# Patient Record
Sex: MALE | NOT HISPANIC OR LATINO | Employment: FULL TIME | URBAN - METROPOLITAN AREA
[De-identification: names, ages, dates, MRNs, and addresses within clinical notes are randomized per-mention and may not be internally consistent; named-entity substitution may affect disease eponyms.]

---

## 2018-06-06 ENCOUNTER — OFFICE VISIT (OUTPATIENT)
Dept: INTERNAL MEDICINE | Facility: CLINIC | Age: 33
End: 2018-06-06
Payer: COMMERCIAL

## 2018-06-06 VITALS
OXYGEN SATURATION: 99 % | HEIGHT: 66 IN | WEIGHT: 151.6 LBS | RESPIRATION RATE: 16 BRPM | TEMPERATURE: 98.2 F | SYSTOLIC BLOOD PRESSURE: 120 MMHG | DIASTOLIC BLOOD PRESSURE: 70 MMHG | HEART RATE: 71 BPM | BODY MASS INDEX: 24.36 KG/M2

## 2018-06-06 DIAGNOSIS — Z11.4 SCREENING FOR HIV (HUMAN IMMUNODEFICIENCY VIRUS): ICD-10-CM

## 2018-06-06 DIAGNOSIS — Z00.00 ROUTINE GENERAL MEDICAL EXAMINATION AT A HEALTH CARE FACILITY: Primary | ICD-10-CM

## 2018-06-06 DIAGNOSIS — Z13.1 SCREENING FOR DIABETES MELLITUS: ICD-10-CM

## 2018-06-06 DIAGNOSIS — R39.9 SYMPTOMS INVOLVING URINARY SYSTEM: ICD-10-CM

## 2018-06-06 DIAGNOSIS — M79.672 PAIN IN BOTH FEET: ICD-10-CM

## 2018-06-06 DIAGNOSIS — M79.671 PAIN IN BOTH FEET: ICD-10-CM

## 2018-06-06 DIAGNOSIS — Z13.6 CARDIOVASCULAR SCREENING; LDL GOAL LESS THAN 160: ICD-10-CM

## 2018-06-06 LAB
ALBUMIN UR-MCNC: NEGATIVE MG/DL
ANION GAP SERPL CALCULATED.3IONS-SCNC: 6 MMOL/L (ref 3–14)
APPEARANCE UR: CLEAR
BILIRUB UR QL STRIP: NEGATIVE
BUN SERPL-MCNC: 8 MG/DL (ref 7–30)
CALCIUM SERPL-MCNC: 9.4 MG/DL (ref 8.5–10.1)
CHLORIDE SERPL-SCNC: 102 MMOL/L (ref 94–109)
CHOLEST SERPL-MCNC: 182 MG/DL
CO2 SERPL-SCNC: 31 MMOL/L (ref 20–32)
COLOR UR AUTO: YELLOW
CREAT SERPL-MCNC: 0.8 MG/DL (ref 0.66–1.25)
FOLATE SERPL-MCNC: 23.4 NG/ML
GFR SERPL CREATININE-BSD FRML MDRD: >90 ML/MIN/1.7M2
GLUCOSE SERPL-MCNC: 91 MG/DL (ref 70–99)
GLUCOSE UR STRIP-MCNC: NEGATIVE MG/DL
HDLC SERPL-MCNC: 39 MG/DL
HGB UR QL STRIP: ABNORMAL
KETONES UR STRIP-MCNC: NEGATIVE MG/DL
LDLC SERPL CALC-MCNC: 113 MG/DL
LEUKOCYTE ESTERASE UR QL STRIP: NEGATIVE
NITRATE UR QL: NEGATIVE
NONHDLC SERPL-MCNC: 143 MG/DL
PH UR STRIP: 6.5 PH (ref 5–7)
POTASSIUM SERPL-SCNC: 3.8 MMOL/L (ref 3.4–5.3)
RBC #/AREA URNS AUTO: ABNORMAL /HPF
SODIUM SERPL-SCNC: 139 MMOL/L (ref 133–144)
SOURCE: ABNORMAL
SP GR UR STRIP: <=1.005 (ref 1–1.03)
TRIGL SERPL-MCNC: 152 MG/DL
UROBILINOGEN UR STRIP-ACNC: 0.2 EU/DL (ref 0.2–1)
VIT B12 SERPL-MCNC: 168 PG/ML (ref 193–986)
WBC #/AREA URNS AUTO: ABNORMAL /HPF

## 2018-06-06 PROCEDURE — 82746 ASSAY OF FOLIC ACID SERUM: CPT | Performed by: INTERNAL MEDICINE

## 2018-06-06 PROCEDURE — 80048 BASIC METABOLIC PNL TOTAL CA: CPT | Performed by: INTERNAL MEDICINE

## 2018-06-06 PROCEDURE — 36415 COLL VENOUS BLD VENIPUNCTURE: CPT | Performed by: INTERNAL MEDICINE

## 2018-06-06 PROCEDURE — 99385 PREV VISIT NEW AGE 18-39: CPT | Performed by: INTERNAL MEDICINE

## 2018-06-06 PROCEDURE — 82607 VITAMIN B-12: CPT | Performed by: INTERNAL MEDICINE

## 2018-06-06 PROCEDURE — 81001 URINALYSIS AUTO W/SCOPE: CPT | Performed by: INTERNAL MEDICINE

## 2018-06-06 PROCEDURE — 87389 HIV-1 AG W/HIV-1&-2 AB AG IA: CPT | Performed by: INTERNAL MEDICINE

## 2018-06-06 PROCEDURE — 80061 LIPID PANEL: CPT | Performed by: INTERNAL MEDICINE

## 2018-06-06 NOTE — LETTER
6/20/2018         Brandon Ordoñez  [address] [facility] [address]            Dear Mr. Ordoñez,    I am writing to inform you of the lab tests you had performed recently.      Your cholesterol results are as follows:    Lab Results   Component Value Date    CHOL 182 06/06/2018    HDL 39 06/06/2018     06/06/2018    TRIG 152 06/06/2018       Additional lab results are as follows:    Kidney function: NORMAL  Urine: NORMAL  Electrolytes: NORMAL  Glucose: NORMAL  Screening test for HIV: NEGATIVE  B12 level: ABNORMALLY LOW    You appear to have some B12 deficiency, which could be contributing to some of your symptoms.  Please follow-up with me in the next few weeks to discuss this further.    Thank you for allowing me to participate in your care.  If you have further questions, please contact us at [phone].      Sincerely,        DEVIN Mirza MD  Dept. of Internal Medicine  [facility]

## 2018-06-06 NOTE — PROGRESS NOTES
SUBJECTIVE:   CC: Brandon Ordoñez is an 33 year old male who presents for preventative health visit.     Healthy Habits:    Do you get at least three servings of calcium containing foods daily (dairy, green leafy vegetables, etc.)? yes    Amount of exercise or daily activities, outside of work: on the job which is like excercise    Problems taking medications regularly No    Medication side effects: No    Have you had an eye exam in the past two years? yes    Do you see a dentist twice per year? no    Do you have sleep apnea, excessive snoring or daytime drowsiness?some drowsiness in the daytime           Today's PHQ-2 Score:   PHQ-2 ( 1999 Pfizer) 6/6/2018   Q1: Little interest or pleasure in doing things 0   Q2: Feeling down, depressed or hopeless 0   PHQ-2 Score 0       Abuse: Current or Past(Physical, Sexual or Emotional)- No  Do you feel safe in your environment - Yes    Social History   Substance Use Topics     Smoking status: Never Smoker     Smokeless tobacco: Never Used     Alcohol use Yes      Comment: Occasional if there is a party       If you drink alcohol do you typically have >3 drinks per day or >7 drinks per week? Not Applicable                      Last PSA: No results found for: PSA    Reviewed orders with patient. Reviewed health maintenance and updated orders accordingly - Yes      Reviewed and updated as needed this visit by clinical staff  Tobacco  Allergies  Meds  Problems  Soc Hx        Reviewed and updated as needed this visit by Provider  Allergies  Meds  Problems          Today's exam accomplished with assistance from phone .    Patient is establishing care today, immigrated to this [place] approximately 8 months ago, did go through routine TB screening process and is up-to-date on tetanus immunization.    Complaining of periods where his urine is darker than usual.  Denies obvious pain or burning with urination, no blood in urine.    Also complains of  "intermittent bilateral foot \"burning.\"  Seems to be worse after prolonged standing and walking.  He is currently employed as a , is on his feet most of the day.    ROS:  CONSTITUTIONAL: NEGATIVE for fever, chills, change in weight  INTEGUMENTARY/SKIN: NEGATIVE for worrisome rashes, moles or lesions  EYES: NEGATIVE for vision changes or irritation  ENT: NEGATIVE for ear, mouth and throat problems  RESP: NEGATIVE for significant cough or SOB  CV: NEGATIVE for chest pain, palpitations or peripheral edema  GI: NEGATIVE for nausea, abdominal pain, heartburn, or change in bowel habits   male: negative for dysuria, hematuria, decreased urinary stream, erectile dysfunction, urethral discharge  MUSCULOSKELETAL: NEGATIVE for significant arthralgias or myalgia  NEURO: NEGATIVE for weakness, dizziness or paresthesias  PSYCHIATRIC: NEGATIVE for changes in mood or affect    OBJECTIVE:   /70  Pulse 71  Temp 98.2  F (36.8  C) (Oral)  Resp 16  Ht 5' 6\" (1.676 m)  Wt 151 lb 9.6 oz (68.8 kg)  SpO2 99%  BMI 24.47 kg/m2  EXAM:  GENERAL: healthy, alert and no distress  NECK: no adenopathy, no asymmetry, masses, or scars and thyroid normal to palpation  RESP: lungs clear to auscultation - no rales, rhonchi or wheezes  CV: regular rate and rhythm, normal S1 S2, no S3 or S4, no murmur, click or rub, no peripheral edema and peripheral pulses strong  ABDOMEN: soft, nontender, no hepatosplenomegaly, no masses and bowel sounds normal  MS: no gross musculoskeletal defects noted, no edema    ASSESSMENT/PLAN:   1. Routine general medical examination at a health care facility  Discussed cardiac disease risk factor modification including screening for and treating HTN, lipids, DM, and avoidance of tobacco.  Also discussed age appropriate cancer screening recommendations including testicular, prostate, colon and lung cancer as dictated by age group.  Recommended low fat, low salt diet and moderation in any alcohol intake. " " Recommended always using seatbelts when in a car.  Recommended never driving after drinking or riding with someone who has been drinking as well.     2. Screening for HIV (human immunodeficiency virus)  Pt agrees to screening  - HIV Antigen Antibody Combo    3. CARDIOVASCULAR SCREENING; LDL GOAL LESS THAN 160    - Lipid panel reflex to direct LDL Fasting    4. Screening for diabetes mellitus    - Basic metabolic panel  (Ca, Cl, CO2, Creat, Gluc, K, Na, BUN)    5. Symptoms involving urinary system    - UA with Microscopic reflex to Culture    6. Pain in both feet    - Folate  - Vitamin B12    COUNSELING:  Reviewed preventive health counseling, as reflected in patient instructions       reports that he has never smoked. He has never used smokeless tobacco.    Estimated body mass index is 24.47 kg/(m^2) as calculated from the following:    Height as of this encounter: 5' 6\" (1.676 m).    Weight as of this encounter: 151 lb 9.6 oz (68.8 kg).       Counseling Resources:  ATP IV Guidelines  Pooled Cohorts Equation Calculator  FRAX Risk Assessment  ICSI Preventive Guidelines  Dietary Guidelines for Americans, 2010  USDA's MyPlate  ASA Prophylaxis  Lung CA Screening    Angelo Mirza MD  [facility]  "

## 2018-06-07 LAB — HIV 1+2 AB+HIV1 P24 AG SERPL QL IA: NONREACTIVE

## 2018-07-30 ENCOUNTER — OFFICE VISIT (OUTPATIENT)
Dept: INTERNAL MEDICINE | Facility: CLINIC | Age: 33
End: 2018-07-30
Payer: COMMERCIAL

## 2018-07-30 VITALS
TEMPERATURE: 98.5 F | HEART RATE: 67 BPM | OXYGEN SATURATION: 99 % | WEIGHT: 150.6 LBS | DIASTOLIC BLOOD PRESSURE: 80 MMHG | SYSTOLIC BLOOD PRESSURE: 115 MMHG | BODY MASS INDEX: 24.31 KG/M2

## 2018-07-30 DIAGNOSIS — E53.8 B12 DEFICIENCY: Primary | ICD-10-CM

## 2018-07-30 DIAGNOSIS — Z23 NEED FOR PROPHYLACTIC VACCINATION WITH TETANUS-DIPHTHERIA (TD): ICD-10-CM

## 2018-07-30 LAB
ERYTHROCYTE [DISTWIDTH] IN BLOOD BY AUTOMATED COUNT: 11.8 % (ref 10–15)
HCT VFR BLD AUTO: 45.2 % (ref 40–53)
HGB BLD-MCNC: 15.6 G/DL (ref 13.3–17.7)
MCH RBC QN AUTO: 29.6 PG (ref 26.5–33)
MCHC RBC AUTO-ENTMCNC: 34.5 G/DL (ref 31.5–36.5)
MCV RBC AUTO: 86 FL (ref 78–100)
PLATELET # BLD AUTO: 236 10E9/L (ref 150–450)
RBC # BLD AUTO: 5.27 10E12/L (ref 4.4–5.9)
WBC # BLD AUTO: 7.4 10E9/L (ref 4–11)

## 2018-07-30 PROCEDURE — 90715 TDAP VACCINE 7 YRS/> IM: CPT | Performed by: INTERNAL MEDICINE

## 2018-07-30 PROCEDURE — 85027 COMPLETE CBC AUTOMATED: CPT | Performed by: INTERNAL MEDICINE

## 2018-07-30 PROCEDURE — 99214 OFFICE O/P EST MOD 30 MIN: CPT | Mod: 25 | Performed by: INTERNAL MEDICINE

## 2018-07-30 PROCEDURE — 90471 IMMUNIZATION ADMIN: CPT | Performed by: INTERNAL MEDICINE

## 2018-07-30 PROCEDURE — 36415 COLL VENOUS BLD VENIPUNCTURE: CPT | Performed by: INTERNAL MEDICINE

## 2018-07-30 NOTE — PROGRESS NOTES
SUBJECTIVE:   Brandon Ordoñez is a 33 year old male who presents to clinic today for the following health issues:      Pt is here to follow up on a letter he received with lab results from 6/20 stating that he had a B-12 deficiency and to further discuss.         Problem list and histories reviewed & adjusted, as indicated.  Additional history: as documented    Labs at last visit had shown B12 deficiency.  Patient continues to complain of generalized fatigue.    Reviewed and updated as needed this visit by clinical staff  Tobacco  Allergies  Meds  Problems       Reviewed and updated as needed this visit by Provider  Allergies  Meds  Problems         ROS:  Constitutional, HEENT, cardiovascular, pulmonary, GI, , musculoskeletal, neuro, skin, endocrine and psych systems are negative, except as otherwise noted.    OBJECTIVE:     /80  Pulse 67  Temp 98.5  F (36.9  C) (Oral)  Wt 150 lb 9.6 oz (68.3 kg)  SpO2 99%  BMI 24.31 kg/m2  Body mass index is 24.31 kg/(m^2).  GENERAL: healthy, alert and no distress  NECK: no adenopathy, no asymmetry, masses, or scars and thyroid normal to palpation  RESP: lungs clear to auscultation - no rales, rhonchi or wheezes  CV: regular rate and rhythm, normal S1 S2, no S3 or S4, no murmur, click or rub, no peripheral edema and peripheral pulses strong  ABDOMEN: soft, nontender, no hepatosplenomegaly, no masses and bowel sounds normal  MS: no gross musculoskeletal defects noted, no edema        ASSESSMENT/PLAN:     1. B12 deficiency  Start B12 supplementation once daily, check CBC today.  Recheck B12 levels in another 3 months.  - Cyanocobalamin (B-12) 1000 MCG TBCR; Take 1,000 mcg by mouth daily  Dispense: 100 tablet; Refill: 1  - CBC with platelets  - Vitamin B12; Future    2. Need for prophylactic vaccination with tetanus-diphtheria (TD)    - TDAP VACCINE (ADACEL)        Angelo Mirza MD  [facility]

## 2018-07-30 NOTE — PATIENT INSTRUCTIONS
- I will contact you with lab results from today.     - Start taking vitamin B12 supplement, once daily.  (Prescription has been sent to your pharmacy)    - Please come in for non-fasting labs in 3 months.  I will be in touch with you when I receive the results.

## 2018-07-30 NOTE — NURSING NOTE
Screening Questionnaire for Adult Immunization    Are you sick today?   No   Do you have allergies to medications, food, a vaccine component or latex?   No   Have you ever had a serious reaction after receiving a vaccination?   No   Do you have a long-term health problem with heart disease, lung disease, asthma, kidney disease, metabolic disease (e.g. diabetes), anemia, or other blood disorder?   No   Do you have cancer, leukemia, HIV/AIDS, or any other immune system problem?   No   In the past 3 months, have you taken medications that affect  your immune system, such as prednisone, other steroids, or anticancer drugs; drugs for the treatment of rheumatoid arthritis, Crohn s disease, or psoriasis; or have you had radiation treatments?   No   Have you had a seizure, or a brain or other nervous system problem?   No   During the past year, have you received a transfusion of blood or blood     products, or been given immune (gamma) globulin or antiviral drug?   No   For women: Are you pregnant or is there a chance you could become        pregnant during the next month?   No   Have you received any vaccinations in the past 4 weeks?   No     Immunization questionnaire answers were all negative.        Per orders of Dr. Mirza, injection of TDAP Adacel given by Jeaneth Melo. Patient instructed to remain in clinic for 15 minutes afterwards, and to report any adverse reaction to me immediately.       Screening performed by Jeaneth Melo on 7/30/2018 at 11:16 AM.    Due to injection administration, patient instructed to remain in clinic for 15 minutes  afterwards, and to report any adverse reaction to me immediately.

## 2018-07-30 NOTE — LETTER
7/30/2018         Brandon Silveirajose e  [address] [facility] [address]            Dear Mr. Ordoñez,    I am writing to inform you of the lab tests you had performed recently.      Your lab results are as follows:    Hemoglobin: NORMAL    Your lab testing today was perfectly normal.  Please continue the B12 supplement that we recommended, and follow-up for repeat lab testing in 3 months as we have discussed.    Thank you for allowing me to participate in your care.  If you have further questions, please contact us at [phone].      Sincerely,        DEVIN Mirza MD  Dept. of Internal Medicine  [facility]

## 2018-07-30 NOTE — MR AVS SNAPSHOT
"              After Visit Summary   7/30/2018    Brandon Ordoñez    MRN: [id number]           Patient Information     Date Of Birth          1985        Visit Information        Provider Department      7/30/2018 10:30 AM Open, Aime; Angelo Mirza MD [facility]        Today's Diagnoses     Need for prophylactic vaccination with tetanus-diphtheria (TD)    -  1    B12 deficiency          Care Instructions    - I will contact you with lab results from today.     - Start taking vitamin B12 supplement, once daily.  (Prescription has been sent to your pharmacy)    - Please come in for non-fasting labs in 3 months.  I will be in touch with you when I receive the results.             Follow-ups after your visit        Future tests that were ordered for you today     Open Future Orders        Priority Expected Expires Ordered    Vitamin B12 Routine 10/30/2018 12/30/2018 7/30/2018            Who to contact     If you have questions or need follow up information about today's clinic visit or your schedule please contact [facility] directly at [phone].  Normal or non-critical lab and imaging results will be communicated to you by MyChart, letter or phone within 4 business days after the clinic has received the results. If you do not hear from us within 7 days, please contact the clinic through [employer] or phone. If you have a critical or abnormal lab result, we will notify you by phone as soon as possible.  Submit refill requests through [employer] or call your pharmacy and they will forward the refill request to us. Please allow 3 business days for your refill to be completed.          Additional Information About Your Visit        [employer] Information     [employer] lets you send messages to your doctor, view your test results, renew your prescriptions, schedule appointments and more. To sign up, go to [email or web address] . Click on \"Log in\" on the " "left side of the screen, which will take you to the Welcome page. Then click on \"Sign up Now\" on the right side of the page.     You will be asked to enter the access code listed below, as well as some personal information. Please follow the directions to create your username and password.     Your access code is: [email or web address]  Expires: 8/15/2018  4:54 PM     Your access code will  in 90 days. If you need help or a new code, please call your [facility] or [phone].        Care EveryWhere ID     This is your Care EveryWhere ID. This could be used by other organizations to access your [place] medical records  [id number]        Your Vitals Were     Pulse Temperature Pulse Oximetry BMI (Body Mass Index)          67 98.5  F (36.9  C) (Oral) 99% 24.31 kg/m2         Blood Pressure from Last 3 Encounters:   18 115/80   18 120/70    Weight from Last 3 Encounters:   18 150 lb 9.6 oz (68.3 kg)   18 151 lb 9.6 oz (68.8 kg)              We Performed the Following     CBC with platelets     TDAP VACCINE (ADACEL)          Today's Medication Changes          These changes are accurate as of 18 11:08 AM.  If you have any questions, ask your nurse or doctor.               Start taking these medicines.        Dose/Directions    B-12 1000 MCG Tbcr   Used for:  B12 deficiency   Started by:  Angelo Mirza MD        Dose:  1000 mcg   Take 1,000 mcg by mouth daily   Quantity:  100 tablet   Refills:  1            Where to get your medicines      These medications were sent to [facility] [address] [facility] [address] AT [facility] [address], [facility] [address]     Phone:  [phone]     B-12 1000 MCG Tbcr                Primary Care Provider Fax #    Physician No Ref-Primary [phone]       No address on file        Equal Access to Services     EDWARD GODFREY AH: Yadira Hector, lino carpenter, marie ch, " malka noelarlet schmitt'aan ah. So [facility] [phone].    ATENCIÓN: Si habla bebe, tiene a [place] disposición servicios gratuitos de asistencia lingüística. Amy al [phone].    We comply with applicable federal civil rights laws and [place] laws. We do not discriminate on the basis of race, color, national origin, age, disability, sex, sexual orientation, or gender identity.            Thank you!     Thank you for choosing [facility]  for your care. Our goal is always to provide you with excellent care. Hearing back from our patients is one way we can continue to improve our services. Please take a few minutes to complete the written survey that you may receive in the mail after your visit with us. Thank you!             Your Updated Medication List - Protect others around you: Learn how to safely use, store and throw away your medicines at www.disposemymeds.org.          This list is accurate as of 7/30/18 11:08 AM.  Always use your most recent med list.                   Brand Name Dispense Instructions for use Diagnosis    B-12 1000 MCG Tbcr     100 tablet    Take 1,000 mcg by mouth daily    B12 deficiency

## 2018-11-21 DIAGNOSIS — E53.8 B12 DEFICIENCY: ICD-10-CM

## 2018-11-21 LAB — VIT B12 SERPL-MCNC: 370 PG/ML (ref 193–986)

## 2018-11-21 PROCEDURE — 36415 COLL VENOUS BLD VENIPUNCTURE: CPT | Performed by: INTERNAL MEDICINE

## 2018-11-21 PROCEDURE — 82607 VITAMIN B-12: CPT | Performed by: INTERNAL MEDICINE

## 2019-12-09 ENCOUNTER — OFFICE VISIT (OUTPATIENT)
Dept: INTERNAL MEDICINE | Facility: CLINIC | Age: 34
End: 2019-12-09
Payer: COMMERCIAL

## 2019-12-09 VITALS
DIASTOLIC BLOOD PRESSURE: 80 MMHG | OXYGEN SATURATION: 98 % | HEART RATE: 74 BPM | TEMPERATURE: 97.9 F | SYSTOLIC BLOOD PRESSURE: 120 MMHG | WEIGHT: 162 LBS | HEIGHT: 67 IN | BODY MASS INDEX: 25.43 KG/M2 | RESPIRATION RATE: 16 BRPM

## 2019-12-09 DIAGNOSIS — Z13.1 SCREENING FOR DIABETES MELLITUS: ICD-10-CM

## 2019-12-09 DIAGNOSIS — Z00.00 ROUTINE GENERAL MEDICAL EXAMINATION AT A HEALTH CARE FACILITY: Primary | ICD-10-CM

## 2019-12-09 DIAGNOSIS — E53.8 VITAMIN B12 DEFICIENCY (NON ANEMIC): ICD-10-CM

## 2019-12-09 DIAGNOSIS — Z13.6 CARDIOVASCULAR SCREENING; LDL GOAL LESS THAN 160: ICD-10-CM

## 2019-12-09 DIAGNOSIS — E53.8 B12 DEFICIENCY: ICD-10-CM

## 2019-12-09 DIAGNOSIS — Z01.84 IMMUNITY STATUS TESTING: ICD-10-CM

## 2019-12-09 LAB
ANION GAP SERPL CALCULATED.3IONS-SCNC: 4 MMOL/L (ref 3–14)
BUN SERPL-MCNC: 13 MG/DL (ref 7–30)
CALCIUM SERPL-MCNC: 9.2 MG/DL (ref 8.5–10.1)
CHLORIDE SERPL-SCNC: 103 MMOL/L (ref 94–109)
CHOLEST SERPL-MCNC: 201 MG/DL
CO2 SERPL-SCNC: 30 MMOL/L (ref 20–32)
CREAT SERPL-MCNC: 0.79 MG/DL (ref 0.66–1.25)
GFR SERPL CREATININE-BSD FRML MDRD: >90 ML/MIN/{1.73_M2}
GLUCOSE SERPL-MCNC: 99 MG/DL (ref 70–99)
HDLC SERPL-MCNC: 39 MG/DL
LDLC SERPL CALC-MCNC: 127 MG/DL
NONHDLC SERPL-MCNC: 162 MG/DL
POTASSIUM SERPL-SCNC: 4 MMOL/L (ref 3.4–5.3)
SODIUM SERPL-SCNC: 137 MMOL/L (ref 133–144)
TRIGL SERPL-MCNC: 173 MG/DL
VIT B12 SERPL-MCNC: 146 PG/ML (ref 193–986)

## 2019-12-09 PROCEDURE — 86765 RUBEOLA ANTIBODY: CPT | Performed by: INTERNAL MEDICINE

## 2019-12-09 PROCEDURE — 80048 BASIC METABOLIC PNL TOTAL CA: CPT | Performed by: INTERNAL MEDICINE

## 2019-12-09 PROCEDURE — 99395 PREV VISIT EST AGE 18-39: CPT | Performed by: INTERNAL MEDICINE

## 2019-12-09 PROCEDURE — 86762 RUBELLA ANTIBODY: CPT | Performed by: INTERNAL MEDICINE

## 2019-12-09 PROCEDURE — 86735 MUMPS ANTIBODY: CPT | Performed by: INTERNAL MEDICINE

## 2019-12-09 PROCEDURE — 80061 LIPID PANEL: CPT | Performed by: INTERNAL MEDICINE

## 2019-12-09 PROCEDURE — 36415 COLL VENOUS BLD VENIPUNCTURE: CPT | Performed by: INTERNAL MEDICINE

## 2019-12-09 PROCEDURE — 82607 VITAMIN B-12: CPT | Performed by: INTERNAL MEDICINE

## 2019-12-09 ASSESSMENT — MIFFLIN-ST. JEOR: SCORE: 1638.46

## 2019-12-09 NOTE — LETTER
12/10/2019         Brandon Ordoñez  [address] [place] [address]            Dear Mr. Ordoñez,    I am writing to inform you of the lab tests you had performed recently.      Your cholesterol results are as follows:    Lab Results   Component Value Date    HDL 39 12/09/2019     12/09/2019    CHOL 201 12/09/2019    TRIG 173 12/09/2019       Additional lab results are as follows:    Kidney function: NORMAL  Hemoglobin: NORMAL  Electrolytes: NORMAL  Glucose: NORMAL  B12 Level: LOW AGAIN, LIKE LAST YEAR  Measles/Mumps/Rubella (MMR) serology: IMMUNE    Your lab testing again shows a slight deficiency and vitamin B12.  I have sent a new prescription for the once daily vitamin B12 supplement to your pharmacy, please start taking this again.    The lab testing also shows that you do have immunity to measles/mumps/rubella (MMR).  You do not need to be vaccinated against these diseases.    Thank you for allowing me to participate in your care.  If you have further questions, please contact us at [phone].      Sincerely,        DEVIN Mirza MD  Dept. of Internal Medicine  [facility]

## 2019-12-09 NOTE — PROGRESS NOTES
3  SUBJECTIVE:   CC: Brandon Ordoñez is an 34 year old male who presents for preventive health visit.     Healthy Habits:    Do you get at least three servings of calcium containing foods daily (dairy, green leafy vegetables, etc.)? yes    Amount of exercise or daily activities, outside of work: 1 day(s) per week    Problems taking medications regularly not applicable    Medication side effects: No    Have you had an eye exam in the past two years? no    Do you see a dentist twice per year? yes    Do you have sleep apnea, excessive snoring or daytime drowsiness?no          Today's PHQ-2 Score:   PHQ-2 ( 1999 Pfizer) 12/9/2019 6/6/2018   Q1: Little interest or pleasure in doing things 0 0   Q2: Feeling down, depressed or hopeless 0 0   PHQ-2 Score 0 0       Abuse: Current or Past(Physical, Sexual or Emotional)- No  Do you feel safe in your environment? Yes        Social History     Tobacco Use     Smoking status: Never Smoker     Smokeless tobacco: Never Used   Substance Use Topics     Alcohol use: Yes     Comment: Occasional if there is a party      If you drink alcohol do you typically have >3 drinks per day or >7 drinks per week? No                      Last PSA: No results found for: PSA    Reviewed orders with patient. Reviewed health maintenance and updated orders accordingly - Yes      Reviewed and updated as needed this visit by clinical staff  Tobacco  Allergies  Meds  Problems  Med Hx  Surg Hx  Fam Hx         Reviewed and updated as needed this visit by Provider  Tobacco  Allergies  Meds  Problems  Med Hx  Surg Hx  Fam Hx            Patient is planning to start school at [employer] in the near future, needs proof of immunity to MMR, as well as record of receiving diphtheria and tetanus immunization.  He received a Tdap at this clinic last year.  He states he had paperwork that proved MMR immunity when he came to this country 2 years ago, but this paperwork has now  "apparently been misplaced.    No other acute issues to discuss today, he has been fasting.  At last years visit we demonstrated B12 deficiency, which corrected with subsequent supplementation, however he stopped this daily supplement several months ago.      ROS:  CONSTITUTIONAL: NEGATIVE for fever, chills, change in weight  INTEGUMENTARY/SKIN: NEGATIVE for worrisome rashes, moles or lesions  EYES: NEGATIVE for vision changes or irritation  ENT: NEGATIVE for ear, mouth and throat problems  RESP: NEGATIVE for significant cough or SOB  CV: NEGATIVE for chest pain, palpitations or peripheral edema  GI: NEGATIVE for nausea, abdominal pain, heartburn, or change in bowel habits   male: negative for dysuria, hematuria, decreased urinary stream, erectile dysfunction, urethral discharge  MUSCULOSKELETAL: NEGATIVE for significant arthralgias or myalgia  NEURO: NEGATIVE for weakness, dizziness or paresthesias  PSYCHIATRIC: NEGATIVE for changes in mood or affect    OBJECTIVE:   /80 (BP Location: Left arm, Patient Position: Chair, Cuff Size: Adult Large)   Pulse 74   Temp 97.9  F (36.6  C) (Oral)   Resp 16   Ht 1.702 m (5' 7\")   Wt 73.5 kg (162 lb)   SpO2 98%   BMI 25.37 kg/m    EXAM:  GENERAL: healthy, alert and no distress  NECK: no adenopathy, no asymmetry, masses, or scars and thyroid normal to palpation  RESP: lungs clear to auscultation - no rales, rhonchi or wheezes  CV: regular rate and rhythm, normal S1 S2, no S3 or S4, no murmur, click or rub, no peripheral edema and peripheral pulses strong  ABDOMEN: soft, nontender, no hepatosplenomegaly, no masses and bowel sounds normal  MS: no gross musculoskeletal defects noted, no edema        ASSESSMENT/PLAN:   1. Routine general medical examination at a health care facility  Discussed cardiac disease risk factor modification including screening for and treating HTN, lipids, DM, and smoking cessation.  Also discussed age appropriate cancer screening " "recommendations including testicular, prostate, colon and lung cancer as dictated by age group.  Recommended low fat, low salt diet and moderation in any alcohol intake.  Recommended always using seatbelts when in a car.  Recommended never driving after drinking or riding with someone who has been drinking as well.     2. CARDIOVASCULAR SCREENING; LDL GOAL LESS THAN 160    - Lipid panel reflex to direct LDL Fasting    3. Vitamin B12 deficiency (non anemic)    - Vitamin B12    4. Screening for diabetes mellitus    - Basic metabolic panel  (Ca, Cl, CO2, Creat, Gluc, K, Na, BUN)    5. Immunity status testing  Check MMR IgG serology today.  Gave patient record of his Tdap immunization received 1 year ago.  - Rubeola Antibody IgG  - Rubella Antibody IgG Quantitative  - Mumps Immune Status, IgG    COUNSELING:  Reviewed preventive health counseling, as reflected in patient instructions    Estimated body mass index is 25.37 kg/m  as calculated from the following:    Height as of this encounter: 1.702 m (5' 7\").    Weight as of this encounter: 73.5 kg (162 lb).    Weight management plan: Discussed healthy diet and exercise guidelines     reports that he has never smoked. He has never used smokeless tobacco.      Counseling Resources:  ATP IV Guidelines  Pooled Cohorts Equation Calculator  FRAX Risk Assessment  ICSI Preventive Guidelines  Dietary Guidelines for Americans, 2010  USDA's MyPlate  ASA Prophylaxis  Lung CA Screening    Angelo Mirza MD  [facility]  "

## 2019-12-10 LAB
MEV IGG SER QL IA: 2.6 AI (ref 0–0.8)
MUV IGG SER QL IA: 4.2 AI (ref 0–0.8)
RUBV IGG SERPL IA-ACNC: 86 IU/ML

## 2020-03-11 ENCOUNTER — HEALTH MAINTENANCE LETTER (OUTPATIENT)
Age: 35
End: 2020-03-11

## 2021-01-03 ENCOUNTER — HEALTH MAINTENANCE LETTER (OUTPATIENT)
Age: 36
End: 2021-01-03

## 2021-01-15 ENCOUNTER — HEALTH MAINTENANCE LETTER (OUTPATIENT)
Age: 36
End: 2021-01-15

## 2021-08-21 ENCOUNTER — VIRTUAL VISIT (OUTPATIENT)
Dept: URGENT CARE | Facility: CLINIC | Age: 36
End: 2021-08-21
Payer: COMMERCIAL

## 2021-08-21 DIAGNOSIS — Z20.822 SUSPECTED COVID-19 VIRUS INFECTION: ICD-10-CM

## 2021-08-21 DIAGNOSIS — R50.9 FEVER, UNSPECIFIED FEVER CAUSE: ICD-10-CM

## 2021-08-21 PROCEDURE — 99212 OFFICE O/P EST SF 10 MIN: CPT | Mod: TEL | Performed by: FAMILY MEDICINE

## 2021-08-21 NOTE — PROGRESS NOTES
The patient is being seen for a billable phone encounter.     Subjective   CC: Brandon Ordoñez  is a 36 year old male who presents via phone visit today for the following health issues:   Chief Complaint   Patient presents with     Cough     Fever     Covid Concern        Concern for COVID-19  About how many days ago did these symptoms start? Friday morning (2d)  Is this your first visit for this illness? Yes  In the 14 days before your symptoms started, have you had close contact with someone with COVID-19 (Coronavirus)? No but son was sick.   Do you have a fever or chills? Yes, the highest temperature was 101  Are you having new or worsening difficulty breathing? No  Do you have new or worsening cough? Yes, it's a dry cough.   Have you had any new or unexplained body aches? YES    Have you experienced any of the following NEW symptoms?    Headache: YES    Sore throat: YES    Loss of taste or smell: No    Chest pain: No    Diarrhea: No    Rash: No  What treatments have you tried? none  Who do you live with? Son and wife  Are you, or a household member, a healthcare worker or a ? No  Do you live in a nursing home, group home, or shelter? No  Do you have a way to get food/medications if quarantined? Yes, I have a friend or family member who can help me.                 Review of Systems   As above        Objective    Gen: Patient is alert, oriented  Resp: Speaking full sentence, no audible shortness of breath.  No cough of wheeze.          Assessment/Plan:  1. Fever, Suspected COVID-19 virus infection: Concern for covid vs other viral URI. Ordered covid test initially but he actually has an appointment already at [facility]. Will await results, but I wrote a work note for return depending on results.  Discussed concerning symptoms for which to be seen.  Also discussed need to continue quarantine while awaiting results and ongoing quarantine to be determined by test results.      Phone call  duration:  14 minutes  A professional Tamil  was used for this visit via phone.     Lydia Sandoval MD

## 2021-08-21 NOTE — LETTER
August 21, 2021  RE:  Brandon Ordoñez                                                                                                                  [address] [place] [address]      To whom it may concern:    I evaluated Brandon Ordoñez on August 21, 2021. Brandon Ordoñez should be excused from work/school due to needing testing for covid-19.      We can not give an exact date for return until he has completed his covid test as it depends on the information below.     Quarantine Guidelines:      If Brandon receives a positive COVID-19 test result, he can return to work on August 30th.        If he receives a negative COVID-19 test result, he may return to work when he has had no fever for at least 24 hours.       Sincerely,  Lydia Sandoval MD

## 2021-09-14 ENCOUNTER — OFFICE VISIT (OUTPATIENT)
Dept: URGENT CARE | Facility: URGENT CARE | Age: 36
End: 2021-09-14
Payer: COMMERCIAL

## 2021-09-14 VITALS
RESPIRATION RATE: 20 BRPM | TEMPERATURE: 98.4 F | HEART RATE: 78 BPM | OXYGEN SATURATION: 98 % | SYSTOLIC BLOOD PRESSURE: 138 MMHG | DIASTOLIC BLOOD PRESSURE: 78 MMHG

## 2021-09-14 DIAGNOSIS — H00.012 HORDEOLUM EXTERNUM OF RIGHT LOWER EYELID: Primary | ICD-10-CM

## 2021-09-14 DIAGNOSIS — H10.31 ACUTE CONJUNCTIVITIS OF RIGHT EYE, UNSPECIFIED ACUTE CONJUNCTIVITIS TYPE: ICD-10-CM

## 2021-09-14 PROCEDURE — 99214 OFFICE O/P EST MOD 30 MIN: CPT | Performed by: PHYSICIAN ASSISTANT

## 2021-09-14 RX ORDER — SULFAMETHOXAZOLE/TRIMETHOPRIM 800-160 MG
1 TABLET ORAL 2 TIMES DAILY
Qty: 20 TABLET | Refills: 0 | Status: SHIPPED | OUTPATIENT
Start: 2021-09-14 | End: 2021-09-24

## 2021-09-14 RX ORDER — POLYMYXIN B SULFATE AND TRIMETHOPRIM 1; 10000 MG/ML; [USP'U]/ML
1-2 SOLUTION OPHTHALMIC EVERY 4 HOURS
Qty: 5 ML | Refills: 0 | Status: SHIPPED | OUTPATIENT
Start: 2021-09-14 | End: 2021-09-21

## 2021-09-14 NOTE — PATIENT INSTRUCTIONS
Patient Education     What Is Conjunctivitis?  Conjunctivitis is an irritation or infection. It affects the membrane that covers the white of your eye and the inside of your eyelid (conjunctiva). It can happen to one or both eyes. The membrane swells and the blood vessels enlarge (dilate). This makes your eye red. That's why conjunctivitis is sometimes called red eye or pink eye.     What are the symptoms?  If you have one or more of these symptoms, see an eye healthcare provider:     Redness in and around your eye    Eyes that are puffy and sore    Itching, burning, or stinging eyes    Watery eyes or discharge from your eye    Eyelids that are crusty or stuck together when you wake up in the morning    Pink color in the whites of one or both eyes    Sensitivity to bright light  Getting treatment quickly can help prevent damage to your eyes.   How is it diagnosed?  Conjunctivitis is often a minor eye infection. But it can sometimes become a more serious problem. Some more serious eye diseases have symptoms that look like conjunctivitis. So it's important for an eye healthcare provider to diagnose you. Your eye healthcare provider will ask about your symptoms and any medicines you take. He or she will ask about any illnesses or health conditions you may have. The healthcare provider will also check your eyes with a hand-held light and a special microscope called a slit lamp.   [facility] last reviewed this educational content on 8/1/2020 2000-2021 [employer]. All rights reserved. This information is not intended as a substitute for professional medical care. Always follow your healthcare professional's instructions.           Patient Education     Sty (or Stye)  A sty is when the oil gland of the eyelid becomes inflamed. It may develop into an infection with a small pocket of pus (an abscess). This can cause pain, redness, and swelling. In early stages, a sty is treated with antibiotic cream, eye  drops, or a small towel soaked in warm water (a warm compress). More severe cases may need to be opened and drained by a healthcare provider.   Home care    Eye drops or ointment are often prescribed to treat the infection. Use these as directed.     Artificial tears may also be used to lubricate the eye and make it more comfortable. You can buy these over the counter without a prescription. Talk with your healthcare provider before using any over-the-counter treatment for a sty.    Apply a warm, damp towel to the affected area for at least 5 minutes, 3 to 4 times a day for a week. Warm compresses open the pores and speed the healing. Make sure the compresses are not too hot, as they may burn your eyelid.    Sometimes the sty will drain with this treatment alone. If this happens, keep using the antibiotic until all the redness and swelling are gone.    Wash your hands before and after touching the infected eyelid.    Don t squeeze or try to break open the sty.    Follow-up care  Follow up with your healthcare provider, or as advised.   When to seek medical advice  Call your healthcare provider or seek medical care right away if any of these occur:     Increase in swelling or redness around the eyelid after 48 to 72 hours    Increase in eye pain or the eyelid blisters    Increase in warmth--the eyelid feels hot    Drainage of blood or thick pus from the sty    Blister on the eyelid    Inability to open the eyelid due to swelling    Fever of 100.4 F (38 C) or above, or as directed by your provider    Vision changes    Headache or stiff neck    The sty comes back  [employer] last reviewed this educational content on 6/1/2020 2000-2021 [employer]. All rights reserved. This information is not intended as a substitute for professional medical care. Always follow your healthcare professional's instructions.

## 2021-09-16 NOTE — PROGRESS NOTES
SUBJECTIVE:  Brandon Ordoñez is a 36 year old male who presents to the clinic today for a rash.  Onset of rash was 2 week(s) ago.   Rash is sudden onset and still present.  Location of the rash: eyelid.  Quality/symptoms of rash: painful and red   Symptoms are mild and rash seems to be stable.  Previous history of a similar rash? No  Recent exposure history: none known    Associated symptoms include: nothing.    Past Medical History:   Diagnosis Date     NO ACTIVE PROBLEMS      Current Outpatient Medications   Medication Sig Dispense Refill     Cyanocobalamin (B-12) 1000 MCG TBCR Take 1,000 mcg by mouth daily 100 tablet 1     sulfamethoxazole-trimethoprim (BACTRIM DS) 800-160 MG tablet Take 1 tablet by mouth 2 times daily for 10 days 20 tablet 0     trimethoprim-polymyxin b (POLYTRIM) 78764-8.1 UNIT/ML-% ophthalmic solution Place 1-2 drops into both eyes every 4 hours for 7 days 5 mL 0     Social History     Tobacco Use     Smoking status: Never Smoker     Smokeless tobacco: Never Used   Substance Use Topics     Alcohol use: Yes     Comment: Occasional if there is a party        ROS:  10 point ROS negative except as listed above      EXAM:   /78   Pulse 78   Temp 98.4  F (36.9  C) (Tympanic)   Resp 20   SpO2 98%   GENERAL: alert, no acute distress.  SKIN: pea sized nodule of right lower lid, minimal erythema.  Tender.    GENERAL APPEARANCE: healthy, alert and no distress  EYES: EOMI,  PERRL, conjunctiva clear (with exception of bilateral congenital conjunctival darkness)  NECK: supple, non-tender to palpation, no adenopathy noted  RESP: lungs clear to auscultation - no rales, rhonchi or wheezes  CV: regular rates and rhythm, normal S1 S2, no murmur noted    ASSESSMENT:  (H00.012) Hordeolum externum of right lower eyelid  (primary encounter diagnosis)  Comment: covering for secondary bacteria  Plan: sulfamethoxazole-trimethoprim (BACTRIM DS)         800-160 MG tablet, Adult Eye Referral, Adult          Dermatology Referral, trimethoprim-polymyxin b         (POLYTRIM) 36155-2.1 UNIT/ML-% ophthalmic         solution      (H10.31) Acute conjunctivitis of right eye, unspecified acute conjunctivitis type  Comment: will cover for bacteria of conjunctiva due to trauma from nodule  Plan: trimethoprim-polymyxin b (POLYTRIM) 68177-3.1         UNIT/ML-% ophthalmic solution      Red flags and emergent follow up discussed, and understood by patient  Follow up with PCP if symptoms worsen or fail to improve      Patient Instructions     Patient Education     What Is Conjunctivitis?  Conjunctivitis is an irritation or infection. It affects the membrane that covers the white of your eye and the inside of your eyelid (conjunctiva). It can happen to one or both eyes. The membrane swells and the blood vessels enlarge (dilate). This makes your eye red. That's why conjunctivitis is sometimes called red eye or pink eye.     What are the symptoms?  If you have one or more of these symptoms, see an eye healthcare provider:     Redness in and around your eye    Eyes that are puffy and sore    Itching, burning, or stinging eyes    Watery eyes or discharge from your eye    Eyelids that are crusty or stuck together when you wake up in the morning    Pink color in the whites of one or both eyes    Sensitivity to bright light  Getting treatment quickly can help prevent damage to your eyes.   How is it diagnosed?  Conjunctivitis is often a minor eye infection. But it can sometimes become a more serious problem. Some more serious eye diseases have symptoms that look like conjunctivitis. So it's important for an eye healthcare provider to diagnose you. Your eye healthcare provider will ask about your symptoms and any medicines you take. He or she will ask about any illnesses or health conditions you may have. The healthcare provider will also check your eyes with a hand-held light and a special microscope called a slit lamp.   [employer] last  reviewed this educational content on 8/1/2020 2000-2021 [employer]. All rights reserved. This information is not intended as a substitute for professional medical care. Always follow your healthcare professional's instructions.           Patient Education     Sty (or Stye)  A sty is when the oil gland of the eyelid becomes inflamed. It may develop into an infection with a small pocket of pus (an abscess). This can cause pain, redness, and swelling. In early stages, a sty is treated with antibiotic cream, eye drops, or a small towel soaked in warm water (a warm compress). More severe cases may need to be opened and drained by a healthcare provider.   Home care    Eye drops or ointment are often prescribed to treat the infection. Use these as directed.     Artificial tears may also be used to lubricate the eye and make it more comfortable. You can buy these over the counter without a prescription. Talk with your healthcare provider before using any over-the-counter treatment for a sty.    Apply a warm, damp towel to the affected area for at least 5 minutes, 3 to 4 times a day for a week. Warm compresses open the pores and speed the healing. Make sure the compresses are not too hot, as they may burn your eyelid.    Sometimes the sty will drain with this treatment alone. If this happens, keep using the antibiotic until all the redness and swelling are gone.    Wash your hands before and after touching the infected eyelid.    Don t squeeze or try to break open the sty.    Follow-up care  Follow up with your healthcare provider, or as advised.   When to seek medical advice  Call your healthcare provider or seek medical care right away if any of these occur:     Increase in swelling or redness around the eyelid after 48 to 72 hours    Increase in eye pain or the eyelid blisters    Increase in warmth--the eyelid feels hot    Drainage of blood or thick pus from the sty    Blister on the eyelid    Inability  to open the eyelid due to swelling    Fever of 100.4 F (38 C) or above, or as directed by your provider    Vision changes    Headache or stiff neck    The sty comes back  [employer] last reviewed this educational content on 6/1/2020 2000-2021 [employer]. All rights reserved. This information is not intended as a substitute for professional medical care. Always follow your healthcare professional's instructions.

## 2021-10-10 ENCOUNTER — HEALTH MAINTENANCE LETTER (OUTPATIENT)
Age: 36
End: 2021-10-10

## 2022-01-29 ENCOUNTER — HEALTH MAINTENANCE LETTER (OUTPATIENT)
Age: 37
End: 2022-01-29

## 2022-05-03 ENCOUNTER — OFFICE VISIT (OUTPATIENT)
Dept: PEDIATRICS | Facility: CLINIC | Age: 37
End: 2022-05-03
Payer: COMMERCIAL

## 2022-05-03 VITALS
BODY MASS INDEX: 25.74 KG/M2 | DIASTOLIC BLOOD PRESSURE: 74 MMHG | OXYGEN SATURATION: 100 % | HEART RATE: 68 BPM | TEMPERATURE: 97.9 F | HEIGHT: 67 IN | WEIGHT: 164 LBS | RESPIRATION RATE: 14 BRPM | SYSTOLIC BLOOD PRESSURE: 130 MMHG

## 2022-05-03 DIAGNOSIS — R51.9 NONINTRACTABLE HEADACHE, UNSPECIFIED CHRONICITY PATTERN, UNSPECIFIED HEADACHE TYPE: Primary | ICD-10-CM

## 2022-05-03 DIAGNOSIS — Z11.59 NEED FOR HEPATITIS C SCREENING TEST: ICD-10-CM

## 2022-05-03 DIAGNOSIS — G47.26 SHIFT WORK SLEEP DISORDER: ICD-10-CM

## 2022-05-03 LAB
BASOPHILS # BLD AUTO: 0 10E3/UL (ref 0–0.2)
BASOPHILS NFR BLD AUTO: 0 %
EOSINOPHIL # BLD AUTO: 0.1 10E3/UL (ref 0–0.7)
EOSINOPHIL NFR BLD AUTO: 2 %
ERYTHROCYTE [DISTWIDTH] IN BLOOD BY AUTOMATED COUNT: 11.7 % (ref 10–15)
HCT VFR BLD AUTO: 41.2 % (ref 40–53)
HGB BLD-MCNC: 13.9 G/DL (ref 13.3–17.7)
LYMPHOCYTES # BLD AUTO: 2.2 10E3/UL (ref 0.8–5.3)
LYMPHOCYTES NFR BLD AUTO: 39 %
MCH RBC QN AUTO: 29.1 PG (ref 26.5–33)
MCHC RBC AUTO-ENTMCNC: 33.7 G/DL (ref 31.5–36.5)
MCV RBC AUTO: 86 FL (ref 78–100)
MONOCYTES # BLD AUTO: 0.5 10E3/UL (ref 0–1.3)
MONOCYTES NFR BLD AUTO: 9 %
NEUTROPHILS # BLD AUTO: 2.8 10E3/UL (ref 1.6–8.3)
NEUTROPHILS NFR BLD AUTO: 50 %
PLATELET # BLD AUTO: 237 10E3/UL (ref 150–450)
RBC # BLD AUTO: 4.78 10E6/UL (ref 4.4–5.9)
TSH SERPL DL<=0.005 MIU/L-ACNC: 0.74 MU/L (ref 0.4–4)
WBC # BLD AUTO: 5.7 10E3/UL (ref 4–11)

## 2022-05-03 PROCEDURE — 85025 COMPLETE CBC W/AUTO DIFF WBC: CPT | Performed by: NURSE PRACTITIONER

## 2022-05-03 PROCEDURE — 86803 HEPATITIS C AB TEST: CPT | Performed by: NURSE PRACTITIONER

## 2022-05-03 PROCEDURE — 84443 ASSAY THYROID STIM HORMONE: CPT | Performed by: NURSE PRACTITIONER

## 2022-05-03 PROCEDURE — 36415 COLL VENOUS BLD VENIPUNCTURE: CPT | Performed by: NURSE PRACTITIONER

## 2022-05-03 PROCEDURE — 99213 OFFICE O/P EST LOW 20 MIN: CPT | Performed by: NURSE PRACTITIONER

## 2022-05-03 NOTE — PROGRESS NOTES
"  Assessment & Plan     Nonintractable headache, unspecified chronicity pattern, unspecified headache type  Is more generalied, improved since ER visit. No red flag symptoms.     Need for hepatitis C screening test    - Hepatitis C Screen Reflex to HCV RNA Quant and Genotype; Future  - CBC with platelets and differential; Future  - TSH with free T4 reflex; Future  - Hepatitis C Screen Reflex to HCV RNA Quant and Genotype  - CBC with platelets and differential  - TSH with free T4 reflex    Shift work sleep disorder  He notes he sleeps 5-6 hours per night, works nights and gets up to help care for his toddler. He complains of fatigue and tiredness. Discussed these things are likely related. All other labs from ER are normal, will add tsh today.   - TSH with free T4 reflex; Future  - TSH with free T4 reflex       No follow-ups on file.    Jennifer Anderson, [facility] JENNIFER Taylor is a 36 year old who presents for the following health issues     HPI     ED/UC Followup:    Facility:  [facility]  Date of visit: 5/1/22  Reason for visit: exposure to chemicals  Current Status: headache at times and tiredness, maybe memory loss, digestion issues      Went to ER 2 days ago by EMS with concern for exposure of CO, level was 10 per 's investigation. He had confusion, strange breathing and headache. He had normal EKG, mild elevation of neut, otherwise normal labs including CO level.     Since the ER visit, he notes he has continued to have mild headache, mild R calf pain that extends to thigh and stomach and neck. He describes it as \"tightness,\" no redness or swelling of the calf. He denies vision changes, n/v/d/c, tolerating PO without problems. He feels the headache all over the head.     He works at [employer], as a technician.     Review of Systems   Constitutional, HEENT, cardiovascular, pulmonary, GI, , musculoskeletal, neuro, skin, endocrine and psych systems are " "negative, except as otherwise noted.      Objective    /74   Pulse 68   Temp 97.9  F (36.6  C)   Resp 14   Ht 1.702 m (5' 7\")   Wt 74.4 kg (164 lb)   SpO2 100%   BMI 25.69 kg/m    Body mass index is 25.69 kg/m .  Physical Exam   GENERAL: healthy, alert and no distress  EYES: Eyes grossly normal to inspection, PERRL and conjunctivae and sclerae normal  HENT: ear canals and TM's normal, nose and mouth without ulcers or lesions  NECK: no adenopathy, no asymmetry, masses, or scars and thyroid normal to palpation  RESP: lungs clear to auscultation - no rales, rhonchi or wheezes  CV: regular rate and rhythm, normal S1 S2, no S3 or S4, no murmur, click or rub, no peripheral edema and peripheral pulses strong  ABDOMEN: soft, nontender, no hepatosplenomegaly, no masses and bowel sounds normal  MS: no gross musculoskeletal defects noted, no edema  PSYCH: mentation appears normal, affect normal/bright              "

## 2022-05-03 NOTE — LETTER
May 3, 2022      Brandon Ordoñez  [address] [place] [address]        To Whom It May Concern:    Brandon Ordoñez  was seen on 5/3/2022, and was seen in ER 5/1/2022.  He has been absent from work, and plans on returning Thursday May 12, 2022 without restrictions.         Sincerely,        GWEN Sorensen CNP

## 2022-05-04 LAB — HCV AB SERPL QL IA: NONREACTIVE

## 2022-09-18 ENCOUNTER — HEALTH MAINTENANCE LETTER (OUTPATIENT)
Age: 37
End: 2022-09-18

## 2023-03-15 NOTE — MR AVS SNAPSHOT
After Visit Summary   6/6/2018    Brandon Ordoñez    MRN: [id number]           Patient Information     Date Of Birth          1985        Visit Information        Provider Department      6/6/2018 9:15 AM Angelo Mirza MD; PHONE,  [facility]        Today's Diagnoses     Routine general medical examination at a health care facility    -  1    Screening for HIV (human immunodeficiency virus)        CARDIOVASCULAR SCREENING; LDL GOAL LESS THAN 160        Screening for diabetes mellitus        Symptoms involving urinary system        Pain in both feet          Care Instructions      Preventive Health Recommendations  Male Ages 26 - 39    Yearly exam:             See your health care provider every year in order to  o   Review health changes.   o   Discuss preventive care.    o   Review your medicines if your doctor has prescribed any.    You should be tested each year for STDs (sexually transmitted diseases), if you re at risk.     After age 35, talk to your provider about cholesterol testing. If you are at risk for heart disease, have your cholesterol tested at least every 5 years.     If you are at risk for diabetes, you should have a diabetes test (fasting glucose).  Shots: Get a flu shot each year. Get a tetanus shot every 10 years.     Nutrition:    Eat at least 5 servings of fruits and vegetables daily.     Eat whole-grain bread, whole-wheat pasta and brown rice instead of white grains and rice.     Talk to your provider about Calcium and Vitamin D.     Lifestyle    Exercise for at least 150 minutes a week (30 minutes a day, 5 days a week). This will help you control your weight and prevent disease.     Limit alcohol to one drink per day.     No smoking.     Wear sunscreen to prevent skin cancer.     See your dentist every six months for an exam and cleaning.             Follow-ups after your visit        Who to contact     If you have questions or  Addended by: Rosa Diaz on: 4/1/2019 11:28 AM     Modules accepted: Orders "need follow up information about today's clinic visit or your schedule please contact [facility] directly at [phone].  Normal or non-critical lab and imaging results will be communicated to you by MyChart, letter or phone within 4 business days after the clinic has received the results. If you do not hear from us within 7 days, please contact the clinic through [employer] or phone. If you have a critical or abnormal lab result, we will notify you by phone as soon as possible.  Submit refill requests through [employer] or call your pharmacy and they will forward the refill request to us. Please allow 3 business days for your refill to be completed.          Additional Information About Your Visit        [employer] Information     [employer] lets you send messages to your doctor, view your test results, renew your prescriptions, schedule appointments and more. To sign up, go to [email or web address] . Click on \"Log in\" on the left side of the screen, which will take you to the Welcome page. Then click on \"Sign up Now\" on the right side of the page.     You will be asked to enter the access code listed below, as well as some personal information. Please follow the directions to create your username and password.     Your access code is: [email or web address]  Expires: 8/15/2018  4:54 PM     Your access code will  in 90 days. If you need help or a new code, please call your [place] clinic or [phone].        Care EveryWhere ID     This is your Care EveryWhere ID. This could be used by other organizations to access your [place] medical records  [id number]        Your Vitals Were     Pulse Temperature Respirations Height Pulse Oximetry BMI (Body Mass Index)    71 98.2  F (36.8  C) (Oral) 16 5' 6\" (1.676 m) 99% 24.47 kg/m2       Blood Pressure from Last 3 Encounters:   18 120/70    Weight from Last 3 Encounters:   18 151 lb 9.6 oz (68.8 kg)              We Performed the Following     " Basic metabolic panel  (Ca, Cl, CO2, Creat, Gluc, K, Na, BUN)     Folate     HIV Antigen Antibody Combo     Lipid panel reflex to direct LDL Fasting     UA with Microscopic reflex to Culture     Vitamin B12        Primary Care Provider Fax #    Physician No Ref-Primary [phone]       No address on file        Equal Access to Services     EDWARD GODFREY : Hadii aad ku hadjulianroberta Soomaali, waaxda luqadaha, qaybta kaalmada adeegyada, malka pandeytanmanuel barbour. So [facility] [phone].    ATENCIÓN: Si habla español, tiene a [place] disposición servicios gratuitos de asistencia lingüística. Llame al [phone].    We comply with applicable federal civil rights laws and [place] laws. We do not discriminate on the basis of race, color, national origin, age, disability, sex, sexual orientation, or gender identity.            Thank you!     Thank you for choosing [facility]  for your care. Our goal is always to provide you with excellent care. Hearing back from our patients is one way we can continue to improve our services. Please take a few minutes to complete the written survey that you may receive in the mail after your visit with us. Thank you!             Your Updated Medication List - Protect others around you: Learn how to safely use, store and throw away your medicines at www.disposemymeds.org.      Notice  As of 6/6/2018 10:09 AM    You have not been prescribed any medications.       Abdominal Pain, N/V/D

## 2023-04-06 ENCOUNTER — OFFICE VISIT (OUTPATIENT)
Dept: URGENT CARE | Facility: URGENT CARE | Age: 38
End: 2023-04-06
Payer: COMMERCIAL

## 2023-04-06 VITALS
RESPIRATION RATE: 16 BRPM | DIASTOLIC BLOOD PRESSURE: 72 MMHG | TEMPERATURE: 99.7 F | SYSTOLIC BLOOD PRESSURE: 100 MMHG | HEART RATE: 71 BPM | OXYGEN SATURATION: 97 %

## 2023-04-06 DIAGNOSIS — R07.0 THROAT PAIN: ICD-10-CM

## 2023-04-06 DIAGNOSIS — J06.9 VIRAL URI WITH COUGH: Primary | ICD-10-CM

## 2023-04-06 LAB
DEPRECATED S PYO AG THROAT QL EIA: NEGATIVE
FLUAV AG SPEC QL IA: NEGATIVE
FLUBV AG SPEC QL IA: NEGATIVE
GROUP A STREP BY PCR: NOT DETECTED
SARS-COV-2 RNA RESP QL NAA+PROBE: NEGATIVE

## 2023-04-06 PROCEDURE — 87651 STREP A DNA AMP PROBE: CPT | Performed by: NURSE PRACTITIONER

## 2023-04-06 PROCEDURE — U0005 INFEC AGEN DETEC AMPLI PROBE: HCPCS | Performed by: NURSE PRACTITIONER

## 2023-04-06 PROCEDURE — 87804 INFLUENZA ASSAY W/OPTIC: CPT | Performed by: NURSE PRACTITIONER

## 2023-04-06 PROCEDURE — 99213 OFFICE O/P EST LOW 20 MIN: CPT | Mod: CS | Performed by: NURSE PRACTITIONER

## 2023-04-06 PROCEDURE — U0003 INFECTIOUS AGENT DETECTION BY NUCLEIC ACID (DNA OR RNA); SEVERE ACUTE RESPIRATORY SYNDROME CORONAVIRUS 2 (SARS-COV-2) (CORONAVIRUS DISEASE [COVID-19]), AMPLIFIED PROBE TECHNIQUE, MAKING USE OF HIGH THROUGHPUT TECHNOLOGIES AS DESCRIBED BY CMS-2020-01-R: HCPCS | Performed by: NURSE PRACTITIONER

## 2023-04-06 NOTE — PATIENT INSTRUCTIONS
Discharge Instructions  Upper Respiratory Infection    The upper respiratory tract includes the sinuses, nasal passages, pharynx, and larynx. A URI, or upper respiratory infection, is an infection of any of the parts of the upper airway. Symptoms include runny nose, congestion, sore throat, cough, and fever. URIs are almost always caused by a virus. Antibiotics do not help with virus infections, so are not used for an ordinary URI. A URI is very contagious through coughing and nasal secretions; make sure you wash your hands often and clean surfaces after sneezing, coughing or touching them.  Viruses can live on surfaces for up to 3 days.      Return to the Emergency Department if:  Any of the symptoms you have get much worse.  You seem very sick, like being too weak to get up.  You have any new symptoms, especially serious things like chest pain.   You are short of breath.   You have a severe headache.  You are vomiting so much you can t keep fluids or medicines down.  You have confusion or seem unusually drowsy.  You have a seizure or convulsion.    Follow-up:    You should start to improve in 3 - 5 days.  A cough can linger for up to six weeks, but overall you should be feeling much better.  See your doctor if you have a fever for more than 3 days, or if you are not feeling better within 5 days.      What can I do to help myself?  Fill any prescriptions the doctor gave you and take them right away  If you have a fever, get plenty of rest and drink lots of fluids, especially water. Using a humidifier or saline nose spray will also help loosen secretions.   What clothes or blankets you have on won t change your fever. Do what is comfortable for you.  Bathing or sponging in lukewarm water may help you feel better.  Tylenol  (acetaminophen), Motrin  (ibuprofen), or Advil  (ibuprofen) help bring fever down and may help you feel more comfortable. Be sure to read and follow the package directions, and ask your doctor if  you have questions.  Do not drink alcohol.  Decongestants may help you feel better. You may use decongestant nose sprays Afrin  (oxymetazoline) or Burke-Synephrine  (phenylephrine hydrochloride) for up to 3 days, or may use a decongestant tablet like Sudafed  (pseudoephedrine).  If you were given a prescription for medicine here today, be sure to read all of the information (including the package insert) that comes with your prescription.  This will include important information about the medicine, its side effects, and any warnings that you need to know about.  The pharmacist who fills the prescription can provide more information and answer questions you may have about the medicine.  If you have questions or concerns that the pharmacist cannot address, please call or return to the Emergency Department.   Opioid Medication Information    Pain medications are among the most commonly prescribed medicines, so we are including this information for all our patients. If you did not receive pain medication or get a prescription for pain medicine, you can ignore it.     You may have been given a prescription for an opioid (narcotic) pain medicine and/or have received a pain medicine while here in the Emergency Department. These medicines can make you drowsy or impaired. You must not drive, operate dangerous equipment, or engage in any other dangerous activities while taking these medications. If you drive while taking these medications, you could be arrested for DUI, or driving under the influence. Do not drink any alcohol while you are taking these medications.     Opioid pain medications can cause addiction. If you have a history of chemical dependency of any type, you are at a higher risk of becoming addicted to pain medications.  Only take these prescribed medications to treat your pain when all other options have been tried. Take it for as short a time and as few doses as possible. Store your pain pills in a secure  place, as they are frequently stolen and provide a dangerous opportunity for children or visitors in your house to start abusing these powerful medications. We will not replace any lost or stolen medicine.  As soon as your pain is better, you should flush all your remaining medication.     Many prescription pain medications contain Tylenol  (acetaminophen), including Vicodin , Tylenol #3 , Norco , Lortab , and Percocet .  You should not take any extra pills of Tylenol  if you are using these prescription medications or you can get very sick.  Do not ever take more than 3000 mg of acetaminophen in any 24 hour period.    All opioids tend to cause constipation. Drink plenty of water and eat foods that have a lot of fiber, such as fruits, vegetables, prune juice, apple juice and high fiber cereal.  Take a laxative if you don t move your bowels at least every other day. Miralax , Milk of Magnesia, Colace , or Senna  can be used to keep you regular.      Remember that you can always come back to the Emergency Department if you are not able to see your regular doctor in the amount of time listed above, if you get any new symptoms, or if there is anything that worries you.

## 2023-04-06 NOTE — LETTER
April 6, 2023      Brandon Ordoñez  [id number] [place] [address]        To Whom It May Concern:    Brandon Ordoñez  was seen on 04/06/23  .  Please excuse him  until 4/10/2023 due to illness.        Sincerely,        Estephania Mandujano, CNP

## 2023-04-06 NOTE — PROGRESS NOTES
Chief Complaint   Patient presents with     Cough     Cough, congestion X 4 days          ICD-10-CM    1. Viral URI with cough  J06.9       2. Throat pain  R07.0 Streptococcus A Rapid Screen w/Reflex to PCR     Influenza A/B antigen     Symptomatic COVID-19 Virus (Coronavirus) by PCR Nose     Group A Streptococcus PCR Throat Swab        Tylenol, ibuprofen, fluids, rest, Delsym for cough suppression at night.  If he develops any significant shortness of breath he is advised to go to the emergency room.  If he is worsening in 7 to 10 days he should return for recheck.      Results for orders placed or performed in visit on 04/06/23 (from the past 24 hour(s))   Streptococcus A Rapid Screen w/Reflex to PCR    Specimen: Throat; Swab   Result Value Ref Range    Group A Strep antigen Negative Negative   Influenza A/B antigen    Specimen: Nose; Swab   Result Value Ref Range    Influenza A antigen Negative Negative    Influenza B antigen Negative Negative    Narrative    Test results must be correlated with clinical data. If necessary, results should be confirmed by a molecular assay or viral culture.       Subjective     Brandon Ordoñez is an 37 year old male who presents to clinic today for Sore throat, fever,runny nose, cough, body aches for 4 days. No appetite      ROS: 10 point ROS neg other than the symptoms noted above in the HPI.       Objective    /72   Pulse 71   Temp 99.7  F (37.6  C) (Tympanic)   Resp 16   SpO2 97%   Nurses notes and VS have been reviewed.    Physical Exam       GENERAL APPEARANCE: alert and mild distress     EYES: PERRL, EOMI, sclera non-icteric     HENT: ear canals and TM's normal, nose and mouth without ulcers or lesions, rhinorrhea clear, tonsillar hypertrophy and tonsillar erythema     NECK: cervical adenopathy bilaterally     RESP: lungs clear to auscultation - no rales, rhonchi or wheezes     CV: regular rates and rhythm, no murmurs, rubs, or gallop     MS: extremities  normal- no gross deformities noted; normal muscle tone.     SKIN: no suspicious lesions or rashes     PSYCH: normal thought process; no significant mood disturbance      GWEN López, CNP  [facility] Provider    The use of Dragon/Ordoro dictation services may have been used to construct the content in this note; any grammatical or spelling errors are non-intentional. Please contact the author of this note directly if you are in need of any clarification.

## 2023-04-13 ENCOUNTER — OFFICE VISIT (OUTPATIENT)
Dept: URGENT CARE | Facility: URGENT CARE | Age: 38
End: 2023-04-13
Payer: COMMERCIAL

## 2023-04-13 VITALS — RESPIRATION RATE: 18 BRPM | HEART RATE: 67 BPM | TEMPERATURE: 97.7 F | OXYGEN SATURATION: 100 %

## 2023-04-13 DIAGNOSIS — J20.9 ACUTE BRONCHITIS WITH SYMPTOMS > 10 DAYS: Primary | ICD-10-CM

## 2023-04-13 DIAGNOSIS — R07.0 THROAT PAIN: ICD-10-CM

## 2023-04-13 LAB
DEPRECATED S PYO AG THROAT QL EIA: NEGATIVE
GROUP A STREP BY PCR: NOT DETECTED

## 2023-04-13 PROCEDURE — 87651 STREP A DNA AMP PROBE: CPT | Performed by: PHYSICIAN ASSISTANT

## 2023-04-13 PROCEDURE — 99214 OFFICE O/P EST MOD 30 MIN: CPT | Performed by: PHYSICIAN ASSISTANT

## 2023-04-13 RX ORDER — BENZONATATE 200 MG/1
200 CAPSULE ORAL 3 TIMES DAILY PRN
Qty: 30 CAPSULE | Refills: 0 | Status: SHIPPED | OUTPATIENT
Start: 2023-04-13 | End: 2023-05-09

## 2023-04-13 RX ORDER — ALBUTEROL SULFATE 90 UG/1
2 AEROSOL, METERED RESPIRATORY (INHALATION) EVERY 6 HOURS PRN
Qty: 18 G | Refills: 0 | Status: SHIPPED | OUTPATIENT
Start: 2023-04-13 | End: 2023-05-09

## 2023-04-13 ASSESSMENT — ENCOUNTER SYMPTOMS
VOMITING: 0
CHEST TIGHTNESS: 1
WHEEZING: 1
SHORTNESS OF BREATH: 1
RHINORRHEA: 1
COUGH: 1
SORE THROAT: 1
DIARRHEA: 0
FEVER: 0

## 2023-04-13 NOTE — PROGRESS NOTES
Assessment & Plan:        ICD-10-CM    1. Acute bronchitis with symptoms > 10 days  J20.9 benzonatate (TESSALON) 200 MG capsule     albuterol (PROAIR HFA/PROVENTIL HFA/VENTOLIN HFA) 108 (90 Base) MCG/ACT inhaler      2. Throat pain  R07.0 Streptococcus A Rapid Screen w/Reflex to PCR - Clinic Collect     Group A Streptococcus PCR Throat Swab            Plan/Clinical Decision Making:    Patient with recent URI with continued cough with recent mild wheezing, chest tightness for the past day.   Nasal/sinus symptoms improving. Normal ear, throat, lung exam. O2sat 100%.   Start albuterol inhaler for wheezing as needed. Use Tessalon for cough.     Negative strep test today. Had previous negative strep, covid, flu testing.       Return if symptoms worsen or fail to improve, for in 3-5 days.     At the end of the encounter, I discussed results, diagnosis, medications. Discussed red flags for immediate return to clinic/ER, as well as indications for follow up if no improvement. Patient understood and agreed to plan. Patient was stable for discharge.        Shantal River PA-C on 4/13/2023 at 2:17 PM          Subjective:     HPI:    Brandon is a 37 year old male who presents to clinic today for the following health issues:  Chief Complaint   Patient presents with     Pharyngitis     ST, cough, difficulty swallowing, chest discomfort X 10 days.      HPI    Patient complains of ST, cough, chest discomfort for 10 days.  Possible wheezing today with chest tightness.   Traveled to [facility].   Was seen for URI symptoms on 4/6/23, had negative strep, covid and flu tests.     History obtained from the patient.    Review of Systems   Constitutional: Negative for fever.   HENT: Positive for congestion, rhinorrhea (mild) and sore throat.    Respiratory: Positive for cough, chest tightness, shortness of breath and wheezing.    Gastrointestinal: Negative for diarrhea and vomiting.         Patient Active Problem List   Diagnosis      CARDIOVASCULAR SCREENING; LDL GOAL LESS THAN 160        Past Medical History:   Diagnosis Date     NO ACTIVE PROBLEMS        Social History     Tobacco Use     Smoking status: Never     Smokeless tobacco: Never   Vaping Use     Vaping status: Not on file   Substance Use Topics     Alcohol use: Yes     Comment: Occasional if there is a party              Objective:     Vitals:    04/13/23 1409   Pulse: 67   Resp: 18   Temp: 97.7  F (36.5  C)   TempSrc: Tympanic   SpO2: 100%         Physical Exam   EXAM:   Pleasant, alert, appropriate appearance. NAD.  Head Exam: Normocephalic, atraumatic.  Eye Exam:   non icteric/injection.    Ear Exam: TMs grey without bulging. Normal canals.  Normal pinna.  Nose Exam: Normal external nose.    OroPharynx Exam:  Moist mucous membranes. negative erythema, pharynx without exudate or hypertrophy.  Neck/Thyroid Exam:  No LAD.    Chest/Respiratory Exam: CTAB.  Cardiovascular Exam: RRR. No murmur or rubs.      Results:  Results for orders placed or performed in visit on 04/13/23   Streptococcus A Rapid Screen w/Reflex to PCR - Clinic Collect     Status: Normal    Specimen: Throat; Swab   Result Value Ref Range    Group A Strep antigen Negative Negative

## 2023-04-13 NOTE — LETTER
April 13, 2023      Brandon Ordoñez  [id number] [place] [address]        To Whom It May Concern:    Brandon Ordoñez  was seen today for illness.  Please excuse him from work for 1 to 3 days as needed due to illness.         Sincerely,        Shantal River PA-C

## 2023-05-04 ENCOUNTER — APPOINTMENT (OUTPATIENT)
Dept: CT IMAGING | Facility: CLINIC | Age: 38
End: 2023-05-04
Attending: PHYSICIAN ASSISTANT
Payer: COMMERCIAL

## 2023-05-04 ENCOUNTER — APPOINTMENT (OUTPATIENT)
Dept: GENERAL RADIOLOGY | Facility: CLINIC | Age: 38
End: 2023-05-04
Attending: INTERNAL MEDICINE
Payer: COMMERCIAL

## 2023-05-04 ENCOUNTER — HOSPITAL ENCOUNTER (INPATIENT)
Facility: CLINIC | Age: 38
LOS: 4 days | Discharge: HOME | End: 2023-05-08
Attending: NEUROLOGICAL SURGERY | Admitting: NEUROLOGICAL SURGERY
Payer: COMMERCIAL

## 2023-05-04 ENCOUNTER — APPOINTMENT (OUTPATIENT)
Dept: CT IMAGING | Facility: CLINIC | Age: 38
End: 2023-05-04
Attending: EMERGENCY MEDICINE
Payer: COMMERCIAL

## 2023-05-04 ENCOUNTER — APPOINTMENT (OUTPATIENT)
Dept: GENERAL RADIOLOGY | Facility: CLINIC | Age: 38
End: 2023-05-04
Attending: EMERGENCY MEDICINE
Payer: COMMERCIAL

## 2023-05-04 ENCOUNTER — HOSPITAL ENCOUNTER (EMERGENCY)
Facility: CLINIC | Age: 38
Discharge: ANOTHER HOSPITAL | End: 2023-05-04
Attending: EMERGENCY MEDICINE | Admitting: EMERGENCY MEDICINE
Payer: COMMERCIAL

## 2023-05-04 ENCOUNTER — ANESTHESIA (OUTPATIENT)
Dept: SURGERY | Facility: CLINIC | Age: 38
End: 2023-05-04
Payer: COMMERCIAL

## 2023-05-04 ENCOUNTER — ANESTHESIA EVENT (OUTPATIENT)
Dept: SURGERY | Facility: CLINIC | Age: 38
End: 2023-05-04
Payer: COMMERCIAL

## 2023-05-04 VITALS
RESPIRATION RATE: 24 BRPM | HEART RATE: 80 BPM | SYSTOLIC BLOOD PRESSURE: 166 MMHG | WEIGHT: 154 LBS | BODY MASS INDEX: 24.12 KG/M2 | DIASTOLIC BLOOD PRESSURE: 100 MMHG | TEMPERATURE: 97.2 F | OXYGEN SATURATION: 100 %

## 2023-05-04 DIAGNOSIS — R90.0 INTRACRANIAL MASS: ICD-10-CM

## 2023-05-04 DIAGNOSIS — I62.9 INTRACRANIAL HEMORRHAGE (H): ICD-10-CM

## 2023-05-04 DIAGNOSIS — Z98.890 S/P CRANIOTOMY: Primary | ICD-10-CM

## 2023-05-04 DIAGNOSIS — R40.0 SOMNOLENCE: ICD-10-CM

## 2023-05-04 DIAGNOSIS — G93.89 BRAIN MASS: Primary | ICD-10-CM

## 2023-05-04 DIAGNOSIS — J30.2 SEASONAL ALLERGIC RHINITIS, UNSPECIFIED TRIGGER: ICD-10-CM

## 2023-05-04 LAB
ALBUMIN SERPL BCG-MCNC: 4.2 G/DL (ref 3.5–5.2)
ALLEN'S TEST: ABNORMAL
ANION GAP SERPL CALCULATED.3IONS-SCNC: 11 MMOL/L (ref 7–15)
ANION GAP SERPL CALCULATED.3IONS-SCNC: 16 MMOL/L (ref 7–15)
APTT PPP: 29 SECONDS (ref 22–38)
BASE EXCESS BLDA CALC-SCNC: -0.3 MMOL/L (ref -9–1.8)
BASOPHILS # BLD AUTO: 0 10E3/UL (ref 0–0.2)
BASOPHILS NFR BLD AUTO: 0 %
BUN SERPL-MCNC: 12.1 MG/DL (ref 6–20)
BUN SERPL-MCNC: 14.9 MG/DL (ref 6–20)
CA-I BLD-MCNC: 4.9 MG/DL (ref 4.4–5.2)
CALCIUM SERPL-MCNC: 10.3 MG/DL (ref 8.6–10)
CALCIUM SERPL-MCNC: 9.4 MG/DL (ref 8.6–10)
CHLORIDE SERPL-SCNC: 100 MMOL/L (ref 98–107)
CHLORIDE SERPL-SCNC: 97 MMOL/L (ref 98–107)
CREAT SERPL-MCNC: 0.68 MG/DL (ref 0.67–1.17)
CREAT SERPL-MCNC: 0.77 MG/DL (ref 0.67–1.17)
DEPRECATED HCO3 PLAS-SCNC: 25 MMOL/L (ref 22–29)
DEPRECATED HCO3 PLAS-SCNC: 26 MMOL/L (ref 22–29)
EOSINOPHIL # BLD AUTO: 0 10E3/UL (ref 0–0.7)
EOSINOPHIL NFR BLD AUTO: 0 %
ERYTHROCYTE [DISTWIDTH] IN BLOOD BY AUTOMATED COUNT: 12.1 % (ref 10–15)
ETHANOL SERPL-MCNC: <0.01 G/DL
GFR SERPL CREATININE-BSD FRML MDRD: >90 ML/MIN/1.73M2
GFR SERPL CREATININE-BSD FRML MDRD: >90 ML/MIN/1.73M2
GLUCOSE BLDC GLUCOMTR-MCNC: 119 MG/DL (ref 70–99)
GLUCOSE BLDC GLUCOMTR-MCNC: 132 MG/DL (ref 70–99)
GLUCOSE BLDC GLUCOMTR-MCNC: 134 MG/DL (ref 70–99)
GLUCOSE BLDC GLUCOMTR-MCNC: 150 MG/DL (ref 70–99)
GLUCOSE SERPL-MCNC: 137 MG/DL (ref 70–99)
GLUCOSE SERPL-MCNC: 159 MG/DL (ref 70–99)
GRAM STAIN RESULT: NORMAL
GRAM STAIN RESULT: NORMAL
HBA1C MFR BLD: 6 %
HCO3 BLD-SCNC: 25 MMOL/L (ref 21–28)
HCO3 BLDV-SCNC: 24 MMOL/L (ref 21–28)
HCO3 BLDV-SCNC: 24 MMOL/L (ref 21–28)
HCO3 BLDV-SCNC: 26 MMOL/L (ref 21–28)
HCT VFR BLD AUTO: 44 % (ref 40–53)
HGB BLD-MCNC: 14.8 G/DL (ref 13.3–17.7)
HOLD SPECIMEN: NORMAL
HOLD SPECIMEN: NORMAL
IMM GRANULOCYTES # BLD: 0.1 10E3/UL
IMM GRANULOCYTES NFR BLD: 0 %
INR PPP: 1.02 (ref 0.85–1.15)
LACTATE BLD-SCNC: 1.9 MMOL/L
LACTATE BLD-SCNC: 2.1 MMOL/L
LACTATE BLD-SCNC: 2.7 MMOL/L
LIPASE SERPL-CCNC: 20 U/L (ref 13–60)
LYMPHOCYTES # BLD AUTO: 0.9 10E3/UL (ref 0.8–5.3)
LYMPHOCYTES NFR BLD AUTO: 6 %
MCH RBC QN AUTO: 29.4 PG (ref 26.5–33)
MCHC RBC AUTO-ENTMCNC: 33.6 G/DL (ref 31.5–36.5)
MCV RBC AUTO: 87 FL (ref 78–100)
MONOCYTES # BLD AUTO: 1.1 10E3/UL (ref 0–1.3)
MONOCYTES NFR BLD AUTO: 7 %
NEUTROPHILS # BLD AUTO: 13.5 10E3/UL (ref 1.6–8.3)
NEUTROPHILS NFR BLD AUTO: 87 %
NRBC # BLD AUTO: 0 10E3/UL
NRBC BLD AUTO-RTO: 0 /100
O2/TOTAL GAS SETTING VFR VENT: 50 %
PCO2 BLD: 44 MM HG (ref 35–45)
PCO2 BLDV: 38 MM HG (ref 40–50)
PCO2 BLDV: 44 MM HG (ref 40–50)
PCO2 BLDV: 53 MM HG (ref 40–50)
PH BLD: 7.37 [PH] (ref 7.35–7.45)
PH BLDV: 7.31 [PH] (ref 7.32–7.43)
PH BLDV: 7.35 [PH] (ref 7.32–7.43)
PH BLDV: 7.41 [PH] (ref 7.32–7.43)
PHOSPHATE SERPL-MCNC: 3.4 MG/DL (ref 2.5–4.5)
PLATELET # BLD AUTO: 240 10E3/UL (ref 150–450)
PO2 BLD: 162 MM HG (ref 80–105)
PO2 BLDV: 36 MM HG (ref 25–47)
PO2 BLDV: 36 MM HG (ref 25–47)
PO2 BLDV: 74 MM HG (ref 25–47)
POTASSIUM SERPL-SCNC: 3.9 MMOL/L (ref 3.4–5.3)
POTASSIUM SERPL-SCNC: 4.4 MMOL/L (ref 3.4–5.3)
RBC # BLD AUTO: 5.04 10E6/UL (ref 4.4–5.9)
SAO2 % BLDV: 66 % (ref 94–100)
SAO2 % BLDV: 70 % (ref 94–100)
SAO2 % BLDV: 93 % (ref 94–100)
SODIUM SERPL-SCNC: 137 MMOL/L (ref 136–145)
SODIUM SERPL-SCNC: 138 MMOL/L (ref 136–145)
WBC # BLD AUTO: 15.5 10E3/UL (ref 4–11)

## 2023-05-04 PROCEDURE — 99221 1ST HOSP IP/OBS SF/LOW 40: CPT | Mod: FS | Performed by: PHYSICIAN ASSISTANT

## 2023-05-04 PROCEDURE — 258N000003 HC RX IP 258 OP 636: Performed by: ANESTHESIOLOGY

## 2023-05-04 PROCEDURE — 88271 CYTOGENETICS DNA PROBE: CPT | Performed by: NEUROLOGICAL SURGERY

## 2023-05-04 PROCEDURE — 85730 THROMBOPLASTIN TIME PARTIAL: CPT | Performed by: EMERGENCY MEDICINE

## 2023-05-04 PROCEDURE — 87205 SMEAR GRAM STAIN: CPT | Performed by: NEUROLOGICAL SURGERY

## 2023-05-04 PROCEDURE — 85004 AUTOMATED DIFF WBC COUNT: CPT | Performed by: EMERGENCY MEDICINE

## 2023-05-04 PROCEDURE — 61510 CRNEC TREPH EXC BRN TUM STTL: CPT | Performed by: NEUROLOGICAL SURGERY

## 2023-05-04 PROCEDURE — 250N000011 HC RX IP 250 OP 636: Performed by: EMERGENCY MEDICINE

## 2023-05-04 PROCEDURE — 94002 VENT MGMT INPAT INIT DAY: CPT

## 2023-05-04 PROCEDURE — 250N000011 HC RX IP 250 OP 636: Performed by: INTERNAL MEDICINE

## 2023-05-04 PROCEDURE — 88342 IMHCHEM/IMCYTCHM 1ST ANTB: CPT | Mod: 26 | Performed by: PATHOLOGY

## 2023-05-04 PROCEDURE — 999N000009 HC STATISTIC AIRWAY CARE

## 2023-05-04 PROCEDURE — 96376 TX/PRO/DX INJ SAME DRUG ADON: CPT

## 2023-05-04 PROCEDURE — 70450 CT HEAD/BRAIN W/O DYE: CPT | Mod: XE,77

## 2023-05-04 PROCEDURE — 370N000017 HC ANESTHESIA TECHNICAL FEE, PER MIN: Performed by: NEUROLOGICAL SURGERY

## 2023-05-04 PROCEDURE — 99291 CRITICAL CARE FIRST HOUR: CPT | Mod: 25

## 2023-05-04 PROCEDURE — 999N000065 XR CHEST PORT 1 VIEW

## 2023-05-04 PROCEDURE — 85610 PROTHROMBIN TIME: CPT | Performed by: EMERGENCY MEDICINE

## 2023-05-04 PROCEDURE — 82803 BLOOD GASES ANY COMBINATION: CPT | Performed by: INTERNAL MEDICINE

## 2023-05-04 PROCEDURE — 80069 RENAL FUNCTION PANEL: CPT | Performed by: EMERGENCY MEDICINE

## 2023-05-04 PROCEDURE — 87075 CULTR BACTERIA EXCEPT BLOOD: CPT | Performed by: NEUROLOGICAL SURGERY

## 2023-05-04 PROCEDURE — 96375 TX/PRO/DX INJ NEW DRUG ADDON: CPT

## 2023-05-04 PROCEDURE — 83605 ASSAY OF LACTIC ACID: CPT

## 2023-05-04 PROCEDURE — 258N000003 HC RX IP 258 OP 636: Performed by: PHYSICIAN ASSISTANT

## 2023-05-04 PROCEDURE — 200N000001 HC R&B ICU

## 2023-05-04 PROCEDURE — 82077 ASSAY SPEC XCP UR&BREATH IA: CPT | Performed by: EMERGENCY MEDICINE

## 2023-05-04 PROCEDURE — 82962 GLUCOSE BLOOD TEST: CPT

## 2023-05-04 PROCEDURE — 96361 HYDRATE IV INFUSION ADD-ON: CPT | Mod: 59

## 2023-05-04 PROCEDURE — 250N000013 HC RX MED GY IP 250 OP 250 PS 637: Performed by: PHYSICIAN ASSISTANT

## 2023-05-04 PROCEDURE — 00970ZZ DRAINAGE OF CEREBRAL HEMISPHERE, OPEN APPROACH: ICD-10-PCS | Performed by: NEUROLOGICAL SURGERY

## 2023-05-04 PROCEDURE — 99292 CRITICAL CARE ADDL 30 MIN: CPT

## 2023-05-04 PROCEDURE — 87040 BLOOD CULTURE FOR BACTERIA: CPT | Mod: XS | Performed by: EMERGENCY MEDICINE

## 2023-05-04 PROCEDURE — 258N000003 HC RX IP 258 OP 636: Performed by: EMERGENCY MEDICINE

## 2023-05-04 PROCEDURE — 250N000009 HC RX 250: Performed by: ANESTHESIOLOGY

## 2023-05-04 PROCEDURE — 250N000009 HC RX 250: Performed by: NEUROLOGICAL SURGERY

## 2023-05-04 PROCEDURE — 999N000157 HC STATISTIC RCP TIME EA 10 MIN

## 2023-05-04 PROCEDURE — 88331 PATH CONSLTJ SURG 1 BLK 1SPC: CPT | Mod: 26 | Performed by: PATHOLOGY

## 2023-05-04 PROCEDURE — 83036 HEMOGLOBIN GLYCOSYLATED A1C: CPT | Performed by: INTERNAL MEDICINE

## 2023-05-04 PROCEDURE — 272N000001 HC OR GENERAL SUPPLY STERILE: Performed by: NEUROLOGICAL SURGERY

## 2023-05-04 PROCEDURE — C1763 CONN TISS, NON-HUMAN: HCPCS | Performed by: NEUROLOGICAL SURGERY

## 2023-05-04 PROCEDURE — C1713 ANCHOR/SCREW BN/BN,TIS/BN: HCPCS | Performed by: NEUROLOGICAL SURGERY

## 2023-05-04 PROCEDURE — 96374 THER/PROPH/DIAG INJ IV PUSH: CPT | Mod: 59

## 2023-05-04 PROCEDURE — 31500 INSERT EMERGENCY AIRWAY: CPT

## 2023-05-04 PROCEDURE — 250N000011 HC RX IP 250 OP 636

## 2023-05-04 PROCEDURE — C9113 INJ PANTOPRAZOLE SODIUM, VIA: HCPCS | Performed by: PHYSICIAN ASSISTANT

## 2023-05-04 PROCEDURE — 87176 TISSUE HOMOGENIZATION CULTR: CPT | Performed by: NEUROLOGICAL SURGERY

## 2023-05-04 PROCEDURE — 88307 TISSUE EXAM BY PATHOLOGIST: CPT | Mod: TC | Performed by: NEUROLOGICAL SURGERY

## 2023-05-04 PROCEDURE — 00B70ZZ EXCISION OF CEREBRAL HEMISPHERE, OPEN APPROACH: ICD-10-PCS | Performed by: NEUROLOGICAL SURGERY

## 2023-05-04 PROCEDURE — 250N000013 HC RX MED GY IP 250 OP 250 PS 637: Performed by: ANESTHESIOLOGY

## 2023-05-04 PROCEDURE — 250N000011 HC RX IP 250 OP 636: Performed by: ANESTHESIOLOGY

## 2023-05-04 PROCEDURE — 88341 IMHCHEM/IMCYTCHM EA ADD ANTB: CPT | Mod: 26 | Performed by: PATHOLOGY

## 2023-05-04 PROCEDURE — 88364 INSITU HYBRIDIZATION (FISH): CPT | Mod: 26 | Performed by: MEDICAL GENETICS

## 2023-05-04 PROCEDURE — 5A1935Z RESPIRATORY VENTILATION, LESS THAN 24 CONSECUTIVE HOURS: ICD-10-PCS | Performed by: NEUROLOGICAL SURGERY

## 2023-05-04 PROCEDURE — 83690 ASSAY OF LIPASE: CPT | Performed by: EMERGENCY MEDICINE

## 2023-05-04 PROCEDURE — 250N000025 HC SEVOFLURANE, PER MIN: Performed by: NEUROLOGICAL SURGERY

## 2023-05-04 PROCEDURE — 70450 CT HEAD/BRAIN W/O DYE: CPT

## 2023-05-04 PROCEDURE — 61781 SCAN PROC CRANIAL INTRA: CPT | Performed by: NEUROLOGICAL SURGERY

## 2023-05-04 PROCEDURE — 8E09XBZ COMPUTER ASSISTED PROCEDURE OF HEAD AND NECK REGION: ICD-10-PCS | Performed by: NEUROLOGICAL SURGERY

## 2023-05-04 PROCEDURE — 360N000079 HC SURGERY LEVEL 6, PER MIN: Performed by: NEUROLOGICAL SURGERY

## 2023-05-04 PROCEDURE — 70496 CT ANGIOGRAPHY HEAD: CPT

## 2023-05-04 PROCEDURE — 88331 PATH CONSLTJ SURG 1 BLK 1SPC: CPT | Mod: TC | Performed by: NEUROLOGICAL SURGERY

## 2023-05-04 PROCEDURE — 250N000009 HC RX 250: Performed by: EMERGENCY MEDICINE

## 2023-05-04 PROCEDURE — 00U20KZ SUPPLEMENT DURA MATER WITH NONAUTOLOGOUS TISSUE SUBSTITUTE, OPEN APPROACH: ICD-10-PCS | Performed by: NEUROLOGICAL SURGERY

## 2023-05-04 PROCEDURE — 99291 CRITICAL CARE FIRST HOUR: CPT | Performed by: INTERNAL MEDICINE

## 2023-05-04 PROCEDURE — 82330 ASSAY OF CALCIUM: CPT | Performed by: INTERNAL MEDICINE

## 2023-05-04 PROCEDURE — 36415 COLL VENOUS BLD VENIPUNCTURE: CPT | Performed by: EMERGENCY MEDICINE

## 2023-05-04 PROCEDURE — 250N000011 HC RX IP 250 OP 636: Performed by: PHYSICIAN ASSISTANT

## 2023-05-04 PROCEDURE — 250N000013 HC RX MED GY IP 250 OP 250 PS 637: Performed by: INTERNAL MEDICINE

## 2023-05-04 PROCEDURE — 250N000011 HC RX IP 250 OP 636: Performed by: NEUROLOGICAL SURGERY

## 2023-05-04 PROCEDURE — 88365 INSITU HYBRIDIZATION (FISH): CPT | Mod: 26 | Performed by: MEDICAL GENETICS

## 2023-05-04 PROCEDURE — 88307 TISSUE EXAM BY PATHOLOGIST: CPT | Mod: 26 | Performed by: PATHOLOGY

## 2023-05-04 DEVICE — IMP SCR SYN MATRIX LOW PRO 1.5X04MM SELF DRILL 04.503.104.01: Type: IMPLANTABLE DEVICE | Site: CRANIAL | Status: FUNCTIONAL

## 2023-05-04 DEVICE — IMP PLATE SYN BURR HOLE COVER 17MM 04.503.023: Type: IMPLANTABLE DEVICE | Site: CRANIAL | Status: FUNCTIONAL

## 2023-05-04 DEVICE — DURAGEN® DURAL GRAFT MATRIX 5 PK 3X3 DOM
Type: IMPLANTABLE DEVICE | Site: CRANIAL | Status: FUNCTIONAL
Brand: DURAGEN®

## 2023-05-04 RX ORDER — SODIUM CHLORIDE, SODIUM LACTATE, POTASSIUM CHLORIDE, CALCIUM CHLORIDE 600; 310; 30; 20 MG/100ML; MG/100ML; MG/100ML; MG/100ML
INJECTION, SOLUTION INTRAVENOUS CONTINUOUS PRN
Status: DISCONTINUED | OUTPATIENT
Start: 2023-05-04 | End: 2023-05-04

## 2023-05-04 RX ORDER — AMOXICILLIN 250 MG
1 CAPSULE ORAL 2 TIMES DAILY
Status: DISCONTINUED | OUTPATIENT
Start: 2023-05-04 | End: 2023-05-08 | Stop reason: HOSPADM

## 2023-05-04 RX ORDER — IOPAMIDOL 755 MG/ML
500 INJECTION, SOLUTION INTRAVASCULAR ONCE
Status: COMPLETED | OUTPATIENT
Start: 2023-05-04 | End: 2023-05-04

## 2023-05-04 RX ORDER — LIDOCAINE 40 MG/G
CREAM TOPICAL
Status: DISCONTINUED | OUTPATIENT
Start: 2023-05-04 | End: 2023-05-08 | Stop reason: HOSPADM

## 2023-05-04 RX ORDER — NALOXONE HYDROCHLORIDE 0.4 MG/ML
0.4 INJECTION, SOLUTION INTRAMUSCULAR; INTRAVENOUS; SUBCUTANEOUS
Status: DISCONTINUED | OUTPATIENT
Start: 2023-05-04 | End: 2023-05-08 | Stop reason: HOSPADM

## 2023-05-04 RX ORDER — HYDRALAZINE HYDROCHLORIDE 20 MG/ML
10-20 INJECTION INTRAMUSCULAR; INTRAVENOUS EVERY 30 MIN PRN
Status: DISCONTINUED | OUTPATIENT
Start: 2023-05-04 | End: 2023-05-08 | Stop reason: HOSPADM

## 2023-05-04 RX ORDER — PROPOFOL 10 MG/ML
INJECTION, EMULSION INTRAVENOUS
Status: COMPLETED
Start: 2023-05-04 | End: 2023-05-04

## 2023-05-04 RX ORDER — DEXAMETHASONE SODIUM PHOSPHATE 10 MG/ML
10 INJECTION, SOLUTION INTRAMUSCULAR; INTRAVENOUS ONCE
Status: COMPLETED | OUTPATIENT
Start: 2023-05-04 | End: 2023-05-04

## 2023-05-04 RX ORDER — DEXAMETHASONE SODIUM PHOSPHATE 4 MG/ML
INJECTION, SOLUTION INTRA-ARTICULAR; INTRALESIONAL; INTRAMUSCULAR; INTRAVENOUS; SOFT TISSUE PRN
Status: DISCONTINUED | OUTPATIENT
Start: 2023-05-04 | End: 2023-05-04

## 2023-05-04 RX ORDER — MANNITOL 20 G/100ML
50 INJECTION, SOLUTION INTRAVENOUS ONCE
Status: COMPLETED | OUTPATIENT
Start: 2023-05-04 | End: 2023-05-04

## 2023-05-04 RX ORDER — ACETAMINOPHEN 325 MG/1
975 TABLET ORAL EVERY 8 HOURS
Status: COMPLETED | OUTPATIENT
Start: 2023-05-04 | End: 2023-05-07

## 2023-05-04 RX ORDER — ONDANSETRON 2 MG/ML
4 INJECTION INTRAMUSCULAR; INTRAVENOUS ONCE
Status: COMPLETED | OUTPATIENT
Start: 2023-05-04 | End: 2023-05-04

## 2023-05-04 RX ORDER — FENTANYL CITRATE 50 UG/ML
100 INJECTION, SOLUTION INTRAMUSCULAR; INTRAVENOUS ONCE
Status: COMPLETED | OUTPATIENT
Start: 2023-05-04 | End: 2023-05-04

## 2023-05-04 RX ORDER — SODIUM CHLORIDE 9 MG/ML
INJECTION, SOLUTION INTRAVENOUS CONTINUOUS
Status: DISCONTINUED | OUTPATIENT
Start: 2023-05-04 | End: 2023-05-05

## 2023-05-04 RX ORDER — OXYCODONE HYDROCHLORIDE 5 MG/1
5 TABLET ORAL EVERY 4 HOURS PRN
Status: DISCONTINUED | OUTPATIENT
Start: 2023-05-04 | End: 2023-05-08 | Stop reason: HOSPADM

## 2023-05-04 RX ORDER — LABETALOL HYDROCHLORIDE 5 MG/ML
10-40 INJECTION, SOLUTION INTRAVENOUS EVERY 10 MIN PRN
Status: DISCONTINUED | OUTPATIENT
Start: 2023-05-04 | End: 2023-05-08 | Stop reason: HOSPADM

## 2023-05-04 RX ORDER — ONDANSETRON 2 MG/ML
4 INJECTION INTRAMUSCULAR; INTRAVENOUS EVERY 6 HOURS PRN
Status: DISCONTINUED | OUTPATIENT
Start: 2023-05-04 | End: 2023-05-08 | Stop reason: HOSPADM

## 2023-05-04 RX ORDER — ONDANSETRON 4 MG/1
4 TABLET, ORALLY DISINTEGRATING ORAL EVERY 6 HOURS PRN
Status: DISCONTINUED | OUTPATIENT
Start: 2023-05-04 | End: 2023-05-08 | Stop reason: HOSPADM

## 2023-05-04 RX ORDER — BUPIVACAINE HYDROCHLORIDE AND EPINEPHRINE 2.5; 5 MG/ML; UG/ML
INJECTION, SOLUTION INFILTRATION; PERINEURAL PRN
Status: DISCONTINUED | OUTPATIENT
Start: 2023-05-04 | End: 2023-05-04

## 2023-05-04 RX ORDER — CHLORHEXIDINE GLUCONATE ORAL RINSE 1.2 MG/ML
15 SOLUTION DENTAL EVERY 12 HOURS
Status: DISCONTINUED | OUTPATIENT
Start: 2023-05-04 | End: 2023-05-05

## 2023-05-04 RX ORDER — PROPOFOL 10 MG/ML
INJECTION, EMULSION INTRAVENOUS PRN
Status: DISCONTINUED | OUTPATIENT
Start: 2023-05-04 | End: 2023-05-04

## 2023-05-04 RX ORDER — FENTANYL CITRATE 50 UG/ML
25-100 INJECTION, SOLUTION INTRAMUSCULAR; INTRAVENOUS
Status: DISCONTINUED | OUTPATIENT
Start: 2023-05-04 | End: 2023-05-06

## 2023-05-04 RX ORDER — PROCHLORPERAZINE MALEATE 10 MG
10 TABLET ORAL EVERY 6 HOURS PRN
Status: DISCONTINUED | OUTPATIENT
Start: 2023-05-04 | End: 2023-05-08 | Stop reason: HOSPADM

## 2023-05-04 RX ORDER — DEXAMETHASONE 4 MG/1
4 TABLET ORAL EVERY 6 HOURS SCHEDULED
Status: DISCONTINUED | OUTPATIENT
Start: 2023-05-04 | End: 2023-05-08 | Stop reason: HOSPADM

## 2023-05-04 RX ORDER — DEXTROSE MONOHYDRATE 25 G/50ML
25-50 INJECTION, SOLUTION INTRAVENOUS
Status: DISCONTINUED | OUTPATIENT
Start: 2023-05-04 | End: 2023-05-08 | Stop reason: HOSPADM

## 2023-05-04 RX ORDER — MAGNESIUM HYDROXIDE 1200 MG/15ML
LIQUID ORAL PRN
Status: DISCONTINUED | OUTPATIENT
Start: 2023-05-04 | End: 2023-05-06

## 2023-05-04 RX ORDER — NALOXONE HYDROCHLORIDE 0.4 MG/ML
0.2 INJECTION, SOLUTION INTRAMUSCULAR; INTRAVENOUS; SUBCUTANEOUS
Status: DISCONTINUED | OUTPATIENT
Start: 2023-05-04 | End: 2023-05-08 | Stop reason: HOSPADM

## 2023-05-04 RX ORDER — DEXAMETHASONE SODIUM PHOSPHATE 4 MG/ML
4 INJECTION, SOLUTION INTRA-ARTICULAR; INTRALESIONAL; INTRAMUSCULAR; INTRAVENOUS; SOFT TISSUE EVERY 6 HOURS SCHEDULED
Status: DISCONTINUED | OUTPATIENT
Start: 2023-05-04 | End: 2023-05-06

## 2023-05-04 RX ORDER — ETOMIDATE 2 MG/ML
20 INJECTION INTRAVENOUS ONCE
Status: COMPLETED | OUTPATIENT
Start: 2023-05-04 | End: 2023-05-04

## 2023-05-04 RX ORDER — FENTANYL CITRATE 50 UG/ML
INJECTION, SOLUTION INTRAMUSCULAR; INTRAVENOUS PRN
Status: DISCONTINUED | OUTPATIENT
Start: 2023-05-04 | End: 2023-05-04

## 2023-05-04 RX ORDER — PROPOFOL 10 MG/ML
5-75 INJECTION, EMULSION INTRAVENOUS CONTINUOUS
Status: DISCONTINUED | OUTPATIENT
Start: 2023-05-04 | End: 2023-05-05

## 2023-05-04 RX ORDER — LEVETIRACETAM 5 MG/ML
500 INJECTION INTRAVASCULAR EVERY 12 HOURS
Status: DISCONTINUED | OUTPATIENT
Start: 2023-05-05 | End: 2023-05-06

## 2023-05-04 RX ORDER — HYDROMORPHONE HCL IN WATER/PF 6 MG/30 ML
.2-.4 PATIENT CONTROLLED ANALGESIA SYRINGE INTRAVENOUS
Status: DISCONTINUED | OUTPATIENT
Start: 2023-05-04 | End: 2023-05-04 | Stop reason: ALTCHOICE

## 2023-05-04 RX ORDER — CEFAZOLIN SODIUM/WATER 2 G/20 ML
SYRINGE (ML) INTRAVENOUS PRN
Status: DISCONTINUED | OUTPATIENT
Start: 2023-05-04 | End: 2023-05-04

## 2023-05-04 RX ORDER — BUPIVACAINE HYDROCHLORIDE AND EPINEPHRINE 5; 5 MG/ML; UG/ML
INJECTION, SOLUTION PERINEURAL PRN
Status: DISCONTINUED | OUTPATIENT
Start: 2023-05-04 | End: 2023-05-04

## 2023-05-04 RX ORDER — LEVETIRACETAM 100 MG/ML
SOLUTION ORAL PRN
Status: DISCONTINUED | OUTPATIENT
Start: 2023-05-04 | End: 2023-05-04

## 2023-05-04 RX ORDER — CHLORHEXIDINE GLUCONATE ORAL RINSE 1.2 MG/ML
15 SOLUTION DENTAL EVERY 12 HOURS
Status: DISCONTINUED | OUTPATIENT
Start: 2023-05-04 | End: 2023-05-04

## 2023-05-04 RX ORDER — CALCIUM CARBONATE 500 MG/1
1000 TABLET, CHEWABLE ORAL 4 TIMES DAILY PRN
Status: DISCONTINUED | OUTPATIENT
Start: 2023-05-04 | End: 2023-05-08 | Stop reason: HOSPADM

## 2023-05-04 RX ORDER — BISACODYL 10 MG
10 SUPPOSITORY, RECTAL RECTAL DAILY PRN
Status: DISCONTINUED | OUTPATIENT
Start: 2023-05-04 | End: 2023-05-08 | Stop reason: HOSPADM

## 2023-05-04 RX ORDER — PROPOFOL 10 MG/ML
5-75 INJECTION, EMULSION INTRAVENOUS CONTINUOUS
Status: DISCONTINUED | OUTPATIENT
Start: 2023-05-04 | End: 2023-05-04 | Stop reason: HOSPADM

## 2023-05-04 RX ORDER — DEXMEDETOMIDINE HYDROCHLORIDE 4 UG/ML
INJECTION, SOLUTION INTRAVENOUS CONTINUOUS PRN
Status: DISCONTINUED | OUTPATIENT
Start: 2023-05-04 | End: 2023-05-04

## 2023-05-04 RX ORDER — NICOTINE POLACRILEX 4 MG
15-30 LOZENGE BUCCAL
Status: DISCONTINUED | OUTPATIENT
Start: 2023-05-04 | End: 2023-05-08 | Stop reason: HOSPADM

## 2023-05-04 RX ORDER — OXYCODONE HYDROCHLORIDE 5 MG/1
10 TABLET ORAL EVERY 4 HOURS PRN
Status: DISCONTINUED | OUTPATIENT
Start: 2023-05-04 | End: 2023-05-08 | Stop reason: HOSPADM

## 2023-05-04 RX ORDER — ACETAMINOPHEN 325 MG/1
650 TABLET ORAL EVERY 4 HOURS PRN
Status: DISCONTINUED | OUTPATIENT
Start: 2023-05-07 | End: 2023-05-08 | Stop reason: HOSPADM

## 2023-05-04 RX ORDER — PROPOFOL 10 MG/ML
INJECTION, EMULSION INTRAVENOUS CONTINUOUS PRN
Status: DISCONTINUED | OUTPATIENT
Start: 2023-05-04 | End: 2023-05-04

## 2023-05-04 RX ORDER — MANNITOL 20 G/100ML
INJECTION, SOLUTION INTRAVENOUS PRN
Status: DISCONTINUED | OUTPATIENT
Start: 2023-05-04 | End: 2023-05-04

## 2023-05-04 RX ORDER — POLYETHYLENE GLYCOL 3350 17 G/17G
17 POWDER, FOR SOLUTION ORAL DAILY
Status: DISCONTINUED | OUTPATIENT
Start: 2023-05-05 | End: 2023-05-08 | Stop reason: HOSPADM

## 2023-05-04 RX ADMIN — MANNITOL 50 G: 20 INJECTION, SOLUTION INTRAVENOUS at 14:39

## 2023-05-04 RX ADMIN — PHENYLEPHRINE HYDROCHLORIDE 100 MCG: 10 INJECTION INTRAVENOUS at 16:03

## 2023-05-04 RX ADMIN — LABETALOL HYDROCHLORIDE 20 MG: 5 INJECTION, SOLUTION INTRAVENOUS at 22:03

## 2023-05-04 RX ADMIN — DEXAMETHASONE SODIUM PHOSPHATE 4 MG: 4 INJECTION, SOLUTION INTRAMUSCULAR; INTRAVENOUS at 23:36

## 2023-05-04 RX ADMIN — FENTANYL CITRATE 100 MCG: 50 INJECTION, SOLUTION INTRAMUSCULAR; INTRAVENOUS at 11:47

## 2023-05-04 RX ADMIN — HYDROMORPHONE HYDROCHLORIDE 1 MG: 1 INJECTION, SOLUTION INTRAMUSCULAR; INTRAVENOUS; SUBCUTANEOUS at 12:11

## 2023-05-04 RX ADMIN — ROCURONIUM BROMIDE 20 MG: 50 INJECTION, SOLUTION INTRAVENOUS at 15:01

## 2023-05-04 RX ADMIN — LABETALOL HYDROCHLORIDE 10 MG: 5 INJECTION, SOLUTION INTRAVENOUS at 21:37

## 2023-05-04 RX ADMIN — ONDANSETRON 4 MG: 2 INJECTION INTRAMUSCULAR; INTRAVENOUS at 10:42

## 2023-05-04 RX ADMIN — DEXAMETHASONE SODIUM PHOSPHATE 4 MG: 4 INJECTION, SOLUTION INTRAMUSCULAR; INTRAVENOUS at 19:48

## 2023-05-04 RX ADMIN — DEXAMETHASONE SODIUM PHOSPHATE 10 MG: 4 INJECTION, SOLUTION INTRA-ARTICULAR; INTRALESIONAL; INTRAMUSCULAR; INTRAVENOUS; SOFT TISSUE at 14:46

## 2023-05-04 RX ADMIN — PHENYLEPHRINE HYDROCHLORIDE 50 MCG: 10 INJECTION INTRAVENOUS at 16:16

## 2023-05-04 RX ADMIN — ROCURONIUM BROMIDE 30 MG: 50 INJECTION, SOLUTION INTRAVENOUS at 14:30

## 2023-05-04 RX ADMIN — ROCURONIUM BROMIDE 10 MG: 50 INJECTION, SOLUTION INTRAVENOUS at 17:01

## 2023-05-04 RX ADMIN — PROPOFOL 25 MCG/KG/MIN: 10 INJECTION, EMULSION INTRAVENOUS at 14:32

## 2023-05-04 RX ADMIN — SODIUM CHLORIDE: 9 INJECTION, SOLUTION INTRAVENOUS at 19:41

## 2023-05-04 RX ADMIN — ACETAMINOPHEN 975 MG: 325 TABLET ORAL at 23:35

## 2023-05-04 RX ADMIN — Medication 2 G: at 14:38

## 2023-05-04 RX ADMIN — ROCURONIUM BROMIDE 100 MG: 10 INJECTION, SOLUTION INTRAVENOUS at 11:56

## 2023-05-04 RX ADMIN — PROPOFOL 10 MCG/KG/MIN: 10 INJECTION, EMULSION INTRAVENOUS at 11:59

## 2023-05-04 RX ADMIN — FENTANYL CITRATE 50 MCG: 50 INJECTION, SOLUTION INTRAMUSCULAR; INTRAVENOUS at 11:47

## 2023-05-04 RX ADMIN — PROPOFOL 35 MCG/KG/MIN: 10 INJECTION, EMULSION INTRAVENOUS at 20:42

## 2023-05-04 RX ADMIN — SODIUM CHLORIDE, SODIUM LACTATE, POTASSIUM CHLORIDE, CALCIUM CHLORIDE: 600; 310; 30; 20 INJECTION, SOLUTION INTRAVENOUS at 14:37

## 2023-05-04 RX ADMIN — FENTANYL CITRATE 100 MCG: 50 INJECTION, SOLUTION INTRAMUSCULAR; INTRAVENOUS at 12:01

## 2023-05-04 RX ADMIN — CHLORHEXIDINE GLUCONATE 0.12% ORAL RINSE 15 ML: 1.2 LIQUID ORAL at 19:48

## 2023-05-04 RX ADMIN — FENTANYL CITRATE 50 MCG: 50 INJECTION, SOLUTION INTRAMUSCULAR; INTRAVENOUS at 14:44

## 2023-05-04 RX ADMIN — IOPAMIDOL 75 ML: 755 INJECTION, SOLUTION INTRAVENOUS at 12:48

## 2023-05-04 RX ADMIN — FENTANYL CITRATE 50 MCG: 50 INJECTION, SOLUTION INTRAMUSCULAR; INTRAVENOUS at 15:02

## 2023-05-04 RX ADMIN — SENNOSIDES AND DOCUSATE SODIUM 1 TABLET: 50; 8.6 TABLET ORAL at 23:36

## 2023-05-04 RX ADMIN — ETOMIDATE INJECTION 20 MG: 2 SOLUTION INTRAVENOUS at 11:56

## 2023-05-04 RX ADMIN — PROPOFOL 50 MG: 10 INJECTION, EMULSION INTRAVENOUS at 14:43

## 2023-05-04 RX ADMIN — DEXMEDETOMIDINE HYDROCHLORIDE 0.2 MCG/KG/HR: 200 INJECTION INTRAVENOUS at 14:56

## 2023-05-04 RX ADMIN — DEXAMETHASONE SODIUM PHOSPHATE 10 MG: 10 INJECTION, SOLUTION INTRAMUSCULAR; INTRAVENOUS at 12:15

## 2023-05-04 RX ADMIN — SODIUM CHLORIDE 80 ML: 9 INJECTION, SOLUTION INTRAVENOUS at 12:48

## 2023-05-04 RX ADMIN — LEVETIRACETAM 500 MG: 100 SOLUTION ORAL at 15:02

## 2023-05-04 RX ADMIN — PHENYLEPHRINE HYDROCHLORIDE 0.2 MCG/KG/MIN: 10 INJECTION INTRAVENOUS at 15:22

## 2023-05-04 RX ADMIN — MANNITOL 50 G: 20 INJECTION, SOLUTION INTRAVENOUS at 11:52

## 2023-05-04 RX ADMIN — SODIUM CHLORIDE 1000 ML: 9 INJECTION, SOLUTION INTRAVENOUS at 10:42

## 2023-05-04 RX ADMIN — SODIUM CHLORIDE, POTASSIUM CHLORIDE, SODIUM LACTATE AND CALCIUM CHLORIDE: 600; 310; 30; 20 INJECTION, SOLUTION INTRAVENOUS at 14:22

## 2023-05-04 RX ADMIN — HYDROMORPHONE HYDROCHLORIDE 1 MG: 1 INJECTION, SOLUTION INTRAMUSCULAR; INTRAVENOUS; SUBCUTANEOUS at 13:22

## 2023-05-04 RX ADMIN — PANTOPRAZOLE SODIUM 40 MG: 40 INJECTION, POWDER, FOR SOLUTION INTRAVENOUS at 19:48

## 2023-05-04 ASSESSMENT — ENCOUNTER SYMPTOMS
NAUSEA: 1
VOMITING: 1
HEADACHES: 1

## 2023-05-04 ASSESSMENT — ACTIVITIES OF DAILY LIVING (ADL)
ADLS_ACUITY_SCORE: 35
ADLS_ACUITY_SCORE: 35
ADLS_ACUITY_SCORE: 47
ADLS_ACUITY_SCORE: 35
ADLS_ACUITY_SCORE: 49

## 2023-05-04 ASSESSMENT — LIFESTYLE VARIABLES: TOBACCO_USE: 0

## 2023-05-04 NOTE — ED PROVIDER NOTES
History     Chief Complaint:  Nausea, Vomiting, & Diarrhea       HPI   Brandon Ordoñez is a 37 year old male who presents with nausea and vomiting for the past few days.  Patient denies any diarrhea.  History is limited by the patient somnolence.  He denies any abdominal pain.  He notes a mild headache.  He denies any fevers.  He was seen initially at urgent care and sent to the ER due to his somnolence.      Independent Historian:    Cousin and Friend: Friend assists with the HPI as noted above.  Cousin arrived later and noted that the patient was seen at [facility] [place] ED with similar symptoms recently.    Review of External Notes:   Reviewed ED note from [facility] [place] from 5/1/2022 patient presented with confusion and headache and concerns for carbon monoxide exposure.  He had negative lab work-up at that time.    ROS:  Review of Systems   Unable to perform ROS: Mental status change   Gastrointestinal: Positive for nausea and vomiting.   Neurological: Positive for headaches.       Allergies:  Chicken-Derived Products (Egg)       Past Medical History:    Past Medical History:   Diagnosis Date     NO ACTIVE PROBLEMS        Past Surgical History:    Past Surgical History:   Procedure Laterality Date     NO HISTORY OF SURGERY          Family History:    family history is not on file.    Social History:   reports that he has never smoked. He has never used smokeless tobacco. He reports current alcohol use. He reports that he does not use drugs.  PCP: Jaimie - [facility]     Physical Exam     Patient Vitals for the past 24 hrs:   BP Temp Temp src Pulse Resp SpO2 Weight   05/04/23 1337 -- -- -- 80 24 100 % --   05/04/23 1336 (!) 166/100 -- -- 79 24 100 % --   05/04/23 1335 -- -- -- 89 25 100 % --   05/04/23 1334 -- -- -- 87 24 100 % --   05/04/23 1333 -- -- -- 96 24 100 % --   05/04/23 1332 (!) 163/119 -- -- 92 24 100 % --   05/04/23 1331 -- -- -- 89 24 100 % --   05/04/23 1330 -- -- -- 99 24 100 % --   05/04/23  1329 (!) 181/107 -- -- 81 29 100 % --   05/04/23 1327 -- -- -- 82 24 100 % --   05/04/23 1326 -- -- -- -- -- 100 % --   05/04/23 1325 -- -- -- -- 24 100 % --   05/04/23 1324 (!) 165/102 -- -- 88 24 100 % --   05/04/23 1320 (!) 167/110 -- -- 86 24 100 % --   05/04/23 1315 (!) 154/108 -- -- 84 19 100 % --   05/04/23 1310 (!) 157/105 -- -- 86 -- 100 % --   05/04/23 1236 (!) 159/106 -- -- 85 20 100 % --   05/04/23 1235 -- -- -- 86 19 100 % --   05/04/23 1230 (!) 156/109 -- -- 85 20 100 % --   05/04/23 1228 (!) 166/103 -- -- 90 20 100 % --   05/04/23 1225 (!) 157/108 -- -- 93 20 100 % --   05/04/23 1220 (!) 151/96 -- -- 85 19 100 % --   05/04/23 1215 (!) 157/106 -- -- 90 20 100 % --   05/04/23 1210 (!) 158/106 -- -- 96 20 100 % --   05/04/23 1205 (!) 161/109 -- -- 103 20 100 % --   05/04/23 1155 133/89 -- -- 72 19 99 % --   05/04/23 1150 -- -- -- 76 -- 98 % --   05/04/23 1145 (!) 148/95 -- -- 92 -- 100 % --   05/04/23 1137 -- -- -- 115 -- 100 % --   05/04/23 1134 -- -- -- 90 -- 100 % --   05/04/23 1130 134/89 -- -- 108 -- 100 % --   05/04/23 1124 -- -- -- 95 -- 100 % --   05/04/23 1123 -- -- -- 87 -- -- --   05/04/23 1122 -- -- -- 97 -- -- --   05/04/23 1121 (!) 104/93 -- -- 103 -- -- --   05/04/23 0958 -- -- -- -- -- -- 69.9 kg (154 lb)   05/04/23 0952 (!) 127/103 97.2  F (36.2  C) Temporal 98 16 99 % --        Physical Exam  Vitals and nursing note reviewed.   Constitutional:       General: He is in acute distress.      Appearance: He is ill-appearing and toxic-appearing.      Comments: Somnolent   HENT:      Head: Normocephalic and atraumatic.      Right Ear: External ear normal.      Left Ear: External ear normal.      Nose: Nose normal.   Eyes:      Extraocular Movements: Extraocular movements intact.      Conjunctiva/sclera: Conjunctivae normal.      Pupils: Pupils are equal, round, and reactive to light.   Cardiovascular:      Rate and Rhythm: Normal rate and regular rhythm.      Heart sounds: No murmur  heard.  Pulmonary:      Effort: Pulmonary effort is normal. No respiratory distress.      Breath sounds: No wheezing, rhonchi or rales.   Abdominal:      General: Abdomen is flat. There is no distension.      Palpations: Abdomen is soft.      Tenderness: There is no abdominal tenderness. There is no guarding or rebound.   Musculoskeletal:         General: No swelling or deformity.      Cervical back: Normal range of motion and neck supple.   Skin:     General: Skin is warm and dry.      Findings: No rash.   Neurological:      Mental Status: He is lethargic.      GCS: GCS eye subscore is 1. GCS verbal subscore is 4. GCS motor subscore is 6.      Cranial Nerves: No cranial nerve deficit.      Motor: Weakness (mild LUE weakness and pt will not move either LE) present.           Emergency Department Course     Imaging:  CTA Head with Contrast   Preliminary Result   IMPRESSION:   1. No high-grade stenosis or occlusion of the major proximal   intracranial arteries.   2. No intracranial aneurysm or high flow vascular malformation is   identified.   3. Please see separate report from previous head CT of same day for   details regarding the large hyperdense right frontal mass. MRI of the   brain without and with contrast is recommended for further evaluation.      XR Chest Port 1 View   Final Result   IMPRESSION: Endotracheal tube tip 1.7 cm from the aicha, consider   repositioning. Enteric tube tip below the margin of study. No acute   infiltrates.      WEI CHEUNG MD            SYSTEM ID:  [id number]      Head CT w/o contrast   Final Result   IMPRESSION:   1. Large mass with associated calcifications and probable associated   hemorrhage in the anterior right frontal region with surrounding   vasogenic edema and significant local mass effect resulting in   narrowing of the right more than left lateral ventricles and third   ventricle (with evidence for entrapment of the posterior body/temporal   horn of the left lateral  ventricle), and leftward subfalcine shift as   well as downward/central transtentorial herniation. Recommend urgent   neurosurgery consultation and brain MRI without and with contrast.   [   Critical Result: Findings concerning for acute intracranial   hemorrhage, brain herniation.]      Finding was identified on 5/4/2023 11:16 AM.       KARLA ARANDA was contacted by Dr. Bacon on 5/4/2023 11:25 AM and   verbalized understanding of the critical result.       WEI BACON MD            SYSTEM ID:  [id number]        Report per radiology    Laboratory:  Labs Ordered and Resulted from Time of ED Arrival to Time of ED Departure   GLUCOSE BY METER - Abnormal       Result Value    GLUCOSE BY METER POCT 150 (*)    BASIC METABOLIC PANEL - Abnormal    Sodium 138      Potassium 3.9      Chloride 97 (*)     Carbon Dioxide (CO2) 25      Anion Gap 16 (*)     Urea Nitrogen 14.9      Creatinine 0.68      Calcium 10.3 (*)     Glucose 159 (*)     GFR Estimate >90     CBC WITH PLATELETS AND DIFFERENTIAL - Abnormal    WBC Count 15.5 (*)     RBC Count 5.04      Hemoglobin 14.8      Hematocrit 44.0      MCV 87      MCH 29.4      MCHC 33.6      RDW 12.1      Platelet Count 240      % Neutrophils 87      % Lymphocytes 6      % Monocytes 7      % Eosinophils 0      % Basophils 0      % Immature Granulocytes 0      NRBCs per 100 WBC 0      Absolute Neutrophils 13.5 (*)     Absolute Lymphocytes 0.9      Absolute Monocytes 1.1      Absolute Eosinophils 0.0      Absolute Basophils 0.0      Absolute Immature Granulocytes 0.1      Absolute NRBCs 0.0     ISTAT GASES LACTATE VENOUS POCT - Abnormal    Lactic Acid POCT 2.7 (*)     Bicarbonate Venous POCT 26      O2 Sat, Venous POCT 93 (*)     pCO2V Venous POCT 53 (*)     pH Venous POCT 7.31 (*)     pO2 Venous POCT 74 (*)    ISTAT GASES LACTATE VENOUS POCT - Abnormal    Lactic Acid POCT 2.1 (*)     Bicarbonate Venous POCT 24      O2 Sat, Venous POCT 66 (*)     pCO2V Venous POCT 44      pH Venous  POCT 7.35      pO2 Venous POCT 36     ISTAT GASES LACTATE VENOUS POCT - Abnormal    Lactic Acid POCT 1.9      Bicarbonate Venous POCT 24      O2 Sat, Venous POCT 70 (*)     pCO2V Venous POCT 38 (*)     pH Venous POCT 7.41      pO2 Venous POCT 36     LIPASE - Normal    Lipase 20     ETHYL ALCOHOL LEVEL - Normal    Alcohol ethyl <0.01     INR - Normal    INR 1.02     PARTIAL THROMBOPLASTIN TIME - Normal    aPTT 29     RED BLOOD CELLS HAVE AVAILABLE (UNIT)   PREPARE RED BLOOD CELLS (UNIT)   BLOOD CULTURE   BLOOD CULTURE   ABO/RH TYPE AND SCREEN        Procedures      Rapid Sequence Intubation      Procedure: Rapid Sequence Intubation    Consent: Unable/Emergent     Risks Discussed: Unable due to AMS    Universal Protocol: Universal protocol was followed and time out conducted just prior to starting procedure, confirming patient identity, site/side, procedure, patient position, and availability of correct equipment and implants.     Indication: Impending respiratory failure/Altered mental status    Preparation: Preoxygenation with Mask. Premedication with Fentanyl.    Sedation: Etomidate     Paralytic: Rocuronium    Procedure Detail:   The patient was intubated with a 8 endotracheal tube using Video Laryngoscopy.  Following intubation, the patient's breath sounds were Equal.  ET Tube placement was confirmed with Auscultation, Chest X-ray and ETCO2.    Monitoring: Monitoring consisted of heart rate, cardiac monitor, continuous pulse oximetry, blood pressure checks, level of consciousness, IV access, constant attendance by RN, and MD attendance until patient stable or transfer of care.      Patient Status: The patient tolerated the procedure well: Yes. There were no complications.      Emergency Department Course & Assessments:             Interventions:  Medications   propofol (DIPRIVAN) infusion (70 mcg/kg/min × 69.9 kg Intravenous Rate/Dose Change 5/4/23 8995)   ondansetron (ZOFRAN) injection 4 mg (4 mg Intravenous  $Given 23 1042)   0.9% sodium chloride BOLUS (0 mLs Intravenous Stopped 23 1233)   dexamethasone PF (DECADRON) injection 10 mg (10 mg Intravenous $Given 23 1215)   mannitol 20 % infusion 50 g (0 g Intravenous Stopped 23 1233)   fentaNYL (PF) (SUBLIMAZE) injection 100 mcg (50 mcg Intravenous $Given 23 1147)   fentaNYL (PF) (SUBLIMAZE) injection 100 mcg (100 mcg Intravenous $Given 23 1147)   etomidate (AMIDATE) injection 20 mg (20 mg Intravenous $Given 23 1156)   rocuronium injection 100 mg (100 mg Intravenous $Given 23 1156)   fentaNYL (PF) (SUBLIMAZE) injection 100 mcg (100 mcg Intravenous $Given 23 1201)   HYDROmorphone (DILAUDID) injection 1 mg (1 mg Intravenous $Given 23 1211)   iopamidol (ISOVUE-370) solution 500 mL (75 mLs Intravenous $Given 23 1248)   CT scan flush use (80 mLs As instructed $Given 23 1248)   HYDROmorphone (DILAUDID) injection 1 mg (1 mg Intravenous $Given 23 1322)        Independent Interpretation (X-rays, CTs, rhythm strip):  Chest x-ray shows endotracheal tube just above the aicha and NG tube position in the stomach.  No obvious infiltrates or pneumothorax per my read.    Consultations/Discussion of Management or Tests:  1130 discussed CT findings with the radiologist.  Neurosurgery paged  1138 discussed with GABRIEL Stern for Dr. Dasilva and neurosurgery.  1211 discussed with Dr. Villanueva with [facility] ICU  1234 discussed with Dr. Dasilva with neurosurgery       Social Determinants of Health affecting care:  None       Disposition:  The patient was transferred to [facility] via EMS. Dr. Villanueva accepted the patient for transfer.     Impression & Plan    CMS Diagnoses: The Lactic acid level is elevated due to intracranial mass and hemorrhage, at this time there is no sign of severe sepsis or septic shock. and None      Medical Decision Makin-year-old male presenting with nausea and vomiting.  Patient is quite somnolent on arrival.  Is  unclear what is causing his somnolence.  He may be severely dehydrated or have some metabolic or electrolyte abnormality.  His blood sugar was normal in triage.  His vital signs were not significantly abnormal either.  Given the altered mental status with nausea and vomiting a CT of the head was ordered and showed a large intracranial mass with associated hemorrhage and midline shift and impending herniation as noted above.  I consulted neurosurgery immediately and they recommended giving a dose of Decadron and transferring a soon as possible.  Given the patient's somnolence and GCS of 11 with signs of pending herniation on his CT, I did give the patient a bolus of mannitol.  We also proceeded with intubation for airway protection and to hyperventilate if need be given the signs of herniation.  I discussed the case with Dr. Villanueva at the [facility] ICU and she accepted the patient for transfer.  I again called neurosurgery and discussed with Dr. Dasilva.  He will plan to take the patient urgently to the operating room upon patient's arrival.  He requested a CT angiogram be performed before transfer for operative planning.  He also requested that we aim for a PCO2 of 32 on the ventilator.  Initial blood gas showed PCO2 of much higher than this so we will increase respiratory rate and repeat VBG.  Patient remained hypertensive after intubation.  He was kept properly sedated with propofol and intermittent fentanyl and Dilaudid boluses.    Critical Care time:  was 90 minutes for this patient excluding procedures.    Diagnosis:    ICD-10-CM    1. Intracranial mass  R90.0       2. Intracranial hemorrhage (H)  I62.9       3. Somnolence  R40.0            Discharge Medications:  Discharge Medication List as of 5/4/2023  2:03 PM                      Antonio Oliva MD  05/04/23 1323

## 2023-05-04 NOTE — H&P
[facility] Intensive Care Consultation    Date of Admission:  5/4/2023     Assessment & Plan   Brandon Ordoñez is a 37 year old male who was admitted on 5/4/2023.  Emergently received surgery for large mass.    Neurology:  Status post glioblastoma resection: Currently heavily sedated.  Nurse notes no responsiveness.  Received paralytic for intubation but nothing else.  To this examiner does have pupil airway reflexes  -Defer to neurosurgery service repeat imaging for prognostication.  -Wean sedation to better assess mental status    Cardiovascular:  No acute issues:   -    Pulmonary:        Postoperative ventilatory requirement: Patient apneic when placed on pressure support.  -reduce minute ventilation and blood gas.       FiO2 (%): 100 %  Resp: 24      Renal  Acute issues: Labs relatively normal earlier in the day  -Continue half-normal saline as previously ordered    ID:         No acute issues:   -    GI  No acute issues: We will order GI prophylaxis if remains intubated  -    Nutrition  No acute issues: Nutrition consult and tube feeds if necessary  -    Endocrine:  Stress hyperglycemia: On dexamethasone  -ICU insulin protocol    Heme/Onc:  That is post resection of brain mass: Evidence of ongoing bleeding  -Monitoring J tube output    Dalila Portillo MD    Time Spent on this Encounter   Billing:  I spent 35 minutes bedside and on the inpatient unit today managing the critical care of Brandon Ordoñez in relation to the issues listed in this note.    Reason for Consult   Reason for consult: Critical care management of postoperative patient    History of Present Illness   Brandon Ordoñez is a 37 year old male who presents with altered mentation.  Head CT showed large mass with midline shift.  Get emergently to the operating room where he was discovered to have a glioblastoma.  Patient transferred; intubated sedated and is unable to provide  history.    Patient has no known medical history.  Unknown social and family history.  Unknown surgical history.    Physical Exam       Temp  Min: 97.2  F (36.2  C)  Max: 97.2  F (36.2  C)                There were no vitals filed for this visit.  No intake/output data recorded.    Constitutional: Heavily sedated  ENT: Very small but bilaterally reactive pupils, head in bandages with bloody drainage into RACHELLE tube, endotracheal tube in place otherwise atraumatic  Respiratory: Synchronous with ventilator.  Does have spontaneous respiratory effort  Cardiovascular: Regular rate rhythm  GI: Benign  Lymph/Hematologic: Normal  Skin: Grossly  Musculoskeletal: Grossly normal  Neurologic: Unresponsive    Medications         Data   Recent Labs   Lab 05/04/23  1039 05/04/23  1003   WBC 15.5*  --    HGB 14.8  --    MCV 87  --      --    INR 1.02  --      --    POTASSIUM 3.9  --    CHLORIDE 97*  --    CO2 25  --    BUN 14.9  --    CR 0.68  --    ANIONGAP 16*  --    ROYER 10.3*  --    * 150*     Recent Results (from the past 24 hour(s))   Head CT w/o contrast    Narrative    CT SCAN OF THE HEAD WITHOUT CONTRAST   5/4/2023 11:11 AM     HISTORY: Altered mental status.    TECHNIQUE: Axial images of the head and coronal reformations without  IV contrast material. Radiation dose for this scan was reduced using  automated exposure control, adjustment of the mA and/or kV according  to patient size, or iterative reconstruction technique.    COMPARISON: None.    FINDINGS: There is a large hyperdense masslike lesion that appears  intra-axial (although an extra-axial lesion is not entirely excluded)  involving the anterior right frontal lobe measuring approximate 6.3 x  4.7 x 5.2 cm. This lesion shows areas of ill-defined calcification  centrally and peripherally. Mild to moderate surrounding vasogenic  edema. Marked local mass effect contributing to significant  narrowing/effacement of the right ventricle and narrowing of  the  frontal horn of the left lateral ventricle, and complete effacement of  the third ventricle. Mild asymmetric dilation of the body and temporal  horn of the left lateral ventricle, concerning for ventricular  entrapment due to obstruction at the level of the foramen of  Chowdary/third ventricle. Complete effacement of the suprasellar cistern  due to posterior inferior displacement/shift of the cerebral  hemispheres. There is approximately 5 mm of leftward midline shift.  There are findings concerning for downward central transtentorial  herniation, with effacement of the interpeduncular and ambient  cisterns. There is diffuse cerebral sulcal effacement, suggestive of  increased intracranial pressure.    The orbits appear unremarkable. The paranasal sinuses are clear. The  mastoid air cells are clear. The calvarium appears intact.      Impression    IMPRESSION:  1. Large mass with associated calcifications and probable associated  hemorrhage in the anterior right frontal region with surrounding  vasogenic edema and significant local mass effect resulting in  narrowing of the right more than left lateral ventricles and third  ventricle (with evidence for entrapment of the posterior body/temporal  horn of the left lateral ventricle), and leftward subfalcine shift as  well as downward/central transtentorial herniation. Recommend urgent  neurosurgery consultation and brain MRI without and with contrast.  [  Critical Result: Findings concerning for acute intracranial  hemorrhage, brain herniation.]    Finding was identified on 5/4/2023 11:16 AM.     KARLA ARANDA was contacted by Dr. Bacon on 5/4/2023 11:25 AM and  verbalized understanding of the critical result.     WEI BACON MD         SYSTEM ID:  [id number]   XR Chest Port 1 View    Narrative    CHEST ONE VIEW  5/4/2023 12:19 PM     HISTORY: Post intubation.    COMPARISON: None.      Impression    IMPRESSION: Endotracheal tube tip 1.7 cm from the aicha,  consider  repositioning. Enteric tube tip below the margin of study. No acute  infiltrates.    WEI CHEUNG MD         SYSTEM ID:  [id number]   CTA Head with Contrast    Narrative    CT ANGIOGRAM OF THE HEAD AND NECK WITH CONTRAST May 4, 2023 1:05 PM     HISTORY: Right frontal mass/hemorrhage.    TECHNIQUE: CT angiography with an injection of 75mL Isovue-370 IV with  scans through the head. Images were transferred to a separate 3-D  workstation where multiplanar reformations and 3-D images were  created. Estimates of carotid stenoses are made relative to the distal  internal carotid artery diameters except as noted. Radiation dose for  this scan was reduced using automated exposure control, adjustment of  the mA and/or kV according to patient size, or iterative  reconstruction technique.      COMPARISON: CT of the head of same day.    FINDINGS: The visualized aspects of the distal cervical internal  carotid arteries appear patent without focal stenosis. The  intracranial internal carotid arteries are widely patent.  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 No high-grade stenosis of the anterior  cerebral arteries is identified. There is mass effect that results in  leftward displacement of the A2/A3 and more distal segments of the  anterior cerebral arteries. The major proximal visualized branches of  the anterior cerebral and middle cerebral arteries appear patent. No  high-grade stenosis or large vessel occlusion seen. The bilateral  vertebral arteries, basilar artery, and the proximal portions of the  posterior cerebral arteries appear patent without significant  stenosis. No large vessel occlusion is identified. No intracranial  aneurysm or high flow vascular malformation identified.    The previously demonstrated large hyperdense right frontal mass is  again noted with associated scattered calcifications and local mass  effect, as described on the previous  head CT report. No obvious  significant/brisk enhancement on arterial phase CT imaging of the head  in the region of the mass.      Impression    IMPRESSION:  1. No high-grade stenosis or occlusion of the major proximal  intracranial arteries.  2. No intracranial aneurysm or high flow vascular malformation is  identified.  3. Please see separate report from previous head CT of same day for  details regarding the large hyperdense right frontal mass. MRI of the  brain without and with contrast is recommended for further evaluation.

## 2023-05-04 NOTE — CONSULTS
[facility]    Neurosurgery Consultation     Date of Admission:  (Not on file)  Date of Consult (When I saw the patient): 05/04/23    Assessment & Plan   Brandon Ordoñez is a 37 year old male who was admitted on (Not on file). I was asked to see the patient for brain mass. Per ED MD, pt with 3 days n/v, and presented to  ED for evaluation. Head CT showed right frontal mass with significant edema and shift, with transtentorial herniation. He was emergently intubated for airway protection and increasing somnolence, and was transferred to  for surgical intervention with Dr. Dasilva. Right frontal craniotomy for intracranial mass resection was performed. See surgical dictation for further details.   Tissue sent to pathology for analysis.     Head CT tonight. MRI Brain w/wo contrast in am.   Keppra 500 mg BID IV.   Extubate as able.   Decadron 4mg q 6hr IV.       I have discussed the following assessment and plan with Dr. Dasilva, who is in agreement with the initial plan and will follow up with further consultation recommendations.    Pedro Luis Reich PA-C  [facility] Neurosurgery  [place] [address]    Tel [phone]        Code Status    No Order    Reason for Consult   Reason for consult: brain mass    Primary Care Physician   [facility]    Chief Complaint   n/v    History is obtained from the electronic health record and emergency department physician    History of Present Illness   Brandon Ordoñez is a 37 year old male who presents with brain mass. Per ED MD, pt with 3 days n/v, and presented to  ED for evaluation. Head CT showed right frontal mass with significant edema and shift, with transtentorial herniation. He was emergently intubated for airway protection and increasing somnolence, and was transferred to  for surgical intervention with Dr. Dasilva. Right frontal craniotomy for  intracranial mass resection was performed. See surgical dictation for further details.     Past Medical History   I have reviewed this patient's medical history and updated it with pertinent information if needed.   Past Medical History:   Diagnosis Date     NO ACTIVE PROBLEMS        Past Surgical History   I have reviewed this patient's surgical history and updated it with pertinent information if needed.  Past Surgical History:   Procedure Laterality Date     NO HISTORY OF SURGERY         Prior to Admission Medications   Cannot display prior to admission medications because the patient has not been admitted in this contact.     Allergies   Allergies   Allergen Reactions     Chicken-Derived Products (Egg) Rash       Social History   I have reviewed this patient's social history and updated it with pertinent information if needed. Brandon Ordoñez  reports that he has never smoked. He has never used smokeless tobacco. He reports current alcohol use. He reports that he does not use drugs.    Family History   I have reviewed this patient's family history and updated it with pertinent information if needed.   No family history on file.    Review of Systems   10 point review of systems is negative with the exception of HPI and PMH.       Physical Exam                      Vital Signs with Ranges  Temp:  [97.2  F (36.2  C)] 97.2  F (36.2  C)  Pulse:  [] 80  Resp:  [16-29] 24  BP: (104-181)/() 166/100  FiO2 (%):  [100 %] 100 %  SpO2:  [98 %-100 %] 100 %  0 lbs 0 oz     , There were no vitals taken for this visit.  0 lbs 0 oz  HEENT:  Normocephalic, atraumatic.  PERRLA.  EOM s intact.   Neck:  Supple, non-tender, without lymphadenopathy.  Heart:  No peripheral edema  Lungs:  No SOB  Abdomen:  Soft, non-tender, non-distended.  Skin:  Warm and dry, good capillary refill.  Extremities:  Good radial and dorsalis pedis pulses bilaterally, no edema, cyanosis or clubbing.    NEUROLOGICAL EXAMINATION:   Mental  status:  Intubated          Data   All new lab and imaging data was personally reviewed by me.    CT:CT SCAN OF THE HEAD WITHOUT CONTRAST   5/4/2023 11:11 AM      HISTORY: Altered mental status.     TECHNIQUE: Axial images of the head and coronal reformations without  IV contrast material. Radiation dose for this scan was reduced using  automated exposure control, adjustment of the mA and/or kV according  to patient size, or iterative reconstruction technique.     COMPARISON: None.     FINDINGS: There is a large hyperdense masslike lesion that appears  intra-axial (although an extra-axial lesion is not entirely excluded)  involving the anterior right frontal lobe measuring approximate 6.3 x  4.7 x 5.2 cm. This lesion shows areas of ill-defined calcification  centrally and peripherally. Mild to moderate surrounding vasogenic  edema. Marked local mass effect contributing to significant  narrowing/effacement of the right ventricle and narrowing of the  frontal horn of the left lateral ventricle, and complete effacement of  the third ventricle. Mild asymmetric dilation of the body and temporal  horn of the left lateral ventricle, concerning for ventricular  entrapment due to obstruction at the level of the foramen of  Chowdary/third ventricle. Complete effacement of the suprasellar cistern  due to posterior inferior displacement/shift of the cerebral  hemispheres. There is approximately 5 mm of leftward midline shift.  There are findings concerning for downward central transtentorial  herniation, with effacement of the interpeduncular and ambient  cisterns. There is diffuse cerebral sulcal effacement, suggestive of  increased intracranial pressure.     The orbits appear unremarkable. The paranasal sinuses are clear. The  mastoid air cells are clear. The calvarium appears intact.                                                                      IMPRESSION:  1. Large mass with associated calcifications and probable  associated  hemorrhage in the anterior right frontal region with surrounding  vasogenic edema and significant local mass effect resulting in  narrowing of the right more than left lateral ventricles and third  ventricle (with evidence for entrapment of the posterior body/temporal  horn of the left lateral ventricle), and leftward subfalcine shift as  well as downward/central transtentorial herniation. Recommend urgent  neurosurgery consultation and brain MRI without and with contrast.  [  Critical Result: Findings concerning for acute intracranial  hemorrhage, brain herniation.]     Finding was identified on 5/4/2023 11:16 AM.      KARLA ARANDA was contacted by Dr. Cerda on 5/4/2023 11:25 AM and  verbalized understanding of the critical result.     MRI:    CBC RESULTS:   Recent Labs   Lab Test 05/04/23  1039   WBC 15.5*   RBC 5.04   HGB 14.8   HCT 44.0   MCV 87   MCH 29.4   MCHC 33.6   RDW 12.1        Basic Metabolic Panel:  Lab Results   Component Value Date     05/04/2023     12/09/2019      Lab Results   Component Value Date    POTASSIUM 3.9 05/04/2023    POTASSIUM 4.0 12/09/2019     Lab Results   Component Value Date    CHLORIDE 97 05/04/2023    CHLORIDE 103 12/09/2019     Lab Results   Component Value Date    ROYER 10.3 05/04/2023    ROYER 9.2 12/09/2019     Lab Results   Component Value Date    CO2 25 05/04/2023    CO2 30 12/09/2019     Lab Results   Component Value Date    BUN 14.9 05/04/2023    BUN 13 12/09/2019     Lab Results   Component Value Date    CR 0.68 05/04/2023    CR 0.79 12/09/2019     Lab Results   Component Value Date     05/04/2023     05/04/2023    GLC 99 12/09/2019     INR:  Lab Results   Component Value Date    INR 1.02 05/04/2023

## 2023-05-04 NOTE — ANESTHESIA CARE TRANSFER NOTE
Patient: Brandon Ordoñez    Procedure: Procedure(s):  CRANIOTOMY, USING OPTICAL TRACKING SYSTEM, WITH NEOPLASM EXCISION RIGHT FRONTAL CRANIOTOMY WITH RESECTION OF RIGHT FRONTAL LESION USING STEALTH       Diagnosis: Brain mass [G93.89]  Diagnosis Additional Information: No value filed.    Anesthesia Type:   General     Note:    Oropharynx: endotracheal tube in place and ventilatory support  Level of Consciousness: iatrogenic sedation  Patient oxygen source: BMV.  Level of Supplemental Oxygen (L/min / FiO2): 15  Independent Airway: airway patency not satisfactory and stable  Dentition: dentition unchanged  Vital Signs Stable: post-procedure vital signs reviewed and stable  Report to RN Given: handoff report given  Patient transferred to: ICU  Comments: Patient connected to SpO2, EKG, and arterial blood pressure transport monitors and accompanied by CRNA, anesthesiologist, circulating RN to ICU room. Patient ventilated by anesthesiologist with ambu via ETT with O2 at 15 liters per minute during transport.     On arrival to ICU, endotracheal tube position unchanged, equal, bilateral breath sounds auscultated in ICU room, patient placed on ICU ventilator by respiratory therapist, teeth and oral mucosa intact and unchanged at handoff of care. At anesthesia handoff of care, clinical monitors and alarms on and functioning, report on patient's clinical status given to ICU RN, report on patient's clinical status given to intensivist, ICU staff questions answered.  ICU Handoff: Call for PAUSE to initiate/utilize ICU HANDOFF, Identified Patient, Identified Responsible Provider, Reviewed the Pertinent Medical History, Discussed Surgical Course, Reviewed Intra-OP Anesthesia Management and Issues during Anesthesia, Set Expectations for Post Procedure Period and Allowed Opportunity for Questions and Acknowledgement of Understanding      Vitals:  Vitals Value Taken Time   /41 05/04/23 1743   Temp     Pulse 67 05/04/23  1745   Resp 49 05/04/23 1745   SpO2 100 % 05/04/23 1745   Vitals shown include unvalidated device data.    Electronically Signed By: GWEN Barnett CRNA  May 4, 2023  5:46 PM

## 2023-05-04 NOTE — ED NOTES
Family present at bedside reports 4+ weeks of nausea, coughing multiple times of vomiting last night. Wife reports speech since 0700. C/o HA since yesterday morning.

## 2023-05-04 NOTE — ANESTHESIA PREPROCEDURE EVALUATION
Anesthesia Pre-Procedure Evaluation    Patient: Brandon Ordoñez   MRN: [id number] : 1985        Procedure : Procedure(s):  CRANIOTOMY, USING OPTICAL TRACKING SYSTEM, WITH NEOPLASM EXCISION RIGHT FRONTAL CRANIOTOMY WITH RESECTION OF RIGHT FRONTAL LESION USING STEALTH          Past Medical History:   Diagnosis Date     NO ACTIVE PROBLEMS       Past Surgical History:   Procedure Laterality Date     NO HISTORY OF SURGERY        Allergies   Allergen Reactions     Chicken-Derived Products (Egg) Rash      Social History     Tobacco Use     Smoking status: Never     Smokeless tobacco: Never   Vaping Use     Vaping status: Not on file   Substance Use Topics     Alcohol use: Yes     Comment: Occasional if there is a party       Wt Readings from Last 1 Encounters:   23 69.9 kg (154 lb)        Anesthesia Evaluation            ROS/MED HX  ENT/Pulmonary:     (+) Intermittent, asthma  (-) tobacco use   Neurologic: Comment: Large R frontal brain tumor with shift.       Cardiovascular:  - neg cardiovascular ROS     METS/Exercise Tolerance: >4 METS    Hematologic:  - neg hematologic  ROS     Musculoskeletal:  - neg musculoskeletal ROS     GI/Hepatic:  - neg GI/hepatic ROS     Renal/Genitourinary:  - neg Renal ROS     Endo:  - neg endo ROS     Psychiatric/Substance Use:  - neg psychiatric ROS     Infectious Disease:  - neg infectious disease ROS     Malignancy:  - neg malignancy ROS     Other:  - neg other ROS          Physical Exam    Airway   unable to assess          Respiratory Devices and Support    ETT:      Dental    unable to assess        Cardiovascular   cardiovascular exam normal          Pulmonary   pulmonary exam normal                OUTSIDE LABS:  CBC:   Lab Results   Component Value Date    WBC 15.5 (H) 2023    WBC 5.7 2022    HGB 14.8 2023    HGB 13.9 2022    HCT 44.0 2023    HCT 41.2 2022     2023     2022     BMP:   Lab Results    Component Value Date     05/04/2023     12/09/2019    POTASSIUM 3.9 05/04/2023    POTASSIUM 4.0 12/09/2019    CHLORIDE 97 (L) 05/04/2023    CHLORIDE 103 12/09/2019    CO2 25 05/04/2023    CO2 30 12/09/2019    BUN 14.9 05/04/2023    BUN 13 12/09/2019    CR 0.68 05/04/2023    CR 0.79 12/09/2019     (H) 05/04/2023     (H) 05/04/2023     COAGS:   Lab Results   Component Value Date    PTT 29 05/04/2023    INR 1.02 05/04/2023     POC: No results found for: BGM, HCG, HCGS  HEPATIC: No results found for: ALBUMIN, PROTTOTAL, ALT, AST, GGT, ALKPHOS, BILITOTAL, BILIDIRECT, [place]  OTHER:   Lab Results   Component Value Date    PH 7.35 05/04/2023    LACT 2.1 (H) 05/04/2023    ROYER 10.3 (H) 05/04/2023    LIPASE 20 05/04/2023    TSH 0.74 05/03/2022       Anesthesia Plan    ASA Status:  4      Anesthesia Type: General.     - Airway: ETT   Induction: Intravenous.   Maintenance: Balanced.   Techniques and Equipment:     - Lines/Monitors: 2nd IV, Arterial Line     Consents    Anesthesia Plan(s) and associated risks, benefits, and realistic alternatives discussed. Questions answered and patient/representative(s) expressed understanding.    - Discussed:     - Discussed with:  Other (See Comment) (Patient to OR directly after transfer from [facility]. )      - Extended Intubation/Ventilatory Support Discussed: No.      - Patient is DNR/DNI Status: No    Use of blood products discussed: No .     Postoperative Care    Pain management: IV analgesics.   PONV prophylaxis: Ondansetron (or other 5HT-3), Dexamethasone or Solumedrol     Comments:    Other Comments: MAP goal of >65mm Hg for this case. Use of IV fluids and colloids as well as IV vasoactive drips anticipated to achieve goal.             Miky Calderon MD

## 2023-05-04 NOTE — ED TRIAGE NOTES
"Pt here for n/v/d, poor PO intake, lethargy, and weakness. Symptoms began 3 days ago. Accompanied by friend. Pt is minimally responsive/sleepy in triage and requires multiple verbal prompts to stay awake and answer questions - VSS, blood sugar is 150, reports he \"slept well\" last night. Denies blood in vomit or stool.       "

## 2023-05-04 NOTE — BRIEF OP NOTE
"[facility]    Brief Operative Note    Pre-operative diagnosis: Brain mass [G93.89]  Post-operative diagnosis Right frontal tumor with hemorrhage    Procedure: Procedure(s):  CRANIOTOMY, USING OPTICAL TRACKING SYSTEM, WITH NEOPLASM EXCISION RIGHT FRONTAL CRANIOTOMY WITH RESECTION OF RIGHT FRONTAL LESION USING STEALTH  Surgeon: Surgeon(s) and Role:     * Perry Dasilva MD - Primary     * Pedro Luis Reich PA-C - Assisting  Anesthesia: General   Estimated Blood Loss: Less than 100 ml    Drains: No 10 channel drain  Specimens:   ID Type Source Tests Collected by Time Destination   1 : right frontal brain tumor Tissue Brain SURGICAL PATHOLOGY EXAM Perry Dasilva MD 5/4/2023  3:26 PM    2 : right frontal brain tumor for permanent Tissue Brain SURGICAL PATHOLOGY EXAM Perry Dasilva MD 5/4/2023  3:57 PM    A : right frontal brain tumor for cultures Tissue Brain ANAEROBIC BACTERIAL CULTURE ROUTINE, GRAM STAIN, AEROBIC BACTERIAL CULTURE ROUTINE Perry Dasilva MD 5/4/2023  3:54 PM      Findings:   Tumor mixed with hemorrhage.  Complications: None.  Implants:   Implant Name Type Inv. Item Serial No.  Lot No. LRB No. Used Action   GRAFT DURAGEN 3X3\"  - [id number]  GRAFT DURAGEN 3X3\"   [employer] [id number] Right 1 Implanted   IMP PLATE SYN FRANCK HOLE COVER 17MM 04.503.023 - [id number] Metallic Hardware/Rives IMP PLATE SYN FRANCK HOLE COVER 17MM 04.503.023  [employer]STRAT[employer] 41 08  04/25/2023 Right 3 Implanted   IMP SCR SYN MATRIX LOW PRO 1.5X04MM SELF DRILL 04.503.104.01 - [id number] Metallic Hardware/Rives IMP SCR SYN MATRIX LOW PRO 1.5X04MM SELF DRILL [id number]  [employer]AT[employer] 41 08  04/25/2023 Right 16 Implanted   IMP SCR SYN MATRIX LOW PRO 1.5X04MM SELF DRILL 04.503.104.01 - [id number] Metallic Hardware/Rives IMP SCR SYN MATRIX LOW PRO 1.5X04MM SELF DRILL [id number]  [employer]AT[employer] 41 08  04/25/2023 Right 1 Wasted           "

## 2023-05-04 NOTE — ANESTHESIA PROCEDURE NOTES
Arterial Line Procedure Note    Pre-Procedure   Staff -        Anesthesiologist:  Miky Calderon MD       Performed By: anesthesiologist       Location: OR       Pre-Anesthestic Checklist: patient identified, IV checked, risks and benefits discussed, monitors and equipment checked and pre-op evaluation  Timeout:       Correct Patient: Yes        Correct Procedure: Yes        Correct Position: Yes   Line Placement:   This line was placed Post Induction  Procedure   Procedure: arterial line       Laterality: right       Insertion Site: radial.  Sterile Prep        Standard elements of sterile barrier followed       Skin prep: Chloraprep  Insertion/Injection        Catheter Type/Size: 20 G, 1.75 in/4.5 cm quick cath (integral wire)  Narrative        Tegaderm dressing used.       Complications: None apparent,        Arterial waveform: Yes

## 2023-05-05 ENCOUNTER — PATIENT OUTREACH (OUTPATIENT)
Dept: ONCOLOGY | Facility: CLINIC | Age: 38
End: 2023-05-05

## 2023-05-05 ENCOUNTER — APPOINTMENT (OUTPATIENT)
Dept: MRI IMAGING | Facility: CLINIC | Age: 38
End: 2023-05-05
Attending: PHYSICIAN ASSISTANT
Payer: COMMERCIAL

## 2023-05-05 LAB
ALBUMIN SERPL BCG-MCNC: 3.9 G/DL (ref 3.5–5.2)
ALLEN'S TEST: ABNORMAL
ALLEN'S TEST: ABNORMAL
ALP SERPL-CCNC: 82 U/L (ref 40–129)
ALT SERPL W P-5'-P-CCNC: 38 U/L (ref 10–50)
ANION GAP SERPL CALCULATED.3IONS-SCNC: 13 MMOL/L (ref 7–15)
AST SERPL W P-5'-P-CCNC: 22 U/L (ref 10–50)
BASE EXCESS BLDA CALC-SCNC: 3.9 MMOL/L (ref -9–1.8)
BASE EXCESS BLDA CALC-SCNC: 4.7 MMOL/L (ref -9–1.8)
BILIRUB SERPL-MCNC: 0.6 MG/DL
BUN SERPL-MCNC: 15.7 MG/DL (ref 6–20)
CALCIUM SERPL-MCNC: 9.3 MG/DL (ref 8.6–10)
CHLORIDE SERPL-SCNC: 101 MMOL/L (ref 98–107)
CREAT SERPL-MCNC: 0.79 MG/DL (ref 0.67–1.17)
DEPRECATED HCO3 PLAS-SCNC: 25 MMOL/L (ref 22–29)
ERYTHROCYTE [DISTWIDTH] IN BLOOD BY AUTOMATED COUNT: 12.5 % (ref 10–15)
GFR SERPL CREATININE-BSD FRML MDRD: >90 ML/MIN/1.73M2
GLUCOSE BLDC GLUCOMTR-MCNC: 119 MG/DL (ref 70–99)
GLUCOSE BLDC GLUCOMTR-MCNC: 122 MG/DL (ref 70–99)
GLUCOSE BLDC GLUCOMTR-MCNC: 126 MG/DL (ref 70–99)
GLUCOSE BLDC GLUCOMTR-MCNC: 140 MG/DL (ref 70–99)
GLUCOSE BLDC GLUCOMTR-MCNC: 93 MG/DL (ref 70–99)
GLUCOSE SERPL-MCNC: 134 MG/DL (ref 70–99)
HCO3 BLD-SCNC: 28 MMOL/L (ref 21–28)
HCO3 BLD-SCNC: 29 MMOL/L (ref 21–28)
HCT VFR BLD AUTO: 35.7 % (ref 40–53)
HGB BLD-MCNC: 11.8 G/DL (ref 13.3–17.7)
MAGNESIUM SERPL-MCNC: 2 MG/DL (ref 1.7–2.3)
MCH RBC QN AUTO: 29.1 PG (ref 26.5–33)
MCHC RBC AUTO-ENTMCNC: 33.1 G/DL (ref 31.5–36.5)
MCV RBC AUTO: 88 FL (ref 78–100)
O2/TOTAL GAS SETTING VFR VENT: 30 %
O2/TOTAL GAS SETTING VFR VENT: 30 %
PCO2 BLD: 38 MM HG (ref 35–45)
PCO2 BLD: 39 MM HG (ref 35–45)
PH BLD: 7.47 [PH] (ref 7.35–7.45)
PH BLD: 7.48 [PH] (ref 7.35–7.45)
PHOSPHATE SERPL-MCNC: 4.2 MG/DL (ref 2.5–4.5)
PLATELET # BLD AUTO: 228 10E3/UL (ref 150–450)
PO2 BLD: 110 MM HG (ref 80–105)
PO2 BLD: 117 MM HG (ref 80–105)
POTASSIUM SERPL-SCNC: 3.9 MMOL/L (ref 3.4–5.3)
PROT SERPL-MCNC: 6.9 G/DL (ref 6.4–8.3)
RBC # BLD AUTO: 4.06 10E6/UL (ref 4.4–5.9)
SODIUM SERPL-SCNC: 139 MMOL/L (ref 136–145)
TROPONIN T SERPL HS-MCNC: <6 NG/L
WBC # BLD AUTO: 13.2 10E3/UL (ref 4–11)

## 2023-05-05 PROCEDURE — 999N000185 HC STATISTIC TRANSPORT TIME EA 15 MIN

## 2023-05-05 PROCEDURE — 84100 ASSAY OF PHOSPHORUS: CPT | Performed by: INTERNAL MEDICINE

## 2023-05-05 PROCEDURE — 250N000011 HC RX IP 250 OP 636: Performed by: PHYSICIAN ASSISTANT

## 2023-05-05 PROCEDURE — 999N000259 HC STATISTIC EXTUBATION

## 2023-05-05 PROCEDURE — 36415 COLL VENOUS BLD VENIPUNCTURE: CPT | Performed by: INTERNAL MEDICINE

## 2023-05-05 PROCEDURE — 93010 ELECTROCARDIOGRAM REPORT: CPT | Performed by: INTERNAL MEDICINE

## 2023-05-05 PROCEDURE — 94003 VENT MGMT INPAT SUBQ DAY: CPT

## 2023-05-05 PROCEDURE — 999N000157 HC STATISTIC RCP TIME EA 10 MIN

## 2023-05-05 PROCEDURE — 250N000013 HC RX MED GY IP 250 OP 250 PS 637: Performed by: PHYSICIAN ASSISTANT

## 2023-05-05 PROCEDURE — 82803 BLOOD GASES ANY COMBINATION: CPT | Performed by: STUDENT IN AN ORGANIZED HEALTH CARE EDUCATION/TRAINING PROGRAM

## 2023-05-05 PROCEDURE — 85027 COMPLETE CBC AUTOMATED: CPT | Performed by: INTERNAL MEDICINE

## 2023-05-05 PROCEDURE — 250N000011 HC RX IP 250 OP 636: Performed by: INTERNAL MEDICINE

## 2023-05-05 PROCEDURE — 83735 ASSAY OF MAGNESIUM: CPT | Performed by: INTERNAL MEDICINE

## 2023-05-05 PROCEDURE — 84484 ASSAY OF TROPONIN QUANT: CPT | Performed by: INTERNAL MEDICINE

## 2023-05-05 PROCEDURE — 80053 COMPREHEN METABOLIC PANEL: CPT | Performed by: INTERNAL MEDICINE

## 2023-05-05 PROCEDURE — 82803 BLOOD GASES ANY COMBINATION: CPT | Performed by: INTERNAL MEDICINE

## 2023-05-05 PROCEDURE — 999N000009 HC STATISTIC AIRWAY CARE

## 2023-05-05 PROCEDURE — 70553 MRI BRAIN STEM W/O & W/DYE: CPT

## 2023-05-05 PROCEDURE — 99291 CRITICAL CARE FIRST HOUR: CPT | Mod: 24 | Performed by: INTERNAL MEDICINE

## 2023-05-05 PROCEDURE — 258N000003 HC RX IP 258 OP 636: Performed by: PHYSICIAN ASSISTANT

## 2023-05-05 PROCEDURE — A9585 GADOBUTROL INJECTION: HCPCS | Performed by: NEUROLOGICAL SURGERY

## 2023-05-05 PROCEDURE — 93005 ELECTROCARDIOGRAM TRACING: CPT

## 2023-05-05 PROCEDURE — 255N000002 HC RX 255 OP 636: Performed by: NEUROLOGICAL SURGERY

## 2023-05-05 PROCEDURE — 250N000013 HC RX MED GY IP 250 OP 250 PS 637: Performed by: INTERNAL MEDICINE

## 2023-05-05 PROCEDURE — 250N000012 HC RX MED GY IP 250 OP 636 PS 637: Performed by: PHYSICIAN ASSISTANT

## 2023-05-05 PROCEDURE — 200N000001 HC R&B ICU

## 2023-05-05 PROCEDURE — 99254 IP/OBS CNSLTJ NEW/EST MOD 60: CPT | Mod: 24 | Performed by: INTERNAL MEDICINE

## 2023-05-05 RX ORDER — HYDROMORPHONE HCL IN WATER/PF 6 MG/30 ML
0.4 PATIENT CONTROLLED ANALGESIA SYRINGE INTRAVENOUS EVERY 5 MIN PRN
Status: DISCONTINUED | OUTPATIENT
Start: 2023-05-05 | End: 2023-05-05

## 2023-05-05 RX ORDER — SODIUM CHLORIDE, SODIUM LACTATE, POTASSIUM CHLORIDE, CALCIUM CHLORIDE 600; 310; 30; 20 MG/100ML; MG/100ML; MG/100ML; MG/100ML
INJECTION, SOLUTION INTRAVENOUS CONTINUOUS
Status: DISCONTINUED | OUTPATIENT
Start: 2023-05-05 | End: 2023-05-05

## 2023-05-05 RX ORDER — SODIUM CHLORIDE 9 MG/ML
INJECTION, SOLUTION INTRAVENOUS CONTINUOUS
Status: ACTIVE | OUTPATIENT
Start: 2023-05-05 | End: 2023-05-05

## 2023-05-05 RX ORDER — ONDANSETRON 2 MG/ML
4 INJECTION INTRAMUSCULAR; INTRAVENOUS EVERY 30 MIN PRN
Status: DISCONTINUED | OUTPATIENT
Start: 2023-05-05 | End: 2023-05-05

## 2023-05-05 RX ORDER — HYDROMORPHONE HCL IN WATER/PF 6 MG/30 ML
0.2 PATIENT CONTROLLED ANALGESIA SYRINGE INTRAVENOUS EVERY 5 MIN PRN
Status: DISCONTINUED | OUTPATIENT
Start: 2023-05-05 | End: 2023-05-05

## 2023-05-05 RX ORDER — FENTANYL CITRATE 50 UG/ML
25 INJECTION, SOLUTION INTRAMUSCULAR; INTRAVENOUS EVERY 5 MIN PRN
Status: DISCONTINUED | OUTPATIENT
Start: 2023-05-05 | End: 2023-05-05

## 2023-05-05 RX ORDER — ONDANSETRON 4 MG/1
4 TABLET, ORALLY DISINTEGRATING ORAL EVERY 30 MIN PRN
Status: DISCONTINUED | OUTPATIENT
Start: 2023-05-05 | End: 2023-05-05

## 2023-05-05 RX ORDER — FENTANYL CITRATE 50 UG/ML
50 INJECTION, SOLUTION INTRAMUSCULAR; INTRAVENOUS EVERY 5 MIN PRN
Status: DISCONTINUED | OUTPATIENT
Start: 2023-05-05 | End: 2023-05-05

## 2023-05-05 RX ORDER — GADOBUTROL 604.72 MG/ML
7 INJECTION INTRAVENOUS ONCE
Status: COMPLETED | OUTPATIENT
Start: 2023-05-05 | End: 2023-05-05

## 2023-05-05 RX ADMIN — LEVETIRACETAM 500 MG: 5 INJECTION INTRAVENOUS at 14:08

## 2023-05-05 RX ADMIN — SENNOSIDES AND DOCUSATE SODIUM 1 TABLET: 50; 8.6 TABLET ORAL at 21:33

## 2023-05-05 RX ADMIN — SODIUM CHLORIDE: 9 INJECTION, SOLUTION INTRAVENOUS at 10:29

## 2023-05-05 RX ADMIN — PROPOFOL 25 MCG/KG/MIN: 10 INJECTION, EMULSION INTRAVENOUS at 05:22

## 2023-05-05 RX ADMIN — ACETAMINOPHEN 975 MG: 325 TABLET ORAL at 22:38

## 2023-05-05 RX ADMIN — LEVETIRACETAM 500 MG: 5 INJECTION INTRAVENOUS at 01:42

## 2023-05-05 RX ADMIN — ACETAMINOPHEN 975 MG: 325 TABLET ORAL at 08:32

## 2023-05-05 RX ADMIN — SENNOSIDES AND DOCUSATE SODIUM 1 TABLET: 50; 8.6 TABLET ORAL at 08:32

## 2023-05-05 RX ADMIN — DEXAMETHASONE 4 MG: 4 TABLET ORAL at 18:57

## 2023-05-05 RX ADMIN — POLYETHYLENE GLYCOL 3350 17 G: 17 POWDER, FOR SOLUTION ORAL at 08:32

## 2023-05-05 RX ADMIN — GADOBUTROL 7 ML: 604.72 INJECTION INTRAVENOUS at 04:30

## 2023-05-05 RX ADMIN — DEXAMETHASONE SODIUM PHOSPHATE 4 MG: 4 INJECTION, SOLUTION INTRAMUSCULAR; INTRAVENOUS at 05:34

## 2023-05-05 RX ADMIN — DEXAMETHASONE 4 MG: 4 TABLET ORAL at 12:17

## 2023-05-05 RX ADMIN — CHLORHEXIDINE GLUCONATE 0.12% ORAL RINSE 15 ML: 1.2 LIQUID ORAL at 08:32

## 2023-05-05 RX ADMIN — OXYCODONE HYDROCHLORIDE 5 MG: 5 TABLET ORAL at 12:18

## 2023-05-05 ASSESSMENT — ACTIVITIES OF DAILY LIVING (ADL)
WEAR_GLASSES_OR_BLIND: NO
HEARING_DIFFICULTY_OR_DEAF: NO
DRESSING/BATHING_DIFFICULTY: NO
DOING_ERRANDS_INDEPENDENTLY_DIFFICULTY: NO
FALL_HISTORY_WITHIN_LAST_SIX_MONTHS: NO
CONCENTRATING,_REMEMBERING_OR_MAKING_DECISIONS_DIFFICULTY: NO
ADLS_ACUITY_SCORE: 32
WALKING_OR_CLIMBING_STAIRS_DIFFICULTY: NO
ADLS_ACUITY_SCORE: 32
ADLS_ACUITY_SCORE: 26
ADLS_ACUITY_SCORE: 32
DIFFICULTY_EATING/SWALLOWING: NO
ADLS_ACUITY_SCORE: 32
CHANGE_IN_FUNCTIONAL_STATUS_SINCE_ONSET_OF_CURRENT_ILLNESS/INJURY: YES
DIFFICULTY_COMMUNICATING: NO
ADLS_ACUITY_SCORE: 26
ADLS_ACUITY_SCORE: 32
TOILETING_ISSUES: NO
ADLS_ACUITY_SCORE: 26
ADLS_ACUITY_SCORE: 26
ADLS_ACUITY_SCORE: 32

## 2023-05-05 NOTE — PLAN OF CARE
Patient does not follow commands or open eyes. Awakens with oral cares/suctioning. Lifting head off of bed and reaching for ETT. Right side seems to be stronger than the left. Pupils are pinpoint, brisk, and reactive.   Large amount of thick secretions.   PRN labetalol given x2 for SBP<140.   Verified placement of OG with Dr. Delvalle. Ok to use.   Surgical site dressing to head with small amount of old drainage. RACHELLE to bulb suction.   Currently completing scheduled brain MRI.

## 2023-05-05 NOTE — PROGRESS NOTES
[facility] ICU RESPIRATORY NOTE  Date of Admission: 5/4/23  Date of Intubation (most recent): 5/4/23  Reason for Mechanical Ventilation: AW/Protection, craniotomy  Number of Days on Mechanical Ventilation: 2  Met Criteria for Pressure Support Trial:   Length of Pressure Support Trial:    Reason for Stopping Pressure Support Trial:  Reason for No Pressure Support Trial: Per MD     Significant Events Today: Pt to MRI overnight     ABG Results:   Recent Labs   Lab 05/04/23  1905 05/04/23  1315 05/04/23  1233 05/04/23  1207   PH 7.37 7.41 7.35 7.31*   PCO2 44  --   --   --    PO2 162*  --   --   --    HCO3 25  --   --   --    O2PER 50  --   --   --      Vent Mode: CMV/AC  (Continuous Mandatory Ventilation/ Assist Control)  FiO2 (%): 30 %  Resp Rate (Set): 12 breaths/min  Tidal Volume (Set, mL): 500 mL  PEEP (cm H2O): 5 cmH2O  Resp: 12    FAUSTO Woody, RRT

## 2023-05-05 NOTE — PLAN OF CARE
Goal Outcome Evaluation:       Pt came from OR at 1740 sedated, propofol stopped at 1800. Neurosurgery at bedside for neuro exam at 1830, pt not following commands, pupils pinpoint, coughing with stimulus, extension posture noted. Neurosugery MD ordered CT stat. LS clear, ABG results unremarkable communicated to intensivist. OG clamped, auscultated in place and obtained stomach contents with suction, xray pending. FC patent  from [id number]. Family at bedside, utilized  to explain cares and to answer question. Surgical incision on Forehead covered with dressing, marked small amt of drainage on dressing. RACHELLE to suction obtained 30 ml of serous sang fluid.

## 2023-05-05 NOTE — OP NOTE
Date of surgery: 05/04/23    Surgeon: Perry Dasilva MD  Assistant: Pedro Luis Reich PA-C  (Note: The assistant was present for and assisted with the entire surgery, and his/her role as an assistant was crucial for aid in positioning, exposure, suctioning, retraction, and closure). A qualified resident was not available for the assistance in the case.      Preoperative diagnosis:  1. Right Frontal intraparenchymal hemorrhage with underlying brain tumor  2. Mass effect with midline shift, cerebral oedema and herniation.    Postoperative diagnosis:   1.Right Frontal intraparenchymal hemorrhage with underlying brain tumor.      Procedure:  1. Right Frontal craniotomy for evacuation of intraparenchymal hemorrhage and  resection of intra-axial brain tumor  2. Use of [employer] Stealth intra-operative neuro-navigation with MRI guidance   3. Use of intraoperative microscope.      EBL: 100 mL      Indications:   Brandon Ordoñez is a 37 year old male who was admitted on 05/04/23 at [facility]. Per ED MD, pt with 3 days n/v, and presented to  ED for evaluation. Head CT showed right frontal mass with significant edema and shift, with transtentorial herniation. He was emergently intubated for airway protection and increasing somnolence, and was transferred to  for surgical intervention   There was no family available to discuss the risks and benefits of the surgery despite several attempts to contact them on phone prior to the surgery. Patient was taken for emergent right frontal craniotomy for evacuation of hematoma and relieve mass effect and tumor resection.        Description of surgery:   The patient was positioned supine with the head slightly turned to left using a shoulder bump  in Carias pins.  [employer] Stealth navigation was registered with MRI guidance.  Sterile prepping and draping procedures were performed.  Antibiotics were administered and timeout was performed.  The 10 blade was used to perform a  curvilinear incision starting in front of the ear to the midline and to the contralateral mid-pupillary line. Skin flap was elevated, temporalis muscle and fascia was incised and left frontal region was exposed.     Tumor was mapped out [facility] neuronavigation and a left frontal craniotomy was performed using single ravindra hole which was created using no 6 cutting ravindra and craniotome was used to complete the craniotomy.    The dura was opened with the geralds and metzenbaum scissors and retracted using No 4-0 nurolon.  The lesion's  location confirmed with navigation.     Corticectomy was performed at the middle frontal gyrus minimizing the transgression of normal cortex to access the hematoma and the tumor.  Hematoma was evacuated using suction and bipolar.  Specimen was sent to pathology for frozen examination which was consistent with necrotic and cellular lesion and was pathological.  After the internal debulking, the edges of the tumor was identified and superior and middle frontal gyrus overlying the hemorrhage tumor was excised. The medial extent of the tumor was defined by the falx and interhemispheric fissure, the latera edge by the inferior frontal gyrus. Posteriorly resection was carried out till the normal white matter was seen. The resection was carried out till the anterior wall of the frontal horn at the depth. The ventricle wall was opened at one place with very small opening which was sealed with brain tissue and gelfoam at the end. Tumor was friable, suck able, necrotic with mild vascularity.  Tumor was meticulously dissected all around from the critical surrounding neurovascular structures.  Using subpial dissection anterior cerebral artery complex was preserved.  No major injury to the vascular structure was noted.     Following satisfactory gross total resection of the mass using neuro navigation, hemostasis was achieved using Surgicel, Floseal and thrombin-soaked Gelfoam.  The brain was completely  relaxed at the end of the procedure.  The resection cavity was irrigated with warm saline and filled.  We then placed the subdural DuraGen and achieved a watertight dural closure above it  using 4-0 Nurolon.  The bone flap was fixed back into place with the cranial plating system.  Antibiotic irrigation was performed, the galea was closed with 2-0 Vicryls, and the skin was closed with 3-0 monocryl. #10 channel drain was placed in the subgaleal space.  Dressing was  applied using Xeroform, Telfa and medpore tape. Counts X 2 were correct.    Patient was kept intubated  and transferred to ICU in a stable condition.There were no intraprocedural complications.    I updated the family about the critical condition of the patient and the surgical procedure after the surgery.

## 2023-05-05 NOTE — PROGRESS NOTES
Extubation note    Successful completion of SBT (Yes or No): yes  Extubation time: 9:50    Patient assessment:    Lung sounds: clear  Stridor present (yes or no): no  Patient tolerance: well    Oxygen Device:    Liter flow:3L NC  SpO2: 99%    Suctioned moderate thick white secretions prior to extubation    Plan: will continue to monitor pt    Connie [place] RT assistant

## 2023-05-05 NOTE — H&P
[facility]    History and Physical - Hospitalist Service       Date of Admission:  5/4/2023    Assessment & Plan      Brandon Ordoñez is a 37 year old male with no significant past medical history who initially presented to [facility] on 5/4/23 with 3 days of nausea, vomiting, and somnolence. CT shows large hyperdense right frontal mass with significant edema and midline shift with transtentorial herniation. Patient was intubated for airway protection and transferred to [facility] 5/4 for neurosurgical consultation.      Right frontal intraparenchymal hemorrhage with underlying brain tumor  Intracranial Hypertension from large frontal mass  Glioblastoma s/p tumor resection 5/4 with Dr. Dasilva  Patient was taken for emergent craniotomy and right frontal mass resection 5/4.  mL. RACHELLE drain placed. Patient extubated 5/5. Nursing appreciates slight left sided weakness, however patient is neurologically intact during my exam without any focal neuro deficits.   - on Keppra 500 mg IV q12 hours for seizure prophylaxis  - Continue decadron per neurosurgery  - appreciate recommendations from Hem/Onc  - [id number] [facility]  - q2h neuro checks per neurosurgery  - continuous cardiac monitoring   - Discontinue IVF when adequate oral intake  - BG AC and HS  - Continue medium sliding scale insulin insulin for anticipated steroid induced hyperglycemia       Diet: Regular Diet Adult    DVT Prophylaxis: Pneumatic Compression Devices  Hammond Catheter: Not present  Lines: None     Cardiac Monitoring: None  Code Status: Full Code      Clinically Significant Risk Factors Present on Admission           # Hypercalcemia: Highest Ca = 10.3 mg/dL in last 2 days, will monitor as appropriate                     Disposition Plan     Expected Discharge Date: 05/06/2023                The patient's care was discussed with the Attending Physician, Dr. Thornton.    Tony Freedman,  NP  Hospitalist Service  [facility]  Securely message with [employer] (more info)  Text page via [facility] Paging/Directory     ______________________________________________________________________    Chief Complaint   Nausea, vomiting, somnolence    History is obtained from the patient and chart review.    History of Present Illness   Brandon Ordoñez is a 37 year old male with no significant past medical history who initially presented to [facility] on 5/4/23 with 3 days of nausea, vomiting, and somnolence. CT shows large hyperdense right frontal mass with significant edema and midline shift with transtentorial herniation. Patient was intubated for airway protection and transferred to [facility] 5/4 for neurosurgical consultation.    Patient was taken for emergent craniotomy and right frontal mass resection 5/4.  mL. Patient extubated 5/5.    On evaluation patient is non-toxic appearing. A/Ox4. He denies fever, chills, headache, paraesthesias, SOB, palpitations, nausea, vomiting, lightheadedness, or dizziness.      Past Medical History    Past Medical History:   Diagnosis Date     NO ACTIVE PROBLEMS        Past Surgical History   Past Surgical History:   Procedure Laterality Date     NO HISTORY OF SURGERY         Prior to Admission Medications   Prior to Admission Medications   Prescriptions Last Dose Informant Patient Reported? Taking?   Cyanocobalamin (B-12) 1000 MCG TBCR   No No   Sig: Take 1,000 mcg by mouth daily   albuterol (PROAIR HFA/PROVENTIL HFA/VENTOLIN HFA) 108 (90 Base) MCG/ACT inhaler   No No   Sig: Inhale 2 puffs into the lungs every 6 hours as needed for shortness of breath, wheezing or cough   benzonatate (TESSALON) 200 MG capsule   No No   Sig: Take 1 capsule (200 mg) by mouth 3 times daily as needed      Facility-Administered Medications: None        Social History   I have reviewed this patient's social history and updated it with pertinent  information if needed.  Social History     Tobacco Use     Smoking status: Never     Smokeless tobacco: Never   Substance Use Topics     Alcohol use: Yes     Comment: Occasional if there is a party      Drug use: No        Physical Exam   Vital Signs: Temp: 99.4  F (37.4  C) Temp src: Oral BP: 100/54 Pulse: 99   Resp: 15 SpO2: 98 % O2 Device: Nasal cannula Oxygen Delivery: 1 LPM  Weight: 155 lbs 6.79 oz    EXAM:   Nursing Notes Reviewed.  General:  Appears stated age, no acute distress. A&O x 3.  Skin:  Warm, dry. No rashes or lesions on exposed skin.  HEENT:  Normocephalic, atraumatic.  Neck:  Supple.  Chest:  Breath sounds CTA and no increased work of breathing on room air.  Cardiovascular:  RRR, no rub or murmur. No peripheral edema.  Abdomen:  Soft, non-tender, non-distended.  Musculoskeletal:  Moves all four extremities with equal strength.   Neurological:  No focal neurological deficits.   Psychiatric:  Affect and mood congruent.      Medical Decision Making       50 MINUTES SPENT BY ME on the date of service doing chart review, history, exam, documentation & further activities per the note.      Data     I have personally reviewed the following data over the past 24 hrs:    13.2 (H)  \   11.8 (L)   / 228     139 101 15.7 /  93   3.9 25 0.79 \       ALT: 38 AST: 22 AP: 82 TBILI: 0.6   ALB: 3.9 TOT PROTEIN: 6.9 LIPASE: N/A       TSH: N/A T4: N/A A1C: 6.0 (H)       Imaging results reviewed over the past 24 hrs:   Recent Results (from the past 24 hour(s))   XR Chest Port 1 View    Narrative    EXAM: XR CHEST PORT 1 VIEW  LOCATION: [facility]  DATE/TIME: 5/4/2023 7:01 PM CDT    INDICATION: Endotracheal tube positioning  COMPARISON: 05/04/2023      Impression    IMPRESSION: Patient is rotated to the right. Endotracheal tube is 4.7 cm above the aicha. Enteric tube courses below the diaphragm. Likely temperature probe in the mid thorax.   CT Head w/o Contrast    Narrative    EXAM: CT HEAD  W/O CONTRAST  LOCATION: [facility]  DATE/TIME: 5/4/2023 7:27 PM CDT    INDICATION: Postoperative evaluation.  COMPARISON: 5/4/2023  TECHNIQUE: Routine CT Head without IV contrast. Multiplanar reformats. Dose reduction techniques were used.    FINDINGS:  INTRACRANIAL CONTENTS: Interval right frontal craniotomy changes with resection of the previously seen mass and evacuation of the associated hemorrhage. There is a small amount of residual hemorrhage along the margins of the resection cavity and in the   right caudate nucleus. 5 mm residual right to left midline shift. Persistent effacement of the right frontal horn. Decreased size of the left lateral ventricle. Persistent effacement of the supratentorial sulci. The basal cisterns are preserved. The   cerebellar tonsils are normal in position.     VISUALIZED ORBITS/SINUSES/MASTOIDS: No intraorbital abnormality. No paranasal sinus mucosal disease. No middle ear or mastoid effusion.    BONES/SOFT TISSUES: No acute abnormality.      Impression    IMPRESSION:  1.  Interval resection of the previously seen right frontal lobe mass with evacuation of the associated hemorrhage. There is a small amount of residual hemorrhage along the margins of the resection cavity in the right caudate nucleus.  2.  Significantly decreased associated mass effect with 5 mm residual shift. The basal cisterns are preserved and the cerebellar tonsils are normal in position. Decreased size of the left lateral ventricle.   MR Brain w/o & w Contrast    Narrative    EXAM: MR BRAIN W/O and W CONTRAST  LOCATION: [facility]  DATE/TIME: 5/5/2023 4:57 AM CDT    INDICATION: Postop tumor resection.  COMPARISON: Head CT dated 05/04/2023.  CONTRAST: 7mL Gadavist.  TECHNIQUE: Routine multiplanar multisequence head MRI without and with intravenous contrast.    FINDINGS:  INTRACRANIAL CONTENTS: Redemonstrated resection of the previously described right frontal  mass and hemorrhage. There is peripheral hemorrhage and cytotoxic edema extending along the right caudate nucleus in the body of the corpus callosum. There is   peripheral T1 hyperintensity without definite enhancement. Pneumocephalus and packing material along the anterior right frontal lobe is unchanged. Mass effect and right to left midline shift is stable, measuring approximately 5 mm. Unchanged effacement   of the right frontal horn. The basal cisterns are preserved. Nonenhancing surrounding FLAIR hyperintensity along the posterior and superior margins of the resection cavity appear grossly similar to the prior CT. Trace intraventricular hemorrhage layering   in the left lateral ventricle. No hydrocephalus.     SELLA: No abnormality accounting for technique.    OSSEOUS STRUCTURES/SOFT TISSUES: Normal marrow signal. The major intracranial vascular flow voids are maintained.     ORBITS: No abnormality accounting for technique.     SINUSES/MASTOIDS: Mild mucosal thickening scattered about the paranasal sinuses. Trace bilateral mastoid effusions.       Impression    IMPRESSION:  1.  Postoperative changes of the right frontal lobe without a definite residual enhancing lesion. Nonspecific surrounding T2/FLAIR hyperintensity along the posterior and superior margins of the resection cavity may reflect vasogenic edema or nonenhancing   tumor.  2.  Small volume hemorrhage and cytotoxic edema along the margins of the resection cavity and extending along the right caudate nucleus and body of the corpus callosum.  3.  Unchanged mass effect with 5 mm right to left midline shift.

## 2023-05-05 NOTE — CONSULTS
[facility] Cancer Care Consultation      Brandon Ordoñez MRN# [id number]   YOB: 1985 Age: 37 year old   Date of Admission: 2023  Requesting physician: Perry Dasilva MD  Reason for consult: Brain lesion.           Assessment and Plan:   37 year old male with no significant PMH who presented with altered mental status. CT head showed a large right frontal mass with midline shift.    1. Right frontal brain mass  --S/p resection 2023.  --Discussed with patient that preliminary pathology shows probable high grade glioma.  --I advised outpatient follow-up with Dr. Abril Bullock to discuss adjuvant radiation and chemotherapy.  --Start PT.  --On dexamethasone with plan to taper.    Thank you for the consult. Will arrange for outpatient follow-up with Dr. Bullock.  Will signoff but please call back if questions.    Sara Scherer MD  Hematology/Oncology  [facility]    Total time spent: 60 minutes in patient evaluation, counseling, documentation, and coordination of care.               Chief Complaint:   Altered mental status         History of Present Illness:   This patient is a 37 year old male with no significant PMH who presented with altered mental status. CT head showed a large right frontal mass with midline shift.  Therefore, he proceeded with emergent surgery on 2023 with right frontal craniotomy, evacuation of intraparenchymal hemorrhage, and resection of brain tumor with Dr. Perry Dasilva. Preliminary pathology of the right frontal brain tumor shows lesional tissue, favor high-grade glioma.         Physical Exam:   Vitals were reviewed  Blood pressure 100/54, pulse 99, temperature 99.4  F (37.4  C), temperature source Oral, resp. rate 15, weight 70.5 kg (155 lb 6.8 oz), SpO2 98 %.  Temperatures:  Current - Temp: 99.4  F (37.4  C); Max - Temp  Av.5  F (36.9  C)  Min: 97.3  F (36.3  C)  Max: 99.9  F (37.7  C)  Respiration range: Resp  Av.4  Min: 11   Max: 26  Pulse range: Pulse  Av  Min: 66  Max: 112  Blood pressure range: Systolic (24hrs), Av , Min:95 , Max:109   ; Diastolic (24hrs), Av, Min:41, Max:64    Pulse oximetry range: SpO2  Av.9 %  Min: 97 %  Max: 100 %    Intake/Output Summary (Last 24 hours) at 2023 1402  Last data filed at 2023 1400  Gross per 24 hour   Intake 2856.09 ml   Output 3755 ml   Net -898.91 ml       GENERAL: No acute distress.  SKIN: No rashes or jaundice.  HEENT: Gauze over right anterior head incision. Eyes anicteric. Oropharynx is clear.  LYMPH: No palpable lymphadenopathy in the cervical, supraclavicular regions.  LUNGS: No audible cough or wheezing.  ABDOMEN: Soft, nontender, nondistended.  EXTREMITIES: No clubbing, cyanosis, or edema.  MENTAL: Alert and oriented to person, place, and time.  NEURO: Answers questions appropriately.            Past Medical History:   I have reviewed this patient's past medical history  Past Medical History:   Diagnosis Date     NO ACTIVE PROBLEMS              Past Surgical History:   I have reviewed this patient's past surgical history  Past Surgical History:   Procedure Laterality Date     NO HISTORY OF SURGERY                 Social History:   I have reviewed this patient's social history  Social History     Tobacco Use     Smoking status: Never     Smokeless tobacco: Never   Vaping Use     Vaping status: Not on file   Substance Use Topics     Alcohol use: Yes     Comment: Occasional if there is a party    Lives with wife and 2 young children.          Family History:   I have reviewed this patient's family history  No family history on file.          Allergies:     Allergies   Allergen Reactions     Chicken-Derived Products (Egg) Rash             Medications:   I have reviewed this patient's current medications  Medications Prior to Admission   Medication Sig Dispense Refill Last Dose     albuterol (PROAIR HFA/PROVENTIL HFA/VENTOLIN HFA) 108 (90 Base) MCG/ACT inhaler  Inhale 2 puffs into the lungs every 6 hours as needed for shortness of breath, wheezing or cough 18 g 0      benzonatate (TESSALON) 200 MG capsule Take 1 capsule (200 mg) by mouth 3 times daily as needed 30 capsule 0      Cyanocobalamin (B-12) 1000 MCG TBCR Take 1,000 mcg by mouth daily 100 tablet 1      Current Facility-Administered Medications Ordered in Epic   Medication Dose Route Frequency Last Rate Last Admin     [START ON 5/7/2023] acetaminophen (TYLENOL) tablet 650 mg  650 mg Oral Q4H PRN         acetaminophen (TYLENOL) tablet 975 mg  975 mg Oral Q8H   975 mg at 05/05/23 0832     bisacodyl (DULCOLAX) suppository 10 mg  10 mg Rectal Daily PRN         calcium carbonate (TUMS) chewable tablet 1,000 mg  1,000 mg Oral 4x Daily PRN         dexamethasone (DECADRON) injection 4 mg  4 mg Intravenous Q6H OMKAR   4 mg at 05/05/23 0534    Or     dexamethasone (DECADRON) tablet 4 mg  4 mg Oral Q6H OMKAR   4 mg at 05/05/23 1217     glucose gel 15-30 g  15-30 g Oral Q15 Min PRN        Or     dextrose 50 % injection 25-50 mL  25-50 mL Intravenous Q15 Min PRN        Or     glucagon injection 1 mg  1 mg Subcutaneous Q15 Min PRN         fentaNYL (PF) (SUBLIMAZE) injection  mcg   mcg Intravenous Q1H PRN         hydrALAZINE (APRESOLINE) injection 10-20 mg  10-20 mg Intravenous Q30 Min PRN         insulin aspart (NovoLOG) injection (RAPID ACTING)  1-6 Units Subcutaneous Q4H         labetalol (NORMODYNE/TRANDATE) injection 10-40 mg  10-40 mg Intravenous Q10 Min PRN   20 mg at 05/04/23 2203     levETIRAcetam (KEPPRA) intermittent infusion 500 mg  500 mg Intravenous Q12H   500 mg at 05/05/23 0142     lidocaine (LMX4) cream   Topical Q1H PRN         lidocaine 1 % 0.1-1 mL  0.1-1 mL Other Q1H PRN         magnesium hydroxide (MILK OF MAGNESIA) suspension 30 mL  30 mL Oral Daily PRN         naloxone (NARCAN) injection 0.2 mg  0.2 mg Intravenous Q2 Min PRN        Or     naloxone (NARCAN) injection 0.4 mg  0.4 mg Intravenous Q2  Min PRN        Or     naloxone (NARCAN) injection 0.2 mg  0.2 mg Intramuscular Q2 Min PRN        Or     naloxone (NARCAN) injection 0.4 mg  0.4 mg Intramuscular Q2 Min PRN         ondansetron (ZOFRAN ODT) ODT tab 4 mg  4 mg Oral Q6H PRN        Or     ondansetron (ZOFRAN) injection 4 mg  4 mg Intravenous Q6H PRN         oxyCODONE (ROXICODONE) tablet 5 mg  5 mg Oral Q4H PRN   5 mg at 05/05/23 1218    Or     oxyCODONE (ROXICODONE) tablet 10 mg  10 mg Oral Q4H PRN         pantoprazole (PROTONIX) 2 mg/mL suspension 40 mg  40 mg Per Feeding Tube QAM AC   40 mg at 05/05/23 0831    Or     pantoprazole (PROTONIX) IV push injection 40 mg  40 mg Intravenous QAM AC         polyethylene glycol (MIRALAX) Packet 17 g  17 g Oral Daily   17 g at 05/05/23 0832     prochlorperazine (COMPAZINE) injection 10 mg  10 mg Intravenous Q6H PRN        Or     prochlorperazine (COMPAZINE) tablet 10 mg  10 mg Oral Q6H PRN         senna-docusate (SENOKOT-S/PERICOLACE) 8.6-50 MG per tablet 1 tablet  1 tablet Oral BID   1 tablet at 05/05/23 0832     sodium chloride (PF) 0.9% PF flush 3 mL  3 mL Intracatheter Q8H   3 mL at 05/05/23 1000     sodium chloride (PF) 0.9% PF flush 3 mL  3 mL Intracatheter q1 min prn         sodium chloride 0.9% (bottle) irrigation    PRN   1,000 mL at 05/04/23 1520     sodium chloride 0.9% infusion   Intravenous Continuous         thrombin 5000 units EPISTAXIS kit    PRN   5,000 Units at 05/04/23 1520     No current Epic-ordered outpatient medications on file.             Review of Systems:     The 14 point Review of Systems is negative other than noted in the HPI.            Data:   All laboratory data reviewed  Results for orders placed or performed during the hospital encounter of 05/04/23 (from the past 24 hour(s))   Surgical Pathology Exam   Result Value Ref Range    Case Report       Surgical Pathology Report                         Case: [id number]                                  Authorizing Provider:  Brown  MD Perry          Collected:           05/04/2023 03:26 PM          Ordering Location:     [facility]          Received:            05/04/2023 03:34 PM                                 [facility]                                                     Pathologist:           Yuly Tejada MD                                                                           Intraop:               Yuly Tejada MD                                                                           Specimen:    Brain, right frontal brain tumor                                                           Intraoperative Consultation       A(1). Brain, right frontal brain tumor:  AFS1:  Lesional tissue present, favor high-grade glioma with superimposed abundant acute inflammation, tissue culture to also be performed, case discussed intraoperatively with surgeon Yuly Tejada MD, MD on 5/4/2023 at 3:59 PM           Gram Stain    Specimen: Brain; Tissue   Result Value Ref Range    Gram Stain Result No organisms seen     Gram Stain Result 4+ WBC seen    Tissue Aerobic Bacterial Culture Routine    Specimen: Brain; Tissue   Result Value Ref Range    Culture No growth, less than 1 day    XR Chest Port 1 View    Narrative    EXAM: XR CHEST PORT 1 VIEW  LOCATION: [facility]  DATE/TIME: 5/4/2023 7:01 PM CDT    INDICATION: Endotracheal tube positioning  COMPARISON: 05/04/2023      Impression    IMPRESSION: Patient is rotated to the right. Endotracheal tube is 4.7 cm above the aicha. Enteric tube courses below the diaphragm. Likely temperature probe in the mid thorax.   Blood gas arterial   Result Value Ref Range    pH Arterial 7.37 7.35 - 7.45    pCO2 Arterial 44 35 - 45 mm Hg    pO2 Arterial 162 (H) 80 - 105 mm Hg    FIO2 50      Bicarbonate Arterial 25 21 - 28 mmol/L    Base Excess/Deficit (+/-) -0.3 -9.0 - 1.8 mmol/L    Esteban's Test Artline    CT Head w/o Contrast    Narrative    EXAM: CT HEAD W/O CONTRAST  LOCATION: [facility]  DATE/TIME: 5/4/2023 7:27 PM CDT    INDICATION: Postoperative evaluation.  COMPARISON: 5/4/2023  TECHNIQUE: Routine CT Head without IV contrast. Multiplanar reformats. Dose reduction techniques were used.    FINDINGS:  INTRACRANIAL CONTENTS: Interval right frontal craniotomy changes with resection of the previously seen mass and evacuation of the associated hemorrhage. There is a small amount of residual hemorrhage along the margins of the resection cavity and in the   right caudate nucleus. 5 mm residual right to left midline shift. Persistent effacement of the right frontal horn. Decreased size of the left lateral ventricle. Persistent effacement of the supratentorial sulci. The basal cisterns are preserved. The   cerebellar tonsils are normal in position.     VISUALIZED ORBITS/SINUSES/MASTOIDS: No intraorbital abnormality. No paranasal sinus mucosal disease. No middle ear or mastoid effusion.    BONES/SOFT TISSUES: No acute abnormality.      Impression    IMPRESSION:  1.  Interval resection of the previously seen right frontal lobe mass with evacuation of the associated hemorrhage. There is a small amount of residual hemorrhage along the margins of the resection cavity in the right caudate nucleus.  2.  Significantly decreased associated mass effect with 5 mm residual shift. The basal cisterns are preserved and the cerebellar tonsils are normal in position. Decreased size of the left lateral ventricle.   Hemoglobin A1c   Result Value Ref Range    Hemoglobin A1C 6.0 (H) <5.7 %   Ionized Calcium   Result Value Ref Range    Calcium Ionized 4.9 4.4 - 5.2 mg/dL   Renal panel   Result Value Ref Range    Sodium 137 136 - 145 mmol/L    Potassium 4.4 3.4 - 5.3 mmol/L    Chloride 100 98 - 107  mmol/L    Carbon Dioxide (CO2) 26 22 - 29 mmol/L    Anion Gap 11 7 - 15 mmol/L    Glucose 137 (H) 70 - 99 mg/dL    Urea Nitrogen 12.1 6.0 - 20.0 mg/dL    Creatinine 0.77 0.67 - 1.17 mg/dL    GFR Estimate >90 >60 mL/min/1.73m2    Calcium 9.4 8.6 - 10.0 mg/dL    Albumin 4.2 3.5 - 5.2 g/dL    Phosphorus 3.4 2.5 - 4.5 mg/dL   Glucose by meter   Result Value Ref Range    GLUCOSE BY METER POCT 119 (H) 70 - 99 mg/dL   Glucose by meter   Result Value Ref Range    GLUCOSE BY METER POCT 132 (H) 70 - 99 mg/dL   MR Brain w/o & w Contrast    Narrative    EXAM: MR BRAIN W/O and W CONTRAST  LOCATION: [facility]  DATE/TIME: 5/5/2023 4:57 AM CDT    INDICATION: Postop tumor resection.  COMPARISON: Head CT dated 05/04/2023.  CONTRAST: 7mL Gadavist.  TECHNIQUE: Routine multiplanar multisequence head MRI without and with intravenous contrast.    FINDINGS:  INTRACRANIAL CONTENTS: Redemonstrated resection of the previously described right frontal mass and hemorrhage. There is peripheral hemorrhage and cytotoxic edema extending along the right caudate nucleus in the body of the corpus callosum. There is   peripheral T1 hyperintensity without definite enhancement. Pneumocephalus and packing material along the anterior right frontal lobe is unchanged. Mass effect and right to left midline shift is stable, measuring approximately 5 mm. Unchanged effacement   of the right frontal horn. The basal cisterns are preserved. Nonenhancing surrounding FLAIR hyperintensity along the posterior and superior margins of the resection cavity appear grossly similar to the prior CT. Trace intraventricular hemorrhage layering   in the left lateral ventricle. No hydrocephalus.     SELLA: No abnormality accounting for technique.    OSSEOUS STRUCTURES/SOFT TISSUES: Normal marrow signal. The major intracranial vascular flow voids are maintained.     ORBITS: No abnormality accounting for technique.     SINUSES/MASTOIDS: Mild mucosal  thickening scattered about the paranasal sinuses. Trace bilateral mastoid effusions.       Impression    IMPRESSION:  1.  Postoperative changes of the right frontal lobe without a definite residual enhancing lesion. Nonspecific surrounding T2/FLAIR hyperintensity along the posterior and superior margins of the resection cavity may reflect vasogenic edema or nonenhancing   tumor.  2.  Small volume hemorrhage and cytotoxic edema along the margins of the resection cavity and extending along the right caudate nucleus and body of the corpus callosum.  3.  Unchanged mass effect with 5 mm right to left midline shift.                   CBC with platelets   Result Value Ref Range    WBC Count 13.2 (H) 4.0 - 11.0 10e3/uL    RBC Count 4.06 (L) 4.40 - 5.90 10e6/uL    Hemoglobin 11.8 (L) 13.3 - 17.7 g/dL    Hematocrit 35.7 (L) 40.0 - 53.0 %    MCV 88 78 - 100 fL    MCH 29.1 26.5 - 33.0 pg    MCHC 33.1 31.5 - 36.5 g/dL    RDW 12.5 10.0 - 15.0 %    Platelet Count 228 150 - 450 10e3/uL   Comprehensive metabolic panel   Result Value Ref Range    Sodium 139 136 - 145 mmol/L    Potassium 3.9 3.4 - 5.3 mmol/L    Chloride 101 98 - 107 mmol/L    Carbon Dioxide (CO2) 25 22 - 29 mmol/L    Anion Gap 13 7 - 15 mmol/L    Urea Nitrogen 15.7 6.0 - 20.0 mg/dL    Creatinine 0.79 0.67 - 1.17 mg/dL    Calcium 9.3 8.6 - 10.0 mg/dL    Glucose 134 (H) 70 - 99 mg/dL    Alkaline Phosphatase 82 40 - 129 U/L    AST 22 10 - 50 U/L    ALT 38 10 - 50 U/L    Protein Total 6.9 6.4 - 8.3 g/dL    Albumin 3.9 3.5 - 5.2 g/dL    Bilirubin Total 0.6 <=1.2 mg/dL    GFR Estimate >90 >60 mL/min/1.73m2   Magnesium   Result Value Ref Range    Magnesium 2.0 1.7 - 2.3 mg/dL   Phosphorus   Result Value Ref Range    Phosphorus 4.2 2.5 - 4.5 mg/dL   Blood gas arterial   Result Value Ref Range    pH Arterial 7.47 (H) 7.35 - 7.45    pCO2 Arterial 38 35 - 45 mm Hg    pO2 Arterial 110 (H) 80 - 105 mm Hg    FIO2 30     Bicarbonate Arterial 28 21 - 28 mmol/L    Base Excess/Deficit  (+/-) 3.9 (H) -9.0 - 1.8 mmol/L    Esteban's Test Artline    Glucose by meter   Result Value Ref Range    GLUCOSE BY METER POCT 126 (H) 70 - 99 mg/dL   Glucose by meter   Result Value Ref Range    GLUCOSE BY METER POCT 122 (H) 70 - 99 mg/dL   Blood gas arterial   Result Value Ref Range    pH Arterial 7.48 (H) 7.35 - 7.45    pCO2 Arterial 39 35 - 45 mm Hg    pO2 Arterial 117 (H) 80 - 105 mm Hg    FIO2 30     Bicarbonate Arterial 29 (H) 21 - 28 mmol/L    Base Excess/Deficit (+/-) 4.7 (H) -9.0 - 1.8 mmol/L    Esteban's Test Artline    Glucose by meter   Result Value Ref Range    GLUCOSE BY METER POCT 93 70 - 99 mg/dL

## 2023-05-05 NOTE — PROGRESS NOTES
[facility] ICU PROGRESS NOTE  05/05/2023      CO-MORBIDITIES:   No diagnosis found.    ASSESSMENT: Brandon Ordoñez is a 37 year old male who was admitted on 5/4/2023 with intracranial hypertension from a large mass. He was taken emergently to surgery for right craniotomy and right frontal mass resection by Dr. Dasilva. Mass was found to be GBM.     TODAY'S PROGRESS/PLANS:     PLAN:  Neuro/ pain/ sedation:  - following commands  - monitor left sided weakness  - Keppra 500 BID for sz ppx   - appreciate NSG recs  - Pain control PRN   - wean sedation for extubation      Pulmonary care:   - Respiratory failure;  - doing well on CPAP mode now  - plan for ABG and extubation today     Cardiovascular:    - Monitor hemodynamic status.   - No active issues  - PRN meds for HTN available     GI/Nutrition:   - bowel regimen  - reassess for dysphagia after extubation   - diet pending screening     Fluids/ Electrolytes/Renal:   - mIVF for now  - adjust as needed for oral intake   - no electrolyte abnomalities   - Creatinine stable   - consider discontinue prince     Endocrine:    - Sliding scale for diabetes management.   - Decadron per NSG     ID/ Antibiotics:  - None     Heme:     - Hemoglobin stable.   - await clearance for NSG for DVT PPx     MSK:  - monitor Left sided weakness  - PT/OT     Prophylaxis:    - Mechanical prophylaxis for DVT.   - need NSG clearance for lovenox   - Keppra  - PPI     Lines/ tubes/ drains:  - ETT  - OGT  - PIVs  - Art line   - Prince   - RACHELLE drain     Disposition:  - ICU      ====================================    HOSPITAL COURSE:     Brandon Ordoñez is a 37 year old male who presents with altered mentation on 5/4/2023.  Head CT showed large right sided mass with midline shift.  He went emergently to the operating room where he was discovered to have a glioblastoma.  Patient transferred to ICU intubated, sedated and is unable to provide history.    SUBJECTIVE:   - no acute events  overnight.   - had MRI brain last night ; still residual 5mm MLS     OBJECTIVE:   1. VITAL SIGNS:   Temp:  [97.2  F (36.2  C)-99.9  F (37.7  C)] 97.7  F (36.5  C)  Pulse:  [] 78  Resp:  [11-29] 12  BP: ()/() 100/54  MAP:  [62 mmHg-95 mmHg] 64 mmHg  Arterial Line BP: ()/(27-78) 102/48  FiO2 (%):  [30 %-100 %] 30 %  SpO2:  [97 %-100 %] 100 %  Vent Mode: (S) PS  (Pressure Support)  FiO2 (%): 30 %  Resp Rate (Set): 12 breaths/min  Tidal Volume (Set, mL): 500 mL  PEEP (cm H2O): 5 cmH2O  Pressure Support (cm H2O): 5 cmH2O  Resp: 12      2. INTAKE/ OUTPUT:   I/O last 3 completed shifts:  In: 1922.45 [I.V.:1862.45; NG/GT:60]  Out: 2730 [Urine:2250; Emesis/NG output:150; Drains:230; Blood:100]    3. PHYSICAL EXAMINATION:   General: alert, comfortable in bed on vent  Neuro: Alert, follows commands, NAD, Left upper extremity slightly weaker than right   Resp: On Vent, synchronous, CTAB   CV: RRR  Abdomen: Soft, Non-distended, Non-tender  Extremities: warm and well perfused    4. INVESTIGATIONS:   Arterial Blood Gases   Recent Labs   Lab 05/05/23  0526 05/04/23  1905 05/04/23  1315 05/04/23  1233   PH 7.47* 7.37 7.41 7.35   PCO2 38 44  --   --    PO2 110* 162*  --   --    HCO3 28 25  --   --      Complete Blood Count   Recent Labs   Lab 05/05/23  0526 05/04/23  1039   WBC 13.2* 15.5*   HGB 11.8* 14.8    240     Basic Metabolic Panel  Recent Labs   Lab 05/05/23  0532 05/05/23  0526 05/04/23  2348 05/04/23 2054 05/04/23 2050 05/04/23  1749 05/04/23  1039   NA  --  139  --   --  137  --  138   POTASSIUM  --  3.9  --   --  4.4  --  3.9   CHLORIDE  --  101  --   --  100  --  97*   CO2  --  25  --   --  26  --  25   BUN  --  15.7  --   --  12.1  --  14.9   CR  --  0.79  --   --  0.77  --  0.68   * 134* 132* 119* 137*   < > 159*    < > = values in this interval not displayed.     Liver Function Tests  Recent Labs   Lab 05/05/23  0526 05/04/23 2050 05/04/23  1039   AST 22  --   --    ALT 38  --    --    ALKPHOS 82  --   --    BILITOTAL 0.6  --   --    ALBUMIN 3.9 4.2  --    INR  --   --  1.02       =========================================    Breanne Dao MD   05/05/2023  Critical Care Fellow

## 2023-05-05 NOTE — PROGRESS NOTES
[facility]    Neurosurgery  Daily Note    Assessment & Plan   Procedure(s):  CRANIOTOMY, USING OPTICAL TRACKING SYSTEM, WITH NEOPLASM EXCISION RIGHT FRONTAL CRANIOTOMY WITH RESECTION OF RIGHT FRONTAL LESION USING STEALTH   1 Day Post-Op   Denies pain. Incision dressed. Drain intact with 110ml output overnight.     EXAM: MR BRAIN W/O and W CONTRAST  LOCATION: [facility]  DATE/TIME: 5/5/2023 4:57 AM CDT     INDICATION: Postop tumor resection.  COMPARISON: Head CT dated 05/04/2023.  CONTRAST: 7mL Gadavist.  TECHNIQUE: Routine multiplanar multisequence head MRI without and with intravenous contrast.     FINDINGS:  INTRACRANIAL CONTENTS: Redemonstrated resection of the previously described right frontal mass and hemorrhage. There is peripheral hemorrhage and cytotoxic edema extending along the right caudate nucleus in the body of the corpus callosum. There is   peripheral T1 hyperintensity without definite enhancement. Pneumocephalus and packing material along the anterior right frontal lobe is unchanged. Mass effect and right to left midline shift is stable, measuring approximately 5 mm. Unchanged effacement   of the right frontal horn. The basal cisterns are preserved. Nonenhancing surrounding FLAIR hyperintensity along the posterior and superior margins of the resection cavity appear grossly similar to the prior CT. Trace intraventricular hemorrhage layering   in the left lateral ventricle. No hydrocephalus.      SELLA: No abnormality accounting for technique.     OSSEOUS STRUCTURES/SOFT TISSUES: Normal marrow signal. The major intracranial vascular flow voids are maintained.      ORBITS: No abnormality accounting for technique.      SINUSES/MASTOIDS: Mild mucosal thickening scattered about the paranasal sinuses. Trace bilateral mastoid effusions.                                                                       IMPRESSION:  1.  Postoperative changes of the right  frontal lobe without a definite residual enhancing lesion. Nonspecific surrounding T2/FLAIR hyperintensity along the posterior and superior margins of the resection cavity may reflect vasogenic edema or nonenhancing   tumor.  2.  Small volume hemorrhage and cytotoxic edema along the margins of the resection cavity and extending along the right caudate nucleus and body of the corpus callosum.  3.  Unchanged mass effect with 5 mm right to left midline shift.       Plan:  -Advance activity as tolerated  -Continue supportive and symptomatic treatment  -Start or continue physical therapy  -Pain control measures  -Continue Keppra BID  -Continue Decadron until extubated then will determine wean  -Advance diet as tolerated  -Routine wound care  -Plans reviewed with DR. Dasilva and family     Kaela Moser PA-C  [facility] Neurosurgery  [facility]  [address] [place] [address]  [address] [place] [address]    Tel [phone]  Pager [phone]    Principal Problem:    S/P craniotomy      Kaela Moser PA-C    Interval History   Stable     Physical Exam   Temp: 99.1  F (37.3  C) Temp src: Bladder BP: 100/54 Pulse: 112   Resp: 15 SpO2: 99 % O2 Device: Nasal cannula Oxygen Delivery: 3 LPM  Vitals:    05/04/23 1800 05/05/23 0020   Weight: 158 lb 4.6 oz (71.8 kg) 155 lb 6.8 oz (70.5 kg)     Vital Signs with Ranges  Temp:  [97.3  F (36.3  C)-99.9  F (37.7  C)] 99.1  F (37.3  C)  Pulse:  [] 112  Resp:  [11-29] 15  BP: ()/() 100/54  MAP:  [62 mmHg-95 mmHg] 74 mmHg  Arterial Line BP: ()/(27-78) 114/64  FiO2 (%):  [30 %-100 %] 30 %  SpO2:  [97 %-100 %] 99 %  I/O last 3 completed shifts:  In: 1922.45 [I.V.:1862.45; NG/GT:60]  Out: 2730 [Urine:2250; Emesis/NG output:150; Drains:230; Blood:100]    Awake, follows commands, intubated  Pupils react  Incision dressed  Drain intact  No ongoing drainage  Left sided weakness   Negative clonus       Medications     sodium chloride 75 mL/hr at 05/05/23  1029        acetaminophen  975 mg Oral Q8H     chlorhexidine  15 mL Mouth/Throat Q12H     dexamethasone  4 mg Intravenous Q6H OMKAR    Or     dexamethasone  4 mg Oral Q6H OMKAR     insulin aspart  1-6 Units Subcutaneous Q4H     levETIRAcetam  500 mg Intravenous Q12H     pantoprazole  40 mg Per Feeding Tube QAM AC    Or     pantoprazole  40 mg Intravenous QAM AC     polyethylene glycol  17 g Oral Daily     senna-docusate  1 tablet Oral BID     sodium chloride (PF)  3 mL Intracatheter Q8H       Plans discussed with Dr. Dasilva who was in agreement with plans    Kaela BEDOYA [facility] Neurosurgery  [address]  [address] [place] [address]    Tel [phone]  Pager [phone]

## 2023-05-05 NOTE — PROGRESS NOTES
RT Note:    Patient transported to and from MRI without major issue. Pt had brief event of excessive coughing when arriving at MRI. Suctioned moderate/large amounts of thick, creamy secretions. No notable desats occurred during this time. Coughing subsided relatively quickly.     Art Coley, RT  5:36 AM  05/05/23

## 2023-05-05 NOTE — ANESTHESIA POSTPROCEDURE EVALUATION
Patient: Brandon Ordoñez    Procedure: Procedure(s):  CRANIOTOMY, USING OPTICAL TRACKING SYSTEM, WITH NEOPLASM EXCISION RIGHT FRONTAL CRANIOTOMY WITH RESECTION OF RIGHT FRONTAL LESION USING STEALTH       Anesthesia Type:  General    Note:  Disposition: ICU            ICU Sign Out: Anesthesiologist/ICU physician sign out WAS performed   Postop Pain Control:            Sign Out: intubated and sedated.   PONV:    Neuro/Psych: Uneventful            Sign Out: PLANNED postop sedation   Airway/Respiratory:             Sign Out: AIRWAY IN SITU/Resp. Support               Airway in situ/Resp. Support: ETT                 Reason: Planned Pre-op (intubated pre op at [facility] ED)   CV/Hemodynamics: Uneventful            Sign Out: Acceptable CV status; No obvious hypovolemia; No obvious fluid overload   Other NRE: NONE   DID A NON-ROUTINE EVENT OCCUR? No           Last vitals:  Vitals:    05/04/23 1800 05/04/23 1815 05/04/23 1830   BP:      Pulse: 73 71 73   Resp: 12 12 12   Temp: 36.6  C (97.9  F) 36.3  C (97.3  F) 36.3  C (97.3  F)   SpO2: 98% 98% 98%       Electronically Signed By: Ventura Ignacio DO  May 4, 2023  7:30 PM

## 2023-05-05 NOTE — PROGRESS NOTES
New Patient Hematology / Oncology Nurse Navigator Note     Referral Date: 5/5/23    Referring provider: Dr. Scherer    Referring Clinic/Organization: [facility]     Referred to: Neuro Oncology    Requested provider (if applicable): Dr. Bullock    Evaluation for : high grade glioma s/p resection     Clinical History (per Nurse review of records provided):      5/4 Prelim path showing:  Intraoperative Consultation P    A(1). Brain, right frontal brain tumor:  AFS1:  Lesional tissue present, favor high-grade glioma with superimposed abundant acute inflammation, tissue culture to also be performed, case discussed intraoperatively with surgeon Yuly Tejaad MD, MD on 5/4/2023 at 3:59 PM       5/4 Op Note with Dr. Dasilva:CRANIOTOMY, USING OPTICAL TRACKING SYSTEM, WITH NEOPLASM EXCISION RIGHT FRONTAL CRANIOTOMY WITH RESECTION OF RIGHT FRONTAL LESION USING STEALTH -- BOOKMARKED    Brain imaging -- BOOKMARKED    Current admission note -- BOOKMARKED    Clinical Assessment / Barriers to Care (Per Nurse):  Pt lives in [place], currently admitted      Records Location: Psychiatric     Records Needed:   N/A    Additional testing needed prior to consult:   Final path confirming dx    Referral updates and Plan:   Will watch for discharge plan then follow-up accordingly. Hold 5/23 Dr. Bullock 11AM @ [facility]. Will adjust as needed pending discharge.    Justyna Padilla, NAVEENN, RN, PHN, OCN  Hematology/Oncology Nurse Navigator  [facility]  [phone]

## 2023-05-06 ENCOUNTER — APPOINTMENT (OUTPATIENT)
Dept: OCCUPATIONAL THERAPY | Facility: CLINIC | Age: 38
End: 2023-05-06
Attending: NEUROLOGICAL SURGERY
Payer: COMMERCIAL

## 2023-05-06 ENCOUNTER — HEALTH MAINTENANCE LETTER (OUTPATIENT)
Age: 38
End: 2023-05-06

## 2023-05-06 LAB
ANION GAP SERPL CALCULATED.3IONS-SCNC: 12 MMOL/L (ref 7–15)
BUN SERPL-MCNC: 12.1 MG/DL (ref 6–20)
CALCIUM SERPL-MCNC: 9 MG/DL (ref 8.6–10)
CHLORIDE SERPL-SCNC: 99 MMOL/L (ref 98–107)
CREAT SERPL-MCNC: 0.69 MG/DL (ref 0.67–1.17)
DEPRECATED HCO3 PLAS-SCNC: 27 MMOL/L (ref 22–29)
ERYTHROCYTE [DISTWIDTH] IN BLOOD BY AUTOMATED COUNT: 12.3 % (ref 10–15)
GFR SERPL CREATININE-BSD FRML MDRD: >90 ML/MIN/1.73M2
GLUCOSE BLDC GLUCOMTR-MCNC: 122 MG/DL (ref 70–99)
GLUCOSE BLDC GLUCOMTR-MCNC: 125 MG/DL (ref 70–99)
GLUCOSE BLDC GLUCOMTR-MCNC: 126 MG/DL (ref 70–99)
GLUCOSE BLDC GLUCOMTR-MCNC: 140 MG/DL (ref 70–99)
GLUCOSE SERPL-MCNC: 127 MG/DL (ref 70–99)
HCT VFR BLD AUTO: 37.6 % (ref 40–53)
HGB BLD-MCNC: 12.4 G/DL (ref 13.3–17.7)
MCH RBC QN AUTO: 29.5 PG (ref 26.5–33)
MCHC RBC AUTO-ENTMCNC: 33 G/DL (ref 31.5–36.5)
MCV RBC AUTO: 89 FL (ref 78–100)
PLATELET # BLD AUTO: 213 10E3/UL (ref 150–450)
POTASSIUM SERPL-SCNC: 3.8 MMOL/L (ref 3.4–5.3)
RBC # BLD AUTO: 4.21 10E6/UL (ref 4.4–5.9)
SODIUM SERPL-SCNC: 138 MMOL/L (ref 136–145)
WBC # BLD AUTO: 12.3 10E3/UL (ref 4–11)

## 2023-05-06 PROCEDURE — 99231 SBSQ HOSP IP/OBS SF/LOW 25: CPT | Performed by: HOSPITALIST

## 2023-05-06 PROCEDURE — 250N000013 HC RX MED GY IP 250 OP 250 PS 637: Performed by: HOSPITALIST

## 2023-05-06 PROCEDURE — C9113 INJ PANTOPRAZOLE SODIUM, VIA: HCPCS | Performed by: INTERNAL MEDICINE

## 2023-05-06 PROCEDURE — 36415 COLL VENOUS BLD VENIPUNCTURE: CPT

## 2023-05-06 PROCEDURE — 250N000013 HC RX MED GY IP 250 OP 250 PS 637: Performed by: PHYSICIAN ASSISTANT

## 2023-05-06 PROCEDURE — 250N000011 HC RX IP 250 OP 636: Performed by: PHYSICIAN ASSISTANT

## 2023-05-06 PROCEDURE — 120N000001 HC R&B MED SURG/OB

## 2023-05-06 PROCEDURE — 250N000012 HC RX MED GY IP 250 OP 636 PS 637: Performed by: PHYSICIAN ASSISTANT

## 2023-05-06 PROCEDURE — 97535 SELF CARE MNGMENT TRAINING: CPT | Mod: GO

## 2023-05-06 PROCEDURE — 85014 HEMATOCRIT: CPT

## 2023-05-06 PROCEDURE — 250N000011 HC RX IP 250 OP 636: Performed by: INTERNAL MEDICINE

## 2023-05-06 PROCEDURE — 80048 BASIC METABOLIC PNL TOTAL CA: CPT

## 2023-05-06 PROCEDURE — 97166 OT EVAL MOD COMPLEX 45 MIN: CPT | Mod: GO

## 2023-05-06 RX ORDER — LEVETIRACETAM 500 MG/1
500 TABLET ORAL 2 TIMES DAILY
Status: DISCONTINUED | OUTPATIENT
Start: 2023-05-06 | End: 2023-05-08 | Stop reason: HOSPADM

## 2023-05-06 RX ADMIN — DEXAMETHASONE 4 MG: 4 TABLET ORAL at 23:56

## 2023-05-06 RX ADMIN — OXYCODONE HYDROCHLORIDE 5 MG: 5 TABLET ORAL at 05:43

## 2023-05-06 RX ADMIN — PANTOPRAZOLE SODIUM 40 MG: 40 INJECTION, POWDER, FOR SOLUTION INTRAVENOUS at 08:42

## 2023-05-06 RX ADMIN — LEVETIRACETAM 500 MG: 500 TABLET, FILM COATED ORAL at 11:43

## 2023-05-06 RX ADMIN — LEVETIRACETAM 500 MG: 500 TABLET, FILM COATED ORAL at 20:16

## 2023-05-06 RX ADMIN — OXYCODONE HYDROCHLORIDE 5 MG: 5 TABLET ORAL at 09:53

## 2023-05-06 RX ADMIN — DEXAMETHASONE 4 MG: 4 TABLET ORAL at 11:43

## 2023-05-06 RX ADMIN — ACETAMINOPHEN 975 MG: 325 TABLET ORAL at 08:42

## 2023-05-06 RX ADMIN — DEXAMETHASONE 4 MG: 4 TABLET ORAL at 17:35

## 2023-05-06 RX ADMIN — DEXAMETHASONE 4 MG: 4 TABLET ORAL at 05:43

## 2023-05-06 RX ADMIN — POLYETHYLENE GLYCOL 3350 17 G: 17 POWDER, FOR SOLUTION ORAL at 08:42

## 2023-05-06 RX ADMIN — ACETAMINOPHEN 975 MG: 325 TABLET ORAL at 15:30

## 2023-05-06 RX ADMIN — SENNOSIDES AND DOCUSATE SODIUM 1 TABLET: 50; 8.6 TABLET ORAL at 08:41

## 2023-05-06 RX ADMIN — SENNOSIDES AND DOCUSATE SODIUM 1 TABLET: 50; 8.6 TABLET ORAL at 20:16

## 2023-05-06 RX ADMIN — ACETAMINOPHEN 975 MG: 325 TABLET ORAL at 23:55

## 2023-05-06 RX ADMIN — DEXAMETHASONE 4 MG: 4 TABLET ORAL at 00:24

## 2023-05-06 RX ADMIN — LEVETIRACETAM 500 MG: 5 INJECTION INTRAVENOUS at 02:33

## 2023-05-06 ASSESSMENT — ACTIVITIES OF DAILY LIVING (ADL)
ADLS_ACUITY_SCORE: 26

## 2023-05-06 NOTE — PLAN OF CARE
Physical Therapy: Orders received. Chart reviewed and discussed with care team.? Physical Therapy not indicated due to pt near baseline level of independent functioning, mobility needs being met by OT during hospital stay, no skilled PT indicated at this time.? Defer discharge recommendations to OT and medical team.? Will complete orders.

## 2023-05-06 NOTE — PROGRESS NOTES
[facility]    Hospitalist Progress Note    Date of Service (when I saw the patient): 05/06/2023    Assessment & Plan   Brandon Ordoñez is a 37 year old male with no significant past medical history who initially presented to [facility] 2[facility] on 5/4/23 with 3 days of nausea, vomiting, and somnolence. CT shows large hyperdense right frontal mass with significant edema and midline shift with transtentorial herniation. Patient was intubated for airway protection and transferred to [facility] 5/4 for neurosurgical consultation.     S/p tumor resection by neurosurgery on 5/4.     Right frontal intraparenchymal hemorrhage with underlying brain tumor  Intracranial Hypertension from large frontal mass  Glioblastoma s/p frontal craniotomy and  tumor resection 5/4 by Dr. Dasilva  Patient was taken for emergent craniotomy and right frontal mass resection 5/4.  mL. RACHELLE drain placed. Patient extubated 5/5. Nursing appreciates slight left sided weakness, however patient is neurologically intact during my exam without any focal neuro deficits.   Seen by hemonc.  - Cont Keppra  for seizure prophylaxis (change to PO).  - Continue decadron per neurosurgery (start 2 week taper on Monday)  - Neurochecks Q4h.  - continuous cardiac monitoring   - BG AC and HS  - Continue medium sliding scale insulin insulin for anticipated steroid induced hyperglycemia  - Follow neurosurgery recs.  - F/U with [facility]monc (Dr jake Bullock) as outpatient for discussion re rad/chemo.     Diet: Regular Diet Adult    DVT Prophylaxis: Pneumatic Compression Devices. Hep subcutaneous on Monday per neurosurgery.  Hammond Catheter: Not present  Lines: None     Cardiac Monitoring: None  Code Status: Full Code      PT    Disposition: transfer to neuro tele. Discontinue when ok with neurosurgery.    Irma Guajardo MD    Interval History   No complaints this am. Oriented x3. Mild pain at surgical site. No headache or  dizziness. No diplopia.      Physical Exam   Temp: 98.6  F (37  C) Temp src: Oral BP: 136/88 Pulse: 73   Resp: 14 SpO2: 95 % O2 Device: None (Room air) Oxygen Delivery: 1 LPM  Vitals:    05/04/23 1800 05/05/23 0020 05/06/23 0300   Weight: 71.8 kg (158 lb 4.6 oz) 70.5 kg (155 lb 6.8 oz) 70.9 kg (156 lb 4.9 oz)     Vital Signs with Ranges  Temp:  [98.6  F (37  C)-99.5  F (37.5  C)] 98.6  F (37  C)  Pulse:  [] 73  Resp:  [14-20] 14  BP: (112-136)/(70-93) 136/88  MAP:  [74 mmHg-95 mmHg] 95 mmHg  Arterial Line BP: (114-142)/(56-77) 142/77  SpO2:  [94 %-100 %] 95 %  I/O last 3 completed shifts:  In: 1908.64 [P.O.:700; I.V.:1108.64; Other:100]  Out: 1530 [Urine:1400; Emesis/NG output:25; Drains:105]    General:  Appears stated age, no acute distress. A&O x 3.  Chest:  Breath sounds CTA and no increased work of breathing on room air.  Cardiovascular:  RRR, no rub or murmur. No peripheral edema.  Abdomen:  Soft, non-tender, non-distended.  Musculoskeletal/skin:  Moves all four extremities with equal strength.  craniotomy site is dressed.  Neurological:  No focal neurological deficits.   Psychiatric:  Affect and mood congruent.      Medications       acetaminophen  975 mg Oral Q8H     dexamethasone  4 mg Intravenous Q6H OMKAR    Or     dexamethasone  4 mg Oral Q6H OMKAR     insulin aspart  1-7 Units Subcutaneous TID AC     insulin aspart  1-5 Units Subcutaneous At Bedtime     levETIRAcetam  500 mg Intravenous Q12H     pantoprazole  40 mg Per Feeding Tube QAM AC    Or     pantoprazole  40 mg Intravenous QAM AC     polyethylene glycol  17 g Oral Daily     senna-docusate  1 tablet Oral BID     sodium chloride (PF)  3 mL Intracatheter Q8H       Data   Recent Labs   Lab 05/06/23  0844 05/06/23  0443 05/05/23 2136 05/05/23  0532 05/05/23  0526 05/04/23 2054 05/04/23 2050 05/04/23 1749 05/04/23  1039   WBC  --  12.3*  --   --  13.2*  --   --   --  15.5*   HGB  --  12.4*  --   --  11.8*  --   --   --  14.8   MCV  --  89  --    --  88  --   --   --  87   PLT  --  213  --   --  228  --   --   --  240   INR  --   --   --   --   --   --   --   --  1.02   NA  --  138  --   --  139  --  137  --  138   POTASSIUM  --  3.8  --   --  3.9  --  4.4  --  3.9   CHLORIDE  --  99  --   --  101  --  100  --  97*   CO2  --  27  --   --  25  --  26  --  25   BUN  --  12.1  --   --  15.7  --  12.1  --  14.9   CR  --  0.69  --   --  0.79  --  0.77  --  0.68   ANIONGAP  --  12  --   --  13  --  11  --  16*   ROYER  --  9.0  --   --  9.3  --  9.4  --  10.3*   * 127* 119*   < > 134*   < > 137*   < > 159*   ALBUMIN  --   --   --   --  3.9  --  4.2  --   --    PROTTOTAL  --   --   --   --  6.9  --   --   --   --    BILITOTAL  --   --   --   --  0.6  --   --   --   --    ALKPHOS  --   --   --   --  82  --   --   --   --    ALT  --   --   --   --  38  --   --   --   --    AST  --   --   --   --  22  --   --   --   --    LIPASE  --   --   --   --   --   --   --   --  20    < > = values in this interval not displayed.       No results found for this or any previous visit (from the past 24 hour(s)).

## 2023-05-06 NOTE — PROGRESS NOTES
Neurosurgery progress note:    POD#2 s/p right frontal craniotomy for resection of tumor.   Preliminary pathology reveals high grade glioma.  Patient extubated and doing well.  CP overnight with cardiac work up negative.  No CP today.  Only complaint is mild incisional pain.  RACHELLE 20cc/8hrs.    Exam:  Awake and alert, sitting up in bed, incision C/D/I, RACHELLE intact.  Moving all four extremities well with mild left pronator drift.  GCS 15    PLAN:  RACHELLE drain removed in usual sterile fashion.  Transfer to neuro floor.  Every 4 hour neuro checks.  PT/OT  OOB as tolerated.  Decadron 4 mg every 6 hours through weekend.  Will start  2 week Decadron taper on Monday.  Start prophylactic Heparin on Monday 5/8/23    Discussed with Dr. Dasilva.    Adore Shelby, JOHNS  [facility] Neurosurgery  [address] [address]    Tel [phone]  Pager [phone]

## 2023-05-06 NOTE — PROGRESS NOTES
Brief Intensivist note  Chest wall pain now resolved after tylenol and sleeping. Ekg was normal and troponin was non-detectable.    roslyn cevallos  May 5, 2023

## 2023-05-06 NOTE — PROGRESS NOTES
05/06/23 0900   Appointment Info   Signing Clinician's Name / Credentials (OT) Liz Higginbotham, OTR/L       Present no   Living Environment   People in Home spouse   Current Living Arrangements house   Home Accessibility stairs to enter home;stairs within home   Number of Stairs, Main Entrance 6   Stair Railings, Main Entrance railings safe and in good condition   Number of Stairs, Within Home, Primary greater than 10 stairs   Stair Railings, Within Home, Primary railings safe and in good condition   Transportation Anticipated family or friend will provide   Living Environment Comments Pt lives in [facility] w/ spouse. Has 6 RO[address], then additional stairs inside the home up to bed/bath. Pt has tub shower.   Self-Care   Usual Activity Tolerance excellent   Current Activity Tolerance moderate   Equipment Currently Used at Home none   Fall history within last six months no   Activity/Exercise/Self-Care Comment Pt was independent w/ ADL tasks and mobility w/out AD. Active typically.   Instrumental Activities of Daily Living (IADL)   IADL Comments Pt was independent w/ IADL tasks. Drives and works full time.   General Information   Onset of Illness/Injury or Date of Surgery 05/04/23   Referring Physician Breanne Dao MD   Patient/Family Therapy Goal Statement (OT) go home   Additional Occupational Profile Info/Pertinent History of Current Problem Brandon Ordoñez is a 37 year old male with no significant past medical history who initially presented to [facility] on 5/4/23 with 3 days of nausea, vomiting, and somnolence. CT shows large hyperdense right frontal mass with significant edema and midline shift with transtentorial herniation. Patient was intubated for airway protection and transferred to [facility] 5/4 for neurosurgical consultation. S/p tumor resection by neurosurgery on 5/4. Preliminary pathology reveals high grade glioma.   Existing Precautions/Restrictions  fall  (crani)   Cognitive Status Examination   Orientation Status orientation to person, place and time   Follows Commands WFL   Cognitive Status Comments Pt AOx4, appears to have no ongoing cognitive deficits. Will continue to monitor.   Visual Perception   Visual Impairment/Limitations WFL   Visual Acuity intact   Sensory   Sensory Quick Adds sensation intact   Pain Assessment   Patient Currently in Pain Yes, see Vital Sign flowsheet  (slight pain at incisional site)   Posture   Posture not impaired   Range of Motion Comprehensive   General Range of Motion no range of motion deficits identified   Strength Comprehensive (MMT)   General Manual Muscle Testing (MMT) Assessment no strength deficits identified   Coordination   Upper Extremity Coordination No deficits were identified   Bed Mobility   Bed Mobility supine-sit   Supine-Sit [place] (Bed Mobility) supervision   Transfers   Transfers sit-stand transfer   Sit-Stand Transfer   Sit-Stand [place] (Transfers) contact guard   Balance   Balance Comments pt slightly guarded at times - CGA and held onto portable monitor for additional support but no major LOB.   Activities of Daily Living   BADL Assessment/Intervention lower body dressing;upper body dressing;bathing;grooming;toileting   Bathing Assessment/Intervention   [place] Level (Bathing) supervision   Comment, (Bathing) per clinical judgement   Upper Body Dressing Assessment/Training   Comment, (Upper Body Dressing) per clinical judgement   [place] Level (Upper Body Dressing) supervision   Lower Body Dressing Assessment/Training   [place] Level (Lower Body Dressing) supervision   Grooming Assessment/Training   [place] Level (Grooming) supervision   Toileting   Comment, (Toileting) per clinical judgement   [place] Level (Toileting) supervision   Clinical Impression   Criteria for Skilled Therapeutic Interventions Met (OT) Yes, treatment indicated   OT Diagnosis decreased I/ADL  independence   OT Problem List-Impairments impacting ADL problems related to;activity tolerance impaired;cognition;pain;post-surgical precautions;mobility   Assessment of Occupational Performance 1-3 Performance Deficits   Identified Performance Deficits decreased independence w/ functional mobility, home mgmt, driving   Planned Therapy Interventions (OT) ADL retraining;IADL retraining;cognition;strengthening;transfer training;home program guidelines;progressive activity/exercise;risk factor education   Clinical Decision Making Complexity (OT) moderate complexity   Anticipated Equipment Needs Upon Discharge (OT)   (likely none)   Risk & Benefits of therapy have been explained evaluation/treatment results reviewed;risks/benefits reviewed;care plan/treatment goals reviewed;current/potential barriers reviewed;participants voiced agreement with care plan;participants included;patient   OT Total Evaluation Time   OT Eval, Moderate Complexity Minutes (73228) 10   OT Goals   Therapy Frequency (OT) Daily   OT Predicted Duration/Target Date for Goal Attainment 05/08/23   OT Goals Hygiene/Grooming;Upper Body Dressing;Toilet Transfer/Toileting;Transfers;OT Goal 1   OT: Hygiene/Grooming modified independent;while standing   OT: Upper Body Dressing Modified independent;including set-up/clothing retrieval   OT: Transfer Modified independent  (tub transfer)   OT: Toilet Transfer/Toileting Modified independent   OT: Goal 1 Patient will ascend/descend 15 stairs w/ SBA in preparation for d/c home.   Interventions   Interventions Quick Adds Self-Care/Home Management   Self-Care/Home Management   Self-Care/Home Mgmt/ADL, Compensatory, Meal Prep Minutes (36860) 15   Symptoms Noted During/After Treatment (Meal Preparation/Planning Training) fatigue;increased pain   Treatment Detail/Skilled Intervention Pt in bed upon arrival, very pleasant and agreeable to OT. Pt w/ some pain at incisional site, otherwise feeling good. No  nausea/dizziness. SBA to EOB. Educated pt on post-op crani precautions and he verbalizes understanding. Pt able to don socks w/ SBA. VSS - see flowsheet. Pt stood w/ CGA, no AD. Ambulated into hallway ~150ft w/ CGA. Pt used no AD but did hold onto portable monitor at times. Appears guarded but steady. Returned to room and sat in chair. Pt washed face w/ SBA while seated. BP stable after activity. Pt left up in chair w/ all needs met, alarm on and RN updated.   OT Discharge Planning   OT Plan monitor cog (no major deficits observed today), simulated tub transfer, stairs, dressing. Likely only 1 more session needed   OT Discharge Recommendation (DC Rec) home with assist   OT Rationale for DC Rec Pt slightly below baseline after surgery, though moving well and able to complete basic ADL tasks w/ SBA. Anticipate pt will be safe for d/c home w/ spouse assisting for bathing, heavy IADL tasks and driving.   OT Brief overview of current status CGA w/out AD, SBA bed mobility and LB dressing   Total Session Time   Timed Code Treatment Minutes 15   Total Session Time (sum of timed and untimed services) 25

## 2023-05-06 NOTE — PLAN OF CARE
Goal Outcome Evaluation:        Neuro: A&Ox4. Neuros intact.    Fever/Pain: Low grade fever. C/O 3/10 pain, given PRN oxycodone x1.   CV: SR. Stable BPs.   Pulm: Lung sounds clear.  SpO2 stable on RA.    : Voiding per urinal. Good urine output.    GI: Regular diet, good appetite.  No BM this shift/No BM since admission, bowel meds given.  sliding scale insulin ordered, but BG levels WNL without insulin coverage.   Skin: Intact w/o blanchable redness.  Surgical incision(s) WDL.   Plan: Neuro assessments every 4 hours. PRN pain management. Transfer to floor, report called to intaking nurse.  Family updated by phone on POC.

## 2023-05-06 NOTE — PROVIDER NOTIFICATION
"Pt calls, states, \"I have chest pain over my chest.\"  Pt motions over his left chest while describing pain.  Pt rates this pain a zero out of 10 at this moment as the pain has subsided.  Pt denies having ever had this pain before and endorses it being like a \"Pressure\".  A 12 lead ECG was ordered and completed and shown to Dr. Delvalle.  A new order for a Troponin level was ordered by Dr. Delvalle.  Discussed plan to have this drawn and await result.  Pt then endorses pain radiated to his right chest then back to his left chest, \"4 out of 10\".  Pt medicated with scheduled 975mg Tylenol and will reassess pain and lab result.  "

## 2023-05-06 NOTE — PLAN OF CARE
Pt is A&O OOB to chair, tolerated being in chair well.  Gait steady when returning to bed.  Perrla +3.  Dressing to head with old drainage.  RACHELLE with minimal serosang output overnight.  RA sats %.  PIV X2 both flush well and S.L.'d.  I.V.F'ds dc'd.  Tolerating oral medications well.  + flatus no BM.

## 2023-05-06 NOTE — PLAN OF CARE
Pt here with R IPH with relating glioblastoma. Craniotomy and tumor resection, POD 2.    A/Ox4. Very slight left sided weakness, dorsi/plantar and hand grasp weaker on the L. Otherwise intact. VSS RA, SBP <160. Regular diet, thin liquids. SBA. Voiding adequately. Incision WDL, some dried drainage. AMANDA. Keppra and decadron. Scheduled tylenol and oxycodone PRN. Hem/onc, neurosurgery and hospitalist following. Discharge pending, likely back to home.

## 2023-05-07 ENCOUNTER — APPOINTMENT (OUTPATIENT)
Dept: OCCUPATIONAL THERAPY | Facility: CLINIC | Age: 38
End: 2023-05-07
Attending: NEUROLOGICAL SURGERY
Payer: COMMERCIAL

## 2023-05-07 LAB
ANION GAP SERPL CALCULATED.3IONS-SCNC: 16 MMOL/L (ref 7–15)
BUN SERPL-MCNC: 15.8 MG/DL (ref 6–20)
CALCIUM SERPL-MCNC: 9.6 MG/DL (ref 8.6–10)
CHLORIDE SERPL-SCNC: 100 MMOL/L (ref 98–107)
CREAT SERPL-MCNC: 0.7 MG/DL (ref 0.67–1.17)
DEPRECATED HCO3 PLAS-SCNC: 23 MMOL/L (ref 22–29)
ERYTHROCYTE [DISTWIDTH] IN BLOOD BY AUTOMATED COUNT: 11.9 % (ref 10–15)
GFR SERPL CREATININE-BSD FRML MDRD: >90 ML/MIN/1.73M2
GLUCOSE BLDC GLUCOMTR-MCNC: 123 MG/DL (ref 70–99)
GLUCOSE BLDC GLUCOMTR-MCNC: 153 MG/DL (ref 70–99)
GLUCOSE BLDC GLUCOMTR-MCNC: 155 MG/DL (ref 70–99)
GLUCOSE BLDC GLUCOMTR-MCNC: 176 MG/DL (ref 70–99)
GLUCOSE BLDC GLUCOMTR-MCNC: 196 MG/DL (ref 70–99)
GLUCOSE SERPL-MCNC: 143 MG/DL (ref 70–99)
HCT VFR BLD AUTO: 40.4 % (ref 40–53)
HGB BLD-MCNC: 13.5 G/DL (ref 13.3–17.7)
MCH RBC QN AUTO: 29.1 PG (ref 26.5–33)
MCHC RBC AUTO-ENTMCNC: 33.4 G/DL (ref 31.5–36.5)
MCV RBC AUTO: 87 FL (ref 78–100)
PLATELET # BLD AUTO: 249 10E3/UL (ref 150–450)
POTASSIUM SERPL-SCNC: 3.9 MMOL/L (ref 3.4–5.3)
RBC # BLD AUTO: 4.64 10E6/UL (ref 4.4–5.9)
SODIUM SERPL-SCNC: 139 MMOL/L (ref 136–145)
WBC # BLD AUTO: 11.2 10E3/UL (ref 4–11)

## 2023-05-07 PROCEDURE — 80048 BASIC METABOLIC PNL TOTAL CA: CPT | Performed by: HOSPITALIST

## 2023-05-07 PROCEDURE — 250N000013 HC RX MED GY IP 250 OP 250 PS 637: Performed by: PHYSICIAN ASSISTANT

## 2023-05-07 PROCEDURE — 250N000011 HC RX IP 250 OP 636: Performed by: PHYSICIAN ASSISTANT

## 2023-05-07 PROCEDURE — 250N000013 HC RX MED GY IP 250 OP 250 PS 637: Performed by: HOSPITALIST

## 2023-05-07 PROCEDURE — 97530 THERAPEUTIC ACTIVITIES: CPT | Mod: GO

## 2023-05-07 PROCEDURE — C9113 INJ PANTOPRAZOLE SODIUM, VIA: HCPCS | Performed by: PHYSICIAN ASSISTANT

## 2023-05-07 PROCEDURE — 36415 COLL VENOUS BLD VENIPUNCTURE: CPT | Performed by: HOSPITALIST

## 2023-05-07 PROCEDURE — 120N000001 HC R&B MED SURG/OB

## 2023-05-07 PROCEDURE — 85027 COMPLETE CBC AUTOMATED: CPT | Performed by: HOSPITALIST

## 2023-05-07 PROCEDURE — 250N000012 HC RX MED GY IP 250 OP 636 PS 637: Performed by: PHYSICIAN ASSISTANT

## 2023-05-07 PROCEDURE — 99231 SBSQ HOSP IP/OBS SF/LOW 25: CPT | Performed by: INTERNAL MEDICINE

## 2023-05-07 RX ORDER — PANTOPRAZOLE SODIUM 40 MG/1
40 TABLET, DELAYED RELEASE ORAL
Status: DISCONTINUED | OUTPATIENT
Start: 2023-05-08 | End: 2023-05-08 | Stop reason: HOSPADM

## 2023-05-07 RX ADMIN — ACETAMINOPHEN 975 MG: 325 TABLET ORAL at 06:35

## 2023-05-07 RX ADMIN — Medication 1 LOZENGE: at 14:36

## 2023-05-07 RX ADMIN — LEVETIRACETAM 500 MG: 500 TABLET, FILM COATED ORAL at 20:41

## 2023-05-07 RX ADMIN — PANTOPRAZOLE SODIUM 40 MG: 40 INJECTION, POWDER, FOR SOLUTION INTRAVENOUS at 06:36

## 2023-05-07 RX ADMIN — DEXAMETHASONE 4 MG: 4 TABLET ORAL at 18:03

## 2023-05-07 RX ADMIN — DEXAMETHASONE 4 MG: 4 TABLET ORAL at 12:51

## 2023-05-07 RX ADMIN — ACETAMINOPHEN 975 MG: 325 TABLET ORAL at 16:08

## 2023-05-07 RX ADMIN — SENNOSIDES AND DOCUSATE SODIUM 1 TABLET: 50; 8.6 TABLET ORAL at 20:41

## 2023-05-07 RX ADMIN — DEXAMETHASONE 4 MG: 4 TABLET ORAL at 06:35

## 2023-05-07 RX ADMIN — LEVETIRACETAM 500 MG: 500 TABLET, FILM COATED ORAL at 09:21

## 2023-05-07 ASSESSMENT — ACTIVITIES OF DAILY LIVING (ADL)
ADLS_ACUITY_SCORE: 26

## 2023-05-07 NOTE — PROGRESS NOTES
Neurosurgery progress note:     POD#3 s/p right frontal craniotomy for resection of tumor.   Preliminary pathology reveals high grade glioma.  Transferred to neuro floor yesterday.  Complains of sore throat, denies headache, weakness, N/V  Therapy recommending home with assist.     Exam:  Awake and alert, sitting up in bed, incision C/D/I,  Moving all four extremities well, neuro intact     PLAN:  Every 4 hour neuro checks.  PT/OT  OOB as tolerated.  Decadron 4 mg every 6 hours through weekend.  Will start  2 week Decadron taper on Monday.  Start prophylactic Heparin on Monday 5/8/23  Likely discharge home tomorrow.      JUAN Unger  [facility] Neurosurgery  [address] [address]     Tel [phone]  Pager [phone]

## 2023-05-07 NOTE — PLAN OF CARE
2Occupational Therapy Discharge Summary    Reason for therapy discharge:    All goals and outcomes met, no further needs identified.    Progress towards therapy goal(s). See goals on Care Plan in [facility] electronic health record for goal details.  Goals met    Therapy recommendation(s):    No further therapy is recommended.

## 2023-05-07 NOTE — PLAN OF CARE
POD 3 crani and tumor resection    A/Ox4. VSS RA. SBP <160. Regular diet, thin liquids. BG checks. Pills whole. IND in room. Tele NSR. Keppra and decadron. Lozenges PRN for sore throat. Plan for hopeful discharge home tomorrow pending final neurosurgery recommendations. Hem/onc outpatient.

## 2023-05-07 NOTE — PROGRESS NOTES
[facility]    Hospitalist Progress Note    Date of Service (when I saw the patient): 05/07/2023    Assessment & Plan   Brandon Ordoñez is a 37 year old male with no significant past medical history who initially presented to [facility] on 5/4/23 with 3 days of nausea, vomiting, and somnolence. CT shows large hyperdense right frontal mass with significant edema and midline shift with transtentorial herniation. Patient was intubated for airway protection and transferred to [facility] 5/4 for neurosurgical consultation.     S/p tumor resection by neurosurgery on 5/4.     Right frontal intraparenchymal hemorrhage with underlying brain tumor  Intracranial Hypertension from large frontal mass  Glioblastoma s/p frontal craniotomy and  tumor resection 5/4 by Dr. Dasilva  Patient was taken for emergent craniotomy and right frontal mass resection 5/4.  mL. RACHELLE drain placed. Patient extubated 5/5. Nursing appreciates slight left sided weakness, however patient is neurologically intact during my exam without any focal neuro deficits.   Seen by hemonc.    Cont Keppra  for seizure prophylaxis (change to PO).    Continue decadron per neurosurgery (start 2 week taper on Monday)    Also per neurosurgery, begin prophylactic heparin on Monday 5/8/2023    Neurochecks Q4h.    continuous cardiac monitoring     BG AC and HS     Continue medium sliding scale insulin insulin for anticipated steroid induced hyperglycemia    RACHELLE drain removed 5/6    F/U with hemonc (Dr jake Bullock) as outpatient for discussion re rad/chemo.     Diet: Regular Diet Adult    DVT Prophylaxis: Pneumatic Compression Devices. Hep subcutaneous on Monday per neurosurgery.  Hammond Catheter: Not present  Lines: None     Cardiac Monitoring: None  Code Status: Full Code    .  Vicky Vargas M.D.  Hospitalist  [facility]  Securely message with the Vocera Web Console (learn more here)  Text page  "via [facility] Paging/Directory        Interval History   \"I had a little bit of a headache, but it is better.\"  Says he has been up and out of bed, he is feeling okay.  He has no new respiratory or GI complaints.    Physical Exam   Temp: 97.5  F (36.4  C) Temp src: Oral BP: 123/88 Pulse: 77   Resp: 18 SpO2: 96 % O2 Device: None (Room air)    Vitals:    05/04/23 1800 05/05/23 0020 05/06/23 0300   Weight: 71.8 kg (158 lb 4.6 oz) 70.5 kg (155 lb 6.8 oz) 70.9 kg (156 lb 4.9 oz)     Vital Signs with Ranges  Temp:  [97.4  F (36.3  C)-99.3  F (37.4  C)] 97.5  F (36.4  C)  Pulse:  [66-96] 77  Resp:  [15-29] 18  BP: (111-131)/(69-99) 123/88  SpO2:  [94 %-99 %] 96 %  I/O last 3 completed shifts:  In: 1300 [P.O.:1300]  Out: 950 [Urine:950]    Constitutional: Awake, alert, cooperative, no apparent distress  Respiratory: Clear to auscultation bilaterally, no crackles or wheezing  Cardiovascular: Regular rate and rhythm, normal S1 and S2, and no murmur noted  GI: Normal bowel sounds, soft, non-distended, non-tender  Skin/Integumen: No rash on exposed skin.  No lower extremity edema  Other: Mood is very pleasant        Medications       acetaminophen  975 mg Oral Q8H     dexamethasone  4 mg Oral Q6H OMKAR     insulin aspart  1-7 Units Subcutaneous TID AC     insulin aspart  1-5 Units Subcutaneous At Bedtime     levETIRAcetam  500 mg Oral BID     pantoprazole  40 mg Per Feeding Tube QAM AC    Or     pantoprazole  40 mg Intravenous QAM AC     polyethylene glycol  17 g Oral Daily     senna-docusate  1 tablet Oral BID     sodium chloride (PF)  3 mL Intracatheter Q8H       Data   Recent Labs   Lab 05/07/23  0736 05/07/23  0204 05/06/23  2118 05/06/23  1617 05/06/23  0844 05/06/23  0443 05/05/23  0532 05/05/23 0526 05/04/23 2054 05/04/23 2050 05/04/23 1749 05/04/23  1039   WBC 11.2*  --   --   --   --  12.3*  --  13.2*  --   --   --  15.5*   HGB 13.5  --   --   --   --  12.4*  --  11.8*  --   --   --  14.8   MCV 87  --   --   --   --  89  " --  88  --   --   --  87     --   --   --   --  213  --  228  --   --   --  240   INR  --   --   --   --   --   --   --   --   --   --   --  1.02   NA  --   --   --   --   --  138  --  139  --  137  --  138   POTASSIUM  --   --   --   --   --  3.8  --  3.9  --  4.4  --  3.9   CHLORIDE  --   --   --   --   --  99  --  101  --  100  --  97*   CO2  --   --   --   --   --  27  --  25  --  26  --  25   BUN  --   --   --   --   --  12.1  --  15.7  --  12.1  --  14.9   CR  --   --   --   --   --  0.69  --  0.79  --  0.77  --  0.68   ANIONGAP  --   --   --   --   --  12  --  13  --  11  --  16*   ROYER  --   --   --   --   --  9.0  --  9.3  --  9.4  --  10.3*   GLC  --  155* 140* 126*   < > 127*   < > 134*   < > 137*   < > 159*   ALBUMIN  --   --   --   --   --   --   --  3.9  --  4.2  --   --    PROTTOTAL  --   --   --   --   --   --   --  6.9  --   --   --   --    BILITOTAL  --   --   --   --   --   --   --  0.6  --   --   --   --    ALKPHOS  --   --   --   --   --   --   --  82  --   --   --   --    ALT  --   --   --   --   --   --   --  38  --   --   --   --    AST  --   --   --   --   --   --   --  22  --   --   --   --    LIPASE  --   --   --   --   --   --   --   --   --   --   --  20    < > = values in this interval not displayed.       No results found for this or any previous visit (from the past 24 hour(s)).

## 2023-05-07 NOTE — PLAN OF CARE
Pt here with POD 3 for crani resection for tumor removal. A&Ox4. Neuros intact. VSS. Tele NSR. Regular diet, thin liquids. Takes pills whole. Up with SBA and GB. Denies pain. Incision AMANDA and approximated. Pt scoring green on the Aggression Stop Light Tool. Plan continue to monitor. Discharge pending, likely to home.

## 2023-05-08 VITALS
TEMPERATURE: 98.3 F | OXYGEN SATURATION: 98 % | DIASTOLIC BLOOD PRESSURE: 73 MMHG | SYSTOLIC BLOOD PRESSURE: 122 MMHG | RESPIRATION RATE: 16 BRPM | WEIGHT: 156.31 LBS | HEART RATE: 71 BPM | BODY MASS INDEX: 24.48 KG/M2

## 2023-05-08 LAB
ANION GAP SERPL CALCULATED.3IONS-SCNC: 14 MMOL/L (ref 7–15)
BUN SERPL-MCNC: 15.7 MG/DL (ref 6–20)
CALCIUM SERPL-MCNC: 9.6 MG/DL (ref 8.6–10)
CHLORIDE SERPL-SCNC: 100 MMOL/L (ref 98–107)
CREAT SERPL-MCNC: 0.68 MG/DL (ref 0.67–1.17)
DEPRECATED HCO3 PLAS-SCNC: 23 MMOL/L (ref 22–29)
ERYTHROCYTE [DISTWIDTH] IN BLOOD BY AUTOMATED COUNT: 11.7 % (ref 10–15)
GFR SERPL CREATININE-BSD FRML MDRD: >90 ML/MIN/1.73M2
GLUCOSE BLDC GLUCOMTR-MCNC: 126 MG/DL (ref 70–99)
GLUCOSE BLDC GLUCOMTR-MCNC: 139 MG/DL (ref 70–99)
GLUCOSE SERPL-MCNC: 134 MG/DL (ref 70–99)
HCT VFR BLD AUTO: 41.6 % (ref 40–53)
HGB BLD-MCNC: 13.7 G/DL (ref 13.3–17.7)
MCH RBC QN AUTO: 28.5 PG (ref 26.5–33)
MCHC RBC AUTO-ENTMCNC: 32.9 G/DL (ref 31.5–36.5)
MCV RBC AUTO: 87 FL (ref 78–100)
PLATELET # BLD AUTO: 288 10E3/UL (ref 150–450)
POTASSIUM SERPL-SCNC: 3.9 MMOL/L (ref 3.4–5.3)
RBC # BLD AUTO: 4.81 10E6/UL (ref 4.4–5.9)
SODIUM SERPL-SCNC: 137 MMOL/L (ref 136–145)
WBC # BLD AUTO: 11.7 10E3/UL (ref 4–11)

## 2023-05-08 PROCEDURE — 250N000013 HC RX MED GY IP 250 OP 250 PS 637: Performed by: HOSPITALIST

## 2023-05-08 PROCEDURE — 99231 SBSQ HOSP IP/OBS SF/LOW 25: CPT | Performed by: INTERNAL MEDICINE

## 2023-05-08 PROCEDURE — 250N000013 HC RX MED GY IP 250 OP 250 PS 637: Performed by: INTERNAL MEDICINE

## 2023-05-08 PROCEDURE — 250N000013 HC RX MED GY IP 250 OP 250 PS 637: Performed by: PHYSICIAN ASSISTANT

## 2023-05-08 PROCEDURE — 80048 BASIC METABOLIC PNL TOTAL CA: CPT | Performed by: INTERNAL MEDICINE

## 2023-05-08 PROCEDURE — 250N000012 HC RX MED GY IP 250 OP 636 PS 637: Performed by: PHYSICIAN ASSISTANT

## 2023-05-08 PROCEDURE — 36415 COLL VENOUS BLD VENIPUNCTURE: CPT | Performed by: INTERNAL MEDICINE

## 2023-05-08 PROCEDURE — 85027 COMPLETE CBC AUTOMATED: CPT | Performed by: INTERNAL MEDICINE

## 2023-05-08 RX ORDER — ACETAMINOPHEN 325 MG/1
650 TABLET ORAL EVERY 4 HOURS PRN
DISCHARGE
Start: 2023-05-08 | End: 2023-05-16

## 2023-05-08 RX ORDER — LORATADINE 10 MG/1
10 TABLET ORAL DAILY
Qty: 10 TABLET | Refills: 0 | Status: SHIPPED | OUTPATIENT
Start: 2023-05-09 | End: 2023-06-30

## 2023-05-08 RX ORDER — LEVETIRACETAM 500 MG/1
500 TABLET ORAL 2 TIMES DAILY
Qty: 60 TABLET | Refills: 1 | Status: SHIPPED | OUTPATIENT
Start: 2023-05-08 | End: 2023-06-30

## 2023-05-08 RX ORDER — OXYCODONE HYDROCHLORIDE 5 MG/1
5 TABLET ORAL EVERY 6 HOURS PRN
Qty: 20 TABLET | Refills: 0 | Status: SHIPPED | OUTPATIENT
Start: 2023-05-08 | End: 2023-06-30

## 2023-05-08 RX ORDER — DEXAMETHASONE 2 MG/1
TABLET ORAL
Qty: 40 TABLET | Refills: 0 | Status: SHIPPED | OUTPATIENT
Start: 2023-05-08 | End: 2023-06-30

## 2023-05-08 RX ORDER — DEXAMETHASONE 2 MG/1
TABLET ORAL
Qty: 40 TABLET | Refills: 0 | Status: SHIPPED | OUTPATIENT
Start: 2023-05-08 | End: 2023-05-08

## 2023-05-08 RX ORDER — AMOXICILLIN 250 MG
1 CAPSULE ORAL 2 TIMES DAILY PRN
Qty: 20 TABLET | Refills: 0 | Status: SHIPPED | OUTPATIENT
Start: 2023-05-08 | End: 2023-06-30

## 2023-05-08 RX ORDER — LORATADINE 10 MG/1
10 TABLET ORAL DAILY
Status: DISCONTINUED | OUTPATIENT
Start: 2023-05-08 | End: 2023-05-08 | Stop reason: HOSPADM

## 2023-05-08 RX ADMIN — SENNOSIDES AND DOCUSATE SODIUM 1 TABLET: 50; 8.6 TABLET ORAL at 09:11

## 2023-05-08 RX ADMIN — PANTOPRAZOLE SODIUM 40 MG: 40 TABLET, DELAYED RELEASE ORAL at 09:14

## 2023-05-08 RX ADMIN — DEXAMETHASONE 4 MG: 4 TABLET ORAL at 12:16

## 2023-05-08 RX ADMIN — DEXAMETHASONE 4 MG: 4 TABLET ORAL at 06:32

## 2023-05-08 RX ADMIN — DEXAMETHASONE 4 MG: 4 TABLET ORAL at 00:08

## 2023-05-08 RX ADMIN — ACETAMINOPHEN 650 MG: 325 TABLET ORAL at 06:32

## 2023-05-08 RX ADMIN — POLYETHYLENE GLYCOL 3350 17 G: 17 POWDER, FOR SOLUTION ORAL at 09:11

## 2023-05-08 RX ADMIN — LEVETIRACETAM 500 MG: 500 TABLET, FILM COATED ORAL at 09:12

## 2023-05-08 RX ADMIN — LORATADINE 10 MG: 10 TABLET ORAL at 09:11

## 2023-05-08 ASSESSMENT — ACTIVITIES OF DAILY LIVING (ADL)
ADLS_ACUITY_SCORE: 26
ADLS_ACUITY_SCORE: 22
ADLS_ACUITY_SCORE: 26
ADLS_ACUITY_SCORE: 22
ADLS_ACUITY_SCORE: 22
ADLS_ACUITY_SCORE: 26

## 2023-05-08 NOTE — PROGRESS NOTES
[facility]    Hospitalist Progress Note    Date of Service (when I saw the patient): 05/08/2023    Assessment & Plan   Brandon Ordoñez is a 37 year old male with no significant past medical history who initially presented to [facility] on 5/4/23 with 3 days of nausea, vomiting, and somnolence. CT shows large hyperdense right frontal mass with significant edema and midline shift with transtentorial herniation. Patient was intubated for airway protection and transferred to [facility] 5/4 for neurosurgical consultation.     S/p tumor resection by neurosurgery on 5/4.     Right frontal intraparenchymal hemorrhage with underlying brain tumor  Intracranial Hypertension from large frontal mass  Glioblastoma s/p frontal craniotomy and  tumor resection 5/4 by Dr. Dasilva  Patient was taken for emergent craniotomy and right frontal mass resection 5/4.  mL. RACHELLE drain placed. Patient extubated 5/5. Nursing appreciates slight left sided weakness, however patient is neurologically intact during my exam without any focal neuro deficits.   Seen by hemonc.    Cont Keppra  for seizure prophylaxis (change to PO).    Continue decadron per neurosurgery (start 2 week taper on Monday)    Also per neurosurgery, begin prophylactic heparin on Monday 5/8/2023    Neurochecks Q4h.    continuous cardiac monitoring     BG AC and HS     Continue medium sliding scale insulin insulin for anticipated steroid induced hyperglycemia    RACHELLE drain removed 5/6    F/U with hemonc (Dr jake Bullock) as outpatient for discussion re rad/chemo.  Patient discharged today by neurosurgery.    Claritin prescribed for cough.    Diet: Regular Diet Adult    DVT Prophylaxis: Pneumatic Compression Devices. Hep subcutaneous on Monday per neurosurgery.  Hammond Catheter: Not present  Lines: None     Cardiac Monitoring: None  Code Status: Full Code    .  Vicky Vargas M.D.  Hospitalist  [facility]  Hospital  Securely message with the Vocera Web Console (learn more here)  Text page via [employer] Paging/Directory        Interval History       Doing better  Complaining of some cough.  Looking forward to going home today.    Physical Exam   Temp: 98.3  F (36.8  C) Temp src: Oral BP: 122/73 Pulse: 71   Resp: 16 SpO2: 98 % O2 Device: None (Room air)    Vitals:    05/04/23 1800 05/05/23 0020 05/06/23 0300   Weight: 71.8 kg (158 lb 4.6 oz) 70.5 kg (155 lb 6.8 oz) 70.9 kg (156 lb 4.9 oz)     Vital Signs with Ranges  Temp:  [97.5  F (36.4  C)-98.3  F (36.8  C)] 98.3  F (36.8  C)  Pulse:  [62-79] 71  Resp:  [16] 16  BP: (116-124)/(63-79) 122/73  SpO2:  [96 %-98 %] 98 %  No intake/output data recorded.    Constitutional: Awake, alert, cooperative, no apparent distress  Respiratory: Clear to auscultation bilaterally, no crackles or wheezing  Cardiovascular: Regular rate and rhythm, normal S1 and S2, and no murmur noted  GI: Normal bowel sounds, soft, non-distended, non-tender  Skin/Integumen: No rash on exposed skin.  No lower extremity edema  Other: Mood is very pleasant        Medications       dexamethasone  4 mg Oral Q6H OMKAR     insulin aspart  1-7 Units Subcutaneous TID AC     insulin aspart  1-5 Units Subcutaneous At Bedtime     levETIRAcetam  500 mg Oral BID     loratadine  10 mg Oral Daily     pantoprazole  40 mg Oral QAM AC     polyethylene glycol  17 g Oral Daily     senna-docusate  1 tablet Oral BID     sodium chloride (PF)  3 mL Intracatheter Q8H       Data   Recent Labs   Lab 05/08/23  1158 05/08/23  0838 05/08/23  0714 05/07/23  0821 05/07/23  0736 05/06/23  0844 05/06/23  0443 05/05/23  0532 05/05/23  0526 05/04/23  2054 05/04/23  2050 05/04/23  1749 05/04/23  1039   WBC  --  11.7*  --   --  11.2*  --  12.3*  --  13.2*  --   --   --  15.5*   HGB  --  13.7  --   --  13.5  --  12.4*  --  11.8*  --   --   --  14.8   MCV  --  87  --   --  87  --  89  --  88  --   --   --  87   PLT  --  288  --   --  249  --  213  --   228  --   --   --  240   INR  --   --   --   --   --   --   --   --   --   --   --   --  1.02   NA  --  137  --   --  139  --  138  --  139  --  137  --  138   POTASSIUM  --  3.9  --   --  3.9  --  3.8  --  3.9  --  4.4  --  3.9   CHLORIDE  --  100  --   --  100  --  99  --  101  --  100  --  97*   CO2  --  23  --   --  23  --  27  --  25  --  26  --  25   BUN  --  15.7  --   --  15.8  --  12.1  --  15.7  --  12.1  --  14.9   CR  --  0.68  --   --  0.70  --  0.69  --  0.79  --  0.77  --  0.68   ANIONGAP  --  14  --   --  16*  --  12  --  13  --  11  --  16*   ROYER  --  9.6  --   --  9.6  --  9.0  --  9.3  --  9.4  --  10.3*   * 134* 126*   < > 143*   < > 127*   < > 134*   < > 137*   < > 159*   ALBUMIN  --   --   --   --   --   --   --   --  3.9  --  4.2  --   --    PROTTOTAL  --   --   --   --   --   --   --   --  6.9  --   --   --   --    BILITOTAL  --   --   --   --   --   --   --   --  0.6  --   --   --   --    ALKPHOS  --   --   --   --   --   --   --   --  82  --   --   --   --    ALT  --   --   --   --   --   --   --   --  38  --   --   --   --    AST  --   --   --   --   --   --   --   --  22  --   --   --   --    LIPASE  --   --   --   --   --   --   --   --   --   --   --   --  20    < > = values in this interval not displayed.       No results found for this or any previous visit (from the past 24 hour(s)).

## 2023-05-08 NOTE — DISCHARGE SUMMARY
[facility]    Discharge Summary  Neurosurgery    Date of Admission:  5/4/2023  Date of Discharge:  5/8/2023  Discharging Provider: Jana Degroot NP  Date of Service (when I saw the patient): 05/08/23    Discharge Diagnoses   Principal Problem:    S/P craniotomy      History of Present Illness   Brandon Ordoñez is an 37 year old male who presented with nausea/vomiting. Imaging revealed a right frontal mass with significant edema and shift with transtentorial herniation. He was emergently intubated for airway protection and increasing somnolence and was transferred to [facility] for surgical intervention. On 5/4/2023 he underwent a right frontal craniotomy for evacuation if PH and resection of intra-axial brain tumor with Dr. Dasilva.     Hospital Course   Brandon Ordoñez was admitted on 5/4/2023.  The following problems were addressed during his hospitalization:    Principal Problem:    S/P craniotomy    Assessment: as above    Plan: Discharge to home with routine follow ups. Will taper decadron to off over 2 weeks as well as continue keppra for now. Outpatient follow up with oncology.     I have discussed the following assessment and plan with Dr. Dasilva who is in agreement with initial plan and will follow up with further consultation recommendations.    Jana Degroot CNP  [facility]  [place] [address]    Tel [phone]  Pager [phone]        Significant Results and Procedures   n/a    Pending Results   These results will be followed up by Dr. Dasilva  Unresulted Labs Ordered in the Past 30 Days of this Admission     Date and Time Order Name Status Description    5/4/2023  3:55 PM Tissue Aerobic Bacterial Culture Routine Preliminary     5/4/2023  3:55 PM Anaerobic Bacterial Culture Routine Preliminary     5/4/2023  3:27 PM Surgical Pathology Exam Preliminary     5/4/2023 11:45 AM  Blood Culture Arm, Left Preliminary     5/4/2023 11:45 AM Blood Culture Arm, Right Preliminary           Code Status   Full Code    Primary Care Physician   [facility]    Physical Exam   Temp: 97.6  F (36.4  C) Temp src: Oral BP: 124/63 Pulse: 62   Resp: 16 SpO2: 98 % O2 Device: None (Room air)    Vitals:    05/04/23 1800 05/05/23 0020 05/06/23 0300   Weight: 71.8 kg (158 lb 4.6 oz) 70.5 kg (155 lb 6.8 oz) 70.9 kg (156 lb 4.9 oz)     Vital Signs with Ranges  Temp:  [97.4  F (36.3  C)-98  F (36.7  C)] 97.6  F (36.4  C)  Pulse:  [62-79] 62  Resp:  [16] 16  BP: (116-130)/(63-82) 124/63  SpO2:  [96 %-98 %] 98 %  No intake/output data recorded.    Constitutional: Awake, alert, cooperative, no apparent distress, and appears stated age.  Eyes: Lids and lashes normal, pupils equal, round and reactive to light, extra ocular muscles intact  ENT: Normocephalic, without obvious abnormality, atraumatic  Respiratory: No increased work of breathing  Skin: No bruising or bleeding, normal skin color, texture, turgor, no redness, warmth, or swelling.  Incision with sutures intact, minimal drainage, open to air.  Musculoskeletal: There is no redness, warmth, or swelling of the joints.  Full range of motion noted.  Motor strength is 5 out of 5 all extremities bilaterally.  Tone is normal.  Neurologic: Awake, alert, oriented to name, place and time.  Cranial nerves II-XII are grossly intact.  Motor is 5 out of 5 bilaterally.     Neuropsychiatric: Calm, normal eye contact, alert, normal affect, oriented to self, place, time and situation, memory for past and recent events intact and thought process normal.       Time Spent on this Encounter   I, Jana Degroot NP, personally saw the patient today and spent less than or equal to 30 minutes discharging this patient.    Discharge Disposition   Discharged to home  Condition at discharge: Stable    Consultations This Hospital Stay   INTENSIVIST IP  CONSULT  PHYSICAL THERAPY ADULT IP CONSULT  OCCUPATIONAL THERAPY ADULT IP CONSULT  HEMATOLOGY & ONCOLOGY IP CONSULT    Discharge Orders      Adult Oncology/Hematology  Referral      Reason for your hospital stay    Craniotomy     Follow-up and recommended labs and tests     Please follow up at [facility] in 2 weeks for an incision check and in 6 weeks.  Please call the clinic at [phone] to schedule your appointment.     Activity    Discharge instructions:  No lifting of more than 10 pounds until follow up visit.  Ok to shampoo hair, shower or bathe, but, do not scrub or submerge incision under water until evaluated post operatively in clinic.    Ok to walk as tolerated, avoid bedrest.    No contact sports until after follow up visit  No high impact activities such as; running/jogging, snowmobile or 4 benoit riding or any other recreational vehicles    Call the office at [phone] for increasing redness, swelling or pus draining from the incision, increased pain or any other questions and concerns.    Go to ER with any seizure activity, mental status change (increasing confusion), difficulty with speech or increasing or acute weakness     Wound care and dressings    Keep your incision clean and dry at all times.  OK to remove dressing on postop day 2.  OK to shower on postop day 3 and allow water to run over incision, pat dry after shower.  No bathing swimming or submerging in water.  Call immediately or come to ED if any drainage occurs, or you develop new pain, redness, or swelling.     Diet    Follow this diet upon discharge: home diet     Discharge Medications   Current Discharge Medication List      START taking these medications    Details   acetaminophen (TYLENOL) 325 MG tablet Take 2 tablets (650 mg) by mouth every 4 hours as needed for other (For optimal non-opioid multimodal pain management to improve pain control.)    Associated Diagnoses: S/P craniotomy       dexamethasone (DECADRON) 2 MG tablet Take 2 tablets (4 mg) by mouth every 8 hours for 3 days, THEN 2 tablets (4 mg) 2 times daily for 3 days, THEN 2 tablets (4 mg) daily (with breakfast) for 3 days, THEN 1 tablet (2 mg) daily (with breakfast) for 3 days, THEN 0.5 tablets (1 mg) daily (with breakfast) for 2 days.  Qty: 40 tablet, Refills: 0    Associated Diagnoses: S/P craniotomy      levETIRAcetam (KEPPRA) 500 MG tablet Take 1 tablet (500 mg) by mouth 2 times daily  Qty: 60 tablet, Refills: 1    Associated Diagnoses: S/P craniotomy      oxyCODONE (ROXICODONE) 5 MG tablet Take 1 tablet (5 mg) by mouth every 6 hours as needed for moderate pain  Qty: 20 tablet, Refills: 0    Associated Diagnoses: S/P craniotomy      senna-docusate (SENOKOT-S/PERICOLACE) 8.6-50 MG tablet Take 1 tablet by mouth 2 times daily as needed for constipation  Qty: 20 tablet, Refills: 0    Associated Diagnoses: S/P craniotomy         CONTINUE these medications which have NOT CHANGED    Details   albuterol (PROAIR HFA/PROVENTIL HFA/VENTOLIN HFA) 108 (90 Base) MCG/ACT inhaler Inhale 2 puffs into the lungs every 6 hours as needed for shortness of breath, wheezing or cough  Qty: 18 g, Refills: 0    Comments: Pharmacy may dispense brand covered by insurance (Proair, or proventil or ventolin or generic albuterol inhaler)  Associated Diagnoses: Acute bronchitis with symptoms > 10 days      benzonatate (TESSALON) 200 MG capsule Take 1 capsule (200 mg) by mouth 3 times daily as needed  Qty: 30 capsule, Refills: 0    Associated Diagnoses: Acute bronchitis with symptoms > 10 days      Cyanocobalamin (B-12) 1000 MCG TBCR Take 1,000 mcg by mouth daily  Qty: 100 tablet, Refills: 1    Associated Diagnoses: B12 deficiency           Allergies   No Active Allergies

## 2023-05-08 NOTE — PROGRESS NOTES
Patient left unit floor via wheelchair with staff assist x1. Friend to drive home. All discharge information gone over by previous RN and questions answered.

## 2023-05-08 NOTE — PLAN OF CARE
Pt here with POD 4 for crani resection for tumor removal. A&Ox4. Neuros intact. VSS. Tele NSR. Regular diet, thin liquids. Takes pills whole. Up independently. Denies pain. Incision AMANDA and approximated. Pt scoring green on the Aggression Stop Light Tool. Plan continue to monitor. Discharge pending, likely to home.

## 2023-05-08 NOTE — PLAN OF CARE
Goal Outcome Evaluation:     Pt here with POD 4 crani s/p tumor removal. A&O x4. Neuros intact. VSS. Tele NSR. reg diet, thin liquids. Takes pills whole with water. Up independently. Denies pain. Pt scoring green on the Aggression Stop Light Tool. Plan  to discharge home today.

## 2023-05-08 NOTE — PROGRESS NOTES
Per discussion at [facility] today pt will be discharging home. Will arrange follow-up next week with Dr. Bullock

## 2023-05-09 ENCOUNTER — OFFICE VISIT (OUTPATIENT)
Dept: FAMILY MEDICINE | Facility: CLINIC | Age: 38
End: 2023-05-09
Payer: COMMERCIAL

## 2023-05-09 VITALS
TEMPERATURE: 96.4 F | OXYGEN SATURATION: 97 % | WEIGHT: 158 LBS | HEART RATE: 80 BPM | DIASTOLIC BLOOD PRESSURE: 80 MMHG | RESPIRATION RATE: 15 BRPM | BODY MASS INDEX: 25.39 KG/M2 | SYSTOLIC BLOOD PRESSURE: 136 MMHG | HEIGHT: 66 IN

## 2023-05-09 DIAGNOSIS — R51.9 NONINTRACTABLE HEADACHE, UNSPECIFIED CHRONICITY PATTERN, UNSPECIFIED HEADACHE TYPE: ICD-10-CM

## 2023-05-09 DIAGNOSIS — G93.89 BRAIN MASS: ICD-10-CM

## 2023-05-09 DIAGNOSIS — Z98.890 S/P CRANIOTOMY: Primary | ICD-10-CM

## 2023-05-09 LAB
ATRIAL RATE - MUSE: 89 BPM
BACTERIA BLD CULT: NO GROWTH
BACTERIA BLD CULT: NO GROWTH
BACTERIA TISS BX CULT: NO GROWTH
DIASTOLIC BLOOD PRESSURE - MUSE: NORMAL MMHG
INTERPRETATION ECG - MUSE: NORMAL
LAB DIRECTOR COMMENTS: NORMAL
LAB DIRECTOR DISCLAIMER: NORMAL
LAB DIRECTOR INTERPRETATION: NORMAL
LAB DIRECTOR METHODOLOGY: NORMAL
LAB DIRECTOR RESULTS: NORMAL
P AXIS - MUSE: 66 DEGREES
PR INTERVAL - MUSE: 148 MS
QRS DURATION - MUSE: 98 MS
QT - MUSE: 370 MS
QTC - MUSE: 450 MS
R AXIS - MUSE: 17 DEGREES
SPECIMEN DESCRIPTION: NORMAL
SYSTOLIC BLOOD PRESSURE - MUSE: NORMAL MMHG
T AXIS - MUSE: 42 DEGREES
VENTRICULAR RATE- MUSE: 89 BPM

## 2023-05-09 PROCEDURE — G0452 MOLECULAR PATHOLOGY INTERPR: HCPCS | Mod: 26 | Performed by: PATHOLOGY

## 2023-05-09 PROCEDURE — 99213 OFFICE O/P EST LOW 20 MIN: CPT | Performed by: FAMILY MEDICINE

## 2023-05-09 PROCEDURE — 81445 SO NEO GSAP 5-50DNA/DNA&RNA: CPT | Performed by: NEUROLOGICAL SURGERY

## 2023-05-09 RX ORDER — ALBUTEROL SULFATE 90 UG/1
2 AEROSOL, METERED RESPIRATORY (INHALATION) EVERY 6 HOURS PRN
Qty: 18 G | Refills: 0 | Status: CANCELLED | OUTPATIENT
Start: 2023-05-09

## 2023-05-09 NOTE — PROGRESS NOTES
"  Assessment & Plan     S/P craniotomy    Nonintractable headache, unspecified chronicity pattern, unspecified headache type    Brain mass    Brandon is doing well s/p procedure. He has follow up scheduled with oncology as well as neurosurgery.            MED REC REQUIRED  Post Medication Reconciliation Status:  Discharge medications reconciled, continue medications without change    BMI:   Estimated body mass index is 25.5 kg/m  as calculated from the following:    Height as of this encounter: 1.676 m (5' 6\").    Weight as of this encounter: 71.7 kg (158 lb).           Ryan Rodriguez DO  [facility]   Brandon is a 37 year old, presenting for the following health issues:  Hospital F/U, Refill Request (Cough syrup?), and Forms (Short Term Disability )        5/9/2023     1:11 PM   Additional Questions   Roomed by Esteban BEDOYA CMA   Accompanied by Greta (cousin)     Forms 5/9/2023   Any forms needing to be completed Yes         5/9/2023     1:11 PM   Patient Reported Additional Medications   Patient reports taking the following new medications 6 new medication added  in to medication list     HPI       Hospital Follow-up Visit:    Hospital/Nursing Home/IP Rehab Facility: [facility]  Date of Admission: 05/04/2023  Date of Discharge: 05/08/2023  Reason(s) for Admission: S/P craniotomy    Was your hospitalization related to COVID-19? No   Problems taking medications regularly:  None  Medication changes since discharge: None  Problems adhering to non-medication therapy:  None    Summary of hospitalization:  [facility] discharge summary reviewed  Diagnostic Tests/Treatments reviewed.  Follow up needed: none  Other Healthcare Providers Involved in Patient s Care:         has follow up scheduled with oncology, Dr. Bullock for 5/16/2023. Needs to schedule follow up with neurosurgery  Update since discharge: stable. Plan of care communicated " "with patient     Today, feeling okay but having frontal headaches and low back pain. Sutures across frontal region of scalp. He does note some photophobia and phonophobia. He also notices feeling some unsteadiness. No numbness or tingling in fingers or toes. He has had some congestion/cough. He has some soreness in throat since discharge. No urinary symptoms. bowel movements regular.          Review of Systems   Constitutional, HEENT, cardiovascular, pulmonary, gi and gu systems are negative, except as otherwise noted.      Objective    /80 (BP Location: Left arm, Patient Position: Sitting, Cuff Size: Adult Regular)   Pulse 80   Temp (!) 96.4  F (35.8  C) (Tympanic)   Resp 15   Ht 1.676 m (5' 6\")   Wt 71.7 kg (158 lb)   SpO2 97%   BMI 25.50 kg/m    Body mass index is 25.5 kg/m .  Physical Exam   GENERAL: healthy, alert and no distress  RESP: lungs clear to auscultation - no rales, rhonchi or wheezes  CV: regular rates and rhythm, normal S1 S2, no S3 or S4 and no murmur, click or rub  SKIN: head incision healing without any surrounding redness, warmth, swelling.              "

## 2023-05-09 NOTE — PROGRESS NOTES
OUTGOING CALL to pt with Jessica escoto, no answer. Will try again later. [employer] message to patient asking to call back as he is active on [employer].

## 2023-05-09 NOTE — PATIENT INSTRUCTIONS
Please follow up at [facility] in 2 weeks for an incision check and in 6 weeks.  Please call the clinic at [phone] to schedule your appointment

## 2023-05-10 ENCOUNTER — TELEPHONE (OUTPATIENT)
Dept: NEUROSURGERY | Facility: CLINIC | Age: 38
End: 2023-05-10

## 2023-05-10 ENCOUNTER — LAB (OUTPATIENT)
Dept: LAB | Facility: CLINIC | Age: 38
End: 2023-05-10
Payer: COMMERCIAL

## 2023-05-10 DIAGNOSIS — I62.9 INTRACRANIAL HEMORRHAGE (H): Primary | ICD-10-CM

## 2023-05-10 DIAGNOSIS — Z98.890 S/P CRANIOTOMY: Primary | ICD-10-CM

## 2023-05-10 PROCEDURE — 81456 SO/HL 51/>GSAP RNA ALYS: CPT | Performed by: NEUROLOGICAL SURGERY

## 2023-05-10 NOTE — TELEPHONE ENCOUNTER
Informed patient that Dr. Dasilva has requested a CT image prior to appointment on June 13.  Patient is aware of the changes, and will also review when in attendance for the 2 week post surgical followup on May 23.

## 2023-05-10 NOTE — PROGRESS NOTES
Patient returned call. Confirmed upcoming appointment with Dr. Bullock 5/16 11AM at [facility]. Provided address and directions. Pt had questions on Neurosurgery follow-up. Believes he needs follow-up 2 weeks post-op with Dr. Dasilva's team. Call warm transferred to Neurosurgery clinic. They will help arrange appropriate follow-up. Pt has my direct number if any further questions arise prior to his appointment with Dr. Bullock Tuesday.

## 2023-05-11 ENCOUNTER — TELEPHONE (OUTPATIENT)
Dept: FAMILY MEDICINE | Facility: CLINIC | Age: 38
End: 2023-05-11
Payer: COMMERCIAL

## 2023-05-12 LAB — INTERPRETATION: NORMAL

## 2023-05-12 NOTE — TELEPHONE ENCOUNTER
RECORDS STATUS - ALL OTHER DIAGNOSIS      RECORDS RECEIVED FROM: Epic   DATE RECEIVED:    NOTES STATUS DETAILS   OFFICE NOTE from referring provider Epic Dr. Sara Scherer   DISCHARGE SUMMARY from hospital [facility] 05/04/23: [facility]   DISCHARGE REPORT from the ER [facility] 05/04/23: [facility]   OPERATIVE REPORT [facility] 05/04/23: CRANIOTOMY, USING OPTICAL TRACKING SYSTEM, WITH NEOPLASM EXCISION RIGHT FRONTAL CRANIOTOMY WITH RESECTION OF RIGHT FRONTAL LESION USING STEALTH   MEDICATION LIST [facility]    LABS     PATHOLOGY REPORTS Report in EPIC 05/04/23: [id number]   ANYTHING RELATED TO DIAGNOSIS Epic Most recent 05/08/23   IMAGING (NEED IMAGES & REPORT)     CT SCANS PACS 05/04/23: CT Head  05/04/23: CTA Head   MRI PACS 05/05/23: MR Brain

## 2023-05-15 ENCOUNTER — TUMOR CONFERENCE (OUTPATIENT)
Dept: ONCOLOGY | Facility: CLINIC | Age: 38
End: 2023-05-15
Payer: COMMERCIAL

## 2023-05-16 ENCOUNTER — PRE VISIT (OUTPATIENT)
Dept: ONCOLOGY | Facility: CLINIC | Age: 38
End: 2023-05-16
Payer: COMMERCIAL

## 2023-05-16 ENCOUNTER — TELEPHONE (OUTPATIENT)
Dept: PHARMACY | Facility: CLINIC | Age: 38
End: 2023-05-16
Payer: COMMERCIAL

## 2023-05-16 ENCOUNTER — LAB REQUISITION (OUTPATIENT)
Dept: LAB | Facility: CLINIC | Age: 38
End: 2023-05-16
Payer: COMMERCIAL

## 2023-05-16 ENCOUNTER — PATIENT OUTREACH (OUTPATIENT)
Dept: ONCOLOGY | Facility: CLINIC | Age: 38
End: 2023-05-16
Payer: COMMERCIAL

## 2023-05-16 ENCOUNTER — ONCOLOGY VISIT (OUTPATIENT)
Dept: ONCOLOGY | Facility: CLINIC | Age: 38
End: 2023-05-16
Attending: INTERNAL MEDICINE
Payer: COMMERCIAL

## 2023-05-16 VITALS
DIASTOLIC BLOOD PRESSURE: 76 MMHG | HEIGHT: 66 IN | WEIGHT: 162.6 LBS | TEMPERATURE: 97.6 F | SYSTOLIC BLOOD PRESSURE: 130 MMHG | BODY MASS INDEX: 26.13 KG/M2 | RESPIRATION RATE: 18 BRPM | HEART RATE: 79 BPM | OXYGEN SATURATION: 98 %

## 2023-05-16 DIAGNOSIS — D61.810 ANTINEOPLASTIC CHEMOTHERAPY INDUCED PANCYTOPENIA (H): ICD-10-CM

## 2023-05-16 DIAGNOSIS — R11.2 CHEMOTHERAPY-INDUCED NAUSEA AND VOMITING: ICD-10-CM

## 2023-05-16 DIAGNOSIS — Z91.89 HIGH RISK FOR CHEMOTHERAPY-INDUCED INFECTIOUS COMPLICATION: ICD-10-CM

## 2023-05-16 DIAGNOSIS — T45.1X5A CHEMOTHERAPY-INDUCED NAUSEA AND VOMITING: ICD-10-CM

## 2023-05-16 DIAGNOSIS — Z11.59 SCREENING FOR VIRAL DISEASE: ICD-10-CM

## 2023-05-16 DIAGNOSIS — C71.9 OLIGODENDROGLIOMA (H): Primary | ICD-10-CM

## 2023-05-16 DIAGNOSIS — T45.1X5A ANTINEOPLASTIC CHEMOTHERAPY INDUCED PANCYTOPENIA (H): ICD-10-CM

## 2023-05-16 LAB
Lab: NORMAL
PERFORMING LABORATORY: NORMAL
SPECIMEN STATUS: NORMAL
TEST NAME: NORMAL

## 2023-05-16 PROCEDURE — 99213 OFFICE O/P EST LOW 20 MIN: CPT | Performed by: PSYCHIATRY & NEUROLOGY

## 2023-05-16 PROCEDURE — 84999 UNLISTED CHEMISTRY PROCEDURE: CPT | Performed by: PATHOLOGY

## 2023-05-16 PROCEDURE — 99205 OFFICE O/P NEW HI 60 MIN: CPT | Mod: 24 | Performed by: PSYCHIATRY & NEUROLOGY

## 2023-05-16 PROCEDURE — 81277 CYTOGENOMIC NEO MICRORA ALYS: CPT | Performed by: PATHOLOGY

## 2023-05-16 PROCEDURE — 99417 PROLNG OP E/M EACH 15 MIN: CPT | Mod: 24 | Performed by: PSYCHIATRY & NEUROLOGY

## 2023-05-16 ASSESSMENT — PAIN SCALES - GENERAL: PAINLEVEL: MILD PAIN (2)

## 2023-05-16 NOTE — PROGRESS NOTES
NEURO-ONCOLOGY INITIAL VISIT  May 16, 2023    CHIEF COMPLAINT: Mr. 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 He is presenting to this initial clinic visit as referred by Dr. Dasilva for evaluation and recommendations on treatment.     HISTORY OF PRESENT ILLNESS  A summary of the patient s oncologic history is as follows;   -PRESENTATION: Progressive somnolence, nausea and vomiting, mild headache.  -5/4/2023 CT Head imaging with a large mass with associated calcifications and probable associated hemorrhage in the anterior right frontal region with surrounding vasogenic edema and significant local mass effect resulting in narrowing of the right more than left lateral ventricles and third ventricle, and leftward subfalcine shift as well as downward/ central transtentorial herniation.   -5/4/2023 SURGERY: Right frontal craniotomy for mass resection by Dr. Dasilva.   Post-operative imaging on 5/5 without a definite residual enhancing lesion. Nonspecific surrounding T2/FLAIR hyperintensity along the posterior and superior margins of the resection cavity may reflect vasogenic edema or nonenhancing tumor. Small volume hemorrhage and cytotoxic edema along the margins of the resection cavity and extending along the right caudate nucleus and body of the corpus callosum.  PATHOLOGY: WHO grade 2 or 3 oligodendroglioma; CDKN2A/B status by next generation sequencing remains pending.   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    Today in clinic;   -Brandon is doing well today and has recovered well from surgery. No headaches or pain. However, feeling more sensitive to loud sounds. Noting dryness about the incision.    -No nausea and good appetite.   -Denies any weakness, changes in sensation, abnormalities with vision, or impairment in cognitive ability.  -Denies any episodes concerning for a seizure, including unexplained episodes of loss of consciousness, unexplained falls, unexplained loss of bowel/ bladder control or tongue biting, unexplained bruising/ myalgia, periods of loss of time, or witnessed shaking/ jerking movements. Remains on post-operative Keppra.   -Tapering off dexamethasone; tolerating dose-reduction well.  -Energy is good.   -Fertility preservation was discussed and Brandon is not interested.       MEDICATIONS   Current Outpatient Medications   Medication Sig Dispense Refill     dexamethasone (DECADRON) 2 MG tablet Take 2 tablets (4 mg) by mouth every 8 hours for 3 days, THEN 2 tablets (4 mg) 2 times daily for 3 days, THEN 2 tablets (4 mg) daily (with breakfast) for 3 days, THEN 1 tablet (2 mg) daily (with breakfast) for 3 days, THEN 0.5 tablets (1 mg) daily (with breakfast) for 2 days. 40 tablet 0     levETIRAcetam (KEPPRA) 500 MG tablet Take 1 tablet (500 mg) by mouth 2 times daily 60 tablet 1     loratadine (CLARITIN) 10 MG tablet Take 1 tablet (10 mg) by mouth daily 10 tablet 0     oxyCODONE (ROXICODONE) 5 MG tablet Take 1 tablet (5 mg) by mouth every 6 hours as needed for moderate pain 20 tablet 0     senna-docusate (SENOKOT-S/PERICOLACE) 8.6-50 MG tablet Take 1 tablet by mouth 2 times daily as needed for constipation (Patient not taking: Reported on 5/16/2023) 20 tablet 0     DRUG ALLERGIES No Known Allergies       IMMUNIZATIONS   Immunization History   Administered Date(s) Administered     COVID-19 MONOVALENT 12+ (Pfizer) 03/29/2021, 04/19/2021     TDAP Vaccine (Adacel) 07/30/2018     SOCIAL HISTORY;   , 2 children.         PHYSICAL EXAMINATION  /76 (BP Location: Right arm, Patient Position: Sitting, Cuff Size: Adult Large)   Pulse 79   Temp 97.6  F (36.4  C) (Oral)   Resp 18   Ht  "1.664 m (5' 5.5\")   Wt 73.8 kg (162 lb 9.6 oz)   SpO2 98%   BMI 26.65 kg/m     Wt Readings from Last 2 Encounters:   05/16/23 73.8 kg (162 lb 9.6 oz)   05/09/23 71.7 kg (158 lb)      Ht Readings from Last 2 Encounters:   05/16/23 1.664 m (5' 5.5\")   05/09/23 1.676 m (5' 6\")     KPS:     -Generally well appearing.  -Respiratory: Normal breath sounds, no audible wheezing.   -Skin: No rashes. Healing head incision.  -Hematologic/ lymphatic: No abnormal bruising. No leg swelling.  -Psychiatric: Normal mood and affect. Pleasant, talkative.  -Neurologic:   MENTAL STATUS:     Alert, oriented to date.    Recall: Intact.    Speech fluent.    Comprehension intact to multi-step commands.   Good right-left orientation.     CRANIAL NERVES:     Pupils are equal, round.     Extraocular movements full, denies diplopia.     Visual fields full.     Facial sensation intact to light touch.   Symmetric facial movements.   Hearing intact.   No dysarthria.  MOTOR:    No pronation or drift.   Able to rise from a chair without use of arms.   On toe/ heel walk, equal distance from floor to heels/ toes.   SENSATION: Intact to light touch throughout.  COORDINATION: Intact finger-nose with eyes open and closed.   GAIT:  Walks without assistance.   Good speed. Normal stride length and heel strike. Normal turns. Normal arm swing.   Able to toe, heel walk. Able to tandem walk.       MEDICAL RECORDS  Personally reviewed hospitalization records from [facility].    LABS  Personally reviewed all available lab results; CBC, CMP, pathology; FISH.      IMAGING  Personally reviewed pre- (below, left) CT head imaging and post-operative (below, right) MR brain imaging.     Postoperative changes of the right frontal lobe without a definite residual enhancing lesion.         Nonspecific surrounding T2/FLAIR hyperintensity along the posterior and superior margins of the resection cavity may reflect vasogenic edema or nonenhancing tumor.    Small volume " hemorrhage and cytotoxic edema along the margins of the resection cavity and extending along the right caudate nucleus and body of the corpus callosum.    Imaging was shown to and results were reviewed with Brandon.       IMPRESSION  Preparatory, clinic, and post-visit time of 90 minutes was spent for this high complexity encounter discussing in detail the nature of his cancer, answering questions pertaining to my recommendations, and devising the treatment plan as outlined below. It was explained to Albinohaider that he has been diagnosed with an oligodendroglioma, which is a type of primary brain cancer for which there is currently no cure. Therefore, cancer-directed treatment strives to slow further growth and increase the time interval to recurrence. The specific grade of the cancer remains pending. I personally reviewed this case with neuro-pathology. Histologically, there were borderline features that are of concern, but do not meet criteria for that of a grade 3 cancer. CDKN2A/B status by next generation sequencing remains pending and will determine the grade.     Prior to Brandon's appointment today, I had been in communication with Dr. Dasilva in neurosurgery regarding his case. I have since reviewed his chart, imaging, and pathology results. Moreover, I personally reviewed his imaging and case at [facility].     With regard to cancer-directed therapy, a multimodal treatment approach first involves attempting a maximum safe surgical resection. In this case, a gross total resection was performed. Following surgery, regardless of the grade, but given the size of the lesion at diagnosis and the borderline histological features identified, my recommendation is to initiate cancer-directed therapy with combination radiation therapy and chemotherapy. In terms of chemotherapy, we discussed today how there are two options; temozolomide or PCV (Procarbazine, CCNU, and Vincristine). Unfortunately, there  is no definitive evidence or overall expert consensus to support one regimen over the other. Since temozolomide is generally well tolerated, has more manageable associated side effects, has proven efficacy in high grade gliomas, and has a less complicated, more convenient dosing regimen, I prescribe temozolomide over PCV. The risks/ benefits of temozolomide were reviewed and the following common, anticipated side effects of this treatment were discussed today including, but not limited to, fatigue, nausea, and constipation. Any AST/ ALT elevations are typically reversible. Concomitant radiation can result in increased cerebral edema and therefore, symptoms of malaise and fatigue generally worsen, but do eventually improve. As a result, dexamethasone dosage may need to be increased. The combination therapy can result in bone marrow suppression; leukopenia and thrombocytopenia.    Brandon is in agreement with this plan and since he lives in [place], [place], I will place a referral to radiation oncology at [facility].     Finally, will taper off Keppra as detailed below.     PROBLEM LIST  Oligodendroglioma  Headaches    PLAN  -CANCER-DIRECTED THERAPY-  Will initiate chemoradiotherapy with temozolomide 75mg/m2 (140mg).   -Additional temozolomide teaching performed by RN/ pharmacy staff.     -Initial labs ordered; CBC with differential, CMP, Hepatitis B serologies (If hepatitis B serologies reveal an active/ prior infection, will start prophylaxis with entecavir 0.5mg daily.)  -Will continue weekly CBC and repeat CMP monthly.    -Next generation sequencing pending.    -Supportive medications;        -Anti-nausea: Zofran 4mg (1 to 2 tabs qHS 30 minutes prior to chemotherapy and then PRN nausea).       -For lymphopenia, prophylactic antibiotics are recommended during concurrent treatment: Sulfamethoxazole-trimethoprim (Bactrim) 800 mg-160 mg; 1 tab orally Monday, Wednesday, and Friday for pneumocystis prophylaxis.        -Bowel regimen: Docusate 100 mg; 1 cap orally 3 times a day (stool softener for constipation) and Senna 8.6 m tabs orally at bedtime (laxative as needed for constipation).    -STEROIDS-  -Continue to wean dexamethasone as tolerated.     -SEIZURE MANAGEMENT-  -While this patient is at increased risk of having seizures, given the lack of seizure history, there is no indication to prescribe an antiepileptic at this time.   -No indication for continuing Keppra beyond 2 weeks post-surgery; can stop Keppra on .    -Quality of life/ MOOD/ FATIGUE-  -Denies any mood issues.  -Continue to monitor mood as untreated/ undertreated depression can worsen fatigue, dysorexia, and quality of life.    Return to clinic with TONJA at midpoint of chemoradiotherapy in about 5 weeks.    In the meantime, Brandon knows to call the clinic with any concerns and he can be seen sooner if needed.     Abril Bullock MD  Neuro-oncology

## 2023-05-16 NOTE — PROGRESS NOTES
[facility]: Cancer Care Initial Note                                    Discussion with Patient:                                                      Intro to rncc role      Education concerns: standard of care, cancer care service line        Assessment:                                                      Initial  Current living arrangement:: I live in a private home with family  Informal Support system:: Family;Friends  Bed or wheelchair confined:: No  Mobility Status: Independent  Transportation means:: Regular car  Medication adherence problem (GOAL):: No  Knowledgeable about how to use meds:: Yes  Medication side effects suspected:: No  Advanced Care Plans/Directives on file:: No  Patient Reported Pain?: No    Plan of Care Education Review:   Assessment completed with:: Patient    Current living arrangement  I live in a private home with family    Plan of Care Education   Yearly learning assessment completed?: Yes (see Education tab)  Diagnosis:: Oligo - grade pending  Does patient understand diagnosis?: Yes  Tx plan/regimen:: chemo RT x 6 weeks  Does patient understand treatment plan/regimen?: Yes  Preparing for treatment:: Reviewed treatment preparation information with patient (vascular access, day of chemo, visitor policy, what to bring, etc.)  Vascular access:: Peripheral IV  Side effect education:: Fatigue;Hair loss;Diarrhea/Constipation;Lab value monitoring (anemia, neutropenia, thrombocytopenia);Nausea/Vomiting  Supportive services:: Social work  Transportation means:: Regular car  Safety/self care at home reviewed with patient:: Yes  Informal Support system:: Family;Friends  Coping - concerns/fears reviewed with patient:: Yes  Plan of Care:: MD follow-up appointment;Treatment schedule  When to call provider:: Bleeding;Uncontrolled nausea/vomiting;Increased shortness of breath;New/worsening pain;Shaking chills;Temperature >100.4F;Uncontrolled diarrhea/constipation  Reasons for deferring  treatment reviewed with patient:: Yes    Evaluation of Learning  Patient Education Provided: Yes  Readiness:: Acceptance  Method:: Literature;Explanation  Response:: Demonstrates understanding           Intervention/Education provided during outreach:                                                       PHI form signed  Patient verbalizes understanding of treatment  SW referral   LASHELL Dennis.  Pharmacy ed done     Follow up call in 1-2 weeks  Patient to follow up as scheduled at next appt  Patient to call/soneshart message with updates  Route to provider to further advise  Confirmed patient has clinic and triage numbers    Signature:  Ariadne Joy RN

## 2023-05-16 NOTE — LETTER
"    5/16/2023         RE: Brandon Ordoñez  [address] [facility] [address]        Dear Colleague,    Thank you for referring your patient, Brandon Ordoñez, to the [facility]. Please see a copy of my visit note below.    Oncology Rooming Note    May 16, 2023 11:07 AM   Brandon Ordoñez is a 37 year old male who presents for:    Chief Complaint   Patient presents with     Oncology Clinic Visit     Initial Vitals: /76 (BP Location: Right arm, Patient Position: Sitting, Cuff Size: Adult Large)   Pulse 79   Temp 97.6  F (36.4  C) (Oral)   Resp 18   Ht 1.664 m (5' 5.5\")   Wt 73.8 kg (162 lb 9.6 oz)   SpO2 98%   BMI 26.65 kg/m   Estimated body mass index is 26.65 kg/m  as calculated from the following:    Height as of this encounter: 1.664 m (5' 5.5\").    Weight as of this encounter: 73.8 kg (162 lb 9.6 oz). Body surface area is 1.85 meters squared.  Mild Pain (2) Comment: Data Unavailable   No LMP for male patient.  Allergies reviewed: Yes  Medications reviewed: Yes    Medications: Medication refills not needed today.  Pharmacy [facility] entered into [employer]: [id number] [place] [facility], [place] - [address] [facility] [address]    Clinical concerns: no       Carolina Brown, BRYANT                NEURO-ONCOLOGY INITIAL VISIT  May 16, 2023    CHIEF COMPLAINT: Mr. 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 He is presenting to this initial clinic visit as referred by Dr. Dasilva for evaluation and recommendations on treatment.     HISTORY OF PRESENT ILLNESS  A summary of the patient s oncologic history is as follows;   -PRESENTATION: Progressive somnolence, nausea and vomiting, mild headache.  -5/4/2023 CT Head imaging with a large mass with associated calcifications and probable associated hemorrhage in " the anterior right frontal region with surrounding vasogenic edema and significant local mass effect resulting in narrowing of the right more than left lateral ventricles and third ventricle, and leftward subfalcine shift as well as downward/ central transtentorial herniation.   -5/4/2023 SURGERY: Right frontal craniotomy for mass resection by Dr. Dasilva.   Post-operative imaging on 5/5 without a definite residual enhancing lesion. Nonspecific surrounding T2/FLAIR hyperintensity along the posterior and superior margins of the resection cavity may reflect vasogenic edema or nonenhancing tumor. Small volume hemorrhage and cytotoxic edema along the margins of the resection cavity and extending along the right caudate nucleus and body of the corpus callosum.  PATHOLOGY: WHO grade 2 or 3 oligodendroglioma; CDKN2A/B status by next generation sequencing remains pending.   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    Today in clinic;   -Brandon is doing well today and has recovered well from surgery. No headaches or pain. However, feeling more sensitive to loud sounds. Noting dryness about the incision.   -No nausea and good appetite.   -Denies any weakness, changes in sensation, abnormalities with vision, or impairment in cognitive ability.  -Denies any episodes concerning for a seizure, including unexplained episodes of loss of consciousness, unexplained falls, unexplained loss of bowel/ bladder control or tongue biting, unexplained bruising/ myalgia, periods of loss of time, or witnessed shaking/ jerking movements. Remains on post-operative Keppra.   -Tapering off dexamethasone; tolerating dose-reduction well.  -Energy is good.   -Fertility preservation was discussed and Brandon is not interested.       MEDICATIONS   Current Outpatient Medications   Medication Sig Dispense Refill      "dexamethasone (DECADRON) 2 MG tablet Take 2 tablets (4 mg) by mouth every 8 hours for 3 days, THEN 2 tablets (4 mg) 2 times daily for 3 days, THEN 2 tablets (4 mg) daily (with breakfast) for 3 days, THEN 1 tablet (2 mg) daily (with breakfast) for 3 days, THEN 0.5 tablets (1 mg) daily (with breakfast) for 2 days. 40 tablet 0     levETIRAcetam (KEPPRA) 500 MG tablet Take 1 tablet (500 mg) by mouth 2 times daily 60 tablet 1     loratadine (CLARITIN) 10 MG tablet Take 1 tablet (10 mg) by mouth daily 10 tablet 0     oxyCODONE (ROXICODONE) 5 MG tablet Take 1 tablet (5 mg) by mouth every 6 hours as needed for moderate pain 20 tablet 0     senna-docusate (SENOKOT-S/PERICOLACE) 8.6-50 MG tablet Take 1 tablet by mouth 2 times daily as needed for constipation (Patient not taking: Reported on 5/16/2023) 20 tablet 0     DRUG ALLERGIES No Known Allergies       IMMUNIZATIONS   Immunization History   Administered Date(s) Administered     COVID-19 MONOVALENT 12+ (Pfizer) 03/29/2021, 04/19/2021     TDAP Vaccine (Adacel) 07/30/2018     SOCIAL HISTORY;   , 2 children.         PHYSICAL EXAMINATION  /76 (BP Location: Right arm, Patient Position: Sitting, Cuff Size: Adult Large)   Pulse 79   Temp 97.6  F (36.4  C) (Oral)   Resp 18   Ht 1.664 m (5' 5.5\")   Wt 73.8 kg (162 lb 9.6 oz)   SpO2 98%   BMI 26.65 kg/m     Wt Readings from Last 2 Encounters:   05/16/23 73.8 kg (162 lb 9.6 oz)   05/09/23 71.7 kg (158 lb)      Ht Readings from Last 2 Encounters:   05/16/23 1.664 m (5' 5.5\")   05/09/23 1.676 m (5' 6\")     KPS:     -Generally well appearing.  -Respiratory: Normal breath sounds, no audible wheezing.   -Skin: No rashes. Healing head incision.  -Hematologic/ lymphatic: No abnormal bruising. No leg swelling.  -Psychiatric: Normal mood and affect. Pleasant, talkative.  -Neurologic:   MENTAL STATUS:     Alert, oriented to date.    Recall: Intact.    Speech fluent.    Comprehension intact to multi-step " commands.   Good right-left orientation.     CRANIAL NERVES:     Pupils are equal, round.     Extraocular movements full, denies diplopia.     Visual fields full.     Facial sensation intact to light touch.   Symmetric facial movements.   Hearing intact.   No dysarthria.  MOTOR:    No pronation or drift.   Able to rise from a chair without use of arms.   On toe/ heel walk, equal distance from floor to heels/ toes.   SENSATION: Intact to light touch throughout.  COORDINATION: Intact finger-nose with eyes open and closed.   GAIT:  Walks without assistance.   Good speed. Normal stride length and heel strike. Normal turns. Normal arm swing.   Able to toe, heel walk. Able to tandem walk.       MEDICAL RECORDS  Personally reviewed hospitalization records from [facility].    LABS  Personally reviewed all available lab results; CBC, CMP, pathology; FISH.      IMAGING  Personally reviewed pre- (below, left) CT head imaging and post-operative (below, right) MR brain imaging.     Postoperative changes of the right frontal lobe without a definite residual enhancing lesion.         Nonspecific surrounding T2/FLAIR hyperintensity along the posterior and superior margins of the resection cavity may reflect vasogenic edema or nonenhancing tumor.    Small volume hemorrhage and cytotoxic edema along the margins of the resection cavity and extending along the right caudate nucleus and body of the corpus callosum.    Imaging was shown to and results were reviewed with Brandon.       IMPRESSION  Preparatory, clinic, and post-visit time of 90 minutes was spent for this high complexity encounter discussing in detail the nature of his cancer, answering questions pertaining to my recommendations, and devising the treatment plan as outlined below. It was explained to Brandon that he has been diagnosed with an oligodendroglioma, which is a type of primary brain cancer for which there is currently no cure. Therefore, cancer-directed treatment  strives to slow further growth and increase the time interval to recurrence. The specific grade of the cancer remains pending. I personally reviewed this case with neuro-pathology. Histologically, there were borderline features that are of concern, but do not meet criteria for that of a grade 3 cancer. CDKN2A/B status by next generation sequencing remains pending and will determine the grade.     Prior to Brandon's appointment today, I had been in communication with Dr. Dasilva in neurosurgery regarding his case. I have since reviewed his chart, imaging, and pathology results. Moreover, I personally reviewed his imaging and case at [facility].     With regard to cancer-directed therapy, a multimodal treatment approach first involves attempting a maximum safe surgical resection. In this case, a gross total resection was performed. Following surgery, regardless of the grade, but given the size of the lesion at diagnosis and the borderline histological features identified, my recommendation is to initiate cancer-directed therapy with combination radiation therapy and chemotherapy. In terms of chemotherapy, we discussed today how there are two options; temozolomide or PCV (Procarbazine, CCNU, and Vincristine). Unfortunately, there is no definitive evidence or overall expert consensus to support one regimen over the other. Since temozolomide is generally well tolerated, has more manageable associated side effects, has proven efficacy in high grade gliomas, and has a less complicated, more convenient dosing regimen, I prescribe temozolomide over PCV. The risks/ benefits of temozolomide were reviewed and the following common, anticipated side effects of this treatment were discussed today including, but not limited to, fatigue, nausea, and constipation. Any AST/ ALT elevations are typically reversible. Concomitant radiation can result in increased cerebral edema and therefore, symptoms of malaise and fatigue  generally worsen, but do eventually improve. As a result, dexamethasone dosage may need to be increased. The combination therapy can result in bone marrow suppression; leukopenia and thrombocytopenia.    Brandon is in agreement with this plan and since he lives in [place], [place], I will place a referral to radiation oncology at [facility].     Finally, will taper off Keppra as detailed below.     PROBLEM LIST  Oligodendroglioma  Headaches    PLAN  -CANCER-DIRECTED THERAPY-  Will initiate chemoradiotherapy with temozolomide 75mg/m2 (140mg).   -Additional temozolomide teaching performed by RN/ pharmacy staff.     -Initial labs ordered; CBC with differential, CMP, Hepatitis B serologies (If hepatitis B serologies reveal an active/ prior infection, will start prophylaxis with entecavir 0.5mg daily.)  -Will continue weekly CBC and repeat CMP monthly.    -Next generation sequencing pending.    -Supportive medications;        -Anti-nausea: Zofran 4mg (1 to 2 tabs qHS 30 minutes prior to chemotherapy and then PRN nausea).       -For lymphopenia, prophylactic antibiotics are recommended during concurrent treatment: Sulfamethoxazole-trimethoprim (Bactrim) 800 mg-160 mg; 1 tab orally Monday, Wednesday, and Friday for pneumocystis prophylaxis.       -Bowel regimen: Docusate 100 mg; 1 cap orally 3 times a day (stool softener for constipation) and Senna 8.6 m tabs orally at bedtime (laxative as needed for constipation).    -STEROIDS-  -Continue to wean dexamethasone as tolerated.     -SEIZURE MANAGEMENT-  -While this patient is at increased risk of having seizures, given the lack of seizure history, there is no indication to prescribe an antiepileptic at this time.   -No indication for continuing Keppra beyond 2 weeks post-surgery; can stop Keppra on .    -Quality of life/ MOOD/ FATIGUE-  -Denies any mood issues.  -Continue to monitor mood as untreated/ undertreated depression can worsen fatigue, dysorexia, and quality  of life.    Return to clinic with TONJA at midpoint of chemoradiotherapy in about 5 weeks.    In the meantime, Brandon knows to call the clinic with any concerns and he can be seen sooner if needed.     Abril Bullock MD  Neuro-oncology       Again, thank you for allowing me to participate in the care of your patient.        Sincerely,        Abril Bullock MD

## 2023-05-16 NOTE — TELEPHONE ENCOUNTER
I called Jennifer to check if a PA is needed, the drug does not require a prior augh but is required to fill at [facility] pharmacy in [place].    Prior Authorization Not Needed per Insurance    Medication: Temozolomide 140mg capsules  Insurance Company: Jennifer - Phone [phone] Fax [phone]  Expected CoPay:    ? Once rx is at [facility] [employer] I will call to confirm copay  Pharmacy [facility] the Rx[facility] [place] - [address]  Pharmacy Notified: No  Patient Notified: No

## 2023-05-16 NOTE — PROGRESS NOTES
"Oncology Rooming Note    May 16, 2023 11:07 AM   Brandon Ordoñez is a 37 year old male who presents for:    Chief Complaint   Patient presents with     Oncology Clinic Visit     Initial Vitals: /76 (BP Location: Right arm, Patient Position: Sitting, Cuff Size: Adult Large)   Pulse 79   Temp 97.6  F (36.4  C) (Oral)   Resp 18   Ht 1.664 m (5' 5.5\")   Wt 73.8 kg (162 lb 9.6 oz)   SpO2 98%   BMI 26.65 kg/m   Estimated body mass index is 26.65 kg/m  as calculated from the following:    Height as of this encounter: 1.664 m (5' 5.5\").    Weight as of this encounter: 73.8 kg (162 lb 9.6 oz). Body surface area is 1.85 meters squared.  Mild Pain (2) Comment: Data Unavailable   No LMP for male patient.  Allergies reviewed: Yes  Medications reviewed: Yes    Medications: Medication refills not needed today.  Pharm[facility] [id number] [place] [id number] [facility] [address]    Clinical concerns: no       Carolina Brown CMA              "

## 2023-05-17 ENCOUNTER — TELEPHONE (OUTPATIENT)
Dept: PHARMACY | Facility: CLINIC | Age: 38
End: 2023-05-17
Payer: COMMERCIAL

## 2023-05-17 DIAGNOSIS — C71.9 OLIGODENDROGLIOMA (H): Primary | ICD-10-CM

## 2023-05-17 RX ORDER — ONDANSETRON 4 MG/1
4 TABLET, FILM COATED ORAL AT BEDTIME
Qty: 30 TABLET | Refills: 1 | Status: SHIPPED | OUTPATIENT
Start: 2023-06-04 | End: 2023-06-30

## 2023-05-17 RX ORDER — TEMOZOLOMIDE 140 MG/1
75 CAPSULE ORAL AT BEDTIME
Qty: 42 CAPSULE | Refills: 0 | Status: SHIPPED | OUTPATIENT
Start: 2023-06-05 | End: 2023-08-17

## 2023-05-17 RX ORDER — SULFAMETHOXAZOLE/TRIMETHOPRIM 800-160 MG
1 TABLET ORAL
Qty: 18 TABLET | Refills: 0 | Status: SHIPPED | OUTPATIENT
Start: 2023-06-05 | End: 2023-06-30

## 2023-05-17 NOTE — ORAL ONC MGMT
"Oral Chemotherapy Monitoring Program    Lab Monitoring Plan  Weekly CBC and monthly CMP  Subjective/Objective:  Brandon Ordoñez is a 37 year old male seen in clinic for an initial visit for oral chemotherapy education.          5/17/2023    10:00 AM   ORAL CHEMOTHERAPY   Assessment Type New Teach   Diagnosis Code Brain Cancer - Glioblastoma   Providers Dr. Bullock   Clinic Name/Location [facility]   Drug Name Temodar (temozolomide)   Dose 140 mg   Current Schedule QHS   Cycle Details Continuous for 42 days during XRT   Any new drug interactions? No   Is the dose as ordered appropriate for the patient? Yes       Last PHQ-2 Score on record:       5/9/2023     1:03 PM 5/3/2022     8:46 AM   PHQ-2 ( 1999 Pfizer)   Q1: Little interest or pleasure in doing things 0 0   Q2: Feeling down, depressed or hopeless 0 0   PHQ-2 Score 0 0   Q1: Little interest or pleasure in doing things Not at all    Q2: Feeling down, depressed or hopeless Not at all    PHQ-2 Score 0        Vitals:  BP:   BP Readings from Last 1 Encounters:   05/16/23 130/76     Wt Readings from Last 1 Encounters:   05/16/23 73.8 kg (162 lb 9.6 oz)     Estimated body surface area is 1.85 meters squared as calculated from the following:    Height as of 5/16/23: 1.664 m (5' 5.5\").    Weight as of 5/16/23: 73.8 kg (162 lb 9.6 oz).    Labs:  _  Result Component Current Result Ref Range   Sodium 137 (5/8/2023) 136 - 145 mmol/L     _  Result Component Current Result Ref Range   Potassium 3.9 (5/8/2023) 3.4 - 5.3 mmol/L     _  Result Component Current Result Ref Range   Calcium 9.6 (5/8/2023) 8.6 - 10.0 mg/dL     _  Result Component Current Result Ref Range   Magnesium 2.0 (5/5/2023) 1.7 - 2.3 mg/dL     _  Result Component Current Result Ref Range   Phosphorus 4.2 (5/5/2023) 2.5 - 4.5 mg/dL     _  Result Component Current Result Ref Range   Albumin 3.9 (5/5/2023) 3.5 - 5.2 g/dL     _  Result Component Current Result Ref Range   Urea Nitrogen 15.7 (5/8/2023) 6.0 - 20.0 " mg/dL     _  Result Component Current Result Ref Range   Creatinine 0.68 (5/8/2023) 0.67 - 1.17 mg/dL     _  Result Component Current Result Ref Range   AST 22 (5/5/2023) 10 - 50 U/L     _  Result Component Current Result Ref Range   ALT 38 (5/5/2023) 10 - 50 U/L     _  Result Component Current Result Ref Range   Bilirubin Total 0.6 (5/5/2023) <=1.2 mg/dL     _  Result Component Current Result Ref Range   WBC Count 11.7 (H) (5/8/2023) 4.0 - 11.0 10e3/uL     _  Result Component Current Result Ref Range   Hemoglobin 13.7 (5/8/2023) 13.3 - 17.7 g/dL     _  Result Component Current Result Ref Range   Platelet Count 288 (5/8/2023) 150 - 450 10e3/uL     No results found for ANC within last 30 days.     _  Result Component Current Result Ref Range   Absolute Neutrophils 13.5 (H) (5/4/2023) 1.6 - 8.3 10e3/uL        Assessment:  Patient is appropriate to start therapy.    Plan:  Basic chemotherapy teaching was reviewed with the patient including indication, start date of therapy, dose, administration, adverse effects, missed doses, food and drug interactions, monitoring, side effect management, office contact information, and safe handling. Written materials were provided and all questions answered.    Follow-Up:  1 week after the start of therapy     Evaristo Watson, PharmD, MS  Hematology/Oncology Clinical Pharmacist  [facility]  [phone]

## 2023-05-17 NOTE — PROGRESS NOTES
Patient scheduled to see radiation oncology in [place] on Monday 5/22 at 10:30am  LVM to remind patient to go to [facility] for labs before or after appt.

## 2023-05-17 NOTE — TELEPHONE ENCOUNTER
I called [employer] to check the status, spoke with Pratima Green she said they did receive the rx and she did a quick test claim to confirm coverage. Copay will be $16.32 for #21, they will do 2 fills of #21. I told her we do not have a start date yet so its not urgent but hopefully they can deliver this in the next week or so.

## 2023-05-18 LAB — BACTERIA TISS BX CULT: NORMAL

## 2023-05-22 ENCOUNTER — TRANSFERRED RECORDS (OUTPATIENT)
Dept: HEALTH INFORMATION MANAGEMENT | Facility: CLINIC | Age: 38
End: 2023-05-22
Payer: COMMERCIAL

## 2023-05-23 ENCOUNTER — OFFICE VISIT (OUTPATIENT)
Dept: NEUROSURGERY | Facility: CLINIC | Age: 38
End: 2023-05-23
Payer: COMMERCIAL

## 2023-05-23 DIAGNOSIS — Z98.890 S/P CRANIOTOMY: Primary | ICD-10-CM

## 2023-05-23 PROCEDURE — 99207 PR NO CHARGE NURSE ONLY: CPT

## 2023-05-23 NOTE — PATIENT INSTRUCTIONS
Instructions for Patient    Incision  Sutures were removed today  Keep your incision clean and dry at all times.   You may get your incision wet in the shower. Use a gentle shampoo with no fragrance, such as baby shampoo, until incision is completely healed. Allow soap and water to run over the incision and gently pat it dry.   Look at your incision site every day. You  may need a mirror or family member to help you.   Watch for signs of infection  Redness, swelling, warmth, drainage (Green or yellow drainage (pus) from your incision or increased bloody drainage), and fever of 101 degrees or higher  [facility] [phone]  No submerging incision in water such as pools, hot tubs, or baths for at least 8 weeks and until the incision is healed  Do not apply lotions or ointments to incision  Avoid coloring your hair or permanent styling until cleared by your surgeon    Activity  Post operative activity limitations for 4 weeks after surgery: No bending forward, no lifting anything greater than 10 pounds (a gallon of milk weighs approximately 8 pounds)  Ok to start driving if it has been 2 weeks since surgery and you have not had seizure activity and you are not taking narcotics.  If you have had a seizure, you may not drive for at least 3 months according to [place] law.  No strenuous activity  We encourage short frequent walks. You may gradually increase the distance as tolerated.    Medications   Refills of pain medication:   Please call the neurosurgery clinic to request 2-3 days before you run out  Don't take more than 3000mg of Acetaminophen in 24 hours  Avoid Aspirin and NSAIDs (ex: ibuprofen/Advil) until given clearance (likely at the 6-week post-op appointment).  Encouraged icing for at least 3-4 times throughout the day for 20-30 minutes at a time. Avoid heat to the incision area.   Taking stool softeners regularly can reduce constipation commonly caused by narcotic pain medications.    Call the Clinic or  go to the Emergency Room  Development of new pain/symptoms or worsening of symptoms  Incision infection (incisional redness, swelling, warmth, drainage, or fever)    Go to the Emergency Room   If sudden onset of Acute changes in the level of consciousness (increased confusion, memory loss, speech abnormalities), Any change in hearing or vision , increased headaches, or New onset of seizures  Chest pain   Trouble breathing     Post-Op Follow Up Appointments  2 week post- op for staple/suture removal with a nurse.  4-6 weeks post-op with nurse practitioner or physician assistant   3 month post-op with nurse practitioner or physician assistant     [facility] Neuro[facility] Clinic  [facility] - [address] [address]  Telephone:  [phone]       Fax:  [phone]

## 2023-05-23 NOTE — PROGRESS NOTES
Post-op Nurse Visit:    Patient presents today s/p RIGHT FRONTAL CRANIOTOMY WITH RESECTION OF RIGHT FRONTAL LESION on 5/4/23 per the order of Dr Dasilva.   Accompanies patient: none    Pain  No pain, scalp seems itchy. Sore today after suture removal     Pain Relief Measures:  Oxycodone: none   Tylenol: as needed   Ice: as needed   Weaned from steroid?: Yes  Weaned from anti-seizure med?: No    Neuro Assessment  Denies new onset of weakness, acute changes in the level of consciousness (increased confusion, memory loss, speech abnormalities), any change in hearing or vision, increased headaches, or new onset of seizures.    Symptoms compared to pre-op:   Equal strength to bilateral upper extremities  Neurologically intact     Incision  Incision inspected. Edges well-approximated. No redness, swelling, drainage, warmth, or fever noted. suture(s) removed without difficulty.   Drain site suture removed     Per Dr Dasilva, cancel appointment and CT in 3 weeks but patient should follow up at the 3 month post op elena. Dr Bullock to manage imaging.

## 2023-05-24 ENCOUNTER — LAB (OUTPATIENT)
Dept: LAB | Facility: CLINIC | Age: 38
End: 2023-05-24
Payer: COMMERCIAL

## 2023-05-24 DIAGNOSIS — T45.1X5A ANTINEOPLASTIC CHEMOTHERAPY INDUCED PANCYTOPENIA (H): ICD-10-CM

## 2023-05-24 DIAGNOSIS — T45.1X5A CHEMOTHERAPY-INDUCED NAUSEA AND VOMITING: ICD-10-CM

## 2023-05-24 DIAGNOSIS — R11.2 CHEMOTHERAPY-INDUCED NAUSEA AND VOMITING: ICD-10-CM

## 2023-05-24 DIAGNOSIS — Z91.89 HIGH RISK FOR CHEMOTHERAPY-INDUCED INFECTIOUS COMPLICATION: ICD-10-CM

## 2023-05-24 DIAGNOSIS — D61.810 ANTINEOPLASTIC CHEMOTHERAPY INDUCED PANCYTOPENIA (H): ICD-10-CM

## 2023-05-24 DIAGNOSIS — Z11.59 SCREENING FOR VIRAL DISEASE: ICD-10-CM

## 2023-05-24 LAB
ALBUMIN SERPL BCG-MCNC: 4.1 G/DL (ref 3.5–5.2)
ALP SERPL-CCNC: 87 U/L (ref 40–129)
ALT SERPL W P-5'-P-CCNC: 103 U/L (ref 10–50)
ANION GAP SERPL CALCULATED.3IONS-SCNC: 13 MMOL/L (ref 7–15)
AST SERPL W P-5'-P-CCNC: 29 U/L (ref 10–50)
BASOPHILS # BLD AUTO: 0 10E3/UL (ref 0–0.2)
BASOPHILS NFR BLD AUTO: 0 %
BILIRUB SERPL-MCNC: 0.5 MG/DL
BUN SERPL-MCNC: 11 MG/DL (ref 6–20)
CALCIUM SERPL-MCNC: 9.1 MG/DL (ref 8.6–10)
CHLORIDE SERPL-SCNC: 101 MMOL/L (ref 98–107)
CREAT SERPL-MCNC: 0.66 MG/DL (ref 0.67–1.17)
DEPRECATED HCO3 PLAS-SCNC: 25 MMOL/L (ref 22–29)
EOSINOPHIL # BLD AUTO: 0.1 10E3/UL (ref 0–0.7)
EOSINOPHIL NFR BLD AUTO: 1 %
ERYTHROCYTE [DISTWIDTH] IN BLOOD BY AUTOMATED COUNT: 12.7 % (ref 10–15)
GFR SERPL CREATININE-BSD FRML MDRD: >90 ML/MIN/1.73M2
GLUCOSE SERPL-MCNC: 150 MG/DL (ref 70–99)
HBV CORE AB SERPL QL IA: NONREACTIVE
HBV SURFACE AG SERPL QL IA: NONREACTIVE
HCT VFR BLD AUTO: 38.8 % (ref 40–53)
HGB BLD-MCNC: 12.7 G/DL (ref 13.3–17.7)
IMM GRANULOCYTES # BLD: 0 10E3/UL
IMM GRANULOCYTES NFR BLD: 0 %
LYMPHOCYTES # BLD AUTO: 2.4 10E3/UL (ref 0.8–5.3)
LYMPHOCYTES NFR BLD AUTO: 29 %
MCH RBC QN AUTO: 29.5 PG (ref 26.5–33)
MCHC RBC AUTO-ENTMCNC: 32.7 G/DL (ref 31.5–36.5)
MCV RBC AUTO: 90 FL (ref 78–100)
MONOCYTES # BLD AUTO: 0.7 10E3/UL (ref 0–1.3)
MONOCYTES NFR BLD AUTO: 8 %
NEUTROPHILS # BLD AUTO: 5.1 10E3/UL (ref 1.6–8.3)
NEUTROPHILS NFR BLD AUTO: 62 %
NRBC # BLD AUTO: 0 10E3/UL
NRBC BLD AUTO-RTO: 0 /100
PLATELET # BLD AUTO: 231 10E3/UL (ref 150–450)
POTASSIUM SERPL-SCNC: 3.5 MMOL/L (ref 3.4–5.3)
PROT SERPL-MCNC: 7.2 G/DL (ref 6.4–8.3)
RBC # BLD AUTO: 4.31 10E6/UL (ref 4.4–5.9)
SODIUM SERPL-SCNC: 139 MMOL/L (ref 136–145)
WBC # BLD AUTO: 8.3 10E3/UL (ref 4–11)

## 2023-05-24 PROCEDURE — 36415 COLL VENOUS BLD VENIPUNCTURE: CPT

## 2023-05-24 PROCEDURE — 80053 COMPREHEN METABOLIC PANEL: CPT

## 2023-05-24 PROCEDURE — 87340 HEPATITIS B SURFACE AG IA: CPT

## 2023-05-24 PROCEDURE — 86704 HEP B CORE ANTIBODY TOTAL: CPT

## 2023-05-24 PROCEDURE — 85025 COMPLETE CBC W/AUTO DIFF WBC: CPT

## 2023-06-01 ENCOUNTER — PATIENT OUTREACH (OUTPATIENT)
Dept: ONCOLOGY | Facility: CLINIC | Age: 38
End: 2023-06-01
Payer: COMMERCIAL

## 2023-06-01 NOTE — TELEPHONE ENCOUNTER
I called [facility] to check the status of the tmz, I spoke with rep Cisneros, they delivered this on 5/31/2023.

## 2023-06-01 NOTE — PROGRESS NOTES
[facility]: Cancer Care                                                                                          Called rad onc PRATIMA Dennis. No start date yet   Called patient to inquire about meds, no answer    Signature:  Ariadne Joy RN

## 2023-06-02 ENCOUNTER — TELEPHONE (OUTPATIENT)
Dept: PHARMACY | Facility: CLINIC | Age: 38
End: 2023-06-02
Payer: COMMERCIAL

## 2023-06-02 LAB — MAYO MISC RESULT: NORMAL

## 2023-06-02 NOTE — ORAL ONC MGMT
"Oral Chemotherapy Monitoring Program.    Patient due to start chemoradiation next week. Multiple calls to [facility] to determine start date of RT from [facility]. Told \"Mon or Tues\" next week but nothing definitive yet.    Pharmacist attempted to reach patient and confirm that he has all of his meds and knows to start the night before his first radiation. Unable to reach patient. Left voicemail requesting return call to review this information.    Pharmacy to follow up again on Monday to check on start date and verify patient knows how to take his medications.    Tracy Wheeler PharmD  June 2, 2023    Patient called back. He is aware that RT is starting on 6/7. He knows he needs to take his zofran 30-60 min prior to his temodar every night and start that the night before his first RT (will start 6/6). He has not picked up his zofran or bactrim from [facility] in [place]. Advised him to go there and pick those up. He will start his bactrim On Wed next week.    Patient verbalized understanding of plan.  Pharmacy to check in a week later and also review labs at that time.  Patient will need another refill 3 weeks in.    Tracy Wheeler PharmD  June 2, 2023        "

## 2023-06-06 ENCOUNTER — PATIENT OUTREACH (OUTPATIENT)
Dept: ONCOLOGY | Facility: CLINIC | Age: 38
End: 2023-06-06
Payer: COMMERCIAL

## 2023-06-13 ENCOUNTER — LAB (OUTPATIENT)
Dept: LAB | Facility: CLINIC | Age: 38
End: 2023-06-13
Payer: COMMERCIAL

## 2023-06-13 ENCOUNTER — DOCUMENTATION ONLY (OUTPATIENT)
Dept: ONCOLOGY | Facility: CLINIC | Age: 38
End: 2023-06-13

## 2023-06-13 DIAGNOSIS — T45.1X5A ANTINEOPLASTIC CHEMOTHERAPY INDUCED PANCYTOPENIA (H): ICD-10-CM

## 2023-06-13 DIAGNOSIS — D61.810 ANTINEOPLASTIC CHEMOTHERAPY INDUCED PANCYTOPENIA (H): ICD-10-CM

## 2023-06-13 LAB
BASOPHILS # BLD AUTO: 0 10E3/UL (ref 0–0.2)
BASOPHILS NFR BLD AUTO: 0 %
EOSINOPHIL # BLD AUTO: 0.1 10E3/UL (ref 0–0.7)
EOSINOPHIL NFR BLD AUTO: 1 %
ERYTHROCYTE [DISTWIDTH] IN BLOOD BY AUTOMATED COUNT: 11.9 % (ref 10–15)
HCT VFR BLD AUTO: 41.5 % (ref 40–53)
HGB BLD-MCNC: 13.9 G/DL (ref 13.3–17.7)
IMM GRANULOCYTES # BLD: 0 10E3/UL
IMM GRANULOCYTES NFR BLD: 0 %
LYMPHOCYTES # BLD AUTO: 2.5 10E3/UL (ref 0.8–5.3)
LYMPHOCYTES NFR BLD AUTO: 34 %
MCH RBC QN AUTO: 29.3 PG (ref 26.5–33)
MCHC RBC AUTO-ENTMCNC: 33.5 G/DL (ref 31.5–36.5)
MCV RBC AUTO: 87 FL (ref 78–100)
MONOCYTES # BLD AUTO: 0.4 10E3/UL (ref 0–1.3)
MONOCYTES NFR BLD AUTO: 5 %
NEUTROPHILS # BLD AUTO: 4.4 10E3/UL (ref 1.6–8.3)
NEUTROPHILS NFR BLD AUTO: 60 %
NRBC # BLD AUTO: 0 10E3/UL
NRBC BLD AUTO-RTO: 0 /100
PLATELET # BLD AUTO: 283 10E3/UL (ref 150–450)
RBC # BLD AUTO: 4.75 10E6/UL (ref 4.4–5.9)
WBC # BLD AUTO: 7.4 10E3/UL (ref 4–11)

## 2023-06-13 PROCEDURE — 85025 COMPLETE CBC W/AUTO DIFF WBC: CPT

## 2023-06-13 PROCEDURE — 36415 COLL VENOUS BLD VENIPUNCTURE: CPT

## 2023-06-13 NOTE — PROGRESS NOTES
Oral Chemotherapy Monitoring Program    Subjective/Objective:  Brandon Ordoñez is a 38 year old male contacted by phone for a follow-up visit for oral chemotherapy. He reports some nausea, constipation, and some general weakness. We discussed that he could optimize taking his ondansetron every 8 hours if needed for nausea control. He reports no vomiting. We discussed that he was staring to become constipated, so we talked about starting Senna/Docusate 1 tab twice daily for now to help prevent constipation. He mentioned that weekly labs will be done on Tuesdays. He will follow up with clinic if needed based on side effects/symptoms if they get worse.       5/17/2023    10:00 AM 6/2/2023     3:00 PM 6/13/2023     1:00 PM   ORAL CHEMOTHERAPY   Assessment Type New Teach Other Chart Review;Initial Follow up;Lab Monitoring   Diagnosis Code Brain Cancer - Glioblastoma Brain Cancer - Glioblastoma Brain Cancer - Glioblastoma   Providers Dr. Kobe Bullock   Clinic Name/Location [facility]   Drug Name Temodar (temozolomide) Temodar (temozolomide) Temodar (temozolomide)   Dose 140 mg 140 mg 140 mg   Current Schedule QHS QHS QHS   Cycle Details Continuous for 42 days during XRT Continuous for 42 days during XRT Continuous for 42 days during XRT   Start Date of Last Cycle   6/6/2023   Adherence Assessment   Adherent   Adverse Effects   Nausea;Constipation;Fatigue   Nausea   Grade 1   Pharmacist Intervention(nausea)   Yes   Intervention(s)   Patient education   Constipation   Grade 1   Pharmacist Intervention(constipation)   Yes   Intervention(s)   Patient education   Fatigue   Grade 1   Pharmacist Intervention(fatigue)   No   Any new drug interactions? No     Is the dose as ordered appropriate for the patient? Yes         Last PHQ-2 Score on record:       5/9/2023     1:03 PM 5/3/2022     8:46 AM   PHQ-2 ( 1999 Pfizer)   Q1: Little interest or pleasure in doing things 0 0   Q2: Feeling down,  "depressed or hopeless 0 0   PHQ-2 Score 0 0   Q1: Little interest or pleasure in doing things Not at all    Q2: Feeling down, depressed or hopeless Not at all    PHQ-2 Score 0        Vitals:  BP:   BP Readings from Last 1 Encounters:   05/16/23 130/76     Wt Readings from Last 1 Encounters:   05/16/23 73.8 kg (162 lb 9.6 oz)     Estimated body surface area is 1.85 meters squared as calculated from the following:    Height as of 5/16/23: 1.664 m (5' 5.5\").    Weight as of 5/16/23: 73.8 kg (162 lb 9.6 oz).    Labs:  _  Result Component Current Result Ref Range   Sodium 139 (5/24/2023) 136 - 145 mmol/L     _  Result Component Current Result Ref Range   Potassium 3.5 (5/24/2023) 3.4 - 5.3 mmol/L     _  Result Component Current Result Ref Range   Calcium 9.1 (5/24/2023) 8.6 - 10.0 mg/dL     No results found for Mag within last 30 days.     No results found for Phos within last 30 days.     _  Result Component Current Result Ref Range   Albumin 4.1 (5/24/2023) 3.5 - 5.2 g/dL     _  Result Component Current Result Ref Range   Urea Nitrogen 11.0 (5/24/2023) 6.0 - 20.0 mg/dL     _  Result Component Current Result Ref Range   Creatinine 0.66 (L) (5/24/2023) 0.67 - 1.17 mg/dL     _  Result Component Current Result Ref Range   AST 29 (5/24/2023) 10 - 50 U/L     _  Result Component Current Result Ref Range    (H) (5/24/2023) 10 - 50 U/L     _  Result Component Current Result Ref Range   Bilirubin Total 0.5 (5/24/2023) <=1.2 mg/dL     _  Result Component Current Result Ref Range   WBC Count 8.3 (5/24/2023) 4.0 - 11.0 10e3/uL     _  Result Component Current Result Ref Range   Hemoglobin 12.7 (L) (5/24/2023) 13.3 - 17.7 g/dL     _  Result Component Current Result Ref Range   Platelet Count 231 (5/24/2023) 150 - 450 10e3/uL     No results found for ANC within last 30 days.     _  Result Component Current Result Ref Range   Absolute Neutrophils 5.1 (5/24/2023) 1.6 - 8.3 10e3/uL      Assessment/Plan:  Continue Temozolomide and " RT plan. Continue weekly lab monitoring on Tuesdays. Continue to monitor for side effects. Start Senna/Docusate for constipation prevention.     Follow-Up:  6/14 review labs from 6/13      Kobe Najera PharmD  [facility]  [phone]  June 13, 2023

## 2023-06-14 ENCOUNTER — DOCUMENTATION ONLY (OUTPATIENT)
Dept: PHARMACY | Facility: CLINIC | Age: 38
End: 2023-06-14
Payer: COMMERCIAL

## 2023-06-14 NOTE — PROGRESS NOTES
Oral Chemotherapy Monitoring Program  Lab Follow Up    Reviewed lab results from 06/13/23.        5/17/2023    10:00 AM 6/2/2023     3:00 PM 6/13/2023     1:00 PM 6/14/2023    10:00 AM   ORAL CHEMOTHERAPY   Assessment Type New Teach Other Chart Review;Initial Follow up;Lab Monitoring Lab Monitoring   Diagnosis Code Brain Cancer - Glioblastoma Brain Cancer - Glioblastoma Brain Cancer - Glioblastoma Brain Cancer - Glioblastoma   Providers Dr. Kobe Bullock   Clinic Name/Location [facility]   Drug Name Temodar (temozolomide) Temodar (temozolomide) Temodar (temozolomide) Temodar (temozolomide)   Dose 140 mg 140 mg 140 mg 140 mg   Current Schedule QHS QHS QHS QHS   Cycle Details Continuous for 42 days during XRT Continuous for 42 days during XRT Continuous for 42 days during XRT Continuous for 42 days during XRT   Start Date of Last Cycle   6/6/2023    Adherence Assessment   Adherent    Adverse Effects   Nausea;Constipation;Fatigue No AE identified during assessment   Nausea   Grade 1    Pharmacist Intervention(nausea)   Yes    Intervention(s)   Patient education    Constipation   Grade 1    Pharmacist Intervention(constipation)   Yes    Intervention(s)   Patient education    Fatigue   Grade 1    Pharmacist Intervention(fatigue)   No    Any new drug interactions? No      Is the dose as ordered appropriate for the patient? Yes          Labs:  _  Result Component Current Result Ref Range   Sodium 139 (5/24/2023) 136 - 145 mmol/L     _  Result Component Current Result Ref Range   Potassium 3.5 (5/24/2023) 3.4 - 5.3 mmol/L     _  Result Component Current Result Ref Range   Calcium 9.1 (5/24/2023) 8.6 - 10.0 mg/dL     No results found for Mag within last 30 days.     No results found for Phos within last 30 days.     _  Result Component Current Result Ref Range   Albumin 4.1 (5/24/2023) 3.5 - 5.2 g/dL     _  Result Component Current Result Ref Range   Urea Nitrogen 11.0  (5/24/2023) 6.0 - 20.0 mg/dL     _  Result Component Current Result Ref Range   Creatinine 0.66 (L) (5/24/2023) 0.67 - 1.17 mg/dL     _  Result Component Current Result Ref Range   AST 29 (5/24/2023) 10 - 50 U/L     _  Result Component Current Result Ref Range    (H) (5/24/2023) 10 - 50 U/L     _  Result Component Current Result Ref Range   Bilirubin Total 0.5 (5/24/2023) <=1.2 mg/dL     _  Result Component Current Result Ref Range   WBC Count 7.4 (6/13/2023) 4.0 - 11.0 10e3/uL     _  Result Component Current Result Ref Range   Hemoglobin 13.9 (6/13/2023) 13.3 - 17.7 g/dL     _  Result Component Current Result Ref Range   Platelet Count 283 (6/13/2023) 150 - 450 10e3/uL     _  Result Component Current Result Ref Range   Absolute Neutrophils 4.4 (6/13/2023) 1.6 - 8.3 10e3/uL        Assessment & Plan:  No concerning abnormalities. Ok to proceed with temozolomide and weekly labs with radiation.    Questions answered to patient's satisfaction.    Follow-Up:  6/20 - Weekly labs      Rachel Moniz, PharmD  Hematology/Oncology Pharmacist [facility]  [phone]

## 2023-06-20 ENCOUNTER — DOCUMENTATION ONLY (OUTPATIENT)
Dept: PHARMACY | Facility: CLINIC | Age: 38
End: 2023-06-20

## 2023-06-20 ENCOUNTER — LAB (OUTPATIENT)
Dept: LAB | Facility: CLINIC | Age: 38
End: 2023-06-20
Payer: COMMERCIAL

## 2023-06-20 DIAGNOSIS — D61.810 ANTINEOPLASTIC CHEMOTHERAPY INDUCED PANCYTOPENIA (H): ICD-10-CM

## 2023-06-20 DIAGNOSIS — T45.1X5A ANTINEOPLASTIC CHEMOTHERAPY INDUCED PANCYTOPENIA (H): ICD-10-CM

## 2023-06-20 LAB
BASOPHILS # BLD AUTO: 0 10E3/UL (ref 0–0.2)
BASOPHILS NFR BLD AUTO: 0 %
EOSINOPHIL # BLD AUTO: 0.1 10E3/UL (ref 0–0.7)
EOSINOPHIL NFR BLD AUTO: 1 %
ERYTHROCYTE [DISTWIDTH] IN BLOOD BY AUTOMATED COUNT: 11.8 % (ref 10–15)
HCT VFR BLD AUTO: 42.6 % (ref 40–53)
HGB BLD-MCNC: 14.5 G/DL (ref 13.3–17.7)
IMM GRANULOCYTES # BLD: 0 10E3/UL
IMM GRANULOCYTES NFR BLD: 0 %
LYMPHOCYTES # BLD AUTO: 3 10E3/UL (ref 0.8–5.3)
LYMPHOCYTES NFR BLD AUTO: 35 %
MCH RBC QN AUTO: 29.6 PG (ref 26.5–33)
MCHC RBC AUTO-ENTMCNC: 34 G/DL (ref 31.5–36.5)
MCV RBC AUTO: 87 FL (ref 78–100)
MONOCYTES # BLD AUTO: 0.6 10E3/UL (ref 0–1.3)
MONOCYTES NFR BLD AUTO: 7 %
NEUTROPHILS # BLD AUTO: 4.8 10E3/UL (ref 1.6–8.3)
NEUTROPHILS NFR BLD AUTO: 57 %
NRBC # BLD AUTO: 0 10E3/UL
NRBC BLD AUTO-RTO: 0 /100
PLATELET # BLD AUTO: 264 10E3/UL (ref 150–450)
RBC # BLD AUTO: 4.9 10E6/UL (ref 4.4–5.9)
WBC # BLD AUTO: 8.6 10E3/UL (ref 4–11)

## 2023-06-20 PROCEDURE — 85025 COMPLETE CBC W/AUTO DIFF WBC: CPT

## 2023-06-20 PROCEDURE — 36415 COLL VENOUS BLD VENIPUNCTURE: CPT

## 2023-06-20 NOTE — PROGRESS NOTES
Oral Chemotherapy Monitoring Program  Lab Follow Up    Reviewed lab results from 6/20/23.        5/17/2023    10:00 AM 6/2/2023     3:00 PM 6/13/2023     1:00 PM 6/14/2023    10:00 AM 6/20/2023     4:00 PM   ORAL CHEMOTHERAPY   Assessment Type New Teach Other Chart Review;Initial Follow up;Lab Monitoring Lab Monitoring Lab Monitoring   Diagnosis Code Brain Cancer - Glioblastoma Brain Cancer - Glioblastoma Brain Cancer - Glioblastoma Brain Cancer - Glioblastoma Brain Cancer - Glioblastoma   Providers Dr. Kobe Bullock   Clinic Name/Location [facility]   Drug Name Temodar (temozolomide) Temodar (temozolomide) Temodar (temozolomide) Temodar (temozolomide) Temodar (temozolomide)   Dose 140 mg 140 mg 140 mg 140 mg 140 mg   Current Schedule QHS QHS QHS QHS QHS   Cycle Details Continuous for 42 days during XRT Continuous for 42 days during XRT Continuous for 42 days during XRT Continuous for 42 days during XRT Continuous for 42 days during XRT   Start Date of Last Cycle   6/6/2023     Adherence Assessment   Adherent     Adverse Effects   Nausea;Constipation;Fatigue No AE identified during assessment    Nausea   Grade 1     Pharmacist Intervention(nausea)   Yes     Intervention(s)   Patient education     Constipation   Grade 1     Pharmacist Intervention(constipation)   Yes     Intervention(s)   Patient education     Fatigue   Grade 1     Pharmacist Intervention(fatigue)   No     Any new drug interactions? No       Is the dose as ordered appropriate for the patient? Yes           Labs:  _  Result Component Current Result Ref Range   Sodium 139 (5/24/2023) 136 - 145 mmol/L     _  Result Component Current Result Ref Range   Potassium 3.5 (5/24/2023) 3.4 - 5.3 mmol/L     _  Result Component Current Result Ref Range   Calcium 9.1 (5/24/2023) 8.6 - 10.0 mg/dL     No results found for Mag within last 30 days.     No results found for Phos within last 30 days.      _  Result Component Current Result Ref Range   Albumin 4.1 (5/24/2023) 3.5 - 5.2 g/dL     _  Result Component Current Result Ref Range   Urea Nitrogen 11.0 (5/24/2023) 6.0 - 20.0 mg/dL     _  Result Component Current Result Ref Range   Creatinine 0.66 (L) (5/24/2023) 0.67 - 1.17 mg/dL     _  Result Component Current Result Ref Range   AST 29 (5/24/2023) 10 - 50 U/L     _  Result Component Current Result Ref Range    (H) (5/24/2023) 10 - 50 U/L     _  Result Component Current Result Ref Range   Bilirubin Total 0.5 (5/24/2023) <=1.2 mg/dL     _  Result Component Current Result Ref Range   WBC Count 8.6 (6/20/2023) 4.0 - 11.0 10e3/uL     _  Result Component Current Result Ref Range   Hemoglobin 14.5 (6/20/2023) 13.3 - 17.7 g/dL     _  Result Component Current Result Ref Range   Platelet Count 264 (6/20/2023) 150 - 450 10e3/uL     No results found for ANC within last 30 days.     _  Result Component Current Result Ref Range   Absolute Neutrophils 4.8 (6/20/2023) 1.6 - 8.3 10e3/uL        Assessment & Plan:  No concerning abnormalities. Proceed with chemoradiation as planned. Weekly labs.     Questions answered to patient's satisfaction.    Follow-Up:  6/27/23 with labs.     Tracy Wheeler PharmD  June 20, 2023

## 2023-06-27 ENCOUNTER — DOCUMENTATION ONLY (OUTPATIENT)
Dept: PHARMACY | Facility: CLINIC | Age: 38
End: 2023-06-27

## 2023-06-27 ENCOUNTER — LAB (OUTPATIENT)
Dept: LAB | Facility: CLINIC | Age: 38
End: 2023-06-27
Payer: COMMERCIAL

## 2023-06-27 DIAGNOSIS — R11.2 CHEMOTHERAPY-INDUCED NAUSEA AND VOMITING: ICD-10-CM

## 2023-06-27 DIAGNOSIS — D61.810 ANTINEOPLASTIC CHEMOTHERAPY INDUCED PANCYTOPENIA (H): ICD-10-CM

## 2023-06-27 DIAGNOSIS — T45.1X5A CHEMOTHERAPY-INDUCED NAUSEA AND VOMITING: ICD-10-CM

## 2023-06-27 DIAGNOSIS — T45.1X5A ANTINEOPLASTIC CHEMOTHERAPY INDUCED PANCYTOPENIA (H): ICD-10-CM

## 2023-06-27 LAB
ALBUMIN SERPL BCG-MCNC: 4.8 G/DL (ref 3.5–5.2)
ALP SERPL-CCNC: 96 U/L (ref 40–129)
ALT SERPL W P-5'-P-CCNC: 42 U/L (ref 0–70)
ANION GAP SERPL CALCULATED.3IONS-SCNC: 8 MMOL/L (ref 7–15)
AST SERPL W P-5'-P-CCNC: 22 U/L (ref 0–45)
BASOPHILS # BLD AUTO: 0.1 10E3/UL (ref 0–0.2)
BASOPHILS NFR BLD AUTO: 1 %
BILIRUB SERPL-MCNC: 0.4 MG/DL
BUN SERPL-MCNC: 8.3 MG/DL (ref 6–20)
CALCIUM SERPL-MCNC: 9.6 MG/DL (ref 8.6–10)
CHLORIDE SERPL-SCNC: 103 MMOL/L (ref 98–107)
CREAT SERPL-MCNC: 0.91 MG/DL (ref 0.67–1.17)
DEPRECATED HCO3 PLAS-SCNC: 28 MMOL/L (ref 22–29)
EOSINOPHIL # BLD AUTO: 0.2 10E3/UL (ref 0–0.7)
EOSINOPHIL NFR BLD AUTO: 2 %
ERYTHROCYTE [DISTWIDTH] IN BLOOD BY AUTOMATED COUNT: 11.9 % (ref 10–15)
GFR SERPL CREATININE-BSD FRML MDRD: >90 ML/MIN/1.73M2
GLUCOSE SERPL-MCNC: 97 MG/DL (ref 70–99)
HCT VFR BLD AUTO: 42.8 % (ref 40–53)
HGB BLD-MCNC: 14.6 G/DL (ref 13.3–17.7)
IMM GRANULOCYTES # BLD: 0 10E3/UL
IMM GRANULOCYTES NFR BLD: 0 %
LYMPHOCYTES # BLD AUTO: 2.5 10E3/UL (ref 0.8–5.3)
LYMPHOCYTES NFR BLD AUTO: 32 %
MCH RBC QN AUTO: 29.7 PG (ref 26.5–33)
MCHC RBC AUTO-ENTMCNC: 34.1 G/DL (ref 31.5–36.5)
MCV RBC AUTO: 87 FL (ref 78–100)
MONOCYTES # BLD AUTO: 0.6 10E3/UL (ref 0–1.3)
MONOCYTES NFR BLD AUTO: 8 %
NEUTROPHILS # BLD AUTO: 4.5 10E3/UL (ref 1.6–8.3)
NEUTROPHILS NFR BLD AUTO: 57 %
NRBC # BLD AUTO: 0 10E3/UL
NRBC BLD AUTO-RTO: 0 /100
PLATELET # BLD AUTO: 229 10E3/UL (ref 150–450)
POTASSIUM SERPL-SCNC: 4.2 MMOL/L (ref 3.4–5.3)
PROT SERPL-MCNC: 8.1 G/DL (ref 6.4–8.3)
RBC # BLD AUTO: 4.92 10E6/UL (ref 4.4–5.9)
SODIUM SERPL-SCNC: 139 MMOL/L (ref 136–145)
WBC # BLD AUTO: 7.8 10E3/UL (ref 4–11)

## 2023-06-27 PROCEDURE — 85025 COMPLETE CBC W/AUTO DIFF WBC: CPT

## 2023-06-27 PROCEDURE — 36415 COLL VENOUS BLD VENIPUNCTURE: CPT

## 2023-06-27 PROCEDURE — 80053 COMPREHEN METABOLIC PANEL: CPT

## 2023-06-27 NOTE — PROGRESS NOTES
Oral Chemotherapy Monitoring Program  Lab Follow Up    Reviewed lab results from 6/27/2023.        5/17/2023    10:00 AM 6/2/2023     3:00 PM 6/13/2023     1:00 PM 6/14/2023    10:00 AM 6/20/2023     4:00 PM 6/27/2023     4:00 PM   ORAL CHEMOTHERAPY   Assessment Type New Teach Other Chart Review;Initial Follow up;Lab Monitoring Lab Monitoring Lab Monitoring Lab Monitoring   Diagnosis Code Brain Cancer - Glioblastoma Brain Cancer - Glioblastoma Brain Cancer - Glioblastoma Brain Cancer - Glioblastoma Brain Cancer - Glioblastoma Brain Cancer - Glioblastoma   Providers Dr. Kobe Bullock   Clinic Name/Location [facility]   Drug Name Temodar (temozolomide) Temodar (temozolomide) Temodar (temozolomide) Temodar (temozolomide) Temodar (temozolomide) Temodar (temozolomide)   Dose 140 mg 140 mg 140 mg 140 mg 140 mg 140 mg   Current Schedule QHS QHS QHS QHS QHS QHS   Cycle Details Continuous for 42 days during XRT Continuous for 42 days during XRT Continuous for 42 days during XRT Continuous for 42 days during XRT Continuous for 42 days during XRT Continuous for 42 days during XRT   Start Date of Last Cycle   6/6/2023      Adherence Assessment   Adherent      Adverse Effects   Nausea;Constipation;Fatigue No AE identified during assessment  No AE identified during assessment   Nausea   Grade 1      Pharmacist Intervention(nausea)   Yes      Intervention(s)   Patient education      Constipation   Grade 1      Pharmacist Intervention(constipation)   Yes      Intervention(s)   Patient education      Fatigue   Grade 1      Pharmacist Intervention(fatigue)   No      Any new drug interactions? No        Is the dose as ordered appropriate for the patient? Yes            Labs:  No results found for NA within last 30 days.     No results found for K within last 30 days.     No results found for CA within last 30 days.     No results found for Mag within  last 30 days.     No results found for Phos within last 30 days.     No results found for ALBUMIN within last 30 days.     No results found for BUN within last 30 days.     No results found for CR within last 30 days.     No results found for AST within last 30 days.     No results found for ALT within last 30 days.     No results found for BILITOTAL within last 30 days.     _  Result Component Current Result Ref Range   WBC Count 7.8 (6/27/2023) 4.0 - 11.0 10e3/uL     _  Result Component Current Result Ref Range   Hemoglobin 14.6 (6/27/2023) 13.3 - 17.7 g/dL     _  Result Component Current Result Ref Range   Platelet Count 229 (6/27/2023) 150 - 450 10e3/uL     No results found for ANC within last 30 days.     _  Result Component Current Result Ref Range   Absolute Neutrophils 4.5 (6/27/2023) 1.6 - 8.3 10e3/uL        Assessment & Plan:  Results show no concerning abnormalities. Okay to continue therapy.     Follow-Up:  1. Follow up in one week with labs    Thank you,  Paula Mcclure, Pharmacy Intern   [facility]

## 2023-06-29 NOTE — PROGRESS NOTES
NEURO-ONCOLOGY INITIAL VISIT  Jun 30, 2023    CHIEF COMPLAINT: Mr. 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 He is presenting today in follow-up.    HISTORY OF PRESENT ILLNESS  A summary of the patient s oncologic history is as follows;   -PRESENTATION: Progressive somnolence, nausea and vomiting, mild headache.  -5/4/2023 CT Head imaging with a large mass with associated calcifications and probable associated hemorrhage in the anterior right frontal region with surrounding vasogenic edema and significant local mass effect resulting in narrowing of the right more than left lateral ventricles and third ventricle, and leftward subfalcine shift as well as downward/ central transtentorial herniation.   -5/4/2023 SURGERY: Right frontal craniotomy for mass resection by Dr. Dasilva.   Post-operative imaging on 5/5 without a definite residual enhancing lesion. Nonspecific surrounding T2/FLAIR hyperintensity along the posterior and superior margins of the resection cavity may reflect vasogenic edema or nonenhancing tumor. Small volume hemorrhage and cytotoxic edema along the margins of the resection cavity and extending along the right caudate nucleus and body of the corpus callosum.  PATHOLOGY: WHO grade 2 or 3 oligodendroglioma; CDKN2A/B status by next generation sequencing remains pending.   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  -6/30/2023 NEURO-ONC: Here for visit at the midpoint of concurrent chemoradiotherapy.     Today in clinic;   - Brandon is here today unaccompanied and reports he is generally doing well.  - He has started treatment with chemoradiation and feels he is tolerating this well  "overall.  - Continues to report some light sensitivity.  - No nausea and good appetite.   -Denies any weakness, changes in sensation, abnormalities with vision, or impairment in cognitive ability.  -Denies any episodes concerning for a seizure and has successfully tapered off his post-op Keppra.    -Tapered off dexamethasone.  - Does have several medication questions and we review these today and refills ordered.       MEDICATIONS   Current Outpatient Medications   Medication Sig Dispense Refill     ondansetron (ZOFRAN) 4 MG tablet Take 1 tablet (4 mg) by mouth At Bedtime Take 30-60 mins before each dose of temozolomide. 30 tablet 1     senna-docusate (SENOKOT-S/PERICOLACE) 8.6-50 MG tablet Take 1 tablet by mouth 2 times daily as needed for constipation 20 tablet 0     sulfamethoxazole-trimethoprim (BACTRIM DS) 800-160 MG tablet Take 1 tablet by mouth Every Mon, Wed, Fri Morning Begin with the start of radiation therapy. 12 tablet 0     temozolomide (TEMODAR) 140 MG capsule Take 1 capsule (140 mg) by mouth At Bedtime Daily during radiation. Take a dose of ondansetron 30-60 min before temozolomide. Take on empty stomach. 42 capsule 0     DRUG ALLERGIES No Known Allergies       IMMUNIZATIONS   Immunization History   Administered Date(s) Administered     COVID-19 MONOVALENT 12+ (Pfizer) 03/29/2021, 04/19/2021     TDAP Vaccine (Adacel) 07/30/2018     SOCIAL HISTORY;   , 2 children (2 yo and 3 yo).     PHYSICAL EXAMINATION  BP (!) 156/94   Pulse 82   Resp 18   Wt 71.2 kg (156 lb 14.4 oz)   SpO2 99%   BMI 25.71 kg/m     Wt Readings from Last 2 Encounters:   06/30/23 71.2 kg (156 lb 14.4 oz)   05/16/23 73.8 kg (162 lb 9.6 oz)      Ht Readings from Last 2 Encounters:   05/16/23 1.664 m (5' 5.5\")   05/09/23 1.676 m (5' 6\")     KPS:     -Generally well appearing.  -Respiratory: Normal breath sounds, no audible wheezing.  Lungs are clear to auscultation bilaterally.  -Cardiac: S1-S2, regular rate and rhythm " with no murmur rub.  No lower extremity edema noted.  -Skin: No rashes. Healing head incision.  -Hematologic/ lymphatic: No abnormal bruising.   -Psychiatric: Normal mood and affect. Pleasant, talkative.  -Neurologic:   MENTAL STATUS:     Alert, oriented to date.    Recall: Intact.    Speech fluent.    Comprehension intact to multi-step commands.   Good right-left orientation.     CRANIAL NERVES:     Pupils are equal, round.     Extraocular movements full, denies diplopia.     Visual fields full.     Facial sensation intact to light touch.   Symmetric facial movements.   Hearing intact.   No dysarthria.  MOTOR:    No pronation or drift.   Able to rise from a chair without use of arms.   On toe/ heel walk, equal distance from floor to heels/ toes.   SENSATION: Intact to light touch throughout.  COORDINATION: Intact finger-nose with eyes open and closed.   GAIT:  Walks without assistance.   Good speed. Normal stride length and heel strike. Normal turns. Normal arm swing.     MEDICAL RECORDS  Personally reviewed hospitalization records from [facility].    LABS  Personally reviewed all available lab results; CBC, CMP.    IMAGING  No new imaging to review today.    IMPRESSION  As above, he is tolerating adjuvant chemoradiotherapy with minimal concerns.  He does have several questions regarding his medications, and we reviewed these, questions were answered, and refills sent in    We again review the risks/ benefits of temozolomide were reviewed and the following common, anticipated side effects of this treatment were discussed today including, but not limited to, fatigue, nausea, and constipation. Any AST/ ALT elevations are typically reversible. Concomitant radiation can result in increased cerebral edema and therefore, symptoms of malaise and fatigue generally worsen, but do eventually improve. As a result, dexamethasone dosage may need to be increased. He has successfully tapered off dexamethasone issues.  The combination  therapy can result in bone marrow suppression; leukopenia and thrombocytopenia.    PROBLEM LIST  Oligodendroglioma  Headaches    PLAN  -CANCER-DIRECTED THERAPY-  Continue chemoradiotherapy with temozolomide 75mg/m2 (140mg).    -Will continue weekly CBC and repeat CMP monthly.  -His hepatitis B titers were nonreactive.    -Supportive medications;        -Anti-nausea: Zofran 4mg (1 to 2 tabs qHS 30 minutes prior to chemotherapy and then PRN nausea).       -For lymphopenia, prophylactic antibiotics are recommended during concurrent treatment: Sulfamethoxazole-trimethoprim (Bactrim) 800 mg-160 mg; 1 tab orally Monday, Wednesday, and Friday for pneumocystis prophylaxis.       -Bowel regimen: Docusate 100 mg; 1 cap orally 3 times a day (stool softener for constipation) and Senna 8.6 m tabs orally at bedtime (laxative as needed for constipation).    -STEROIDS-  -He was able to wean successfully.    -SEIZURE MANAGEMENT-  -While this patient is at increased risk of having seizures, given the lack of seizure history, there is no indication to prescribe an antiepileptic at this time.   -He stopped his postop Keppra on 5    -Quality of life/ MOOD/ FATIGUE-  -Denies any mood issues.  -Continue to monitor mood as untreated/ undertreated depression can worsen fatigue, dysorexia, and quality of life.    Return to clinic with TONJA at endpoint of chemoradiotherapy in about 3 weeks.    In the meantime, Brandon knows to call the clinic with any concerns and he can be seen sooner if needed.     Lilibeth Marr, APRN, CNP  [facility] [id number] [place] [address]  [phone]

## 2023-06-30 ENCOUNTER — ONCOLOGY VISIT (OUTPATIENT)
Dept: ONCOLOGY | Facility: CLINIC | Age: 38
End: 2023-06-30
Attending: PSYCHIATRY & NEUROLOGY
Payer: COMMERCIAL

## 2023-06-30 VITALS
BODY MASS INDEX: 25.71 KG/M2 | SYSTOLIC BLOOD PRESSURE: 156 MMHG | DIASTOLIC BLOOD PRESSURE: 94 MMHG | WEIGHT: 156.9 LBS | HEART RATE: 82 BPM | OXYGEN SATURATION: 99 % | RESPIRATION RATE: 18 BRPM

## 2023-06-30 DIAGNOSIS — Z98.890 S/P CRANIOTOMY: ICD-10-CM

## 2023-06-30 DIAGNOSIS — C71.9 OLIGODENDROGLIOMA (H): ICD-10-CM

## 2023-06-30 PROCEDURE — 99214 OFFICE O/P EST MOD 30 MIN: CPT | Mod: 24 | Performed by: NURSE PRACTITIONER

## 2023-06-30 PROCEDURE — 99213 OFFICE O/P EST LOW 20 MIN: CPT | Performed by: NURSE PRACTITIONER

## 2023-06-30 RX ORDER — AMOXICILLIN 250 MG
1 CAPSULE ORAL 2 TIMES DAILY PRN
Qty: 20 TABLET | Refills: 0 | Status: SHIPPED | OUTPATIENT
Start: 2023-06-30 | End: 2024-02-27

## 2023-06-30 RX ORDER — SULFAMETHOXAZOLE/TRIMETHOPRIM 800-160 MG
1 TABLET ORAL
Qty: 12 TABLET | Refills: 0 | Status: SHIPPED | OUTPATIENT
Start: 2023-06-30 | End: 2023-08-17

## 2023-06-30 RX ORDER — ONDANSETRON 4 MG/1
4 TABLET, FILM COATED ORAL AT BEDTIME
Qty: 30 TABLET | Refills: 1 | Status: SHIPPED | OUTPATIENT
Start: 2023-06-30 | End: 2023-08-22

## 2023-06-30 ASSESSMENT — PAIN SCALES - GENERAL: PAINLEVEL: MODERATE PAIN (4)

## 2023-06-30 NOTE — LETTER
6/30/2023         RE: Brandon Ordoñez  [address] [address] [facility] [address]        Dear Colleague,    Thank you for referring your patient, Brandon Ordoñez, to the [facility]. Please see a copy of my visit note below.    NEURO-ONCOLOGY INITIAL VISIT  Jun 30, 2023    CHIEF COMPLAINT: Mr. 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 He is presenting today in follow-up.    HISTORY OF PRESENT ILLNESS  A summary of the patient s oncologic history is as follows;   -PRESENTATION: Progressive somnolence, nausea and vomiting, mild headache.  -5/4/2023 CT Head imaging with a large mass with associated calcifications and probable associated hemorrhage in the anterior right frontal region with surrounding vasogenic edema and significant local mass effect resulting in narrowing of the right more than left lateral ventricles and third ventricle, and leftward subfalcine shift as well as downward/ central transtentorial herniation.   -5/4/2023 SURGERY: Right frontal craniotomy for mass resection by Dr. Dasilva.   Post-operative imaging on 5/5 without a definite residual enhancing lesion. Nonspecific surrounding T2/FLAIR hyperintensity along the posterior and superior margins of the resection cavity may reflect vasogenic edema or nonenhancing tumor. Small volume hemorrhage and cytotoxic edema along the margins of the resection cavity and extending along the right caudate nucleus and body of the corpus callosum.  PATHOLOGY: WHO grade 2 or 3 oligodendroglioma; CDKN2A/B status by next generation sequencing remains pending.   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  -6/30/2023 NEURO-ONC:  Here for visit at the midpoint of concurrent chemoradiotherapy.     Today in clinic;   - Brandon is here today unaccompanied and reports he is generally doing well.  - He has started treatment with chemoradiation and feels he is tolerating this well overall.  - Continues to report some light sensitivity.  - No nausea and good appetite.   -Denies any weakness, changes in sensation, abnormalities with vision, or impairment in cognitive ability.  -Denies any episodes concerning for a seizure and has successfully tapered off his post-op Keppra.    -Tapered off dexamethasone.  - Does have several medication questions and we review these today and refills ordered.       MEDICATIONS   Current Outpatient Medications   Medication Sig Dispense Refill     ondansetron (ZOFRAN) 4 MG tablet Take 1 tablet (4 mg) by mouth At Bedtime Take 30-60 mins before each dose of temozolomide. 30 tablet 1     senna-docusate (SENOKOT-S/PERICOLACE) 8.6-50 MG tablet Take 1 tablet by mouth 2 times daily as needed for constipation 20 tablet 0     sulfamethoxazole-trimethoprim (BACTRIM DS) 800-160 MG tablet Take 1 tablet by mouth Every Mon, Wed, Fri Morning Begin with the start of radiation therapy. 12 tablet 0     temozolomide (TEMODAR) 140 MG capsule Take 1 capsule (140 mg) by mouth At Bedtime Daily during radiation. Take a dose of ondansetron 30-60 min before temozolomide. Take on empty stomach. 42 capsule 0     DRUG ALLERGIES No Known Allergies       IMMUNIZATIONS   Immunization History   Administered Date(s) Administered     COVID-19 MONOVALENT 12+ (Pfizer) 03/29/2021, 04/19/2021     TDAP Vaccine (Adacel) 07/30/2018     SOCIAL HISTORY;   , 2 children (2 yo and 5 yo).     PHYSICAL EXAMINATION  BP (!) 156/94   Pulse 82   Resp 18   Wt 71.2 kg (156 lb 14.4 oz)   SpO2 99%   BMI 25.71 kg/m     Wt Readings from Last 2 Encounters:   06/30/23 71.2 kg (156 lb 14.4 oz)   05/16/23 73.8 kg (162 lb 9.6 oz)      Ht Readings from Last 2  "Encounters:   05/16/23 1.664 m (5' 5.5\")   05/09/23 1.676 m (5' 6\")     KPS:     -Generally well appearing.  -Respiratory: Normal breath sounds, no audible wheezing.  Lungs are clear to auscultation bilaterally.  -Cardiac: S1-S2, regular rate and rhythm with no murmur rub.  No lower extremity edema noted.  -Skin: No rashes. Healing head incision.  -Hematologic/ lymphatic: No abnormal bruising.   -Psychiatric: Normal mood and affect. Pleasant, talkative.  -Neurologic:   MENTAL STATUS:     Alert, oriented to date.    Recall: Intact.    Speech fluent.    Comprehension intact to multi-step commands.   Good right-left orientation.     CRANIAL NERVES:     Pupils are equal, round.     Extraocular movements full, denies diplopia.     Visual fields full.     Facial sensation intact to light touch.   Symmetric facial movements.   Hearing intact.   No dysarthria.  MOTOR:    No pronation or drift.   Able to rise from a chair without use of arms.   On toe/ heel walk, equal distance from floor to heels/ toes.   SENSATION: Intact to light touch throughout.  COORDINATION: Intact finger-nose with eyes open and closed.   GAIT:  Walks without assistance.   Good speed. Normal stride length and heel strike. Normal turns. Normal arm swing.     MEDICAL RECORDS  Personally reviewed hospitalization records from [facility].    LABS  Personally reviewed all available lab results; CBC, CMP.    IMAGING  No new imaging to review today.    IMPRESSION  As above, he is tolerating adjuvant chemoradiotherapy with minimal concerns.  He does have several questions regarding his medications, and we reviewed these, questions were answered, and refills sent in    We again review the risks/ benefits of temozolomide were reviewed and the following common, anticipated side effects of this treatment were discussed today including, but not limited to, fatigue, nausea, and constipation. Any AST/ ALT elevations are typically reversible. Concomitant radiation can " result in increased cerebral edema and therefore, symptoms of malaise and fatigue generally worsen, but do eventually improve. As a result, dexamethasone dosage may need to be increased. He has successfully tapered off dexamethasone issues.  The combination therapy can result in bone marrow suppression; leukopenia and thrombocytopenia.    PROBLEM LIST  Oligodendroglioma  Headaches    PLAN  -CANCER-DIRECTED THERAPY-  Continue chemoradiotherapy with temozolomide 75mg/m2 (140mg).    -Will continue weekly CBC and repeat CMP monthly.  -His hepatitis B titers were nonreactive.    -Supportive medications;        -Anti-nausea: Zofran 4mg (1 to 2 tabs qHS 30 minutes prior to chemotherapy and then PRN nausea).       -For lymphopenia, prophylactic antibiotics are recommended during concurrent treatment: Sulfamethoxazole-trimethoprim (Bactrim) 800 mg-160 mg; 1 tab orally Monday, Wednesday, and Friday for pneumocystis prophylaxis.       -Bowel regimen: Docusate 100 mg; 1 cap orally 3 times a day (stool softener for constipation) and Senna 8.6 m tabs orally at bedtime (laxative as needed for constipation).    -STEROIDS-  -He was able to wean successfully.    -SEIZURE MANAGEMENT-  -While this patient is at increased risk of having seizures, given the lack of seizure history, there is no indication to prescribe an antiepileptic at this time.   -He stopped his postop Keppra on 5    -Quality of life/ MOOD/ FATIGUE-  -Denies any mood issues.  -Continue to monitor mood as untreated/ undertreated depression can worsen fatigue, dysorexia, and quality of life.    Return to clinic with TONJA at endpoint of chemoradiotherapy in about 3 weeks.    In the meantime, Brandon knows to call the clinic with any concerns and he can be seen sooner if needed.     Lilibeth Marr, APRN, CNP  [facility]  [address] [place] [address] [phone]        Again, thank you for allowing me to participate in the care of  your patient.        Sincerely,        GWEN Sprague CNP

## 2023-07-07 ENCOUNTER — LAB (OUTPATIENT)
Dept: LAB | Facility: CLINIC | Age: 38
End: 2023-07-07
Payer: COMMERCIAL

## 2023-07-07 DIAGNOSIS — T45.1X5A ANTINEOPLASTIC CHEMOTHERAPY INDUCED PANCYTOPENIA (H): ICD-10-CM

## 2023-07-07 DIAGNOSIS — D61.810 ANTINEOPLASTIC CHEMOTHERAPY INDUCED PANCYTOPENIA (H): ICD-10-CM

## 2023-07-07 LAB
BASOPHILS # BLD AUTO: 0.1 10E3/UL (ref 0–0.2)
BASOPHILS NFR BLD AUTO: 1 %
EOSINOPHIL # BLD AUTO: 0.2 10E3/UL (ref 0–0.7)
EOSINOPHIL NFR BLD AUTO: 2 %
ERYTHROCYTE [DISTWIDTH] IN BLOOD BY AUTOMATED COUNT: 11.9 % (ref 10–15)
HCT VFR BLD AUTO: 42 % (ref 40–53)
HGB BLD-MCNC: 14.3 G/DL (ref 13.3–17.7)
IMM GRANULOCYTES # BLD: 0 10E3/UL
IMM GRANULOCYTES NFR BLD: 0 %
LYMPHOCYTES # BLD AUTO: 2.9 10E3/UL (ref 0.8–5.3)
LYMPHOCYTES NFR BLD AUTO: 38 %
MCH RBC QN AUTO: 29.8 PG (ref 26.5–33)
MCHC RBC AUTO-ENTMCNC: 34 G/DL (ref 31.5–36.5)
MCV RBC AUTO: 88 FL (ref 78–100)
MONOCYTES # BLD AUTO: 0.5 10E3/UL (ref 0–1.3)
MONOCYTES NFR BLD AUTO: 7 %
NEUTROPHILS # BLD AUTO: 3.9 10E3/UL (ref 1.6–8.3)
NEUTROPHILS NFR BLD AUTO: 52 %
NRBC # BLD AUTO: 0 10E3/UL
NRBC BLD AUTO-RTO: 0 /100
PLATELET # BLD AUTO: 252 10E3/UL (ref 150–450)
RBC # BLD AUTO: 4.8 10E6/UL (ref 4.4–5.9)
WBC # BLD AUTO: 7.5 10E3/UL (ref 4–11)

## 2023-07-07 PROCEDURE — 85014 HEMATOCRIT: CPT

## 2023-07-07 PROCEDURE — 36415 COLL VENOUS BLD VENIPUNCTURE: CPT

## 2023-07-13 ENCOUNTER — LAB (OUTPATIENT)
Dept: LAB | Facility: CLINIC | Age: 38
End: 2023-07-13
Payer: COMMERCIAL

## 2023-07-13 DIAGNOSIS — T45.1X5A ANTINEOPLASTIC CHEMOTHERAPY INDUCED PANCYTOPENIA (H): ICD-10-CM

## 2023-07-13 DIAGNOSIS — R11.2 CHEMOTHERAPY-INDUCED NAUSEA AND VOMITING: ICD-10-CM

## 2023-07-13 DIAGNOSIS — D61.810 ANTINEOPLASTIC CHEMOTHERAPY INDUCED PANCYTOPENIA (H): ICD-10-CM

## 2023-07-13 DIAGNOSIS — T45.1X5A CHEMOTHERAPY-INDUCED NAUSEA AND VOMITING: ICD-10-CM

## 2023-07-13 LAB
ALBUMIN SERPL BCG-MCNC: 4.8 G/DL (ref 3.5–5.2)
ALP SERPL-CCNC: 88 U/L (ref 40–129)
ALT SERPL W P-5'-P-CCNC: 30 U/L (ref 0–70)
ANION GAP SERPL CALCULATED.3IONS-SCNC: 9 MMOL/L (ref 7–15)
AST SERPL W P-5'-P-CCNC: 19 U/L (ref 0–45)
BASOPHILS # BLD AUTO: 0 10E3/UL (ref 0–0.2)
BASOPHILS NFR BLD AUTO: 1 %
BILIRUB SERPL-MCNC: 0.5 MG/DL
BUN SERPL-MCNC: 10.6 MG/DL (ref 6–20)
CALCIUM SERPL-MCNC: 10 MG/DL (ref 8.6–10)
CHLORIDE SERPL-SCNC: 102 MMOL/L (ref 98–107)
CREAT SERPL-MCNC: 0.97 MG/DL (ref 0.67–1.17)
DEPRECATED HCO3 PLAS-SCNC: 27 MMOL/L (ref 22–29)
EOSINOPHIL # BLD AUTO: 0.1 10E3/UL (ref 0–0.7)
EOSINOPHIL NFR BLD AUTO: 2 %
ERYTHROCYTE [DISTWIDTH] IN BLOOD BY AUTOMATED COUNT: 11.9 % (ref 10–15)
GFR SERPL CREATININE-BSD FRML MDRD: >90 ML/MIN/1.73M2
GLUCOSE SERPL-MCNC: 97 MG/DL (ref 70–99)
HCT VFR BLD AUTO: 41.6 % (ref 40–53)
HGB BLD-MCNC: 14.2 G/DL (ref 13.3–17.7)
IMM GRANULOCYTES # BLD: 0 10E3/UL
IMM GRANULOCYTES NFR BLD: 0 %
LYMPHOCYTES # BLD AUTO: 2.1 10E3/UL (ref 0.8–5.3)
LYMPHOCYTES NFR BLD AUTO: 30 %
MCH RBC QN AUTO: 29.5 PG (ref 26.5–33)
MCHC RBC AUTO-ENTMCNC: 34.1 G/DL (ref 31.5–36.5)
MCV RBC AUTO: 87 FL (ref 78–100)
MONOCYTES # BLD AUTO: 0.4 10E3/UL (ref 0–1.3)
MONOCYTES NFR BLD AUTO: 6 %
NEUTROPHILS # BLD AUTO: 4.2 10E3/UL (ref 1.6–8.3)
NEUTROPHILS NFR BLD AUTO: 61 %
NRBC # BLD AUTO: 0 10E3/UL
NRBC BLD AUTO-RTO: 0 /100
PLATELET # BLD AUTO: 266 10E3/UL (ref 150–450)
POTASSIUM SERPL-SCNC: 4.2 MMOL/L (ref 3.4–5.3)
PROT SERPL-MCNC: 7.7 G/DL (ref 6.4–8.3)
RBC # BLD AUTO: 4.81 10E6/UL (ref 4.4–5.9)
SODIUM SERPL-SCNC: 138 MMOL/L (ref 136–145)
WBC # BLD AUTO: 6.8 10E3/UL (ref 4–11)

## 2023-07-13 PROCEDURE — 85025 COMPLETE CBC W/AUTO DIFF WBC: CPT

## 2023-07-13 PROCEDURE — 36415 COLL VENOUS BLD VENIPUNCTURE: CPT

## 2023-07-13 PROCEDURE — 80053 COMPREHEN METABOLIC PANEL: CPT

## 2023-07-20 ENCOUNTER — TRANSFERRED RECORDS (OUTPATIENT)
Dept: HEALTH INFORMATION MANAGEMENT | Facility: CLINIC | Age: 38
End: 2023-07-20

## 2023-07-20 ENCOUNTER — LAB (OUTPATIENT)
Dept: LAB | Facility: CLINIC | Age: 38
End: 2023-07-20
Payer: COMMERCIAL

## 2023-07-20 DIAGNOSIS — D61.810 ANTINEOPLASTIC CHEMOTHERAPY INDUCED PANCYTOPENIA (H): ICD-10-CM

## 2023-07-20 DIAGNOSIS — T45.1X5A ANTINEOPLASTIC CHEMOTHERAPY INDUCED PANCYTOPENIA (H): ICD-10-CM

## 2023-07-20 LAB
BASOPHILS # BLD AUTO: 0 10E3/UL (ref 0–0.2)
BASOPHILS NFR BLD AUTO: 1 %
EOSINOPHIL # BLD AUTO: 0.1 10E3/UL (ref 0–0.7)
EOSINOPHIL NFR BLD AUTO: 2 %
ERYTHROCYTE [DISTWIDTH] IN BLOOD BY AUTOMATED COUNT: 11.8 % (ref 10–15)
HCT VFR BLD AUTO: 41.7 % (ref 40–53)
HGB BLD-MCNC: 14.3 G/DL (ref 13.3–17.7)
IMM GRANULOCYTES # BLD: 0 10E3/UL
IMM GRANULOCYTES NFR BLD: 0 %
LYMPHOCYTES # BLD AUTO: 2.2 10E3/UL (ref 0.8–5.3)
LYMPHOCYTES NFR BLD AUTO: 34 %
MCH RBC QN AUTO: 29.5 PG (ref 26.5–33)
MCHC RBC AUTO-ENTMCNC: 34.3 G/DL (ref 31.5–36.5)
MCV RBC AUTO: 86 FL (ref 78–100)
MONOCYTES # BLD AUTO: 0.5 10E3/UL (ref 0–1.3)
MONOCYTES NFR BLD AUTO: 8 %
NEUTROPHILS # BLD AUTO: 3.7 10E3/UL (ref 1.6–8.3)
NEUTROPHILS NFR BLD AUTO: 55 %
NRBC # BLD AUTO: 0 10E3/UL
NRBC BLD AUTO-RTO: 0 /100
PLATELET # BLD AUTO: 259 10E3/UL (ref 150–450)
RBC # BLD AUTO: 4.84 10E6/UL (ref 4.4–5.9)
WBC # BLD AUTO: 6.5 10E3/UL (ref 4–11)

## 2023-07-20 PROCEDURE — 36415 COLL VENOUS BLD VENIPUNCTURE: CPT

## 2023-07-20 PROCEDURE — 85025 COMPLETE CBC W/AUTO DIFF WBC: CPT

## 2023-07-21 ENCOUNTER — ONCOLOGY VISIT (OUTPATIENT)
Dept: ONCOLOGY | Facility: CLINIC | Age: 38
End: 2023-07-21
Attending: PSYCHIATRY & NEUROLOGY
Payer: COMMERCIAL

## 2023-07-21 VITALS
OXYGEN SATURATION: 99 % | DIASTOLIC BLOOD PRESSURE: 90 MMHG | WEIGHT: 156 LBS | HEART RATE: 71 BPM | BODY MASS INDEX: 25.56 KG/M2 | SYSTOLIC BLOOD PRESSURE: 131 MMHG | RESPIRATION RATE: 16 BRPM

## 2023-07-21 DIAGNOSIS — C71.9 OLIGODENDROGLIOMA (H): Primary | ICD-10-CM

## 2023-07-21 PROCEDURE — 99213 OFFICE O/P EST LOW 20 MIN: CPT | Performed by: NURSE PRACTITIONER

## 2023-07-21 PROCEDURE — 99214 OFFICE O/P EST MOD 30 MIN: CPT | Mod: 24 | Performed by: NURSE PRACTITIONER

## 2023-07-21 ASSESSMENT — PAIN SCALES - GENERAL: PAINLEVEL: MILD PAIN (2)

## 2023-07-21 NOTE — PROGRESS NOTES
NEURO-ONCOLOGY INITIAL VISIT  Jul 21, 2023    CHIEF COMPLAINT: Mr. 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 He is presenting today in follow-up.    HISTORY OF PRESENT ILLNESS  A summary of the patient s oncologic history is as follows;   -PRESENTATION: Progressive somnolence, nausea and vomiting, mild headache.  -5/4/2023 CT Head imaging with a large mass with associated calcifications and probable associated hemorrhage in the anterior right frontal region with surrounding vasogenic edema and significant local mass effect resulting in narrowing of the right more than left lateral ventricles and third ventricle, and leftward subfalcine shift as well as downward/ central transtentorial herniation.   -5/4/2023 SURGERY: Right frontal craniotomy for mass resection by Dr. Dasilva.   Post-operative imaging on 5/5 without a definite residual enhancing lesion. Nonspecific surrounding T2/FLAIR hyperintensity along the posterior and superior margins of the resection cavity may reflect vasogenic edema or nonenhancing tumor. Small volume hemorrhage and cytotoxic edema along the margins of the resection cavity and extending along the right caudate nucleus and body of the corpus callosum.  PATHOLOGY: WHO grade 2 or 3 oligodendroglioma; CDKN2A/B status by next generation sequencing remains pending.   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  -6/30/2023 NEURO-ONC/ CHEMORADS: Here for visit at the midpoint of concurrent chemoradiotherapy.   -7/21/2023 NEURO-ONC/ CHEMORADS: Here for visit at the end of concurrent chemoradiotherapy.  He completed therapy yesterday, since 7/20/2023.      Today in clinic;   -Brandon is  here today unaccompanied and reports he is generally doing well.  -He reports he has tolerated chemo radiation with some expected side effects.  -He did have some nausea, and occasionally needed to take a second dose of Zofran in the morning.  -He also reports some mild constipation for which she is using senna with relief.  -His appetite is not as good, but he does continue to feel that he is eating and drinking adequately.  -His biggest issue is fatigue.  -He is no longer taking dexamethasone.  -He has no concerns for any seizure activity.  -Plans to start back at work next month, and has sent in paperwork for us to complete.    MEDICATIONS   Current Outpatient Medications   Medication Sig Dispense Refill     ondansetron (ZOFRAN) 4 MG tablet Take 1 tablet (4 mg) by mouth At Bedtime Take 30-60 mins before each dose of temozolomide. 30 tablet 1     senna-docusate (SENOKOT-S/PERICOLACE) 8.6-50 MG tablet Take 1 tablet by mouth 2 times daily as needed for constipation 20 tablet 0     sulfamethoxazole-trimethoprim (BACTRIM DS) 800-160 MG tablet Take 1 tablet by mouth Every Mon, Wed, Fri Morning Begin with the start of radiation therapy. 12 tablet 0     temozolomide (TEMODAR) 140 MG capsule Take 1 capsule (140 mg) by mouth At Bedtime Daily during radiation. Take a dose of ondansetron 30-60 min before temozolomide. Take on empty stomach. (Patient not taking: Reported on 7/21/2023) 42 capsule 0     DRUG ALLERGIES No Known Allergies       IMMUNIZATIONS   Immunization History   Administered Date(s) Administered     COVID-19 MONOVALENT 12+ (Pfizer) 03/29/2021, 04/19/2021     TDAP Vaccine (Adacel) 07/30/2018     SOCIAL HISTORY;   , 2 children (2 yo and 5 yo).     PHYSICAL EXAMINATION  BP (!) 131/90   Pulse 71   Resp 16   Wt 70.8 kg (156 lb)   SpO2 99%   BMI 25.56 kg/m     Wt Readings from Last 2 Encounters:   07/21/23 70.8 kg (156 lb)   06/30/23 71.2 kg (156 lb 14.4 oz)      Ht Readings from Last 2 Encounters:  "  05/16/23 1.664 m (5' 5.5\")   05/09/23 1.676 m (5' 6\")     KPS:     -Generally well appearing.  -Respiratory: Normal breath sounds, no audible wheezing.  Lungs are clear to auscultation bilaterally.  -Cardiac: S1-S2, regular rate and rhythm with no murmur rub.  No lower extremity edema noted.  -Skin: No rashes.   -Hematologic/ lymphatic: No abnormal bruising.   -Psychiatric: Normal mood and affect. Pleasant, talkative.  -Neurologic:   MENTAL STATUS:     Alert, oriented to date.    Recall: Intact.    Speech fluent.    Comprehension intact to multi-step commands.   Good right-left orientation.     CRANIAL NERVES:     Pupils are equal, round.     Extraocular movements full, denies diplopia.     Visual fields full.     Facial sensation intact to light touch.   Symmetric facial movements.   Hearing intact.   No dysarthria.  MOTOR:    No pronation or drift.   Able to rise from a chair without use of arms.  SENSATION: Intact to light touch throughout.  COORDINATION: Intact finger-nose with eyes open and closed.   GAIT:  Walks without assistance.     MEDICAL RECORDS  Personally reviewed pharmacy notes, oncology notes and [employer] messages.    LABS  Personally reviewed all available lab results; CBC (7/13 and yesterday), CMP (7/13).    IMAGING  No new imaging to review today.    IMPRESSION  As above, he is tolerating adjuvant chemoradiotherapy with minimal concerns.  He has tolerated concurrent chemoradiation with mild expected side effects, such as fatigue, constipation and nausea, both of which he has managed with supportive medications.  He does also have the occasional headache right after radiation which he manages with Tylenol with good relief.  He has no new neurologic concerns.  He is in fact feeling well enough to go back to work next month.    Today, we reviewed the plan, which will be to have this month off, and then follow-up with Dr. Bullock in 1 month with labs, a brain MRI and physician visit.  We again " review that concomitant radiation can result in increased cerebral edema and therefore, symptoms of malaise and fatigue generally worsen, and can do so even in this month off treatment, but do eventually improve. As a result, dexamethasone may need to be added.  He has successfully tapered off dexamethasone without issues so would need a new supply if he calls in with worsening symptoms.     PROBLEM LIST  Oligodendroglioma  Headaches    PLAN  -CANCER-DIRECTED THERAPY-  He has completed chemoradiotherapy with temozolomide 75mg/m2 (140mg).  -Return in one month with labs and MRI as above.     -STEROIDS-  -He was able to wean successfully.    -SEIZURE MANAGEMENT-  -While this patient is at increased risk of having seizures, given the lack of seizure history, there is no indication to prescribe an antiepileptic at this time.     -Quality of life/ MOOD/ FATIGUE-  -Denies any mood issues.  -Continue to monitor mood as untreated/ undertreated depression can worsen fatigue, dysorexia, and quality of life.    Return to clinic in 1 month with brain MRI, labs, and to see Dr. Bullock.    In the meantime, Brandon knows to call the clinic with any concerns and he can be seen sooner if needed.     Lilibeth Marr, APRN, CNP  [facility]  [address] [place] [address] [phone]

## 2023-07-21 NOTE — LETTER
7/21/2023         RE: Brandon Ordoñez  [address] [address] [address]        Dear Colleague,    Thank you for referring your patient, Brandon Ordoñez, to the [facility]. Please see a copy of my visit note below.    NEURO-ONCOLOGY INITIAL VISIT  Jul 21, 2023    CHIEF COMPLAINT: Mr. 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 He is presenting today in follow-up.    HISTORY OF PRESENT ILLNESS  A summary of the patient s oncologic history is as follows;   -PRESENTATION: Progressive somnolence, nausea and vomiting, mild headache.  -5/4/2023 CT Head imaging with a large mass with associated calcifications and probable associated hemorrhage in the anterior right frontal region with surrounding vasogenic edema and significant local mass effect resulting in narrowing of the right more than left lateral ventricles and third ventricle, and leftward subfalcine shift as well as downward/ central transtentorial herniation.   -5/4/2023 SURGERY: Right frontal craniotomy for mass resection by Dr. Dasilva.   Post-operative imaging on 5/5 without a definite residual enhancing lesion. Nonspecific surrounding T2/FLAIR hyperintensity along the posterior and superior margins of the resection cavity may reflect vasogenic edema or nonenhancing tumor. Small volume hemorrhage and cytotoxic edema along the margins of the resection cavity and extending along the right caudate nucleus and body of the corpus callosum.  PATHOLOGY: WHO grade 2 or 3 oligodendroglioma; CDKN2A/B status by next generation sequencing remains pending.   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  -6/30/2023 NEURO-ONC/  CHEMORADS: Here for visit at the midpoint of concurrent chemoradiotherapy.   -7/21/2023 NEURO-ONC/ CHEMORADS: Here for visit at the end of concurrent chemoradiotherapy.  He completed therapy yesterday, since 7/20/2023.      Today in clinic;   -Brandon is here today unaccompanied and reports he is generally doing well.  -He reports he has tolerated chemo radiation with some expected side effects.  -He did have some nausea, and occasionally needed to take a second dose of Zofran in the morning.  -He also reports some mild constipation for which she is using senna with relief.  -His appetite is not as good, but he does continue to feel that he is eating and drinking adequately.  -His biggest issue is fatigue.  -He is no longer taking dexamethasone.  -He has no concerns for any seizure activity.  -Plans to start back at work next month, and has sent in paperwork for us to complete.    MEDICATIONS   Current Outpatient Medications   Medication Sig Dispense Refill     ondansetron (ZOFRAN) 4 MG tablet Take 1 tablet (4 mg) by mouth At Bedtime Take 30-60 mins before each dose of temozolomide. 30 tablet 1     senna-docusate (SENOKOT-S/PERICOLACE) 8.6-50 MG tablet Take 1 tablet by mouth 2 times daily as needed for constipation 20 tablet 0     sulfamethoxazole-trimethoprim (BACTRIM DS) 800-160 MG tablet Take 1 tablet by mouth Every Mon, Wed, Fri Morning Begin with the start of radiation therapy. 12 tablet 0     temozolomide (TEMODAR) 140 MG capsule Take 1 capsule (140 mg) by mouth At Bedtime Daily during radiation. Take a dose of ondansetron 30-60 min before temozolomide. Take on empty stomach. (Patient not taking: Reported on 7/21/2023) 42 capsule 0     DRUG ALLERGIES No Known Allergies       IMMUNIZATIONS   Immunization History   Administered Date(s) Administered     COVID-19 MONOVALENT 12+ (Pfizer) 03/29/2021, 04/19/2021     TDAP Vaccine (Adacel) 07/30/2018     SOCIAL HISTORY;   , 2 children (2 yo and 3 yo).  "    PHYSICAL EXAMINATION  BP (!) 131/90   Pulse 71   Resp 16   Wt 70.8 kg (156 lb)   SpO2 99%   BMI 25.56 kg/m     Wt Readings from Last 2 Encounters:   07/21/23 70.8 kg (156 lb)   06/30/23 71.2 kg (156 lb 14.4 oz)      Ht Readings from Last 2 Encounters:   05/16/23 1.664 m (5' 5.5\")   05/09/23 1.676 m (5' 6\")     KPS:     -Generally well appearing.  -Respiratory: Normal breath sounds, no audible wheezing.  Lungs are clear to auscultation bilaterally.  -Cardiac: S1-S2, regular rate and rhythm with no murmur rub.  No lower extremity edema noted.  -Skin: No rashes.   -Hematologic/ lymphatic: No abnormal bruising.   -Psychiatric: Normal mood and affect. Pleasant, talkative.  -Neurologic:   MENTAL STATUS:     Alert, oriented to date.    Recall: Intact.    Speech fluent.    Comprehension intact to multi-step commands.   Good right-left orientation.     CRANIAL NERVES:     Pupils are equal, round.     Extraocular movements full, denies diplopia.     Visual fields full.     Facial sensation intact to light touch.   Symmetric facial movements.   Hearing intact.   No dysarthria.  MOTOR:    No pronation or drift.   Able to rise from a chair without use of arms.  SENSATION: Intact to light touch throughout.  COORDINATION: Intact finger-nose with eyes open and closed.   GAIT:  Walks without assistance.     MEDICAL RECORDS  Personally reviewed pharmacy notes, oncology notes and [employer] messages.    LABS  Personally reviewed all available lab results; CBC (7/13 and yesterday), CMP (7/13).    IMAGING  No new imaging to review today.    IMPRESSION  As above, he is tolerating adjuvant chemoradiotherapy with minimal concerns.  He has tolerated concurrent chemoradiation with mild expected side effects, such as fatigue, constipation and nausea, both of which he has managed with supportive medications.  He does also have the occasional headache right after radiation which he manages with Tylenol with good relief.  He has no " new neurologic concerns.  He is in fact feeling well enough to go back to work next month.    Today, we reviewed the plan, which will be to have this month off, and then follow-up with Dr. Bullock in 1 month with labs, a brain MRI and physician visit.  We again review that concomitant radiation can result in increased cerebral edema and therefore, symptoms of malaise and fatigue generally worsen, and can do so even in this month off treatment, but do eventually improve. As a result, dexamethasone may need to be added.  He has successfully tapered off dexamethasone without issues so would need a new supply if he calls in with worsening symptoms.     PROBLEM LIST  Oligodendroglioma  Headaches    PLAN  -CANCER-DIRECTED THERAPY-  He has completed chemoradiotherapy with temozolomide 75mg/m2 (140mg).  -Return in one month with labs and MRI as above.     -STEROIDS-  -He was able to wean successfully.    -SEIZURE MANAGEMENT-  -While this patient is at increased risk of having seizures, given the lack of seizure history, there is no indication to prescribe an antiepileptic at this time.     -Quality of life/ MOOD/ FATIGUE-  -Denies any mood issues.  -Continue to monitor mood as untreated/ undertreated depression can worsen fatigue, dysorexia, and quality of life.    Return to clinic in 1 month with brain MRI, labs, and to see Dr. Bullock.    In the meantime, Brandon knows to call the clinic with any concerns and he can be seen sooner if needed.     GWEN Pickard, CNP  [facility] [address] [address]  [phone]        Again, thank you for allowing me to participate in the care of your patient.        Sincerely,        GWEN Sprague CNP

## 2023-08-18 DIAGNOSIS — C71.9 OLIGODENDROGLIOMA (H): Primary | ICD-10-CM

## 2023-08-18 LAB
INTERPRETATION: NORMAL
PATH REPORT.ADDENDUM SPEC: ABNORMAL
PATH REPORT.COMMENTS IMP SPEC: ABNORMAL
PATH REPORT.COMMENTS IMP SPEC: YES
PATH REPORT.FINAL DX SPEC: ABNORMAL
PATH REPORT.GROSS SPEC: ABNORMAL
PATH REPORT.INTRAOP OBS SPEC DOC: ABNORMAL
PATH REPORT.MICROSCOPIC SPEC OTHER STN: ABNORMAL
PATH REPORT.RELEVANT HX SPEC: ABNORMAL
PHOTO IMAGE: ABNORMAL
SIGNIFICANT RESULTS: NORMAL
SPECIMEN DESCRIPTION: NORMAL
TEST DETAILS, MDL: NORMAL

## 2023-08-18 RX ORDER — TEMOZOLOMIDE 140 MG/1
150 CAPSULE ORAL AT BEDTIME
Qty: 10 CAPSULE | Refills: 0 | Status: SHIPPED | OUTPATIENT
Start: 2023-08-22 | End: 2024-01-07

## 2023-08-18 NOTE — ORAL ONC MGMT
Oral Chemotherapy Monitoring Program     Placed call to patient in follow up of pending adjuvant Temozolomide therapy. Pending start of adj regimen on 8/22 pending labs and provider visit. Calling to tell patient that we are releasing TMZ prior to appt on 8/22 but wanting to ensure patient does not start TMZ until told to do so pending 8/22 provider and lab visit. Left voicemail asking for return call to discuss the above plan. Releasing TMZ ahead of appt on 8/22 d/t logistics in mail order/specialty pharmacy process.     Follow Up:  8/22 in clinic teach, baseline labs, and Dr. Bullock visit (pending start of adj TMZ regimen)  9/5 cbc labs would be due pending adj C1 start in 8/22    Kobe Najera PharmD  [facility]  [phone]  August 18, 2023

## 2023-08-18 NOTE — PROGRESS NOTES
NEURO-ONCOLOGY VISIT  Aug 22, 2023    CHIEF COMPLAINT: Mr. 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 He completed chemoradiotherapy with adjuvant temozolomide on 7/20/2023. Post-radiation imaging demonstrates anticipated post-radiation induced changes and no new concerns.     The plan is to initiate adjuvant-dosed temozolomide.     Brandon is presenting in follow-up.    HISTORY OF PRESENT ILLNESS  -Brandon enjoyed the break from chemoradiotherapy.  -Appetite remains low. No lingering nausea or constipation.  -Energy is improving.   -If works for a long time, experience mild headache. With resting and/ or Tylenol, the pain resolves.   -He is no longer taking dexamethasone.  -He has no concerns for any seizure activity.  -Returned to work.      MEDICATIONS   Current Outpatient Medications   Medication Sig Dispense Refill    ondansetron (ZOFRAN) 4 MG tablet Take 1 tablet (4 mg) by mouth At Bedtime Take 30-60 mins before each dose of temozolomide. 30 tablet 1    senna-docusate (SENOKOT-S/PERICOLACE) 8.6-50 MG tablet Take 1 tablet by mouth 2 times daily as needed for constipation 20 tablet 0    temozolomide (TEMODAR) 140 MG capsule Take 2 capsules (280 mg) by mouth At Bedtime for 5 days Take ondansetron 30-60 min before temozolomide. Take on an empty stomach. (Patient not taking: Reported on 8/22/2023) 10 capsule 0     DRUG ALLERGIES No Known Allergies       IMMUNIZATIONS   Immunization History   Administered Date(s) Administered    COVID-19 MONOVALENT 12+ (Pfizer) 03/29/2021, 04/19/2021    TDAP Vaccine (Adacel) 07/30/2018       ONCOLOGIC HISTORY  -PRESENTATION: Progressive somnolence, nausea and vomiting, mild headache.  -5/4/2023 CT Head imaging with a large mass with associated calcifications  "and probable associated hemorrhage in the anterior right frontal region with surrounding vasogenic edema and significant local mass effect resulting in narrowing of the right more than left lateral ventricles and third ventricle, and leftward subfalcine shift as well as downward/ central transtentorial herniation.   -5/4/2023 SURGERY: Right frontal craniotomy for mass resection by Dr. Dasilva.   Post-operative imaging on 5/5 without a definite residual enhancing lesion. Nonspecific surrounding T2/FLAIR hyperintensity along the posterior and superior margins of the resection cavity may reflect vasogenic edema or nonenhancing tumor. Small volume hemorrhage and cytotoxic edema along the margins of the resection cavity and extending along the right caudate nucleus and body of the corpus callosum.  PATHOLOGY: WHO grade 2 oligodendroglioma;   FISH with deletion of both the 1p36.32 and 19q13.33 regions   No homozygous deletion of CDKN2A/B gene    IDH1 R132S mutated.  TERT c.-146C>T mutated.   TMB Score: 2.438 mut/Mb   Fusion Event: NEGATIVE   Histology is borderline due to presence of microvascular proliferation and necrosis without palisading, however, the histological criteria for distinction between grade 2 and 3 are not well defined.  -5/16/2023 NEURO-ONC: Regardless of grade, recommending chemoradiotherapy with concurrent temozolomide.  -6/30/2023 NEURO-ONC/ CHEMORADS: Here for visit at the midpoint of concurrent chemoradiotherapy.   -7/20/2023 CHEMORADS: Completed chemoradiotherapy with concurrent temozolomide 75mg/m2 (140mg)   -8/22/2023 NEURO-ONC/ MRB/ CHEMO: Clinically well. Imaging with no concerns. Starting adjuvant temozolomide 150mg/m2 (280mg), cycle 1 (start date tonight).     SOCIAL HISTORY   , 2 children (2 yo and 5 yo).       PHYSICAL EXAMINATION  /88   Pulse 71   Resp 16   Ht 1.664 m (5' 5.5\")   Wt 74.8 kg (164 lb 12.8 oz)   SpO2 98%   BMI 27.01 kg/m     Wt Readings from Last 2 " "Encounters:   08/22/23 74.8 kg (164 lb 12.8 oz)   07/21/23 70.8 kg (156 lb)      Ht Readings from Last 2 Encounters:   08/22/23 1.664 m (5' 5.5\")   05/16/23 1.664 m (5' 5.5\")     KPS:     -Generally well appearing.  -Respiratory: Normal breath sounds, no audible wheezing.  -Psychiatric: Normal mood and affect. Pleasant, talkative.  -Neurologic:   MENTAL STATUS:     Alert, oriented.    Recall: Intact.    Speech fluent.    Comprehension intact.     CRANIAL NERVES:     Pupils are equal, round.     Symmetric facial movements.   Hearing intact.   No dysarthria.  GAIT:  Walks without assistance.       MEDICAL RECORDS  Personally reviewed pharmacy notes, oncology notes and [employer] messages.    LABS  Personally reviewed all available lab results; CBC and CMP.    IMAGING  Personally reviewed MR brain imaging from today and compared to pre-radiation imaging. To my eye, there are has been an overall reduction in chela-cavitary as well as periventricular T2 FLAIR, with clearing of the resection cavity. There is only a thin linear degree of contrast enhancement about the posterior-lateral margin that is post-operative in nature. No concerning findings.     MRI T2 FLAIR 5/2023, 8/2023;       Formal read; \"Nonspecific residual T2 hyperintense signal along the posterior margin of the resection cavity. Thin enhancement along the margins of the resection cavity, probably postoperative.\"    Imaging was shown to and results were reviewed with Brandon.      IMPRESSION  Clinic time of 40 minutes was spent reviewing data, in evaluation, and coordinating care for this high complexity visit. This was in addition to answering questions pertaining to my recommendations and devising the plan as outlined below.      Prior to Brandon appointment today, I have been in communication with neuro-pathology regarding the final diagnosis. There was no homozygous deletion of CDKN2A/B gene. Both IDH1 and TERT were mutated, which conveys a more " favorable prognosis. Histology was borderline due to presence of microvascular proliferation and necrosis without palisading, however, the histological criteria for distinction between grade 2 and 3 are not well defined. Taken together, the final diagnosis is that of a grade 2 oligodendroglioma and I discussed this with Brandon today.     Brandon has no new neurologic concerns. Headaches are mild and can be trigger by exertion. His fatigue continues to improve. I personally reviewed the treatment notes from radiation oncology.     Imaging as detailed above with no concerning findings. The plan is to repeat imaging per NCCN guidelines of every 3-6 months for the first 5 years (at least throught 8/2028) and then, every 6 months thereafter. At this point in time, will repeat imaging in 4 months.     With regard to cancer-directed therapy, the plan is to start adjuvant temozolomide. In the adjuvant phase, temozolomide is dosed at 150mg/m2 for days 1-5 of a 28 day cycle for the first cycle, and then increased to 200mg/m2 if tolerated. The previously reviewed common side effects of temozolomide can still be anticipated and were discussed as including, but not limited to, fatigue, nausea, and constipation. Bone marrow suppression can result in leukopenia and thrombocytopenia. CBC and CMP from today with no concerns. I alerted pharmacy and [facility] to the plan.      PROBLEM LIST  WHO grade 2 oligodendroglioma  Headaches    PLAN  -CANCER DIRECTED THERAPY-  Will initiate adjuvant temozolomide at 150mg/m2 (280mg), cycle 1.   -If this dose is well tolerated, will increase to 200mg/m2 for cycle 2.  -Supportive medications; Zofran and bowel regimen.     -Repeat 28 day cycle if WBC >= 3, ANC >= 1.5, HgB >= 10, and platelets >= 100.  -Surveillance labs reviewed, at goal for chemotherapy.   -Will continue every 2 weeks CBC and CMP every 4 weeks.    -Repeat imaging in 4 months.     -STEROIDS-  -Off dexamethasone.     -SEIZURE  MANAGEMENT-  -While this patient is at increased risk of having seizures, given the lack of seizure history, there is no indication to prescribe an antiepileptic at this time.     -Quality of life/ MOOD/ FATIGUE-  -Denies any mood issues.  -Fatigue improving.   -Continue to monitor mood as untreated/ undertreated depression can worsen fatigue, dysorexia, and quality of life.    Return to clinic in 1 month with GWEN Pickard, CNP + labs and to discuss dosing for cycle 2 of chemotherapy.     In the meantime, Brandon knows to call the clinic with any concerns and he can be seen sooner if needed.     Abril Bullock MD  Neuro-oncology

## 2023-08-22 ENCOUNTER — HOSPITAL ENCOUNTER (OUTPATIENT)
Dept: MRI IMAGING | Facility: CLINIC | Age: 38
Discharge: HOME | End: 2023-08-22
Attending: NURSE PRACTITIONER
Payer: COMMERCIAL

## 2023-08-22 ENCOUNTER — DOCUMENTATION ONLY (OUTPATIENT)
Dept: PHARMACY | Facility: CLINIC | Age: 38
End: 2023-08-22

## 2023-08-22 ENCOUNTER — LAB (OUTPATIENT)
Dept: LAB | Facility: CLINIC | Age: 38
End: 2023-08-22
Attending: NURSE PRACTITIONER
Payer: COMMERCIAL

## 2023-08-22 ENCOUNTER — ONCOLOGY VISIT (OUTPATIENT)
Dept: ONCOLOGY | Facility: CLINIC | Age: 38
End: 2023-08-22
Attending: PSYCHIATRY & NEUROLOGY
Payer: COMMERCIAL

## 2023-08-22 VITALS
OXYGEN SATURATION: 98 % | RESPIRATION RATE: 16 BRPM | WEIGHT: 164.8 LBS | SYSTOLIC BLOOD PRESSURE: 130 MMHG | BODY MASS INDEX: 26.48 KG/M2 | HEART RATE: 71 BPM | HEIGHT: 66 IN | DIASTOLIC BLOOD PRESSURE: 88 MMHG

## 2023-08-22 DIAGNOSIS — D61.810 ANTINEOPLASTIC CHEMOTHERAPY INDUCED PANCYTOPENIA (H): ICD-10-CM

## 2023-08-22 DIAGNOSIS — R11.2 CHEMOTHERAPY-INDUCED NAUSEA AND VOMITING: ICD-10-CM

## 2023-08-22 DIAGNOSIS — T45.1X5A ANTINEOPLASTIC CHEMOTHERAPY INDUCED PANCYTOPENIA (H): ICD-10-CM

## 2023-08-22 DIAGNOSIS — T45.1X5A CHEMOTHERAPY-INDUCED NAUSEA AND VOMITING: ICD-10-CM

## 2023-08-22 DIAGNOSIS — C71.9 OLIGODENDROGLIOMA (H): ICD-10-CM

## 2023-08-22 DIAGNOSIS — C71.9 OLIGODENDROGLIOMA (H): Primary | ICD-10-CM

## 2023-08-22 LAB
ALBUMIN SERPL BCG-MCNC: 4.8 G/DL (ref 3.5–5.2)
ALP SERPL-CCNC: 94 U/L (ref 40–129)
ALT SERPL W P-5'-P-CCNC: 25 U/L (ref 0–70)
ANION GAP SERPL CALCULATED.3IONS-SCNC: 9 MMOL/L (ref 7–15)
AST SERPL W P-5'-P-CCNC: 19 U/L (ref 0–45)
BASOPHILS # BLD AUTO: 0 10E3/UL (ref 0–0.2)
BASOPHILS NFR BLD AUTO: 1 %
BILIRUB SERPL-MCNC: 0.4 MG/DL
BUN SERPL-MCNC: 8 MG/DL (ref 6–20)
CALCIUM SERPL-MCNC: 9.5 MG/DL (ref 8.6–10)
CHLORIDE SERPL-SCNC: 102 MMOL/L (ref 98–107)
CREAT SERPL-MCNC: 0.84 MG/DL (ref 0.67–1.17)
DEPRECATED HCO3 PLAS-SCNC: 28 MMOL/L (ref 22–29)
EOSINOPHIL # BLD AUTO: 0.1 10E3/UL (ref 0–0.7)
EOSINOPHIL NFR BLD AUTO: 2 %
ERYTHROCYTE [DISTWIDTH] IN BLOOD BY AUTOMATED COUNT: 11.4 % (ref 10–15)
GFR SERPL CREATININE-BSD FRML MDRD: >90 ML/MIN/1.73M2
GLUCOSE SERPL-MCNC: 101 MG/DL (ref 70–99)
HCT VFR BLD AUTO: 44 % (ref 40–53)
HGB BLD-MCNC: 15 G/DL (ref 13.3–17.7)
IMM GRANULOCYTES # BLD: 0 10E3/UL
IMM GRANULOCYTES NFR BLD: 0 %
LYMPHOCYTES # BLD AUTO: 1.7 10E3/UL (ref 0.8–5.3)
LYMPHOCYTES NFR BLD AUTO: 36 %
MCH RBC QN AUTO: 29.2 PG (ref 26.5–33)
MCHC RBC AUTO-ENTMCNC: 34.1 G/DL (ref 31.5–36.5)
MCV RBC AUTO: 86 FL (ref 78–100)
MONOCYTES # BLD AUTO: 0.3 10E3/UL (ref 0–1.3)
MONOCYTES NFR BLD AUTO: 7 %
NEUTROPHILS # BLD AUTO: 2.5 10E3/UL (ref 1.6–8.3)
NEUTROPHILS NFR BLD AUTO: 54 %
NRBC # BLD AUTO: 0 10E3/UL
NRBC BLD AUTO-RTO: 0 /100
PLATELET # BLD AUTO: 239 10E3/UL (ref 150–450)
POTASSIUM SERPL-SCNC: 4.7 MMOL/L (ref 3.4–5.3)
PROT SERPL-MCNC: 7.7 G/DL (ref 6.4–8.3)
RBC # BLD AUTO: 5.13 10E6/UL (ref 4.4–5.9)
SODIUM SERPL-SCNC: 139 MMOL/L (ref 136–145)
WBC # BLD AUTO: 4.7 10E3/UL (ref 4–11)

## 2023-08-22 PROCEDURE — 99213 OFFICE O/P EST LOW 20 MIN: CPT | Performed by: PSYCHIATRY & NEUROLOGY

## 2023-08-22 PROCEDURE — 99215 OFFICE O/P EST HI 40 MIN: CPT | Performed by: PSYCHIATRY & NEUROLOGY

## 2023-08-22 PROCEDURE — 85025 COMPLETE CBC W/AUTO DIFF WBC: CPT

## 2023-08-22 PROCEDURE — 70553 MRI BRAIN STEM W/O & W/DYE: CPT

## 2023-08-22 PROCEDURE — A9585 GADOBUTROL INJECTION: HCPCS | Performed by: NURSE PRACTITIONER

## 2023-08-22 PROCEDURE — 80053 COMPREHEN METABOLIC PANEL: CPT

## 2023-08-22 PROCEDURE — 36415 COLL VENOUS BLD VENIPUNCTURE: CPT

## 2023-08-22 PROCEDURE — 255N000002 HC RX 255 OP 636: Performed by: NURSE PRACTITIONER

## 2023-08-22 RX ORDER — ONDANSETRON 4 MG/1
4 TABLET, FILM COATED ORAL AT BEDTIME
Qty: 30 TABLET | Refills: 1 | Status: SHIPPED | OUTPATIENT
Start: 2023-08-22 | End: 2023-11-27

## 2023-08-22 RX ORDER — GADOBUTROL 604.72 MG/ML
10 INJECTION INTRAVENOUS ONCE
Status: COMPLETED | OUTPATIENT
Start: 2023-08-22 | End: 2023-08-22

## 2023-08-22 RX ADMIN — GADOBUTROL 15 ML: 604.72 INJECTION INTRAVENOUS at 07:34

## 2023-08-22 ASSESSMENT — PAIN SCALES - GENERAL: PAINLEVEL: NO PAIN (0)

## 2023-08-22 NOTE — LETTER
8/22/2023         RE: Brandon Ordoñez  [id number] [address] [facility] [address]        Dear Colleague,    Thank you for referring your patient, Brandon Ordoñez, to the [facility]. Please see a copy of my visit note below.    NEURO-ONCOLOGY VISIT  Aug 22, 2023    CHIEF COMPLAINT: Mr. 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 He completed chemoradiotherapy with adjuvant temozolomide on 7/20/2023. Post-radiation imaging demonstrates anticipated post-radiation induced changes and no new concerns.     The plan is to initiate adjuvant-dosed temozolomide.     Brandon is presenting in follow-up.    HISTORY OF PRESENT ILLNESS  -Brandon enjoyed the break from chemoradiotherapy.  -Appetite remains low. No lingering nausea or constipation.  -Energy is improving.   -If works for a long time, experience mild headache. With resting and/ or Tylenol, the pain resolves.   -He is no longer taking dexamethasone.  -He has no concerns for any seizure activity.  -Returned to work.      MEDICATIONS   Current Outpatient Medications   Medication Sig Dispense Refill     ondansetron (ZOFRAN) 4 MG tablet Take 1 tablet (4 mg) by mouth At Bedtime Take 30-60 mins before each dose of temozolomide. 30 tablet 1     senna-docusate (SENOKOT-S/PERICOLACE) 8.6-50 MG tablet Take 1 tablet by mouth 2 times daily as needed for constipation 20 tablet 0     temozolomide (TEMODAR) 140 MG capsule Take 2 capsules (280 mg) by mouth At Bedtime for 5 days Take ondansetron 30-60 min before temozolomide. Take on an empty stomach. (Patient not taking: Reported on 8/22/2023) 10 capsule 0     DRUG ALLERGIES No Known Allergies       IMMUNIZATIONS   Immunization History   Administered Date(s) Administered      COVID-19 MONOVALENT 12+ (Pfizer) 03/29/2021, 04/19/2021     TDAP Vaccine (Adacel) 07/30/2018       ONCOLOGIC HISTORY  -PRESENTATION: Progressive somnolence, nausea and vomiting, mild headache.  -5/4/2023 CT Head imaging with a large mass with associated calcifications and probable associated hemorrhage in the anterior right frontal region with surrounding vasogenic edema and significant local mass effect resulting in narrowing of the right more than left lateral ventricles and third ventricle, and leftward subfalcine shift as well as downward/ central transtentorial herniation.   -5/4/2023 SURGERY: Right frontal craniotomy for mass resection by Dr. Dasilva.   Post-operative imaging on 5/5 without a definite residual enhancing lesion. Nonspecific surrounding T2/FLAIR hyperintensity along the posterior and superior margins of the resection cavity may reflect vasogenic edema or nonenhancing tumor. Small volume hemorrhage and cytotoxic edema along the margins of the resection cavity and extending along the right caudate nucleus and body of the corpus callosum.  PATHOLOGY: WHO grade 2 oligodendroglioma;   FISH with deletion of both the 1p36.32 and 19q13.33 regions   No homozygous deletion of CDKN2A/B gene    IDH1 R132S mutated.  TERT c.-146C>T mutated.   TMB Score: 2.438 mut/Mb   Fusion Event: NEGATIVE   Histology is borderline due to presence of microvascular proliferation and necrosis without palisading, however, the histological criteria for distinction between grade 2 and 3 are not well defined.  -5/16/2023 NEURO-ONC: Regardless of grade, recommending chemoradiotherapy with concurrent temozolomide.  -6/30/2023 NEURO-ONC/ CHEMORADS: Here for visit at the midpoint of concurrent chemoradiotherapy.   -7/20/2023 CHEMORADS: Completed chemoradiotherapy with concurrent temozolomide 75mg/m2 (140mg)   -8/22/2023 NEURO-ONC/ MRB/ CHEMO: Clinically well. Imaging with no concerns. Starting adjuvant temozolomide 150mg/m2 (280mg),  "cycle 1 (start date tonight).     SOCIAL HISTORY   , 2 children (2 yo and 3 yo).       PHYSICAL EXAMINATION  /88   Pulse 71   Resp 16   Ht 1.664 m (5' 5.5\")   Wt 74.8 kg (164 lb 12.8 oz)   SpO2 98%   BMI 27.01 kg/m     Wt Readings from Last 2 Encounters:   08/22/23 74.8 kg (164 lb 12.8 oz)   07/21/23 70.8 kg (156 lb)      Ht Readings from Last 2 Encounters:   08/22/23 1.664 m (5' 5.5\")   05/16/23 1.664 m (5' 5.5\")     KPS:     -Generally well appearing.  -Respiratory: Normal breath sounds, no audible wheezing.  -Psychiatric: Normal mood and affect. Pleasant, talkative.  -Neurologic:   MENTAL STATUS:     Alert, oriented.    Recall: Intact.    Speech fluent.    Comprehension intact.     CRANIAL NERVES:     Pupils are equal, round.     Symmetric facial movements.   Hearing intact.   No dysarthria.  GAIT:  Walks without assistance.       MEDICAL RECORDS  Personally reviewed pharmacy notes, oncology notes and [employer] messages.    LABS  Personally reviewed all available lab results; CBC and CMP.    IMAGING  Personally reviewed MR brain imaging from today and compared to pre-radiation imaging. To my eye, there are has been an overall reduction in chela-cavitary as well as periventricular T2 FLAIR, with clearing of the resection cavity. There is only a thin linear degree of contrast enhancement about the posterior-lateral margin that is post-operative in nature. No concerning findings.     MRI T2 FLAIR 5/2023, 8/2023;       Formal read; \"Nonspecific residual T2 hyperintense signal along the posterior margin of the resection cavity. Thin enhancement along the margins of the resection cavity, probably postoperative.\"    Imaging was shown to and results were reviewed with Brandon.      IMPRESSION  Clinic time of 40 minutes was spent reviewing data, in evaluation, and coordinating care for this high complexity visit. This was in addition to answering questions pertaining to my recommendations and " devising the plan as outlined below.      Prior to Brandon appointment today, I have been in communication with neuro-pathology regarding the final diagnosis. There was no homozygous deletion of CDKN2A/B gene. Both IDH1 and TERT were mutated, which conveys a more favorable prognosis. Histology was borderline due to presence of microvascular proliferation and necrosis without palisading, however, the histological criteria for distinction between grade 2 and 3 are not well defined. Taken together, the final diagnosis is that of a grade 2 oligodendroglioma and I discussed this with Brandon today.     Brandon has no new neurologic concerns. Headaches are mild and can be trigger by exertion. His fatigue continues to improve. I personally reviewed the treatment notes from radiation oncology.     Imaging as detailed above with no concerning findings. The plan is to repeat imaging per NCCN guidelines of every 3-6 months for the first 5 years (at least throught 8/2028) and then, every 6 months thereafter. At this point in time, will repeat imaging in 4 months.     With regard to cancer-directed therapy, the plan is to start adjuvant temozolomide. In the adjuvant phase, temozolomide is dosed at 150mg/m2 for days 1-5 of a 28 day cycle for the first cycle, and then increased to 200mg/m2 if tolerated. The previously reviewed common side effects of temozolomide can still be anticipated and were discussed as including, but not limited to, fatigue, nausea, and constipation. Bone marrow suppression can result in leukopenia and thrombocytopenia. CBC and CMP from today with no concerns. I alerted pharmacy and RNCC to the plan.      PROBLEM LIST  WHO grade 2 oligodendroglioma  Headaches    PLAN  -CANCER DIRECTED THERAPY-  Will initiate adjuvant temozolomide at 150mg/m2 (280mg), cycle 1.   -If this dose is well tolerated, will increase to 200mg/m2 for cycle 2.  -Supportive medications; Zofran and bowel regimen.     -Repeat 28  "day cycle if WBC >= 3, ANC >= 1.5, HgB >= 10, and platelets >= 100.  -Surveillance labs reviewed, at goal for chemotherapy.   -Will continue every 2 weeks CBC and CMP every 4 weeks.    -Repeat imaging in 4 months.     -STEROIDS-  -Off dexamethasone.     -SEIZURE MANAGEMENT-  -While this patient is at increased risk of having seizures, given the lack of seizure history, there is no indication to prescribe an antiepileptic at this time.     -Quality of life/ MOOD/ FATIGUE-  -Denies any mood issues.  -Fatigue improving.   -Continue to monitor mood as untreated/ undertreated depression can worsen fatigue, dysorexia, and quality of life.    Return to clinic in 1 month with Lilibeth aMrr, GWEN, CNP + labs and to discuss dosing for cycle 2 of chemotherapy.     In the meantime, Brandon knows to call the clinic with any concerns and he can be seen sooner if needed.     Abril Bullock MD  Neuro-oncology      Oncology Rooming Note    August 22, 2023 10:02 AM   Brandon Ordoñez is a 38 year old male who presents for:    Chief Complaint   Patient presents with     Oncology Clinic Visit     Initial Vitals: Resp 16   Ht 1.664 m (5' 5.5\")   Wt 74.8 kg (164 lb 12.8 oz)   BMI 27.01 kg/m   Estimated body mass index is 27.01 kg/m  as calculated from the following:    Height as of this encounter: 1.664 m (5' 5.5\").    Weight as of this encounter: 74.8 kg (164 lb 12.8 oz). Body surface area is 1.86 meters squared.  No Pain (0) Comment: Data Unavailable   No LMP for male patient.  Allergies reviewed: Yes  Medications reviewed: Yes    Medications: Medication refills not needed today.  Pharmacy name entered into "[employer]:    [id number] [place] [facility] [address] [address] [id number] - [place], [place] - [address] AT [facility] OF [place] [address] & [facility] [address]            Again, thank you for allowing me to participate in " the care of your patient.        Sincerely,        Abril Bullock MD

## 2023-08-22 NOTE — PROGRESS NOTES
Oral Chemotherapy Monitoring Program    Lab Monitoring Plan  CBC/CMP at baseline and monthly  Labs drawn outside of [place]: n/a  Subjective/Objective:  Brandon Ordoñez is a 38 year old male seen in clinic for an initial visit for oral chemotherapy education.  This was a mini-teach since patient was previously on temozolomide concurrently with radiation.  Discussed change in dose, and that he will only take for 5 nights, then take 23 nights off.  Recommended using a wall calendar or his phone to keep track.  Advised him to continue taking Zofran 30-60 minutes prior to dose.  Patient asked if it's ok for him to take 2 tablets sometimes, and I reassured him that is appropriate since his Zofran is only 4mg strength.  Sent IB to Dr Bullock about increasing Zofran premed to 8mg tablets.      Patient stores medication in original container in his bedroom.  No one else handles the medication for me.  Reviewed possible side effects.  Understands to call us if there are signs of infection or bleeding.  Patient had some fatigue with temozolomide previously.  Advised him to take a daily nap, if needed, and to stay active with a daily walk.  Discussed possible constipation, particularly if he takes the higher strength Zofran.  He has no c/o constipation but is aware to start OTC bowel regimen if it occurs, along with drinking plenty of water.  Warned about possible decrease in appetite - patient did experience this before but was able to find things to eat.      Zofran prescription sent to [facility]'s local [facility] today.  Temozolomide order sent to [facility] pharmacy on 8/18.  Gave patient the phone number to call the specialty pharmacy and set up delivery.  He will start treatment when he receives drug.        6/2/2023     3:00 PM 6/13/2023     1:00 PM 6/14/2023    10:00 AM 6/20/2023     4:00 PM 6/27/2023     4:00 PM 8/18/2023     9:00 AM 8/22/2023    11:00 AM   ORAL CHEMOTHERAPY   Assessment Type Other Chart  Review;Initial Follow up;Lab Monitoring Lab Monitoring Lab Monitoring Lab Monitoring Chart Review;Initial Work up;Refill New Teach   Diagnosis Code Brain Cancer - Glioblastoma Brain Cancer - Glioblastoma Brain Cancer - Glioblastoma Brain Cancer - Glioblastoma Brain Cancer - Glioblastoma     Providers Dr. Kobe Bullock   Clinic Name/Location [facility]   Drug Name Temodar (temozolomide) Temodar (temozolomide) Temodar (temozolomide) Temodar (temozolomide) Temodar (temozolomide) Temodar (temozolomide) Temodar (temozolomide)   Dose 140 mg 140 mg 140 mg 140 mg 140 mg 280 mg 280 mg   Current Schedule QHS QHS QHS QHS QHS Daily Daily   Cycle Details Continuous for 42 days during XRT Continuous for 42 days during XRT Continuous for 42 days during XRT Continuous for 42 days during XRT Continuous for 42 days during XRT 5 days on, 23 days off 5 days on, 23 days off   Start Date of Last Cycle  6/6/2023        Planned next cycle start date      8/22/2023 8/24/2023   Adherence Assessment  Adherent        Adverse Effects  Nausea;Constipation;Fatigue No AE identified during assessment  No AE identified during assessment No AE identified during assessment    Nausea  Grade 1        Pharmacist Intervention(nausea)  Yes        Intervention(s)  Patient education        Constipation  Grade 1        Pharmacist Intervention(constipation)  Yes        Intervention(s)  Patient education        Fatigue  Grade 1        Pharmacist Intervention(fatigue)  No        Any new drug interactions?      No No   Is the dose as ordered appropriate for the patient?      Yes Yes       Last PHQ-2 Score on record:       5/9/2023     1:03 PM 5/3/2022     8:46 AM   PHQ-2 ( 1999 Pfizer)   Q1: Little interest or pleasure in doing things 0 0   Q2: Feeling down, depressed or hopeless 0 0   PHQ-2 Score 0 0   Q1: Little interest or pleasure in doing things Not at all  "   Q2: Feeling down, depressed or hopeless Not at all    PHQ-2 Score 0        Vitals:  BP:   BP Readings from Last 1 Encounters:   08/22/23 130/88     Wt Readings from Last 1 Encounters:   08/22/23 74.8 kg (164 lb 12.8 oz)     Estimated body surface area is 1.86 meters squared as calculated from the following:    Height as of an earlier encounter on 8/22/23: 1.664 m (5' 5.5\").    Weight as of an earlier encounter on 8/22/23: 74.8 kg (164 lb 12.8 oz).    Labs:  _  Result Component Current Result Ref Range   Sodium 139 (8/22/2023) 136 - 145 mmol/L     _  Result Component Current Result Ref Range   Potassium 4.7 (8/22/2023) 3.4 - 5.3 mmol/L     _  Result Component Current Result Ref Range   Calcium 9.5 (8/22/2023) 8.6 - 10.0 mg/dL     No results found for Mag within last 30 days.     No results found for Phos within last 30 days.     _  Result Component Current Result Ref Range   Albumin 4.8 (8/22/2023) 3.5 - 5.2 g/dL     _  Result Component Current Result Ref Range   Urea Nitrogen 8.0 (8/22/2023) 6.0 - 20.0 mg/dL     _  Result Component Current Result Ref Range   Creatinine 0.84 (8/22/2023) 0.67 - 1.17 mg/dL     _  Result Component Current Result Ref Range   AST 19 (8/22/2023) 0 - 45 U/L     _  Result Component Current Result Ref Range   ALT 25 (8/22/2023) 0 - 70 U/L     _  Result Component Current Result Ref Range   Bilirubin Total 0.4 (8/22/2023) <=1.2 mg/dL     _  Result Component Current Result Ref Range   WBC Count 4.7 (8/22/2023) 4.0 - 11.0 10e3/uL     _  Result Component Current Result Ref Range   Hemoglobin 15.0 (8/22/2023) 13.3 - 17.7 g/dL     _  Result Component Current Result Ref Range   Platelet Count 239 (8/22/2023) 150 - 450 10e3/uL     _  Result Component Current Result Ref Range   Absolute Neutrophils 2.5 (8/22/2023) 1.6 - 8.3 10e3/uL        Assessment:  Patient is appropriate to start therapy.    Plan:  Basic chemotherapy teaching was reviewed with the patient including indication, start date of " therapy, dose, administration, adverse effects, missed doses, food and drug interactions, monitoring, side effect management, office contact information, and safe handling. Written materials were provided and all questions answered.    Follow-Up:  1 week follow up phone call around 9/1     Dania Marks PharmD  Oral Chemotherapy Pharmacist  [phone]

## 2023-08-22 NOTE — PROGRESS NOTES
"Oncology Rooming Note    August 22, 2023 10:02 AM   Brandon Ordoñez is a 38 year old male who presents for:    Chief Complaint   Patient presents with    Oncology Clinic Visit     Initial Vitals: Resp 16   Ht 1.664 m (5' 5.5\")   Wt 74.8 kg (164 lb 12.8 oz)   BMI 27.01 kg/m   Estimated body mass index is 27.01 kg/m  as calculated from the following:    Height as of this encounter: 1.664 m (5' 5.5\").    Weight as of this encounter: 74.8 kg (164 lb 12.8 oz). Body surface area is 1.86 meters squared.  No Pain (0) Comment: Data Unavailable   No LMP for male patient.  Allergies reviewed: Yes  Medications reviewed: Yes    Medications: Medication refills not needed today.  Pharmacy name entered into EPIC:    [id number] [place] [facility] [place] [address] [place] [address] - [address] [id number] [place], [place] - [address]  AT [facility] OF [facility] [address] & [facility] [address]            "

## 2023-08-31 ENCOUNTER — DOCUMENTATION ONLY (OUTPATIENT)
Dept: PHARMACY | Facility: CLINIC | Age: 38
End: 2023-08-31
Payer: COMMERCIAL

## 2023-08-31 NOTE — PROGRESS NOTES
Oral Chemotherapy Monitoring Program     Placed call to patient in follow up of oral chemotherapy. Left message requesting call back. No drug names were mentioned. Will update when response received.     Paula Mcclure  Pharmacy Intern  [facility]  [phone]     post operative complication/fever

## 2023-09-05 ENCOUNTER — TELEPHONE (OUTPATIENT)
Dept: PHARMACY | Facility: CLINIC | Age: 38
End: 2023-09-05
Payer: COMMERCIAL

## 2023-09-05 NOTE — ORAL ONC MGMT
Oral Chemotherapy Monitoring Program.    Patient currently on adjuvant temozolomide therapy. Left voicemail requesting return call to check in on how his first cycle of adjuvant temozolomide went, confirm start date, and discuss dose escalation for C2.  No drug names were mentioned.     Pharmacy will look for labs later this week.  Tracy Wheeler PharmD  September 5, 2023

## 2023-09-06 ENCOUNTER — LAB (OUTPATIENT)
Dept: LAB | Facility: CLINIC | Age: 38
End: 2023-09-06
Payer: COMMERCIAL

## 2023-09-06 DIAGNOSIS — D61.810 ANTINEOPLASTIC CHEMOTHERAPY INDUCED PANCYTOPENIA (H): ICD-10-CM

## 2023-09-06 DIAGNOSIS — T45.1X5A ANTINEOPLASTIC CHEMOTHERAPY INDUCED PANCYTOPENIA (H): ICD-10-CM

## 2023-09-06 DIAGNOSIS — T45.1X5A CHEMOTHERAPY-INDUCED NAUSEA AND VOMITING: ICD-10-CM

## 2023-09-06 DIAGNOSIS — R11.2 CHEMOTHERAPY-INDUCED NAUSEA AND VOMITING: ICD-10-CM

## 2023-09-06 LAB
ALBUMIN SERPL BCG-MCNC: 4.9 G/DL (ref 3.5–5.2)
ALP SERPL-CCNC: 102 U/L (ref 40–129)
ALT SERPL W P-5'-P-CCNC: 25 U/L (ref 0–70)
ANION GAP SERPL CALCULATED.3IONS-SCNC: 10 MMOL/L (ref 7–15)
AST SERPL W P-5'-P-CCNC: 18 U/L (ref 0–45)
BASOPHILS # BLD AUTO: 0 10E3/UL (ref 0–0.2)
BASOPHILS NFR BLD AUTO: 0 %
BILIRUB SERPL-MCNC: 0.8 MG/DL
BUN SERPL-MCNC: 7.2 MG/DL (ref 6–20)
CALCIUM SERPL-MCNC: 9.6 MG/DL (ref 8.6–10)
CHLORIDE SERPL-SCNC: 99 MMOL/L (ref 98–107)
CREAT SERPL-MCNC: 0.93 MG/DL (ref 0.67–1.17)
DEPRECATED HCO3 PLAS-SCNC: 29 MMOL/L (ref 22–29)
EGFRCR SERPLBLD CKD-EPI 2021: >90 ML/MIN/1.73M2
EOSINOPHIL # BLD AUTO: 0.1 10E3/UL (ref 0–0.7)
EOSINOPHIL NFR BLD AUTO: 1 %
ERYTHROCYTE [DISTWIDTH] IN BLOOD BY AUTOMATED COUNT: 11.5 % (ref 10–15)
GLUCOSE SERPL-MCNC: 99 MG/DL (ref 70–99)
HCT VFR BLD AUTO: 44 % (ref 40–53)
HGB BLD-MCNC: 15.2 G/DL (ref 13.3–17.7)
IMM GRANULOCYTES # BLD: 0 10E3/UL
IMM GRANULOCYTES NFR BLD: 1 %
LYMPHOCYTES # BLD AUTO: 2.3 10E3/UL (ref 0.8–5.3)
LYMPHOCYTES NFR BLD AUTO: 30 %
MCH RBC QN AUTO: 29.2 PG (ref 26.5–33)
MCHC RBC AUTO-ENTMCNC: 34.5 G/DL (ref 31.5–36.5)
MCV RBC AUTO: 85 FL (ref 78–100)
MONOCYTES # BLD AUTO: 0.6 10E3/UL (ref 0–1.3)
MONOCYTES NFR BLD AUTO: 8 %
NEUTROPHILS # BLD AUTO: 4.5 10E3/UL (ref 1.6–8.3)
NEUTROPHILS NFR BLD AUTO: 60 %
NRBC # BLD AUTO: 0 10E3/UL
NRBC BLD AUTO-RTO: 0 /100
PLATELET # BLD AUTO: 229 10E3/UL (ref 150–450)
POTASSIUM SERPL-SCNC: 4.2 MMOL/L (ref 3.4–5.3)
PROT SERPL-MCNC: 7.8 G/DL (ref 6.4–8.3)
RBC # BLD AUTO: 5.21 10E6/UL (ref 4.4–5.9)
SODIUM SERPL-SCNC: 138 MMOL/L (ref 136–145)
WBC # BLD AUTO: 7.5 10E3/UL (ref 4–11)

## 2023-09-06 PROCEDURE — 80053 COMPREHEN METABOLIC PANEL: CPT

## 2023-09-06 PROCEDURE — 85025 COMPLETE CBC W/AUTO DIFF WBC: CPT

## 2023-09-06 PROCEDURE — 36415 COLL VENOUS BLD VENIPUNCTURE: CPT

## 2023-09-07 ENCOUNTER — DOCUMENTATION ONLY (OUTPATIENT)
Dept: ONCOLOGY | Facility: CLINIC | Age: 38
End: 2023-09-07
Payer: COMMERCIAL

## 2023-09-07 NOTE — PROGRESS NOTES
Oral Chemotherapy Monitoring Program     Placed call to patient in follow up of Temozolomide therapy. I believe patient began adj TMZ on 8/24/23 (still waiting to confirm directly with patient). Calling to do our 1 week assessment with patient. Patient has responded to [facility] message so will try that method of communication again. Patient also did get mid cycle labs on 9/6. No new concerns on labs from 9/6. Still need to ask about dose escalation for C2 of adj TMZ (will send message to provider for update via Epic IB message about unable to get in touch with him on the phone to ask specific questions). C2 adj TMZ anticipated to start 9/21 pending labs and provider visit.     Last Comprehensive Metabolic Panel:  Sodium   Date Value Ref Range Status   09/06/2023 138 136 - 145 mmol/L Final   12/09/2019 137 133 - 144 mmol/L Final     Potassium   Date Value Ref Range Status   09/06/2023 4.2 3.4 - 5.3 mmol/L Final   12/09/2019 4.0 3.4 - 5.3 mmol/L Final     Chloride   Date Value Ref Range Status   09/06/2023 99 98 - 107 mmol/L Final   12/09/2019 103 94 - 109 mmol/L Final     Carbon Dioxide   Date Value Ref Range Status   12/09/2019 30 20 - 32 mmol/L Final     Carbon Dioxide (CO2)   Date Value Ref Range Status   09/06/2023 29 22 - 29 mmol/L Final     Anion Gap   Date Value Ref Range Status   09/06/2023 10 7 - 15 mmol/L Final   12/09/2019 4 3 - 14 mmol/L Final     Glucose   Date Value Ref Range Status   09/06/2023 99 70 - 99 mg/dL Final   12/09/2019 99 70 - 99 mg/dL Final     GLUCOSE BY METER POCT   Date Value Ref Range Status   05/08/2023 139 (H) 70 - 99 mg/dL Final     Urea Nitrogen   Date Value Ref Range Status   09/06/2023 7.2 6.0 - 20.0 mg/dL Final   12/09/2019 13 7 - 30 mg/dL Final     Creatinine   Date Value Ref Range Status   09/06/2023 0.93 0.67 - 1.17 mg/dL Final   12/09/2019 0.79 0.66 - 1.25 mg/dL Final     GFR Estimate   Date Value Ref Range Status   09/06/2023 >90 >60 mL/min/1.73m2 Final   12/09/2019 >90 >60  mL/min/[1.73_m2] Final     Comment:     Non  GFR Calc  Starting 12/18/2018, serum creatinine based estimated GFR (eGFR) will be   calculated using the Chronic Kidney Disease Epidemiology Collaboration   (CKD-EPI) equation.       Calcium   Date Value Ref Range Status   09/06/2023 9.6 8.6 - 10.0 mg/dL Final   12/09/2019 9.2 8.5 - 10.1 mg/dL Final     Bilirubin Total   Date Value Ref Range Status   09/06/2023 0.8 <=1.2 mg/dL Final     Alkaline Phosphatase   Date Value Ref Range Status   09/06/2023 102 40 - 129 U/L Final     ALT   Date Value Ref Range Status   09/06/2023 25 0 - 70 U/L Final     Comment:     Reference intervals for this test were updated on 6/12/2023 to more accurately reflect our healthy population. There may be differences in the flagging of prior results with similar values performed with this method. Interpretation of those prior results can be made in the context of the updated reference intervals.       AST   Date Value Ref Range Status   09/06/2023 18 0 - 45 U/L Final     Comment:     Reference intervals for this test were updated on 6/12/2023 to more accurately reflect our healthy population. There may be differences in the flagging of prior results with similar values performed with this method. Interpretation of those prior results can be made in the context of the updated reference intervals.               Lab Results   Component Value Date    WBC 7.5 09/06/2023    WBC 7.4 07/30/2018     Lab Results   Component Value Date    RBC 5.21 09/06/2023    RBC 5.27 07/30/2018     Lab Results   Component Value Date    HGB 15.2 09/06/2023    HGB 15.6 07/30/2018     Lab Results   Component Value Date    HCT 44.0 09/06/2023    HCT 45.2 07/30/2018     Lab Results   Component Value Date    MCV 85 09/06/2023    MCV 86 07/30/2018     Lab Results   Component Value Date    MCH 29.2 09/06/2023    MCH 29.6 07/30/2018     Lab Results   Component Value Date    MCHC 34.5 09/06/2023    MCHC 34.5  07/30/2018     Lab Results   Component Value Date    RDW 11.5 09/06/2023    RDW 11.8 07/30/2018     Lab Results   Component Value Date     09/06/2023     07/30/2018   ANC = 4.5    Left message to please call back otherwise we will attempt a call in the next few days. No patient or drug names were mentioned.   9/11 look for [employer] message updates and if nothing, try calling patient again. Need to ask about C2 dose escalation.   9/19 Dr. Bullock appt and labs  9/21 C2 adj TMZ to start    Kobe Najera PharmD  [facility] Oral Chemotherapy  [phone]  September 7, 2023

## 2023-09-11 ENCOUNTER — PATIENT OUTREACH (OUTPATIENT)
Dept: ONCOLOGY | Facility: CLINIC | Age: 38
End: 2023-09-11
Payer: COMMERCIAL

## 2023-09-11 NOTE — PROGRESS NOTES
[facility]: Cancer Care                                                                                          Multiple calls made to patient and wife to reschedule follow up on 9/19. Patient will need to see Lilibeth on 9/15 with labs.   Awaiting for call back to reschedule     Signature:  Ariadne Joy RN

## 2023-09-18 NOTE — PROGRESS NOTES
NEURO-ONCOLOGY VISIT  Sep 19, 2023    CHIEF COMPLAINT: Mr. 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 He completed chemoradiotherapy with adjuvant temozolomide on 7/20/2023. Post-radiation imaging demonstrated anticipated post-radiation induced changes and no new concerns.     The plan is to continue treatment on adjuvant-dosed temozolomide.     Brandon is presenting in follow-up.    HISTORY OF PRESENT ILLNESS  -Brandon did not tolerate his first cycle of chemotherapy; following 2 days of therapy, he was so fatigued that he could not work. At that point, he stopped chemotherapy and did not completed the remaining 3 days. Had nausea, but this was somewhat controlled with Zofran. Low appetite. Energy is now improving.   -Having mild headache. With resting and/ or Tylenol, the pain resolves.   -Off dexamethasone.  -He has no concerns for any seizure activity.  -No weakness, no sensation changes, and no vision changes.       MEDICATIONS   Current Outpatient Medications   Medication Sig Dispense Refill     dexAMETHasone (DECADRON) 2 MG tablet Take 3 tablets (6 mg) by mouth daily (with breakfast) for 6 days, THEN 2 tablets (4 mg) daily (with breakfast) for 3 days, THEN 1 tablet (2 mg) daily (with breakfast) for 3 days. 27 tablet 0     ondansetron (ZOFRAN) 4 MG tablet Take 1 tablet (4 mg) by mouth At Bedtime Take 30-60 mins before each dose of temozolomide. 30 tablet 1     senna-docusate (SENOKOT-S/PERICOLACE) 8.6-50 MG tablet Take 1 tablet by mouth 2 times daily as needed for constipation 20 tablet 0     temozolomide (TEMODAR) 140 MG capsule Take 2 capsules (280 mg) by mouth At Bedtime for 5 days Take ondansetron 30-60 min before temozolomide. Take on an empty stomach. (Patient not taking:  Reported on 8/22/2023) 10 capsule 0     DRUG ALLERGIES No Known Allergies       IMMUNIZATIONS   Immunization History   Administered Date(s) Administered     COVID-19 MONOVALENT 12+ (Pfizer) 03/29/2021, 04/19/2021     TDAP Vaccine (Adacel) 07/30/2018       ONCOLOGIC HISTORY  -PRESENTATION: Progressive somnolence, nausea and vomiting, mild headache.  -5/4/2023 CT Head imaging with a large mass with associated calcifications and probable associated hemorrhage in the anterior right frontal region with surrounding vasogenic edema and significant local mass effect resulting in narrowing of the right more than left lateral ventricles and third ventricle, and leftward subfalcine shift as well as downward/ central transtentorial herniation.   -5/4/2023 SURGERY: Right frontal craniotomy for mass resection by Dr. Dasilva.   Post-operative imaging on 5/5 without a definite residual enhancing lesion. Nonspecific surrounding T2/FLAIR hyperintensity along the posterior and superior margins of the resection cavity may reflect vasogenic edema or nonenhancing tumor. Small volume hemorrhage and cytotoxic edema along the margins of the resection cavity and extending along the right caudate nucleus and body of the corpus callosum.  PATHOLOGY: WHO grade 2 oligodendroglioma;   FISH with deletion of both the 1p36.32 and 19q13.33 regions   No homozygous deletion of CDKN2A/B gene    IDH1 R132S mutated.  TERT c.-146C>T mutated.   TMB Score: 2.438 mut/Mb   Fusion Event: NEGATIVE   Histology is borderline due to presence of microvascular proliferation and necrosis without palisading, however, the histological criteria for distinction between grade 2 and 3 are not well defined.  -5/16/2023 NEURO-ONC: Regardless of grade, recommending chemoradiotherapy with concurrent temozolomide.  -6/30/2023 NEURO-ONC/ CHEMORADS: Here for visit at the midpoint of concurrent chemoradiotherapy.   -7/20/2023 CHEMORADS: Completed chemoradiotherapy with concurrent  "temozolomide 75mg/m2 (140mg)   -8/22/2023 NEURO-ONC/ MRB/ CHEMO: Clinically well. Imaging with no concerns. Starting adjuvant temozolomide 150mg/m2 (280mg), cycle 1 (start date tonight).   -9/19/2023 NEURO-ONC/ CHEMO: No new neurological concerns. Modifications to supportive medications to better tolerate adjuvant temozolomide. Will repeat dosing of cycle 1 at 150mg/m2 (280mg).     SOCIAL HISTORY   , 2 children (2 yo and 5 yo).       PHYSICAL EXAMINATION  /83 (BP Location: Right arm, Patient Position: Sitting, Cuff Size: Adult Large)   Pulse 77   Temp 98.4  F (36.9  C) (Oral)   Resp 18   Wt 74.1 kg (163 lb 6.4 oz)   SpO2 99%   BMI 26.78 kg/m     Wt Readings from Last 2 Encounters:   09/19/23 74.1 kg (163 lb 6.4 oz)   08/22/23 74.8 kg (164 lb 12.8 oz)      Ht Readings from Last 2 Encounters:   08/22/23 1.664 m (5' 5.5\")   05/16/23 1.664 m (5' 5.5\")     KPS: 100    -Generally well appearing.  -Respiratory: Normal breath sounds, no audible wheezing.  -Psychiatric: Normal mood and affect. Pleasant, talkative.  -Neurologic:   MENTAL STATUS:     Alert, oriented.    Recall: Intact.    Speech fluent.    Comprehension intact.     CRANIAL NERVES:     Pupils are equal, round.     Symmetric facial movements.   Hearing intact.   No dysarthria.  GAIT:  Walks without assistance.       MEDICAL RECORDS  Personally reviewed pharmacy notes, oncology notes and [facility] messages.    LABS  Personally reviewed all available lab results; CBC and CMP.    IMAGING  No new neuro-imaging to review.        IMPRESSION  Clinic time of 40 minutes was spent reviewing data, in evaluation, and coordinating care for this high complexity visit. This was in addition to answering questions pertaining to my recommendations and devising the plan as outlined below.      Brandon has no new neurologic concerns. Headaches are mild and can be trigger by exertion.     Brandon did not tolerate his first cycle of chemotherapy. Following 2 " days of therapy, he was so fatigued that he could not work. At that point, he stopped chemotherapy and did not completed the remaining 3 days. He had nausea, but this was somewhat controlled with Zofran plus noted low appetite. His energy is now improving.     I discussed with Brandon the critical importance on his overall prognosis of completing adjuvant therapy. I also discussed the importance of remaining in close communication with RNCC and pharmacy to communicate concerns and to ensure safely administration of chemotherapy. Brandon voiced understanding.     We discussed the option for improved supportive medications for him to better tolerate adjuvant chemotherapy. Dexamethasone can improve energy, reduce nausea, and increase appetite. Will also continue to use Zofran. Will RESTART cycle 1; D1-5 of therapy. I alerted pharmacy and RNCC to the plan.    CBC and CMP from today with no concerns. I will check labs for any reversible, contributing factor to his fatigue, including vitamin B12, D deficiency, low iron, and thyroid dysfunction. ADDENDUM: All labs returned without concerning abnormalities.     PROBLEM LIST  WHO grade 2 oligodendroglioma  Headaches    PLAN  -CANCER DIRECTED THERAPY-  Will initiate adjuvant temozolomide at 150mg/m2 (280mg), cycle 1.   -Supportive medications; Zofran and bowel regimen plus dexamethasone as detailed below.     -Repeat 28 day cycle if WBC >= 3, ANC >= 1.5, HgB >= 10, and platelets >= 100.  -Surveillance labs reviewed, at goal for chemotherapy.   -Will continue every 2 weeks CBC and CMP every 4 weeks.    -Repeat imaging in 3 months (12/2023).     -STEROIDS-  -Dexamethasone use while on chemotherapy; 6mg daily for 6 days, 4mg daily for 3 days, 2mg daily for 3 days.    Refilled today.     -SEIZURE MANAGEMENT-  -While this patient is at increased risk of having seizures, given the lack of seizure history, there is no indication to prescribe an antiepileptic at this time.      -Quality of life/ MOOD/ FATIGUE-  -Denies any mood issues.  -Fatigue; chemotherapy-associated.    Labs in 9/2023 Vitamin B12/ D deficiency, low iron, and thyroid studies without concern.   -Continue to monitor mood as untreated/ undertreated depression can worsen fatigue, dysorexia, and quality of life.    Return to clinic in 1 month with GWEN Pickard, CNP + labs and to discuss dosing for cycle 2 of chemotherapy.     In the meantime, Brandon knows to call the clinic with any concerns and he can be seen sooner if needed.     Abril Bullock MD  Neuro-oncology

## 2023-09-19 ENCOUNTER — LAB (OUTPATIENT)
Dept: INFUSION THERAPY | Facility: CLINIC | Age: 38
End: 2023-09-19
Attending: PSYCHIATRY & NEUROLOGY
Payer: COMMERCIAL

## 2023-09-19 ENCOUNTER — ONCOLOGY VISIT (OUTPATIENT)
Dept: ONCOLOGY | Facility: CLINIC | Age: 38
End: 2023-09-19
Attending: PSYCHIATRY & NEUROLOGY
Payer: COMMERCIAL

## 2023-09-19 VITALS
BODY MASS INDEX: 26.78 KG/M2 | OXYGEN SATURATION: 99 % | HEART RATE: 77 BPM | TEMPERATURE: 98.4 F | WEIGHT: 163.4 LBS | SYSTOLIC BLOOD PRESSURE: 130 MMHG | DIASTOLIC BLOOD PRESSURE: 83 MMHG | RESPIRATION RATE: 18 BRPM

## 2023-09-19 DIAGNOSIS — T45.1X5A ANTINEOPLASTIC CHEMOTHERAPY INDUCED PANCYTOPENIA (H): ICD-10-CM

## 2023-09-19 DIAGNOSIS — T45.1X5A CHEMOTHERAPY-INDUCED NAUSEA AND VOMITING: ICD-10-CM

## 2023-09-19 DIAGNOSIS — E53.8 VITAMIN B12 DEFICIENCY (NON ANEMIC): ICD-10-CM

## 2023-09-19 DIAGNOSIS — R11.2 CHEMOTHERAPY-INDUCED NAUSEA AND VOMITING: ICD-10-CM

## 2023-09-19 DIAGNOSIS — E55.9 VITAMIN D DEFICIENCY: ICD-10-CM

## 2023-09-19 DIAGNOSIS — C71.9 OLIGODENDROGLIOMA (H): Primary | ICD-10-CM

## 2023-09-19 DIAGNOSIS — E07.9 THYROID DYSFUNCTION: ICD-10-CM

## 2023-09-19 DIAGNOSIS — R53.83 FATIGUE, UNSPECIFIED TYPE: ICD-10-CM

## 2023-09-19 DIAGNOSIS — D50.9 IRON DEFICIENCY ANEMIA, UNSPECIFIED IRON DEFICIENCY ANEMIA TYPE: ICD-10-CM

## 2023-09-19 DIAGNOSIS — D61.810 ANTINEOPLASTIC CHEMOTHERAPY INDUCED PANCYTOPENIA (H): ICD-10-CM

## 2023-09-19 LAB
ALBUMIN SERPL BCG-MCNC: 4.8 G/DL (ref 3.5–5.2)
ALP SERPL-CCNC: 105 U/L (ref 40–129)
ALT SERPL W P-5'-P-CCNC: 21 U/L (ref 0–70)
ANION GAP SERPL CALCULATED.3IONS-SCNC: 12 MMOL/L (ref 7–15)
AST SERPL W P-5'-P-CCNC: 15 U/L (ref 0–45)
BASOPHILS # BLD AUTO: 0 10E3/UL (ref 0–0.2)
BASOPHILS NFR BLD AUTO: 0 %
BILIRUB SERPL-MCNC: 0.3 MG/DL
BUN SERPL-MCNC: 8.3 MG/DL (ref 6–20)
CALCIUM SERPL-MCNC: 9.6 MG/DL (ref 8.6–10)
CHLORIDE SERPL-SCNC: 102 MMOL/L (ref 98–107)
CREAT SERPL-MCNC: 0.95 MG/DL (ref 0.67–1.17)
DEPRECATED HCO3 PLAS-SCNC: 27 MMOL/L (ref 22–29)
EGFRCR SERPLBLD CKD-EPI 2021: >90 ML/MIN/1.73M2
EOSINOPHIL # BLD AUTO: 0.1 10E3/UL (ref 0–0.7)
EOSINOPHIL NFR BLD AUTO: 1 %
ERYTHROCYTE [DISTWIDTH] IN BLOOD BY AUTOMATED COUNT: 11.4 % (ref 10–15)
GLUCOSE SERPL-MCNC: 112 MG/DL (ref 70–99)
HCT VFR BLD AUTO: 41.8 % (ref 40–53)
HGB BLD-MCNC: 13.9 G/DL (ref 13.3–17.7)
IMM GRANULOCYTES # BLD: 0 10E3/UL
IMM GRANULOCYTES NFR BLD: 0 %
IRON BINDING CAPACITY (ROCHE): 375 UG/DL (ref 240–430)
IRON SATN MFR SERPL: 19 % (ref 15–46)
IRON SERPL-MCNC: 72 UG/DL (ref 61–157)
LYMPHOCYTES # BLD AUTO: 1.9 10E3/UL (ref 0.8–5.3)
LYMPHOCYTES NFR BLD AUTO: 31 %
MCH RBC QN AUTO: 28.4 PG (ref 26.5–33)
MCHC RBC AUTO-ENTMCNC: 33.3 G/DL (ref 31.5–36.5)
MCV RBC AUTO: 85 FL (ref 78–100)
MONOCYTES # BLD AUTO: 0.3 10E3/UL (ref 0–1.3)
MONOCYTES NFR BLD AUTO: 6 %
NEUTROPHILS # BLD AUTO: 3.7 10E3/UL (ref 1.6–8.3)
NEUTROPHILS NFR BLD AUTO: 62 %
NRBC # BLD AUTO: 0 10E3/UL
NRBC BLD AUTO-RTO: 0 /100
PLATELET # BLD AUTO: 260 10E3/UL (ref 150–450)
POTASSIUM SERPL-SCNC: 4 MMOL/L (ref 3.4–5.3)
PROT SERPL-MCNC: 8 G/DL (ref 6.4–8.3)
RBC # BLD AUTO: 4.9 10E6/UL (ref 4.4–5.9)
SODIUM SERPL-SCNC: 141 MMOL/L (ref 136–145)
TSH SERPL DL<=0.005 MIU/L-ACNC: 0.65 UIU/ML (ref 0.3–4.2)
WBC # BLD AUTO: 6.1 10E3/UL (ref 4–11)

## 2023-09-19 PROCEDURE — 83550 IRON BINDING TEST: CPT | Performed by: PSYCHIATRY & NEUROLOGY

## 2023-09-19 PROCEDURE — 82607 VITAMIN B-12: CPT | Performed by: PSYCHIATRY & NEUROLOGY

## 2023-09-19 PROCEDURE — 82306 VITAMIN D 25 HYDROXY: CPT | Performed by: PSYCHIATRY & NEUROLOGY

## 2023-09-19 PROCEDURE — 36415 COLL VENOUS BLD VENIPUNCTURE: CPT | Performed by: PSYCHIATRY & NEUROLOGY

## 2023-09-19 PROCEDURE — 36415 COLL VENOUS BLD VENIPUNCTURE: CPT

## 2023-09-19 PROCEDURE — 85025 COMPLETE CBC W/AUTO DIFF WBC: CPT | Performed by: PSYCHIATRY & NEUROLOGY

## 2023-09-19 PROCEDURE — 84443 ASSAY THYROID STIM HORMONE: CPT | Performed by: PSYCHIATRY & NEUROLOGY

## 2023-09-19 PROCEDURE — 99215 OFFICE O/P EST HI 40 MIN: CPT | Performed by: PSYCHIATRY & NEUROLOGY

## 2023-09-19 PROCEDURE — 99213 OFFICE O/P EST LOW 20 MIN: CPT | Performed by: PSYCHIATRY & NEUROLOGY

## 2023-09-19 PROCEDURE — 80053 COMPREHEN METABOLIC PANEL: CPT | Performed by: PSYCHIATRY & NEUROLOGY

## 2023-09-19 PROCEDURE — 82728 ASSAY OF FERRITIN: CPT | Performed by: PSYCHIATRY & NEUROLOGY

## 2023-09-19 RX ORDER — DEXAMETHASONE 2 MG/1
TABLET ORAL
Qty: 27 TABLET | Refills: 0 | Status: SHIPPED | OUTPATIENT
Start: 2023-09-19 | End: 2023-10-26

## 2023-09-19 ASSESSMENT — PAIN SCALES - GENERAL: PAINLEVEL: NO PAIN (0)

## 2023-09-19 NOTE — PROGRESS NOTES
Medical Assistant Note:  Brandon Ordoñez presents today for blood draw.    Patient seen by provider today: Yes: damian.   present during visit today: Not Applicable.    Concerns: No Concerns.    Procedure:  Lab draw site: rac, Needle type: bf, Gauge: 23.    Post Assessment:  Labs drawn without difficulty: Yes.    Discharge Plan:  Departure Mode: Ambulatory.    Face to Face Time: 5 min.    Jessica Mack, CMA

## 2023-09-19 NOTE — PROGRESS NOTES
"Oncology Rooming Note    September 19, 2023 3:47 PM   Brandon Ordoñez is a 38 year old male who presents for:    Chief Complaint   Patient presents with    Oncology Clinic Visit     Oligodendroglioma (H)       Initial Vitals: /83 (BP Location: Right arm, Patient Position: Sitting, Cuff Size: Adult Large)   Pulse 77   Temp 98.4  F (36.9  C) (Oral)   Resp 18   Wt 74.1 kg (163 lb 6.4 oz)   SpO2 99%   BMI 26.78 kg/m   Estimated body mass index is 26.78 kg/m  as calculated from the following:    Height as of 8/22/23: 1.664 m (5' 5.5\").    Weight as of this encounter: 74.1 kg (163 lb 6.4 oz). Body surface area is 1.85 meters squared.  No Pain (0) Comment: Data Unavailable   No LMP for male patient.  Allergies reviewed: Yes  Medications reviewed: Yes    Medications: Medication refills not needed today.  Pharmacy name entered into [facility]:    [id number] [place] [facility] [place] [address] [address] - [address] [id number] - [place], [place] - [address] [facility] [address] & [facility]    Clinical concerns: no     Carolina Brown CMA              "

## 2023-09-19 NOTE — LETTER
9/19/2023         RE: Brandon Ordoñez  [address] [facility] [place] [address]        Dear Colleague,    Thank you for referring your patient, Brandon Ordoñez, to the [facility]. Please see a copy of my visit note below.    NEURO-ONCOLOGY VISIT  Sep 19, 2023    CHIEF COMPLAINT: Mr. 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 He completed chemoradiotherapy with adjuvant temozolomide on 7/20/2023. Post-radiation imaging demonstrated anticipated post-radiation induced changes and no new concerns.     The plan is to continue treatment on adjuvant-dosed temozolomide.     Brandon is presenting in follow-up.    HISTORY OF PRESENT ILLNESS  -Brandon did not tolerate his first cycle of chemotherapy; following 2 days of therapy, he was so fatigued that he could not work. At that point, he stopped chemotherapy and did not completed the remaining 3 days. Had nausea, but this was somewhat controlled with Zofran. Low appetite. Energy is now improving.   -Having mild headache. With resting and/ or Tylenol, the pain resolves.   -Off dexamethasone.  -He has no concerns for any seizure activity.  -No weakness, no sensation changes, and no vision changes.       MEDICATIONS   Current Outpatient Medications   Medication Sig Dispense Refill     dexAMETHasone (DECADRON) 2 MG tablet Take 3 tablets (6 mg) by mouth daily (with breakfast) for 6 days, THEN 2 tablets (4 mg) daily (with breakfast) for 3 days, THEN 1 tablet (2 mg) daily (with breakfast) for 3 days. 27 tablet 0     ondansetron (ZOFRAN) 4 MG tablet Take 1 tablet (4 mg) by mouth At Bedtime Take 30-60 mins before each dose of temozolomide. 30 tablet 1     senna-docusate (SENOKOT-S/PERICOLACE) 8.6-50 MG tablet Take 1 tablet  by mouth 2 times daily as needed for constipation 20 tablet 0     temozolomide (TEMODAR) 140 MG capsule Take 2 capsules (280 mg) by mouth At Bedtime for 5 days Take ondansetron 30-60 min before temozolomide. Take on an empty stomach. (Patient not taking: Reported on 8/22/2023) 10 capsule 0     DRUG ALLERGIES No Known Allergies       IMMUNIZATIONS   Immunization History   Administered Date(s) Administered     COVID-19 MONOVALENT 12+ (Pfizer) 03/29/2021, 04/19/2021     TDAP Vaccine (Adacel) 07/30/2018       ONCOLOGIC HISTORY  -PRESENTATION: Progressive somnolence, nausea and vomiting, mild headache.  -5/4/2023 CT Head imaging with a large mass with associated calcifications and probable associated hemorrhage in the anterior right frontal region with surrounding vasogenic edema and significant local mass effect resulting in narrowing of the right more than left lateral ventricles and third ventricle, and leftward subfalcine shift as well as downward/ central transtentorial herniation.   -5/4/2023 SURGERY: Right frontal craniotomy for mass resection by Dr. Dasilva.   Post-operative imaging on 5/5 without a definite residual enhancing lesion. Nonspecific surrounding T2/FLAIR hyperintensity along the posterior and superior margins of the resection cavity may reflect vasogenic edema or nonenhancing tumor. Small volume hemorrhage and cytotoxic edema along the margins of the resection cavity and extending along the right caudate nucleus and body of the corpus callosum.  PATHOLOGY: WHO grade 2 oligodendroglioma;   FISH with deletion of both the 1p36.32 and 19q13.33 regions   No homozygous deletion of CDKN2A/B gene    IDH1 R132S mutated.  TERT c.-146C>T mutated.   TMB Score: 2.438 mut/Mb   Fusion Event: NEGATIVE   Histology is borderline due to presence of microvascular proliferation and necrosis without palisading, however, the histological criteria for distinction between grade 2 and 3 are not well defined.  -5/16/2023  "NEURO-ONC: Regardless of grade, recommending chemoradiotherapy with concurrent temozolomide.  -6/30/2023 NEURO-ONC/ CHEMORADS: Here for visit at the midpoint of concurrent chemoradiotherapy.   -7/20/2023 CHEMORADS: Completed chemoradiotherapy with concurrent temozolomide 75mg/m2 (140mg)   -8/22/2023 NEURO-ONC/ MRB/ CHEMO: Clinically well. Imaging with no concerns. Starting adjuvant temozolomide 150mg/m2 (280mg), cycle 1 (start date tonight).   -9/19/2023 NEURO-ONC/ CHEMO: No new neurological concerns. Modifications to supportive medications to better tolerate adjuvant temozolomide. Will repeat dosing of cycle 1 at 150mg/m2 (280mg).     SOCIAL HISTORY   , 2 children (2 yo and 5 yo).       PHYSICAL EXAMINATION  /83 (BP Location: Right arm, Patient Position: Sitting, Cuff Size: Adult Large)   Pulse 77   Temp 98.4  F (36.9  C) (Oral)   Resp 18   Wt 74.1 kg (163 lb 6.4 oz)   SpO2 99%   BMI 26.78 kg/m     Wt Readings from Last 2 Encounters:   09/19/23 74.1 kg (163 lb 6.4 oz)   08/22/23 74.8 kg (164 lb 12.8 oz)      Ht Readings from Last 2 Encounters:   08/22/23 1.664 m (5' 5.5\")   05/16/23 1.664 m (5' 5.5\")     KPS: 100    -Generally well appearing.  -Respiratory: Normal breath sounds, no audible wheezing.  -Psychiatric: Normal mood and affect. Pleasant, talkative.  -Neurologic:   MENTAL STATUS:     Alert, oriented.    Recall: Intact.    Speech fluent.    Comprehension intact.     CRANIAL NERVES:     Pupils are equal, round.     Symmetric facial movements.   Hearing intact.   No dysarthria.  GAIT:  Walks without assistance.       MEDICAL RECORDS  Personally reviewed pharmacy notes, oncology notes and Dev[employer] messages.    LABS  Personally reviewed all available lab results; CBC and CMP.    IMAGING  No new neuro-imaging to review.        IMPRESSION  Clinic time of 40 minutes was spent reviewing data, in evaluation, and coordinating care for this high complexity visit. This was in addition to answering " questions pertaining to my recommendations and devising the plan as outlined below.      Brandon has no new neurologic concerns. Headaches are mild and can be trigger by exertion.     Brandon did not tolerate his first cycle of chemotherapy. Following 2 days of therapy, he was so fatigued that he could not work. At that point, he stopped chemotherapy and did not completed the remaining 3 days. He had nausea, but this was somewhat controlled with Zofran plus noted low appetite. His energy is now improving.     I discussed with Brandon the critical importance on his overall prognosis of completing adjuvant therapy. I also discussed the importance of remaining in close communication with RNCC and pharmacy to communicate concerns and to ensure safely administration of chemotherapy. Brandon voiced understanding.     We discussed the option for improved supportive medications for him to better tolerate adjuvant chemotherapy. Dexamethasone can improve energy, reduce nausea, and increase appetite. Will also continue to use Zofran. Will RESTART cycle 1; D1-5 of therapy. I alerted pharmacy and RNCC to the plan.    CBC and CMP from today with no concerns. I will check labs for any reversible, contributing factor to his fatigue, including vitamin B12, D deficiency, low iron, and thyroid dysfunction. ADDENDUM: All labs returned without concerning abnormalities.     PROBLEM LIST  WHO grade 2 oligodendroglioma  Headaches    PLAN  -CANCER DIRECTED THERAPY-  Will initiate adjuvant temozolomide at 150mg/m2 (280mg), cycle 1.   -Supportive medications; Zofran and bowel regimen plus dexamethasone as detailed below.     -Repeat 28 day cycle if WBC >= 3, ANC >= 1.5, HgB >= 10, and platelets >= 100.  -Surveillance labs reviewed, at goal for chemotherapy.   -Will continue every 2 weeks CBC and CMP every 4 weeks.    -Repeat imaging in 3 months (12/2023).     -STEROIDS-  -Dexamethasone use while on chemotherapy; 6mg daily for 6  "days, 4mg daily for 3 days, 2mg daily for 3 days.    Refilled today.     -SEIZURE MANAGEMENT-  -While this patient is at increased risk of having seizures, given the lack of seizure history, there is no indication to prescribe an antiepileptic at this time.     -Quality of life/ MOOD/ FATIGUE-  -Denies any mood issues.  -Fatigue; chemotherapy-associated.    Labs in 9/2023 Vitamin B12/ D deficiency, low iron, and thyroid studies without concern.   -Continue to monitor mood as untreated/ undertreated depression can worsen fatigue, dysorexia, and quality of life.    Return to clinic in 1 month with Lilibeth Marr, GWEN, CNP + labs and to discuss dosing for cycle 2 of chemotherapy.     In the meantime, Brandon knows to call the clinic with any concerns and he can be seen sooner if needed.     Abril Bullock MD  Neuro-oncology      Oncology Rooming Note    September 19, 2023 3:47 PM   Brandon Ordoñez is a 38 year old male who presents for:    Chief Complaint   Patient presents with     Oncology Clinic Visit     Oligodendroglioma (H)       Initial Vitals: /83 (BP Location: Right arm, Patient Position: Sitting, Cuff Size: Adult Large)   Pulse 77   Temp 98.4  F (36.9  C) (Oral)   Resp 18   Wt 74.1 kg (163 lb 6.4 oz)   SpO2 99%   BMI 26.78 kg/m   Estimated body mass index is 26.78 kg/m  as calculated from the following:    Height as of 8/22/23: 1.664 m (5' 5.5\").    Weight as of this encounter: 74.1 kg (163 lb 6.4 oz). Body surface area is 1.85 meters squared.  No Pain (0) Comment: Data Unavailable   No LMP for male patient.  Allergies reviewed: Yes  Medications reviewed: Yes    Medications: Medication refills not needed today.  Pharmacy name entered into [facility]:    [id number] [place] [facility] [place] [address] [address] - [address]  [id number] - [place], [place] - [address] AT [place] [address] [address]    Clinical concerns: " no     Carolina Brown, BRYANT                  Again, thank you for allowing me to participate in the care of your patient.        Sincerely,        Abril Bullock MD

## 2023-09-20 ENCOUNTER — TELEPHONE (OUTPATIENT)
Dept: PHARMACY | Facility: CLINIC | Age: 38
End: 2023-09-20
Payer: COMMERCIAL

## 2023-09-20 LAB
FERRITIN SERPL-MCNC: 50 NG/ML (ref 31–409)
VIT B12 SERPL-MCNC: 237 PG/ML (ref 232–1245)

## 2023-09-20 RX ORDER — TEMOZOLOMIDE 140 MG/1
150 CAPSULE ORAL AT BEDTIME
Qty: 10 CAPSULE | Refills: 0 | Status: SHIPPED | OUTPATIENT
Start: 2023-09-20 | End: 2024-01-07

## 2023-09-20 NOTE — TELEPHONE ENCOUNTER
I called [facility] pharmacy to help expedite the delivery of temozolomide, they are still typing up the rx but will expedite this and try to call the patient in 20 minutes. They are not able to set up deliveries via email or text but they are open till 5pm CT.    I will send the patient a [employer] message with this update so he knows to call [facility] if he misses their call.

## 2023-09-22 LAB — DEPRECATED CALCIDIOL+CALCIFEROL SERPL-MC: 13 UG/L (ref 20–75)

## 2023-09-25 ENCOUNTER — TELEPHONE (OUTPATIENT)
Dept: ONCOLOGY | Facility: CLINIC | Age: 38
End: 2023-09-25
Payer: COMMERCIAL

## 2023-09-28 DIAGNOSIS — E55.9 VITAMIN D DEFICIENCY: Primary | ICD-10-CM

## 2023-09-28 RX ORDER — ERGOCALCIFEROL 1.25 MG/1
50000 CAPSULE, LIQUID FILLED ORAL WEEKLY
Qty: 8 CAPSULE | Refills: 0 | Status: SHIPPED | OUTPATIENT
Start: 2023-09-28 | End: 2024-01-09

## 2023-10-09 ENCOUNTER — TELEPHONE (OUTPATIENT)
Dept: PHARMACY | Facility: CLINIC | Age: 38
End: 2023-10-09
Payer: COMMERCIAL

## 2023-10-09 NOTE — ORAL ONC MGMT
Oral Chemotherapy Monitoring Program     Placed call to patient in follow up of oral chemotherapy. Left message requesting call back. No drug names were mentioned. Will update when response received.     Evaristo Watson, PharmD, MS  Hematology/Oncology Clinical Pharmacist  [facility]

## 2023-10-11 NOTE — ORAL ONC MGMT
Oral Chemotherapy Monitoring Program    Subjective/Objective:  Brandon Ordoñez is a 38 year old male contacted by phone for a follow-up visit for oral chemotherapy.  Started temodar cycle on 10/2/23 and confirmed correct dosing. He had some nausea again with this cycle but didn't have any vomiting. He was taking the zofran prior to each dose. Denies any other side effects, missed doses or med changes.         6/27/2023     4:00 PM 8/18/2023     9:00 AM 8/22/2023    11:00 AM 9/5/2023    11:00 AM 9/7/2023    11:00 AM 10/9/2023     4:00 PM 10/11/2023     4:00 PM   ORAL CHEMOTHERAPY   Assessment Type Lab Monitoring Chart Review;Initial Work up;Refill New Teach New Teach Chart Review;Lab Monitoring;Left Voicemail Left Voicemail Initial Follow up   Diagnosis Code Brain Cancer - Glioblastoma     Brain Cancer - Glioblastoma Brain Cancer - Glioblastoma   Providers Dr. Kobe Bullock   Clinic Name/Location [facility]   Drug Name Temodar (temozolomide) Temodar (temozolomide) Temodar (temozolomide) Temodar (temozolomide) Temodar (temozolomide) Temodar (temozolomide) Temodar (temozolomide)   Dose 140 mg 280 mg 280 mg 280 mg 280 mg 280 mg 280 mg   Current Schedule QHS Daily Daily Daily Daily QHS QHS   Cycle Details Continuous for 42 days during XRT 5 days on, 23 days off 5 days on, 23 days off 5 days on, 23 days off 5 days on, 23 days off 5 days on, 23 days off 5 days on, 23 days off   Start Date of Last Cycle    8/24/2023 8/24/2023  10/2/2023   Planned next cycle start date  8/22/2023 8/24/2023  9/21/2023  10/30/2023   Doses missed in last 2 weeks       0   Adherence Assessment       Adherent   Adverse Effects No AE identified during assessment No AE identified during assessment   Fatigue  Nausea   Nausea       Grade 1   Pharmacist Intervention(nausea)       Yes   Intervention(s)       Rx medication recommendation  "  Fatigue     Grade 1     Pharmacist Intervention(fatigue)     No     Any new drug interactions?  No No    No   Is the dose as ordered appropriate for the patient?  Yes Yes    Yes       Last PHQ-2 Score on record:       5/9/2023     1:03 PM 5/3/2022     8:46 AM   PHQ-2 ( 1999 Pfizer)   Q1: Little interest or pleasure in doing things 0 0   Q2: Feeling down, depressed or hopeless 0 0   PHQ-2 Score 0 0   Q1: Little interest or pleasure in doing things Not at all    Q2: Feeling down, depressed or hopeless Not at all    PHQ-2 Score 0        Vitals:  BP:   BP Readings from Last 1 Encounters:   09/19/23 130/83     Wt Readings from Last 1 Encounters:   09/19/23 74.1 kg (163 lb 6.4 oz)     Estimated body surface area is 1.85 meters squared as calculated from the following:    Height as of 8/22/23: 1.664 m (5' 5.5\").    Weight as of 9/19/23: 74.1 kg (163 lb 6.4 oz).    Labs:  _  Result Component Current Result Ref Range   Sodium 141 (9/19/2023) 136 - 145 mmol/L     _  Result Component Current Result Ref Range   Potassium 4.0 (9/19/2023) 3.4 - 5.3 mmol/L     _  Result Component Current Result Ref Range   Calcium 9.6 (9/19/2023) 8.6 - 10.0 mg/dL     No results found for Mag within last 30 days.     No results found for Phos within last 30 days.     _  Result Component Current Result Ref Range   Albumin 4.8 (9/19/2023) 3.5 - 5.2 g/dL     _  Result Component Current Result Ref Range   Urea Nitrogen 8.3 (9/19/2023) 6.0 - 20.0 mg/dL     _  Result Component Current Result Ref Range   Creatinine 0.95 (9/19/2023) 0.67 - 1.17 mg/dL     _  Result Component Current Result Ref Range   AST 15 (9/19/2023) 0 - 45 U/L     _  Result Component Current Result Ref Range   ALT 21 (9/19/2023) 0 - 70 U/L     _  Result Component Current Result Ref Range   Bilirubin Total 0.3 (9/19/2023) <=1.2 mg/dL     _  Result Component Current Result Ref Range   WBC Count 6.1 (9/19/2023) 4.0 - 11.0 10e3/uL     _  Result Component Current Result Ref Range "   Hemoglobin 13.9 (9/19/2023) 13.3 - 17.7 g/dL     _  Result Component Current Result Ref Range   Platelet Count 260 (9/19/2023) 150 - 450 10e3/uL     No results found for ANC within last 30 days.     _  Result Component Current Result Ref Range   Absolute Neutrophils 3.7 (9/19/2023) 1.6 - 8.3 10e3/uL          Assessment/Plan:  Tolerated repeat of cycle 2 fairly well.     Follow-Up:  10/12: q2wk labs. He plans to get them at walk in lab in [place]    Refill Due:  10/30    Evaristo Watson, PharmD, MS  Hematology/Oncology Clinical Pharmacist  [facility]  [phone]

## 2023-10-12 ENCOUNTER — LAB (OUTPATIENT)
Dept: LAB | Facility: CLINIC | Age: 38
End: 2023-10-12
Payer: COMMERCIAL

## 2023-10-12 ENCOUNTER — DOCUMENTATION ONLY (OUTPATIENT)
Dept: PHARMACY | Facility: CLINIC | Age: 38
End: 2023-10-12

## 2023-10-12 DIAGNOSIS — T45.1X5A ANTINEOPLASTIC CHEMOTHERAPY INDUCED PANCYTOPENIA (H): ICD-10-CM

## 2023-10-12 DIAGNOSIS — D61.810 ANTINEOPLASTIC CHEMOTHERAPY INDUCED PANCYTOPENIA (H): ICD-10-CM

## 2023-10-12 LAB
BASO+EOS+MONOS # BLD AUTO: NORMAL 10*3/UL
BASO+EOS+MONOS NFR BLD AUTO: NORMAL %
BASOPHILS # BLD AUTO: 0.1 10E3/UL (ref 0–0.2)
BASOPHILS NFR BLD AUTO: 1 %
EOSINOPHIL # BLD AUTO: 0.1 10E3/UL (ref 0–0.7)
EOSINOPHIL NFR BLD AUTO: 1 %
ERYTHROCYTE [DISTWIDTH] IN BLOOD BY AUTOMATED COUNT: 11.8 % (ref 10–15)
HCT VFR BLD AUTO: 46 % (ref 40–53)
HGB BLD-MCNC: 15.5 G/DL (ref 13.3–17.7)
IMM GRANULOCYTES # BLD: 0 10E3/UL
IMM GRANULOCYTES NFR BLD: 0 %
LYMPHOCYTES # BLD AUTO: 2.5 10E3/UL (ref 0.8–5.3)
LYMPHOCYTES NFR BLD AUTO: 39 %
MCH RBC QN AUTO: 29 PG (ref 26.5–33)
MCHC RBC AUTO-ENTMCNC: 33.7 G/DL (ref 31.5–36.5)
MCV RBC AUTO: 86 FL (ref 78–100)
MONOCYTES # BLD AUTO: 0.4 10E3/UL (ref 0–1.3)
MONOCYTES NFR BLD AUTO: 7 %
NEUTROPHILS # BLD AUTO: 3.4 10E3/UL (ref 1.6–8.3)
NEUTROPHILS NFR BLD AUTO: 52 %
NRBC # BLD AUTO: 0 10E3/UL
NRBC BLD AUTO-RTO: 0 /100
PLATELET # BLD AUTO: 244 10E3/UL (ref 150–450)
RBC # BLD AUTO: 5.35 10E6/UL (ref 4.4–5.9)
WBC # BLD AUTO: 6.5 10E3/UL (ref 4–11)

## 2023-10-12 PROCEDURE — 85025 COMPLETE CBC W/AUTO DIFF WBC: CPT

## 2023-10-12 PROCEDURE — 36415 COLL VENOUS BLD VENIPUNCTURE: CPT

## 2023-10-12 NOTE — PROGRESS NOTES
Oral Chemotherapy Monitoring Program  Lab Follow Up    Reviewed lab results from 10/12/23.        8/18/2023     9:00 AM 8/22/2023    11:00 AM 9/5/2023    11:00 AM 9/7/2023    11:00 AM 10/9/2023     4:00 PM 10/11/2023     4:00 PM 10/12/2023     4:00 PM   ORAL CHEMOTHERAPY   Assessment Type Chart Review;Initial Work up;Refill New Teach New Teach Chart Review;Lab Monitoring;Left Voicemail Left Voicemail Initial Follow up Lab Monitoring   Diagnosis Code     Brain Cancer - Glioblastoma Brain Cancer - Glioblastoma Brain Cancer - Glioblastoma   Providers Dr. Kobe Bullock   Clinic Name/Location [facility]   Drug Name Temodar (temozolomide) Temodar (temozolomide) Temodar (temozolomide) Temodar (temozolomide) Temodar (temozolomide) Temodar (temozolomide) Temodar (temozolomide)   Dose 280 mg 280 mg 280 mg 280 mg 280 mg 280 mg 280 mg   Current Schedule Daily Daily Daily Daily QHS QHS QHS   Cycle Details 5 days on, 23 days off 5 days on, 23 days off 5 days on, 23 days off 5 days on, 23 days off 5 days on, 23 days off 5 days on, 23 days off 5 days on, 23 days off   Start Date of Last Cycle   8/24/2023 8/24/2023  10/2/2023 10/2/2023   Planned next cycle start date 8/22/2023 8/24/2023  9/21/2023  10/30/2023 10/30/2023   Doses missed in last 2 weeks      0    Adherence Assessment      Adherent    Adverse Effects No AE identified during assessment   Fatigue  Nausea No AE identified during assessment   Nausea      Grade 1    Pharmacist Intervention(nausea)      Yes    Intervention(s)      Rx medication recommendation    Fatigue    Grade 1      Pharmacist Intervention(fatigue)    No      Any new drug interactions? No No    No    Is the dose as ordered appropriate for the patient? Yes Yes    Yes        Labs:  _  Result Component Current Result Ref Range   Sodium 141 (9/19/2023) 136 - 145 mmol/L     _  Result Component Current Result  Ref Range   Potassium 4.0 (9/19/2023) 3.4 - 5.3 mmol/L     _  Result Component Current Result Ref Range   Calcium 9.6 (9/19/2023) 8.6 - 10.0 mg/dL     No results found for Mag within last 30 days.     No results found for Phos within last 30 days.     _  Result Component Current Result Ref Range   Albumin 4.8 (9/19/2023) 3.5 - 5.2 g/dL     _  Result Component Current Result Ref Range   Urea Nitrogen 8.3 (9/19/2023) 6.0 - 20.0 mg/dL     _  Result Component Current Result Ref Range   Creatinine 0.95 (9/19/2023) 0.67 - 1.17 mg/dL     _  Result Component Current Result Ref Range   AST 15 (9/19/2023) 0 - 45 U/L     _  Result Component Current Result Ref Range   ALT 21 (9/19/2023) 0 - 70 U/L     _  Result Component Current Result Ref Range   Bilirubin Total 0.3 (9/19/2023) <=1.2 mg/dL     _  Result Component Current Result Ref Range   WBC Count 6.5 (10/12/2023) 4.0 - 11.0 10e3/uL     _  Result Component Current Result Ref Range   Hemoglobin 15.5 (10/12/2023) 13.3 - 17.7 g/dL     _  Result Component Current Result Ref Range   Platelet Count 244 (10/12/2023) 150 - 450 10e3/uL     No results found for ANC within last 30 days.     _  Result Component Current Result Ref Range   Absolute Neutrophils 3.4 (10/12/2023) 1.6 - 8.3 10e3/uL        Assessment & Plan:  No concerning abnormalities.    Follow-Up:  10/26: appt with Lilibeth Watson, PharmD, MS  Hematology/Oncology Clinical Pharmacist  [facility]  [phone]

## 2023-10-26 ENCOUNTER — LAB (OUTPATIENT)
Dept: LAB | Facility: CLINIC | Age: 38
End: 2023-10-26
Payer: COMMERCIAL

## 2023-10-26 ENCOUNTER — VIRTUAL VISIT (OUTPATIENT)
Dept: ONCOLOGY | Facility: CLINIC | Age: 38
End: 2023-10-26
Attending: PSYCHIATRY & NEUROLOGY
Payer: COMMERCIAL

## 2023-10-26 VITALS — HEIGHT: 66 IN | BODY MASS INDEX: 26.03 KG/M2 | WEIGHT: 162 LBS

## 2023-10-26 DIAGNOSIS — T45.1X5A ANTINEOPLASTIC CHEMOTHERAPY INDUCED PANCYTOPENIA (H): ICD-10-CM

## 2023-10-26 DIAGNOSIS — T45.1X5A CHEMOTHERAPY-INDUCED NAUSEA AND VOMITING: ICD-10-CM

## 2023-10-26 DIAGNOSIS — D61.810 ANTINEOPLASTIC CHEMOTHERAPY INDUCED PANCYTOPENIA (H): ICD-10-CM

## 2023-10-26 DIAGNOSIS — R11.2 CHEMOTHERAPY-INDUCED NAUSEA AND VOMITING: ICD-10-CM

## 2023-10-26 DIAGNOSIS — C71.9 OLIGODENDROGLIOMA (H): Primary | ICD-10-CM

## 2023-10-26 DIAGNOSIS — E55.9 VITAMIN D DEFICIENCY: ICD-10-CM

## 2023-10-26 LAB
ALBUMIN SERPL BCG-MCNC: 4.9 G/DL (ref 3.5–5.2)
ALP SERPL-CCNC: 106 U/L (ref 40–129)
ALT SERPL W P-5'-P-CCNC: 23 U/L (ref 0–70)
ANION GAP SERPL CALCULATED.3IONS-SCNC: 11 MMOL/L (ref 7–15)
AST SERPL W P-5'-P-CCNC: 18 U/L (ref 0–45)
BASOPHILS # BLD AUTO: 0 10E3/UL (ref 0–0.2)
BASOPHILS NFR BLD AUTO: 0 %
BILIRUB SERPL-MCNC: 0.3 MG/DL
BUN SERPL-MCNC: 10.4 MG/DL (ref 6–20)
CALCIUM SERPL-MCNC: 9.5 MG/DL (ref 8.6–10)
CHLORIDE SERPL-SCNC: 101 MMOL/L (ref 98–107)
CREAT SERPL-MCNC: 0.84 MG/DL (ref 0.67–1.17)
DEPRECATED HCO3 PLAS-SCNC: 28 MMOL/L (ref 22–29)
EGFRCR SERPLBLD CKD-EPI 2021: >90 ML/MIN/1.73M2
EOSINOPHIL # BLD AUTO: 0.1 10E3/UL (ref 0–0.7)
EOSINOPHIL NFR BLD AUTO: 2 %
ERYTHROCYTE [DISTWIDTH] IN BLOOD BY AUTOMATED COUNT: 12 % (ref 10–15)
GLUCOSE SERPL-MCNC: 103 MG/DL (ref 70–99)
HCT VFR BLD AUTO: 43.7 % (ref 40–53)
HGB BLD-MCNC: 14.6 G/DL (ref 13.3–17.7)
IMM GRANULOCYTES # BLD: 0 10E3/UL
IMM GRANULOCYTES NFR BLD: 0 %
LYMPHOCYTES # BLD AUTO: 2.3 10E3/UL (ref 0.8–5.3)
LYMPHOCYTES NFR BLD AUTO: 34 %
MCH RBC QN AUTO: 28.9 PG (ref 26.5–33)
MCHC RBC AUTO-ENTMCNC: 33.4 G/DL (ref 31.5–36.5)
MCV RBC AUTO: 87 FL (ref 78–100)
MONOCYTES # BLD AUTO: 0.5 10E3/UL (ref 0–1.3)
MONOCYTES NFR BLD AUTO: 7 %
NEUTROPHILS # BLD AUTO: 3.8 10E3/UL (ref 1.6–8.3)
NEUTROPHILS NFR BLD AUTO: 57 %
NRBC # BLD AUTO: 0 10E3/UL
NRBC BLD AUTO-RTO: 0 /100
PLATELET # BLD AUTO: 237 10E3/UL (ref 150–450)
POTASSIUM SERPL-SCNC: 4 MMOL/L (ref 3.4–5.3)
PROT SERPL-MCNC: 7.9 G/DL (ref 6.4–8.3)
RBC # BLD AUTO: 5.05 10E6/UL (ref 4.4–5.9)
SODIUM SERPL-SCNC: 140 MMOL/L (ref 135–145)
WBC # BLD AUTO: 6.7 10E3/UL (ref 4–11)

## 2023-10-26 PROCEDURE — 99215 OFFICE O/P EST HI 40 MIN: CPT | Mod: 95 | Performed by: NURSE PRACTITIONER

## 2023-10-26 PROCEDURE — 36415 COLL VENOUS BLD VENIPUNCTURE: CPT

## 2023-10-26 PROCEDURE — 80053 COMPREHEN METABOLIC PANEL: CPT

## 2023-10-26 PROCEDURE — 85025 COMPLETE CBC W/AUTO DIFF WBC: CPT

## 2023-10-26 RX ORDER — DEXAMETHASONE 2 MG/1
TABLET ORAL
Qty: 27 TABLET | Refills: 0 | Status: SHIPPED | OUTPATIENT
Start: 2023-10-26 | End: 2023-11-27

## 2023-10-26 ASSESSMENT — PAIN SCALES - GENERAL: PAINLEVEL: NO PAIN (0)

## 2023-10-26 NOTE — PROGRESS NOTES
Virtual vist:   Patient: HOME  Provider: BRITTNEE at OFFICE  Time start: 3:51 pm  Time stop: 4:09 pm    NEURO-ONCOLOGY VISIT  Oct 26, 2023    CHIEF COMPLAINT: Mr. 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 He completed chemoradiotherapy with adjuvant temozolomide on 7/20/2023. Post-radiation imaging demonstrated anticipated post-radiation induced changes and no new concerns.     The plan is to continue treatment on adjuvant-dosed temozolomide.     Brandon is presenting in follow-up via video with .    HISTORY OF PRESENT ILLNESS  -Dexamethasone helped with this last cycle and he was able to take the full five days or the repeated cycle 1.  -No nausea that was too bad this time.   -He was a little bit tired but the last couple weeks doing okay.  -Constipation was well-controlled with use of Senna.   -Sometimes when he does work he gets a minor headaches but they go away on their own, but if really bad then takes Tylenol.  -He continues to work every day.  -No concerns for seizure activity.    -Sometimes forgetful.   -He has not yet started his high-dose vitamin D.  He did not realize it had been sent to the pharmacy.  He will pick this up today.      MEDICATIONS   Current Outpatient Medications   Medication Sig Dispense Refill    ondansetron (ZOFRAN) 4 MG tablet Take 1 tablet (4 mg) by mouth At Bedtime Take 30-60 mins before each dose of temozolomide. 30 tablet 1    senna-docusate (SENOKOT-S/PERICOLACE) 8.6-50 MG tablet Take 1 tablet by mouth 2 times daily as needed for constipation 20 tablet 0    vitamin D2 (ERGOCALCIFEROL) 84597 units (1250 mcg) capsule Take 1 capsule (50,000 Units) by mouth once a week 8 capsule 0    dexAMETHasone (DECADRON) 2 MG tablet Take 3 tablets (6 mg) by mouth  daily (with breakfast) for 6 days, THEN 2 tablets (4 mg) daily (with breakfast) for 3 days, THEN 1 tablet (2 mg) daily (with breakfast) for 3 days. 27 tablet 0    temozolomide (TEMODAR) 140 MG capsule Take 2 capsules (280 mg) by mouth At Bedtime for 5 days Take ondansetron 30-60 min before temozolomide. Take on an empty stomach. 10 capsule 0    temozolomide (TEMODAR) 140 MG capsule Take 2 capsules (280 mg) by mouth At Bedtime for 5 days Take ondansetron 30-60 min before temozolomide. Take on an empty stomach. (Patient not taking: Reported on 8/22/2023) 10 capsule 0     DRUG ALLERGIES No Known Allergies       IMMUNIZATIONS   Immunization History   Administered Date(s) Administered    COVID-19 MONOVALENT 12+ (Pfizer) 03/29/2021, 04/19/2021    TDAP Vaccine (Adacel) 07/30/2018       ONCOLOGIC HISTORY  -PRESENTATION: Progressive somnolence, nausea and vomiting, mild headache.  -5/4/2023 CT Head imaging with a large mass with associated calcifications and probable associated hemorrhage in the anterior right frontal region with surrounding vasogenic edema and significant local mass effect resulting in narrowing of the right more than left lateral ventricles and third ventricle, and leftward subfalcine shift as well as downward/ central transtentorial herniation.   -5/4/2023 SURGERY: Right frontal craniotomy for mass resection by Dr. Dasilva.   Post-operative imaging on 5/5 without a definite residual enhancing lesion. Nonspecific surrounding T2/FLAIR hyperintensity along the posterior and superior margins of the resection cavity may reflect vasogenic edema or nonenhancing tumor. Small volume hemorrhage and cytotoxic edema along the margins of the resection cavity and extending along the right caudate nucleus and body of the corpus callosum.  PATHOLOGY: WHO grade 2 oligodendroglioma;   FISH with deletion of both the 1p36.32 and 19q13.33 regions   No homozygous deletion of CDKN2A/B gene    IDH1 R132S mutated.  TERT c.-146C>T  "mutated.   TMB Score: 2.438 mut/Mb   Fusion Event: NEGATIVE   Histology is borderline due to presence of microvascular proliferation and necrosis without palisading, however, the histological criteria for distinction between grade 2 and 3 are not well defined.  -5/16/2023 NEURO-ONC: Regardless of grade, recommending chemoradiotherapy with concurrent temozolomide.  -6/30/2023 NEURO-ONC/ CHEMORADS: Here for visit at the midpoint of concurrent chemoradiotherapy.   -7/20/2023 CHEMORADS: Completed chemoradiotherapy with concurrent temozolomide 75mg/m2 (140mg)   -8/22/2023 NEURO-ONC/ MRB/ CHEMO: Clinically well. Imaging with no concerns. Starting adjuvant temozolomide 150mg/m2 (280mg), cycle 1 (start date tonight).   -9/19/2023 NEURO-ONC/ CHEMO: No new neurological concerns. Modifications to supportive medications to better tolerate adjuvant temozolomide. Will repeat dosing of cycle 1 at 150mg/m2 (280mg).   -10/26/2023 NEURO-ONC/ CHEMO: No new neurological concerns.  Modifications to supportive medications to better tolerate adjuvant temozolomide were successful and he tolerated his repeat dosing of cycle 1 at 150mg/m2 (280mg).  Will plan on repeating at this dosing level one more time and then consider increase to 200mg/m2 with cycle 3.     SOCIAL HISTORY   , 2 children (2 yo and 3 yo).     PHYSICAL EXAMINATION  There were no vitals taken for this visit.   Wt Readings from Last 2 Encounters:   09/19/23 74.1 kg (163 lb 6.4 oz)   08/22/23 74.8 kg (164 lb 12.8 oz)      Ht Readings from Last 2 Encounters:   08/22/23 1.664 m (5' 5.5\")   05/16/23 1.664 m (5' 5.5\")     KPS: 100    -Generally well appearing.  -Respiratory: Normal breath sounds, no audible wheezing.  -Psychiatric: Normal mood and affect. Pleasant, talkative.  -Neurologic:   MENTAL STATUS:     Alert, oriented.    Recall: Intact.    Speech fluent.    Comprehension intact.     CRANIAL NERVES:     Pupils are equal, round.     Symmetric facial " movements.   Hearing intact.   No dysarthria.  GAIT:  Walks without assistance.     The remainder of a complete physical exam is deferred due to video health medium.    MEDICAL RECORDS  Personally reviewed pharmacy notes, oncology notes and [employer] messages.    LABS  Personally reviewed all available lab results; CBC and CMP from 10/12 and labs from 9/19.    IMAGING  No new neuro-imaging to review.      IMPRESSION  As above, with the addition of dexamethasone to his supportive medications, he was able to complete the full 5 days of cycle 1 treatment.  He tolerated it well, with minimal mild nausea and mild constipation well controlled with senna.  He does have ongoing fatigue.  He did not realize that his high-dose vitamin C prescription was sent to his pharmacy, so he has not picked this up or started it.  This is reviewed with him today, and he will start this weekly dosing as soon as he picks up the medication.  Dexamethasone is refilled for him as well.    He is due for labs today, and plans to go to the lab this afternoon.    Brandon has no new neurologic concerns. Headaches are mild and can be trigger by exertion.     Because of his intolerance to the initial trial of cycle 1, we discussed and he would like to continue the next cycle at the same dose.  If he tolerates it well with supportive medications, will consider increasing his dose for cycle 3.  Message sent to team.      CBC and CMP from 10/12 with no concerns and he is due for repeat lab check.   Vit D lab ordered today for recheck in 7-8 weeks.     PROBLEM LIST  WHO grade 2 oligodendroglioma  Headaches    PLAN  -CANCER DIRECTED THERAPY-  Will continue adjuvant temozolomide at 150mg/m2 (270mg), cycle 2.  Labs pending today.  Due to start Monday, 10/30.  -We discuss that if he tolerates this well we consider increase to 200mg/m2 dosing.  -Supportive medications; Zofran and bowel regimen plus dexamethasone as detailed below.     -Repeat 28 day cycle  if WBC >= 3, ANC >= 1.5, HgB >= 10, and platelets >= 100.  -Surveillance labs reviewed, at goal for chemotherapy.   -Will continue every 2 weeks CBC and CMP every 4 weeks.    -Repeat imaging in 2 months (12/2023).     -STEROIDS-  -Dexamethasone use while on chemotherapy; 6mg daily for 6 days, 4mg daily for 3 days, 2mg daily for 3 days.    Refilled today.     -SEIZURE MANAGEMENT-  -While this patient is at increased risk of having seizures, given the lack of seizure history, there is no indication to prescribe an antiepileptic at this time.     -Quality of life/ MOOD/ FATIGUE-  -Denies any mood issues.  -Fatigue; chemotherapy-associated.    Labs in 9/2023 Vitamin B12/ D deficiency, low iron, and thyroid studies without concern.   -Continue to monitor mood as untreated/ undertreated depression can worsen fatigue, dysorexia, and quality of life.    Labs every 2 weeks.  Return to clinic in 1 month with me + labs in anticipation of cycle 3 of adjuvant temozolomide.  He will follow-up in December 2023 with labs, imaging and to see Dr. Bullock prior to cycle 4.    In the meantime, Connorministerio knows to call the clinic with any concerns and he can be seen sooner if needed.     46 minutes spent on the date of the encounter doing chart review, review of test results, interpretation of tests, patient visit, and documentation.     GWEN Pickard, CNP  [facility] [address] [place] [address]  [phone]

## 2023-10-26 NOTE — LETTER
10/26/2023         RE: Brandon Ordoñez  [address] [place] [address]        Dear Colleague,    Thank you for referring your patient, Brandon Ordoñez, to the [facility]. Please see a copy of my visit note below.    Virtual vist:   Patient: HOME  Provider: BRITTNEE at OFFICE  Time start: 3:51 pm  Time stop: 4:09 pm    NEURO-ONCOLOGY VISIT  Oct 26, 2023    CHIEF COMPLAINT: Mr. 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 He completed chemoradiotherapy with adjuvant temozolomide on 7/20/2023. Post-radiation imaging demonstrated anticipated post-radiation induced changes and no new concerns.     The plan is to continue treatment on adjuvant-dosed temozolomide.     Brandon is presenting in follow-up via video with .    HISTORY OF PRESENT ILLNESS  -Dexamethasone helped with this last cycle and he was able to take the full five days or the repeated cycle 1.  -No nausea that was too bad this time.   -He was a little bit tired but the last couple weeks doing okay.  -Constipation was well-controlled with use of Senna.   -Sometimes when he does work he gets a minor headaches but they go away on their own, but if really bad then takes Tylenol.  -He continues to work every day.  -No concerns for seizure activity.    -Sometimes forgetful.   -He has not yet started his high-dose vitamin D.  He did not realize it had been sent to the pharmacy.  He will pick this up today.      MEDICATIONS   Current Outpatient Medications   Medication Sig Dispense Refill    ondansetron (ZOFRAN) 4 MG tablet Take 1 tablet (4 mg) by mouth At Bedtime Take 30-60 mins before each dose of temozolomide. 30 tablet 1    senna-docusate (SENOKOT-S/PERICOLACE) 8.6-50 MG tablet Take 1  tablet by mouth 2 times daily as needed for constipation 20 tablet 0    vitamin D2 (ERGOCALCIFEROL) 63823 units (1250 mcg) capsule Take 1 capsule (50,000 Units) by mouth once a week 8 capsule 0    dexAMETHasone (DECADRON) 2 MG tablet Take 3 tablets (6 mg) by mouth daily (with breakfast) for 6 days, THEN 2 tablets (4 mg) daily (with breakfast) for 3 days, THEN 1 tablet (2 mg) daily (with breakfast) for 3 days. 27 tablet 0    temozolomide (TEMODAR) 140 MG capsule Take 2 capsules (280 mg) by mouth At Bedtime for 5 days Take ondansetron 30-60 min before temozolomide. Take on an empty stomach. 10 capsule 0    temozolomide (TEMODAR) 140 MG capsule Take 2 capsules (280 mg) by mouth At Bedtime for 5 days Take ondansetron 30-60 min before temozolomide. Take on an empty stomach. (Patient not taking: Reported on 8/22/2023) 10 capsule 0     DRUG ALLERGIES No Known Allergies       IMMUNIZATIONS   Immunization History   Administered Date(s) Administered    COVID-19 MONOVALENT 12+ (Pfizer) 03/29/2021, 04/19/2021    TDAP Vaccine (Adacel) 07/30/2018       ONCOLOGIC HISTORY  -PRESENTATION: Progressive somnolence, nausea and vomiting, mild headache.  -5/4/2023 CT Head imaging with a large mass with associated calcifications and probable associated hemorrhage in the anterior right frontal region with surrounding vasogenic edema and significant local mass effect resulting in narrowing of the right more than left lateral ventricles and third ventricle, and leftward subfalcine shift as well as downward/ central transtentorial herniation.   -5/4/2023 SURGERY: Right frontal craniotomy for mass resection by Dr. Dasilva.   Post-operative imaging on 5/5 without a definite residual enhancing lesion. Nonspecific surrounding T2/FLAIR hyperintensity along the posterior and superior margins of the resection cavity may reflect vasogenic edema or nonenhancing tumor. Small volume hemorrhage and cytotoxic edema along the margins of the resection cavity  "and extending along the right caudate nucleus and body of the corpus callosum.  PATHOLOGY: WHO grade 2 oligodendroglioma;   FISH with deletion of both the 1p36.32 and 19q13.33 regions   No homozygous deletion of CDKN2A/B gene    IDH1 R132S mutated.  TERT c.-146C>T mutated.   TMB Score: 2.438 mut/Mb   Fusion Event: NEGATIVE   Histology is borderline due to presence of microvascular proliferation and necrosis without palisading, however, the histological criteria for distinction between grade 2 and 3 are not well defined.  -5/16/2023 NEURO-ONC: Regardless of grade, recommending chemoradiotherapy with concurrent temozolomide.  -6/30/2023 NEURO-ONC/ CHEMORADS: Here for visit at the midpoint of concurrent chemoradiotherapy.   -7/20/2023 CHEMORADS: Completed chemoradiotherapy with concurrent temozolomide 75mg/m2 (140mg)   -8/22/2023 NEURO-ONC/ MRB/ CHEMO: Clinically well. Imaging with no concerns. Starting adjuvant temozolomide 150mg/m2 (280mg), cycle 1 (start date tonight).   -9/19/2023 NEURO-ONC/ CHEMO: No new neurological concerns. Modifications to supportive medications to better tolerate adjuvant temozolomide. Will repeat dosing of cycle 1 at 150mg/m2 (280mg).   -10/26/2023 NEURO-ONC/ CHEMO: No new neurological concerns.  Modifications to supportive medications to better tolerate adjuvant temozolomide were successful and he tolerated his repeat dosing of cycle 1 at 150mg/m2 (280mg).  Will plan on repeating at this dosing level one more time and then consider increase to 200mg/m2 with cycle 3.     SOCIAL HISTORY   , 2 children (2 yo and 5 yo).     PHYSICAL EXAMINATION  There were no vitals taken for this visit.   Wt Readings from Last 2 Encounters:   09/19/23 74.1 kg (163 lb 6.4 oz)   08/22/23 74.8 kg (164 lb 12.8 oz)      Ht Readings from Last 2 Encounters:   08/22/23 1.664 m (5' 5.5\")   05/16/23 1.664 m (5' 5.5\")     KPS: 100    -Generally well appearing.  -Respiratory: Normal breath sounds, no audible " wheezing.  -Psychiatric: Normal mood and affect. Pleasant, talkative.  -Neurologic:   MENTAL STATUS:     Alert, oriented.    Recall: Intact.    Speech fluent.    Comprehension intact.     CRANIAL NERVES:     Pupils are equal, round.     Symmetric facial movements.   Hearing intact.   No dysarthria.  GAIT:  Walks without assistance.     The remainder of a complete physical exam is deferred due to video health medium.    MEDICAL RECORDS  Personally reviewed pharmacy notes, oncology notes and [employer] messages.    LABS  Personally reviewed all available lab results; CBC and CMP from 10/12 and labs from 9/19.    IMAGING  No new neuro-imaging to review.      IMPRESSION  As above, with the addition of dexamethasone to his supportive medications, he was able to complete the full 5 days of cycle 1 treatment.  He tolerated it well, with minimal mild nausea and mild constipation well controlled with senna.  He does have ongoing fatigue.  He did not realize that his high-dose vitamin C prescription was sent to his pharmacy, so he has not picked this up or started it.  This is reviewed with him today, and he will start this weekly dosing as soon as he picks up the medication.  Dexamethasone is refilled for him as well.    He is due for labs today, and plans to go to the lab this afternoon.    Brandon has no new neurologic concerns. Headaches are mild and can be trigger by exertion.     Because of his intolerance to the initial trial of cycle 1, we discussed and he would like to continue the next cycle at the same dose.  If he tolerates it well with supportive medications, will consider increasing his dose for cycle 3.  Message sent to team.      CBC and CMP from 10/12 with no concerns and he is due for repeat lab check.   Vit D lab ordered today for recheck in 7-8 weeks.     PROBLEM LIST  WHO grade 2 oligodendroglioma  Headaches    PLAN  -CANCER DIRECTED THERAPY-  Will continue adjuvant temozolomide at 150mg/m2 (270mg), cycle  2.  Labs pending today.  Due to start Monday, 10/30.  -We discuss that if he tolerates this well we consider increase to 200mg/m2 dosing.  -Supportive medications; Zofran and bowel regimen plus dexamethasone as detailed below.     -Repeat 28 day cycle if WBC >= 3, ANC >= 1.5, HgB >= 10, and platelets >= 100.  -Surveillance labs reviewed, at goal for chemotherapy.   -Will continue every 2 weeks CBC and CMP every 4 weeks.    -Repeat imaging in 2 months (12/2023).     -STEROIDS-  -Dexamethasone use while on chemotherapy; 6mg daily for 6 days, 4mg daily for 3 days, 2mg daily for 3 days.    Refilled today.     -SEIZURE MANAGEMENT-  -While this patient is at increased risk of having seizures, given the lack of seizure history, there is no indication to prescribe an antiepileptic at this time.     -Quality of life/ MOOD/ FATIGUE-  -Denies any mood issues.  -Fatigue; chemotherapy-associated.    Labs in 9/2023 Vitamin B12/ D deficiency, low iron, and thyroid studies without concern.   -Continue to monitor mood as untreated/ undertreated depression can worsen fatigue, dysorexia, and quality of life.    Labs every 2 weeks.  Return to clinic in 1 month with me + labs in anticipation of cycle 3 of adjuvant temozolomide.  He will follow-up in December 2023 with labs, imaging and to see Dr. Bullock prior to cycle 4.    In the meantime, Brandon knows to call the clinic with any concerns and he can be seen sooner if needed.     46 minutes spent on the date of the encounter doing chart review, review of test results, interpretation of tests, patient visit, and documentation.     Lilibeth Marr, APRN, CNP  [facility]  [address] [place] [address] [phone]

## 2023-10-26 NOTE — NURSING NOTE
.Is the patient currently in the state of [place]? YES    Visit mode:VIDEO    If the visit is dropped, the patient can be reconnected by: VIDEO VISIT: Send to e-mail at: [email or web address]    Will anyone else be joining the visit? NO  (If patient encounters technical issues they should call [phone] :[phone])    How would you like to obtain your AVS? MyChart    Are changes needed to the allergy or medication list? Pt stated no med changes    Reason for visit: RECHMESSI LOPEZ

## 2023-10-27 RX ORDER — TEMOZOLOMIDE 140 MG/1
150 CAPSULE ORAL DAILY
Qty: 10 CAPSULE | Refills: 0 | Status: SHIPPED | OUTPATIENT
Start: 2023-10-27 | End: 2024-01-07

## 2023-10-27 NOTE — PROGRESS NOTES
Oral Chemotherapy Monitoring Program  Lab Follow Up    Reviewed lab results from 10/26/23.        8/22/2023    11:00 AM 9/5/2023    11:00 AM 9/7/2023    11:00 AM 10/9/2023     4:00 PM 10/11/2023     4:00 PM 10/12/2023     4:00 PM 10/27/2023     9:00 AM   ORAL CHEMOTHERAPY   Assessment Type New Teach New Teach Chart Review;Lab Monitoring;Left Voicemail Left Voicemail Initial Follow up Lab Monitoring Lab Monitoring   Diagnosis Code    Brain Cancer - Glioblastoma Brain Cancer - Glioblastoma Brain Cancer - Glioblastoma Brain Cancer - Glioblastoma   Providers Dr. Kobe Bullock   Clinic Name/Location [facility]   Drug Name Temodar (temozolomide) Temodar (temozolomide) Temodar (temozolomide) Temodar (temozolomide) Temodar (temozolomide) Temodar (temozolomide) Temodar (temozolomide)   Dose 280 mg 280 mg 280 mg 280 mg 280 mg 280 mg 280 mg   Current Schedule Daily Daily Daily QHS QHS QHS QHS   Cycle Details 5 days on, 23 days off 5 days on, 23 days off 5 days on, 23 days off 5 days on, 23 days off 5 days on, 23 days off 5 days on, 23 days off 5 days on, 23 days off   Start Date of Last Cycle  8/24/2023 8/24/2023  10/2/2023 10/2/2023 10/2/2023   Planned next cycle start date 8/24/2023  9/21/2023  10/30/2023 10/30/2023 10/30/2023   Doses missed in last 2 weeks     0     Adherence Assessment     Adherent     Adverse Effects   Fatigue  Nausea No AE identified during assessment    Nausea     Grade 1     Pharmacist Intervention(nausea)     Yes     Intervention(s)     Rx medication recommendation     Fatigue   Grade 1       Pharmacist Intervention(fatigue)   No       Any new drug interactions? No    No     Is the dose as ordered appropriate for the patient? Yes    Yes         Labs:  _  Result Component Current Result Ref Range   Sodium 140 (10/26/2023) 135 - 145 mmol/L     _  Result Component Current Result Ref Range   Potassium  4.0 (10/26/2023) 3.4 - 5.3 mmol/L     _  Result Component Current Result Ref Range   Calcium 9.5 (10/26/2023) 8.6 - 10.0 mg/dL     No results found for Mag within last 30 days.     No results found for Phos within last 30 days.     _  Result Component Current Result Ref Range   Albumin 4.9 (10/26/2023) 3.5 - 5.2 g/dL     _  Result Component Current Result Ref Range   Urea Nitrogen 10.4 (10/26/2023) 6.0 - 20.0 mg/dL     _  Result Component Current Result Ref Range   Creatinine 0.84 (10/26/2023) 0.67 - 1.17 mg/dL     _  Result Component Current Result Ref Range   AST 18 (10/26/2023) 0 - 45 U/L     _  Result Component Current Result Ref Range   ALT 23 (10/26/2023) 0 - 70 U/L     _  Result Component Current Result Ref Range   Bilirubin Total 0.3 (10/26/2023) <=1.2 mg/dL     _  Result Component Current Result Ref Range   WBC Count 6.7 (10/26/2023) 4.0 - 11.0 10e3/uL     _  Result Component Current Result Ref Range   Hemoglobin 14.6 (10/26/2023) 13.3 - 17.7 g/dL     _  Result Component Current Result Ref Range   Platelet Count 237 (10/26/2023) 150 - 450 10e3/uL     No results found for ANC within last 30 days.     _  Result Component Current Result Ref Range   Absolute Neutrophils 3.8 (10/26/2023) 1.6 - 8.3 10e3/uL        Assessment & Plan:  No concerning abnormalities. Ok to proceed with C2 as planned. Staying at 150mg/m2 this cycle and will discuss dose escalation for C3 with patient after C2 is complete.  C2 = 10/30/23  Questions answered to patient's satisfaction.    Follow-Up:  2 weeks with repeat labs.    Tracy FreemanD  October 27, 2023

## 2023-11-08 ENCOUNTER — TELEPHONE (OUTPATIENT)
Dept: NEUROSURGERY | Facility: CLINIC | Age: 38
End: 2023-11-08
Payer: COMMERCIAL

## 2023-11-08 NOTE — TELEPHONE ENCOUNTER
I spoke to the patient on telephone to assess his clinical progress.  Patient reports that he is doing very well with no new neurological symptoms.  He is following with Dr. Bullock from neurooncology.  He has completed chemoradiation therapy with concurrent temozolomide.  He was not able to tolerate for cycle of chemotherapy and after 2 days of starting treatment he was very much fatigued to the point that he was not able to work.  Chemotherapy was stopped at that point.      He reports no new neurological symptoms and reports that his incision has healed well.    He is back to work for last 3 months and able to carry out his activities of daily living independently.  He reported no new onset seizures.    I explained to him to continue  follow-up with Dr. Bullock for his neuro-oncology needs.  He can follow-up with me on as-needed basis if there are any surgical needs.

## 2023-11-09 ENCOUNTER — LAB (OUTPATIENT)
Dept: LAB | Facility: CLINIC | Age: 38
End: 2023-11-09
Payer: COMMERCIAL

## 2023-11-09 DIAGNOSIS — T45.1X5A ANTINEOPLASTIC CHEMOTHERAPY INDUCED PANCYTOPENIA (H): ICD-10-CM

## 2023-11-09 DIAGNOSIS — D61.810 ANTINEOPLASTIC CHEMOTHERAPY INDUCED PANCYTOPENIA (H): ICD-10-CM

## 2023-11-09 LAB
BASOPHILS # BLD AUTO: 0.1 10E3/UL (ref 0–0.2)
BASOPHILS NFR BLD AUTO: 1 %
EOSINOPHIL # BLD AUTO: 0.1 10E3/UL (ref 0–0.7)
EOSINOPHIL NFR BLD AUTO: 1 %
ERYTHROCYTE [DISTWIDTH] IN BLOOD BY AUTOMATED COUNT: 11.8 % (ref 10–15)
HCT VFR BLD AUTO: 40.5 % (ref 40–53)
HGB BLD-MCNC: 13.9 G/DL (ref 13.3–17.7)
IMM GRANULOCYTES # BLD: 0 10E3/UL
IMM GRANULOCYTES NFR BLD: 0 %
LYMPHOCYTES # BLD AUTO: 2.4 10E3/UL (ref 0.8–5.3)
LYMPHOCYTES NFR BLD AUTO: 34 %
MCH RBC QN AUTO: 29.3 PG (ref 26.5–33)
MCHC RBC AUTO-ENTMCNC: 34.3 G/DL (ref 31.5–36.5)
MCV RBC AUTO: 85 FL (ref 78–100)
MONOCYTES # BLD AUTO: 0.4 10E3/UL (ref 0–1.3)
MONOCYTES NFR BLD AUTO: 6 %
NEUTROPHILS # BLD AUTO: 4.1 10E3/UL (ref 1.6–8.3)
NEUTROPHILS NFR BLD AUTO: 58 %
NRBC # BLD AUTO: 0 10E3/UL
NRBC BLD AUTO-RTO: 0 /100
PLATELET # BLD AUTO: 251 10E3/UL (ref 150–450)
RBC # BLD AUTO: 4.74 10E6/UL (ref 4.4–5.9)
WBC # BLD AUTO: 7.1 10E3/UL (ref 4–11)

## 2023-11-09 PROCEDURE — 85004 AUTOMATED DIFF WBC COUNT: CPT

## 2023-11-09 PROCEDURE — 36415 COLL VENOUS BLD VENIPUNCTURE: CPT

## 2023-11-13 ENCOUNTER — DOCUMENTATION ONLY (OUTPATIENT)
Dept: PHARMACY | Facility: CLINIC | Age: 38
End: 2023-11-13
Payer: COMMERCIAL

## 2023-11-13 NOTE — PROGRESS NOTES
Oral Chemotherapy Monitoring Program  Lab Follow Up    Reviewed lab results from 11/9/23. Also left voicemail for patient to follow-up on cycle 2 of temozolomide and see if patient has tolerated 2nd cycle with supportive medications. No patient or drug names were mentioned.         9/5/2023    11:00 AM 9/7/2023    11:00 AM 10/9/2023     4:00 PM 10/11/2023     4:00 PM 10/12/2023     4:00 PM 10/27/2023     9:00 AM 11/13/2023     9:00 AM   ORAL CHEMOTHERAPY   Assessment Type New Teach Chart Review;Lab Monitoring;Left Voicemail Left Voicemail Initial Follow up Lab Monitoring Lab Monitoring Lab Monitoring;Left Voicemail   Diagnosis Code   Brain Cancer - Glioblastoma Brain Cancer - Glioblastoma Brain Cancer - Glioblastoma Brain Cancer - Glioblastoma Brain Cancer - Glioblastoma   Providers Dr. Kobe Bullock   Clinic Name/Location [facility]   Drug Name Temodar (temozolomide) Temodar (temozolomide) Temodar (temozolomide) Temodar (temozolomide) Temodar (temozolomide) Temodar (temozolomide) Temodar (temozolomide)   Dose 280 mg 280 mg 280 mg 280 mg 280 mg 280 mg 280 mg   Current Schedule Daily Daily QHS QHS QHS QHS QHS   Cycle Details 5 days on, 23 days off 5 days on, 23 days off 5 days on, 23 days off 5 days on, 23 days off 5 days on, 23 days off 5 days on, 23 days off 5 days on, 23 days off   Start Date of Last Cycle 8/24/2023 8/24/2023  10/2/2023 10/2/2023 10/2/2023 10/30/2023   Planned next cycle start date  9/21/2023  10/30/2023 10/30/2023 10/30/2023 11/27/2023   Doses missed in last 2 weeks    0      Adherence Assessment    Adherent      Adverse Effects  Fatigue  Nausea No AE identified during assessment  No AE identified during assessment   Nausea    Grade 1      Pharmacist Intervention(nausea)    Yes      Intervention(s)    Rx medication recommendation      Fatigue  Grade 1        Pharmacist Intervention(fatigue)  No         Any new drug interactions?    No      Is the dose as ordered appropriate for the patient?    Yes          Labs:  _  Result Component Current Result Ref Range   Sodium 140 (10/26/2023) 135 - 145 mmol/L     _  Result Component Current Result Ref Range   Potassium 4.0 (10/26/2023) 3.4 - 5.3 mmol/L     _  Result Component Current Result Ref Range   Calcium 9.5 (10/26/2023) 8.6 - 10.0 mg/dL     No results found for Mag within last 30 days.     No results found for Phos within last 30 days.     _  Result Component Current Result Ref Range   Albumin 4.9 (10/26/2023) 3.5 - 5.2 g/dL     _  Result Component Current Result Ref Range   Urea Nitrogen 10.4 (10/26/2023) 6.0 - 20.0 mg/dL     _  Result Component Current Result Ref Range   Creatinine 0.84 (10/26/2023) 0.67 - 1.17 mg/dL     _  Result Component Current Result Ref Range   AST 18 (10/26/2023) 0 - 45 U/L     _  Result Component Current Result Ref Range   ALT 23 (10/26/2023) 0 - 70 U/L     _  Result Component Current Result Ref Range   Bilirubin Total 0.3 (10/26/2023) <=1.2 mg/dL     _  Result Component Current Result Ref Range   WBC Count 7.1 (11/9/2023) 4.0 - 11.0 10e3/uL     _  Result Component Current Result Ref Range   Hemoglobin 13.9 (11/9/2023) 13.3 - 17.7 g/dL     _  Result Component Current Result Ref Range   Platelet Count 251 (11/9/2023) 150 - 450 10e3/uL     No results found for ANC within last 30 days.     _  Result Component Current Result Ref Range   Absolute Neutrophils 4.1 (11/9/2023) 1.6 - 8.3 10e3/uL        Assessment & Plan:  No concerning abnormalities. Pharmacy will follow-up in 2 days or when we receive a call back from the patient to assess tolerability of cycle 2 and see if patient is agreeable to increase TMZ dose for cycle 3. Ok to proceed with temozolomide.    Questions answered to patient's satisfaction.    Follow-Up:  11/15 - Phone call   11/24 - Visit with Lilibeth + labs Rachel Moniz, PharmD  Hematology/Oncology Pharmacist  [facility]  [facility]  [phone]

## 2023-11-17 ENCOUNTER — DOCUMENTATION ONLY (OUTPATIENT)
Dept: PHARMACY | Facility: CLINIC | Age: 38
End: 2023-11-17
Payer: COMMERCIAL

## 2023-11-17 NOTE — PROGRESS NOTES
Oral Chemotherapy Monitoring Program    Call placed to assess how cycle 2 of temozolomide dose and see if patient willing to increase dose for cycle 3. Left voicemail requesting call back. No patient or drug names were mentioned. Also sent [facility] [employer] message requesting call back/more information on how he has tolerated this cycle.    Rachel Moniz, PharmD  Hematology/Oncology Pharmacist  [facility]  [phone]

## 2023-11-24 ENCOUNTER — LAB (OUTPATIENT)
Dept: LAB | Facility: CLINIC | Age: 38
End: 2023-11-24
Payer: COMMERCIAL

## 2023-11-24 ENCOUNTER — VIRTUAL VISIT (OUTPATIENT)
Dept: ONCOLOGY | Facility: CLINIC | Age: 38
End: 2023-11-24
Attending: NURSE PRACTITIONER
Payer: COMMERCIAL

## 2023-11-24 DIAGNOSIS — D61.810 ANTINEOPLASTIC CHEMOTHERAPY INDUCED PANCYTOPENIA (H): ICD-10-CM

## 2023-11-24 DIAGNOSIS — T45.1X5A CHEMOTHERAPY-INDUCED NAUSEA AND VOMITING: ICD-10-CM

## 2023-11-24 DIAGNOSIS — C71.9 OLIGODENDROGLIOMA (H): ICD-10-CM

## 2023-11-24 DIAGNOSIS — T45.1X5A ANTINEOPLASTIC CHEMOTHERAPY INDUCED PANCYTOPENIA (H): ICD-10-CM

## 2023-11-24 DIAGNOSIS — R11.2 CHEMOTHERAPY-INDUCED NAUSEA AND VOMITING: ICD-10-CM

## 2023-11-24 LAB
ALBUMIN SERPL BCG-MCNC: 5.2 G/DL (ref 3.5–5.2)
ALP SERPL-CCNC: 108 U/L (ref 40–150)
ALT SERPL W P-5'-P-CCNC: 26 U/L (ref 0–70)
ANION GAP SERPL CALCULATED.3IONS-SCNC: 12 MMOL/L (ref 7–15)
AST SERPL W P-5'-P-CCNC: 24 U/L (ref 0–45)
BASOPHILS # BLD AUTO: 0 10E3/UL (ref 0–0.2)
BASOPHILS NFR BLD AUTO: 1 %
BILIRUB SERPL-MCNC: 0.3 MG/DL
BUN SERPL-MCNC: 12.4 MG/DL (ref 6–20)
CALCIUM SERPL-MCNC: 9.4 MG/DL (ref 8.6–10)
CHLORIDE SERPL-SCNC: 97 MMOL/L (ref 98–107)
CREAT SERPL-MCNC: 0.82 MG/DL (ref 0.67–1.17)
DEPRECATED HCO3 PLAS-SCNC: 29 MMOL/L (ref 22–29)
EGFRCR SERPLBLD CKD-EPI 2021: >90 ML/MIN/1.73M2
EOSINOPHIL # BLD AUTO: 0.1 10E3/UL (ref 0–0.7)
EOSINOPHIL NFR BLD AUTO: 1 %
ERYTHROCYTE [DISTWIDTH] IN BLOOD BY AUTOMATED COUNT: 12 % (ref 10–15)
GLUCOSE SERPL-MCNC: 94 MG/DL (ref 70–99)
HCT VFR BLD AUTO: 43.3 % (ref 40–53)
HGB BLD-MCNC: 14.7 G/DL (ref 13.3–17.7)
IMM GRANULOCYTES # BLD: 0 10E3/UL
IMM GRANULOCYTES NFR BLD: 0 %
LYMPHOCYTES # BLD AUTO: 2.9 10E3/UL (ref 0.8–5.3)
LYMPHOCYTES NFR BLD AUTO: 34 %
MCH RBC QN AUTO: 29.2 PG (ref 26.5–33)
MCHC RBC AUTO-ENTMCNC: 33.9 G/DL (ref 31.5–36.5)
MCV RBC AUTO: 86 FL (ref 78–100)
MONOCYTES # BLD AUTO: 0.6 10E3/UL (ref 0–1.3)
MONOCYTES NFR BLD AUTO: 7 %
NEUTROPHILS # BLD AUTO: 4.9 10E3/UL (ref 1.6–8.3)
NEUTROPHILS NFR BLD AUTO: 57 %
NRBC # BLD AUTO: 0 10E3/UL
NRBC BLD AUTO-RTO: 0 /100
PLATELET # BLD AUTO: 260 10E3/UL (ref 150–450)
POTASSIUM SERPL-SCNC: 3.9 MMOL/L (ref 3.4–5.3)
PROT SERPL-MCNC: 8.4 G/DL (ref 6.4–8.3)
RBC # BLD AUTO: 5.03 10E6/UL (ref 4.4–5.9)
SODIUM SERPL-SCNC: 138 MMOL/L (ref 135–145)
WBC # BLD AUTO: 8.5 10E3/UL (ref 4–11)

## 2023-11-24 PROCEDURE — 85025 COMPLETE CBC W/AUTO DIFF WBC: CPT

## 2023-11-24 PROCEDURE — 36415 COLL VENOUS BLD VENIPUNCTURE: CPT

## 2023-11-24 PROCEDURE — 80053 COMPREHEN METABOLIC PANEL: CPT

## 2023-11-24 NOTE — PROGRESS NOTES
Patient connected to video from work and then needed to leave.  Rescheduled for Monday 11/27/2023.

## 2023-11-24 NOTE — LETTER
11/24/2023         RE: Brandon Ordoñez  [id number]  [place] [address]        Dear Colleague,    Thank you for referring your patient, Brandon Ordoñez, to the [facility]. Please see a copy of my visit note below.    Patient connected to video from work and then needed to leave.  Rescheduled for Monday 11/27/2023.       Again, thank you for allowing me to participate in the care of your patient.        Sincerely,        GWEN Sprague CNP

## 2023-11-27 ENCOUNTER — VIRTUAL VISIT (OUTPATIENT)
Dept: ONCOLOGY | Facility: CLINIC | Age: 38
End: 2023-11-27
Attending: NURSE PRACTITIONER
Payer: COMMERCIAL

## 2023-11-27 VITALS — WEIGHT: 159 LBS | BODY MASS INDEX: 25.55 KG/M2 | HEIGHT: 66 IN

## 2023-11-27 DIAGNOSIS — C71.9 OLIGODENDROGLIOMA (H): Primary | ICD-10-CM

## 2023-11-27 DIAGNOSIS — K59.03 DRUG-INDUCED CONSTIPATION: ICD-10-CM

## 2023-11-27 DIAGNOSIS — C71.9 OLIGODENDROGLIOMA (H): ICD-10-CM

## 2023-11-27 PROCEDURE — 99214 OFFICE O/P EST MOD 30 MIN: CPT | Mod: 95 | Performed by: NURSE PRACTITIONER

## 2023-11-27 RX ORDER — TEMOZOLOMIDE 140 MG/1
150 CAPSULE ORAL DAILY
Qty: 10 CAPSULE | Refills: 0 | Status: CANCELLED | OUTPATIENT
Start: 2023-11-27

## 2023-11-27 RX ORDER — TEMOZOLOMIDE 140 MG/1
280 CAPSULE ORAL DAILY
Qty: 10 CAPSULE | Refills: 0 | Status: SHIPPED | OUTPATIENT
Start: 2023-11-27 | End: 2024-01-07

## 2023-11-27 RX ORDER — POLYETHYLENE GLYCOL 3350 17 G/17G
1 POWDER, FOR SOLUTION ORAL DAILY
Qty: 578 G | Refills: 1 | Status: SHIPPED | OUTPATIENT
Start: 2023-11-27 | End: 2024-07-23

## 2023-11-27 RX ORDER — ONDANSETRON 4 MG/1
4 TABLET, FILM COATED ORAL AT BEDTIME
Qty: 30 TABLET | Refills: 1 | Status: SHIPPED | OUTPATIENT
Start: 2023-11-27 | End: 2024-07-23

## 2023-11-27 RX ORDER — DEXAMETHASONE 2 MG/1
TABLET ORAL
Qty: 27 TABLET | Refills: 0 | Status: SHIPPED | OUTPATIENT
Start: 2023-11-27 | End: 2024-01-09

## 2023-11-27 ASSESSMENT — PAIN SCALES - GENERAL: PAINLEVEL: NO PAIN (0)

## 2023-11-27 NOTE — CONFIDENTIAL NOTE
Last prescribing provider: Lilibeth EVANGELISTA    Last clinic visit date: 11/24/23    Any missed appointments or no-shows since last clinic visit?: None    Recommendations for requested medication (if none, N/A): Take 2 capsules (280 mg) by mouth daily for 5 doses Take ondansetron 30-60 min before temozolomide. Take at bedtime on an empty stomach.     Next clinic visit date: 11/27/23    Any other pertinent information (if none, N/A): N/A

## 2023-11-27 NOTE — NURSING NOTE
Is the patient currently in the state of [place]? YES    Visit mode:VIDEO    If the visit is dropped, the patient can be reconnected by: VIDEO VISIT: Send to e-mail at: [email or web address]    Will anyone else be joining the visit? NO  (If patient encounters technical issues they should call [phone])    How would you like to obtain your AVS? MyChart    Are changes needed to the allergy or medication list? No    Reason for visit: Video Visit (Follow UP)    Mya LOPEZ

## 2023-11-27 NOTE — PROGRESS NOTES
Virtual Visit Details    Type of service:  Video Visit     Originating Location (pt. Location): Home    Distant Location (provider location):  On-site  Platform used for Video Visit: Lux  Time start: 2:50 pm  Time stop: 3:09 pm    NEURO-ONCOLOGY VISIT  Nov 27, 2023    CHIEF COMPLAINT: Mr. 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 He completed chemoradiotherapy with adjuvant temozolomide on 7/20/2023. Post-radiation imaging demonstrated anticipated post-radiation induced changes and no new concerns.     The plan is to continue treatment on adjuvant-dosed temozolomide.     Brandon is presenting in follow-up via video and he is unaccompanied today.    HISTORY OF PRESENT ILLNESS  -He reports that he tolerated the last cycle of adjuvant temozolomide with expected side effects.  -The steroid taper he was put on last cycle did help with his nausea.  This is refilled for him today at his request.  -He did have constipation and needed to use rectal suppositories with the last cycle.  -He does continue to feel fatigued, but he is working full-time.  -He continues to report that he feels generally cold.  He denies shaking chills or fevers.  -He denies any headaches, dizziness, vision changes such as double vision, or any activities concerning for seizure.  -He has started his high-dose vitamin D.      MEDICATIONS   Current Outpatient Medications   Medication Sig Dispense Refill    dexAMETHasone (DECADRON) 2 MG tablet Take 3 tablets (6 mg) by mouth daily (with breakfast) for 6 days, THEN 2 tablets (4 mg) daily (with breakfast) for 3 days, THEN 1 tablet (2 mg) daily (with breakfast) for 3 days. 27 tablet 0    ondansetron (ZOFRAN) 4 MG tablet Take 1 tablet (4 mg) by mouth at bedtime Take 30-60 mins  before each dose of temozolomide. 30 tablet 1    polyethylene glycol (MIRALAX) 17 GM/Dose powder Take 17 g (1 Capful) by mouth daily 578 g 1    senna-docusate (SENOKOT-S/PERICOLACE) 8.6-50 MG tablet Take 1 tablet by mouth 2 times daily as needed for constipation 20 tablet 0    vitamin D2 (ERGOCALCIFEROL) 51557 units (1250 mcg) capsule Take 1 capsule (50,000 Units) by mouth once a week 8 capsule 0    temozolomide (TEMODAR) 140 MG capsule Take 2 capsules (280 mg) by mouth daily for 5 doses Take ondansetron 30-60 min before temozolomide. Take at bedtime on an empty stomach. 10 capsule 0    temozolomide (TEMODAR) 140 MG capsule Take 2 capsules (280 mg) by mouth daily for 5 doses Take ondansetron 30-60 min before temozolomide. Take at bedtime on an empty stomach. 10 capsule 0    temozolomide (TEMODAR) 140 MG capsule Take 2 capsules (280 mg) by mouth At Bedtime for 5 days Take ondansetron 30-60 min before temozolomide. Take on an empty stomach. 10 capsule 0    temozolomide (TEMODAR) 140 MG capsule Take 2 capsules (280 mg) by mouth At Bedtime for 5 days Take ondansetron 30-60 min before temozolomide. Take on an empty stomach. (Patient not taking: Reported on 8/22/2023) 10 capsule 0     DRUG ALLERGIES No Known Allergies       IMMUNIZATIONS   Immunization History   Administered Date(s) Administered    COVID-19 MONOVALENT 12+ (Pfizer) 03/29/2021, 04/19/2021    TDAP Vaccine (Adacel) 07/30/2018       ONCOLOGIC HISTORY  -PRESENTATION: Progressive somnolence, nausea and vomiting, mild headache.  -5/4/2023 CT Head imaging with a large mass with associated calcifications and probable associated hemorrhage in the anterior right frontal region with surrounding vasogenic edema and significant local mass effect resulting in narrowing of the right more than left lateral ventricles and third ventricle, and leftward subfalcine shift as well as downward/ central transtentorial herniation.   -5/4/2023 SURGERY: Right frontal craniotomy for  mass resection by Dr. Dasilva.   Post-operative imaging on 5/5 without a definite residual enhancing lesion. Nonspecific surrounding T2/FLAIR hyperintensity along the posterior and superior margins of the resection cavity may reflect vasogenic edema or nonenhancing tumor. Small volume hemorrhage and cytotoxic edema along the margins of the resection cavity and extending along the right caudate nucleus and body of the corpus callosum.  PATHOLOGY: WHO grade 2 oligodendroglioma;   FISH with deletion of both the 1p36.32 and 19q13.33 regions   No homozygous deletion of CDKN2A/B gene    IDH1 R132S mutated.  TERT c.-146C>T mutated.   TMB Score: 2.438 mut/Mb   Fusion Event: NEGATIVE   Histology is borderline due to presence of microvascular proliferation and necrosis without palisading, however, the histological criteria for distinction between grade 2 and 3 are not well defined.  -5/16/2023 NEURO-ONC: Regardless of grade, recommending chemoradiotherapy with concurrent temozolomide.  -6/30/2023 NEURO-ONC/ CHEMORADS: Here for visit at the midpoint of concurrent chemoradiotherapy.   -7/20/2023 CHEMORADS: Completed chemoradiotherapy with concurrent temozolomide 75mg/m2 (140mg)   -8/22/2023 NEURO-ONC/ MRB/ CHEMO: Clinically well. Imaging with no concerns. Starting adjuvant temozolomide 150mg/m2 (280mg), cycle 1 (start date tonight).   -9/19/2023 NEURO-ONC/ CHEMO: No new neurological concerns. Modifications to supportive medications to better tolerate adjuvant temozolomide. Will repeat dosing of cycle 1 at 150mg/m2 (280mg).   -10/26/2023 NEURO-ONC/ CHEMO: No new neurological concerns.  Modifications to supportive medications to better tolerate adjuvant temozolomide were successful and he tolerated his repeat dosing of cycle 1 at 150mg/m2 (280mg).  Will plan on repeating at this dosing level one more time and then consider increase to 200mg/m2 with cycle 3.   -11/27/2023 NEURO-ONC/ CHEMO:  No new neurological concerns.   "Modifications to supportive medications to better tolerate adjuvant temozolomide were successful and he tolerated his repeat dosing of cycle 1 at 150mg/m2, and after discussion today, he would like to continue at this dose rather than increase.    SOCIAL HISTORY   , 2 children (2 yo and 5 yo).     PHYSICAL EXAMINATION  There were no vitals taken for this visit.   Wt Readings from Last 2 Encounters:   10/26/23 73.5 kg (162 lb)   09/19/23 74.1 kg (163 lb 6.4 oz)      Ht Readings from Last 2 Encounters:   10/26/23 1.664 m (5' 5.5\")   08/22/23 1.664 m (5' 5.5\")     KPS: 100    -Generally well appearing.  -Respiratory: Normal breath sounds, no audible wheezing.  -Psychiatric: Normal mood and affect. Pleasant, talkative.  -Neurologic:   MENTAL STATUS:     Alert, oriented.    Recall: Intact.    Speech fluent.    Comprehension intact.     CRANIAL NERVES:     Pupils are equal, round.     Symmetric facial movements.   Hearing intact.   No dysarthria.  GAIT:  Walks without assistance.     The remainder of a complete physical exam is deferred due to video health medium.    MEDICAL RECORDS  Personally reviewed pharmacy notes, oncology notes and @Pay messages.    LABS  Personally reviewed all available lab results; CBC and CMP from 11/24/2023.    IMAGING  No new neuro-imaging to review.      IMPRESSION  As above, with the addition of dexamethasone to his supportive medications, he was able to complete the full 5 days of repeat cycle 1 treatment, and does report that cycle 2 was well-tolerated overall as well.  He did experience constipation that was significant enough that he needed to use a rectal suppository.  He had been taking senna only for management of constipation.  We discussed the addition of MiraLAX this cycle, so we will hopefully be able to avoid the need for repeat suppositories.  He does have ongoing fatigue.  He did  his prescription for vitamin D, and has been taking this for the last several " weeks.  We will add on vitamin D to his next set of labs in 2 weeks to recheck his levels.    rBandon has no new neurologic concerns. Headaches are mild and can be trigger by exertion.     As noted above, we discussed and he would like to continue the next cycle at the same dose.      PROBLEM LIST  WHO grade 2 oligodendroglioma  Headaches    PLAN  -CANCER DIRECTED THERAPY-  Will continue adjuvant temozolomide at 150mg/m2 (270mg), cycle 3.    -Supportive medications; Zofran and bowel regimen plus dexamethasone as detailed below.   -Added Miralax daily to bowel regimen today.     -Repeat 28 day cycle if WBC >= 3, ANC >= 1.5, HgB >= 10, and platelets >= 100.  -Surveillance labs reviewed, at goal for chemotherapy.   -Will continue every 2 weeks CBC and CMP every 4 weeks.    -Repeat imaging in 1 month (12/2023).     -STEROIDS-  -Dexamethasone use while on chemotherapy; 6mg daily for 6 days, 4mg daily for 3 days, 2mg daily for 3 days.    Refilled today.     -SEIZURE MANAGEMENT-  -While this patient is at increased risk of having seizures, given the lack of seizure history, there is no indication to prescribe an antiepileptic at this time.     -Quality of life/ MOOD/ FATIGUE-  -Denies any mood issues.  -Fatigue; chemotherapy-associated.    Labs in 9/2023 Vitamin B12/ D deficiency, low iron, and thyroid studies without concern.   -Continue to monitor mood as untreated/ undertreated depression can worsen fatigue, dysorexia, and quality of life.    Labs every 2 weeks and will include repeat Vit D with next lab draw in two weeks.  He will follow-up in December 2023 with labs, imaging and to see Dr. Bullock prior to cycle 4.    In the meantime, Brandon knows to call the clinic with any concerns and he can be seen sooner if needed.      Lilibeth Marr, APRN, CNP  [facility]  [address] [place] [address] [phone]

## 2023-11-27 NOTE — LETTER
11/27/2023         RE: Brandon Ordoñez  [id number] [place] [address]        Dear Colleague,    Thank you for referring your patient, Brandon Ordoñez, to the [facility]. Please see a copy of my visit note below.    Virtual Visit Details    Type of service:  Video Visit     Originating Location (pt. Location): Home    Distant Location (provider location):  On-site  Platform used for Video Visit: AmWell  Time start: 2:50 pm  Time stop: 3:09 pm    NEURO-ONCOLOGY VISIT  Nov 27, 2023    CHIEF COMPLAINT: Mr. 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 He completed chemoradiotherapy with adjuvant temozolomide on 7/20/2023. Post-radiation imaging demonstrated anticipated post-radiation induced changes and no new concerns.     The plan is to continue treatment on adjuvant-dosed temozolomide.     Brandon is presenting in follow-up via video and he is unaccompanied today.    HISTORY OF PRESENT ILLNESS  -He reports that he tolerated the last cycle of adjuvant temozolomide with expected side effects.  -The steroid taper he was put on last cycle did help with his nausea.  This is refilled for him today at his request.  -He did have constipation and needed to use rectal suppositories with the last cycle.  -He does continue to feel fatigued, but he is working full-time.  -He continues to report that he feels generally cold.  He denies shaking chills or fevers.  -He denies any headaches, dizziness, vision changes such as double vision, or any activities concerning for seizure.  -He has started his high-dose vitamin D.      MEDICATIONS   Current Outpatient Medications   Medication Sig Dispense Refill    dexAMETHasone (DECADRON) 2 MG tablet Take 3 tablets (6 mg) by  mouth daily (with breakfast) for 6 days, THEN 2 tablets (4 mg) daily (with breakfast) for 3 days, THEN 1 tablet (2 mg) daily (with breakfast) for 3 days. 27 tablet 0    ondansetron (ZOFRAN) 4 MG tablet Take 1 tablet (4 mg) by mouth at bedtime Take 30-60 mins before each dose of temozolomide. 30 tablet 1    polyethylene glycol (MIRALAX) 17 GM/Dose powder Take 17 g (1 Capful) by mouth daily 578 g 1    senna-docusate (SENOKOT-S/PERICOLACE) 8.6-50 MG tablet Take 1 tablet by mouth 2 times daily as needed for constipation 20 tablet 0    vitamin D2 (ERGOCALCIFEROL) 11589 units (1250 mcg) capsule Take 1 capsule (50,000 Units) by mouth once a week 8 capsule 0    temozolomide (TEMODAR) 140 MG capsule Take 2 capsules (280 mg) by mouth daily for 5 doses Take ondansetron 30-60 min before temozolomide. Take at bedtime on an empty stomach. 10 capsule 0    temozolomide (TEMODAR) 140 MG capsule Take 2 capsules (280 mg) by mouth daily for 5 doses Take ondansetron 30-60 min before temozolomide. Take at bedtime on an empty stomach. 10 capsule 0    temozolomide (TEMODAR) 140 MG capsule Take 2 capsules (280 mg) by mouth At Bedtime for 5 days Take ondansetron 30-60 min before temozolomide. Take on an empty stomach. 10 capsule 0    temozolomide (TEMODAR) 140 MG capsule Take 2 capsules (280 mg) by mouth At Bedtime for 5 days Take ondansetron 30-60 min before temozolomide. Take on an empty stomach. (Patient not taking: Reported on 8/22/2023) 10 capsule 0     DRUG ALLERGIES No Known Allergies       IMMUNIZATIONS   Immunization History   Administered Date(s) Administered    COVID-19 MONOVALENT 12+ (Pfizer) 03/29/2021, 04/19/2021    TDAP Vaccine (Adacel) 07/30/2018       ONCOLOGIC HISTORY  -PRESENTATION: Progressive somnolence, nausea and vomiting, mild headache.  -5/4/2023 CT Head imaging with a large mass with associated calcifications and probable associated hemorrhage in the anterior right frontal region with surrounding vasogenic edema and  significant local mass effect resulting in narrowing of the right more than left lateral ventricles and third ventricle, and leftward subfalcine shift as well as downward/ central transtentorial herniation.   -5/4/2023 SURGERY: Right frontal craniotomy for mass resection by Dr. Dasilva.   Post-operative imaging on 5/5 without a definite residual enhancing lesion. Nonspecific surrounding T2/FLAIR hyperintensity along the posterior and superior margins of the resection cavity may reflect vasogenic edema or nonenhancing tumor. Small volume hemorrhage and cytotoxic edema along the margins of the resection cavity and extending along the right caudate nucleus and body of the corpus callosum.  PATHOLOGY: WHO grade 2 oligodendroglioma;   FISH with deletion of both the 1p36.32 and 19q13.33 regions   No homozygous deletion of CDKN2A/B gene    IDH1 R132S mutated.  TERT c.-146C>T mutated.   TMB Score: 2.438 mut/Mb   Fusion Event: NEGATIVE   Histology is borderline due to presence of microvascular proliferation and necrosis without palisading, however, the histological criteria for distinction between grade 2 and 3 are not well defined.  -5/16/2023 NEURO-ONC: Regardless of grade, recommending chemoradiotherapy with concurrent temozolomide.  -6/30/2023 NEURO-ONC/ CHEMORADS: Here for visit at the midpoint of concurrent chemoradiotherapy.   -7/20/2023 CHEMORADS: Completed chemoradiotherapy with concurrent temozolomide 75mg/m2 (140mg)   -8/22/2023 NEURO-ONC/ MRB/ CHEMO: Clinically well. Imaging with no concerns. Starting adjuvant temozolomide 150mg/m2 (280mg), cycle 1 (start date tonight).   -9/19/2023 NEURO-ONC/ CHEMO: No new neurological concerns. Modifications to supportive medications to better tolerate adjuvant temozolomide. Will repeat dosing of cycle 1 at 150mg/m2 (280mg).   -10/26/2023 NEURO-ONC/ CHEMO: No new neurological concerns.  Modifications to supportive medications to better tolerate adjuvant temozolomide were  "successful and he tolerated his repeat dosing of cycle 1 at 150mg/m2 (280mg).  Will plan on repeating at this dosing level one more time and then consider increase to 200mg/m2 with cycle 3.   -11/27/2023 NEURO-ONC/ CHEMO:  No new neurological concerns.  Modifications to supportive medications to better tolerate adjuvant temozolomide were successful and he tolerated his repeat dosing of cycle 1 at 150mg/m2, and after discussion today, he would like to continue at this dose rather than increase.    SOCIAL HISTORY   , 2 children (2 yo and 3 yo).     PHYSICAL EXAMINATION  There were no vitals taken for this visit.   Wt Readings from Last 2 Encounters:   10/26/23 73.5 kg (162 lb)   09/19/23 74.1 kg (163 lb 6.4 oz)      Ht Readings from Last 2 Encounters:   10/26/23 1.664 m (5' 5.5\")   08/22/23 1.664 m (5' 5.5\")     KPS: 100    -Generally well appearing.  -Respiratory: Normal breath sounds, no audible wheezing.  -Psychiatric: Normal mood and affect. Pleasant, talkative.  -Neurologic:   MENTAL STATUS:     Alert, oriented.    Recall: Intact.    Speech fluent.    Comprehension intact.     CRANIAL NERVES:     Pupils are equal, round.     Symmetric facial movements.   Hearing intact.   No dysarthria.  GAIT:  Walks without assistance.     The remainder of a complete physical exam is deferred due to video health medium.    MEDICAL RECORDS  Personally reviewed pharmacy notes, oncology notes and [employer] messages.    LABS  Personally reviewed all available lab results; CBC and CMP from 11/24/2023.    IMAGING  No new neuro-imaging to review.      IMPRESSION  As above, with the addition of dexamethasone to his supportive medications, he was able to complete the full 5 days of repeat cycle 1 treatment, and does report that cycle 2 was well-tolerated overall as well.  He did experience constipation that was significant enough that he needed to use a rectal suppository.  He had been taking senna only for management of " constipation.  We discussed the addition of MiraLAX this cycle, so we will hopefully be able to avoid the need for repeat suppositories.  He does have ongoing fatigue.  He did  his prescription for vitamin D, and has been taking this for the last several weeks.  We will add on vitamin D to his next set of labs in 2 weeks to recheck his levels.    Brandon has no new neurologic concerns. Headaches are mild and can be trigger by exertion.     As noted above, we discussed and he would like to continue the next cycle at the same dose.      PROBLEM LIST  WHO grade 2 oligodendroglioma  Headaches    PLAN  -CANCER DIRECTED THERAPY-  Will continue adjuvant temozolomide at 150mg/m2 (270mg), cycle 3.    -Supportive medications; Zofran and bowel regimen plus dexamethasone as detailed below.   -Added Miralax daily to bowel regimen today.     -Repeat 28 day cycle if WBC >= 3, ANC >= 1.5, HgB >= 10, and platelets >= 100.  -Surveillance labs reviewed, at goal for chemotherapy.   -Will continue every 2 weeks CBC and CMP every 4 weeks.    -Repeat imaging in 1 month (12/2023).     -STEROIDS-  -Dexamethasone use while on chemotherapy; 6mg daily for 6 days, 4mg daily for 3 days, 2mg daily for 3 days.    Refilled today.     -SEIZURE MANAGEMENT-  -While this patient is at increased risk of having seizures, given the lack of seizure history, there is no indication to prescribe an antiepileptic at this time.     -Quality of life/ MOOD/ FATIGUE-  -Denies any mood issues.  -Fatigue; chemotherapy-associated.    Labs in 9/2023 Vitamin B12/ D deficiency, low iron, and thyroid studies without concern.   -Continue to monitor mood as untreated/ undertreated depression can worsen fatigue, dysorexia, and quality of life.    Labs every 2 weeks and will include repeat Vit D with next lab draw in two weeks.  He will follow-up in December 2023 with labs, imaging and to see Dr. Bullock prior to cycle 4.    In the meantime, Brandon knows to call  the clinic with any concerns and he can be seen sooner if needed.      GWEN Pickard, CNP  [facility] [id number] [place], [place] [id number] [phone]

## 2023-11-29 RX ORDER — TEMOZOLOMIDE 140 MG/1
CAPSULE ORAL
OUTPATIENT
Start: 2023-11-29

## 2023-11-29 NOTE — TELEPHONE ENCOUNTER
Per chart review, Rx for temodar was sent in on 11/27/2023 through separate encounter.  No further action needed at this time.  Will close encounter.    Merlyn lAlen RN on 11/29/2023 at 10:02 AM

## 2023-12-12 ENCOUNTER — LAB (OUTPATIENT)
Dept: LAB | Facility: CLINIC | Age: 38
End: 2023-12-12
Payer: COMMERCIAL

## 2023-12-12 ENCOUNTER — DOCUMENTATION ONLY (OUTPATIENT)
Dept: ONCOLOGY | Facility: CLINIC | Age: 38
End: 2023-12-12

## 2023-12-12 DIAGNOSIS — E55.9 VITAMIN D DEFICIENCY: ICD-10-CM

## 2023-12-12 DIAGNOSIS — T45.1X5A ANTINEOPLASTIC CHEMOTHERAPY INDUCED PANCYTOPENIA (H): ICD-10-CM

## 2023-12-12 DIAGNOSIS — D61.810 ANTINEOPLASTIC CHEMOTHERAPY INDUCED PANCYTOPENIA (H): ICD-10-CM

## 2023-12-12 LAB
BASOPHILS # BLD AUTO: 0 10E3/UL (ref 0–0.2)
BASOPHILS NFR BLD AUTO: 1 %
EOSINOPHIL # BLD AUTO: 0.1 10E3/UL (ref 0–0.7)
EOSINOPHIL NFR BLD AUTO: 2 %
ERYTHROCYTE [DISTWIDTH] IN BLOOD BY AUTOMATED COUNT: 11.7 % (ref 10–15)
HCT VFR BLD AUTO: 44.8 % (ref 40–53)
HGB BLD-MCNC: 15 G/DL (ref 13.3–17.7)
IMM GRANULOCYTES # BLD: 0 10E3/UL
IMM GRANULOCYTES NFR BLD: 0 %
LYMPHOCYTES # BLD AUTO: 2.5 10E3/UL (ref 0.8–5.3)
LYMPHOCYTES NFR BLD AUTO: 37 %
MCH RBC QN AUTO: 28.8 PG (ref 26.5–33)
MCHC RBC AUTO-ENTMCNC: 33.5 G/DL (ref 31.5–36.5)
MCV RBC AUTO: 86 FL (ref 78–100)
MONOCYTES # BLD AUTO: 0.4 10E3/UL (ref 0–1.3)
MONOCYTES NFR BLD AUTO: 6 %
NEUTROPHILS # BLD AUTO: 3.7 10E3/UL (ref 1.6–8.3)
NEUTROPHILS NFR BLD AUTO: 54 %
NRBC # BLD AUTO: 0 10E3/UL
NRBC BLD AUTO-RTO: 0 /100
PLATELET # BLD AUTO: 248 10E3/UL (ref 150–450)
RBC # BLD AUTO: 5.2 10E6/UL (ref 4.4–5.9)
WBC # BLD AUTO: 6.7 10E3/UL (ref 4–11)

## 2023-12-12 PROCEDURE — 82306 VITAMIN D 25 HYDROXY: CPT

## 2023-12-12 PROCEDURE — 85025 COMPLETE CBC W/AUTO DIFF WBC: CPT

## 2023-12-12 PROCEDURE — 36415 COLL VENOUS BLD VENIPUNCTURE: CPT

## 2023-12-12 NOTE — PROGRESS NOTES
Oral Chemotherapy Monitoring Program  Lab Follow Up    Reviewed lab results from 12/12/23.        10/9/2023     4:00 PM 10/11/2023     4:00 PM 10/12/2023     4:00 PM 10/27/2023     9:00 AM 11/13/2023     9:00 AM 11/27/2023     3:00 PM 12/12/2023     3:00 PM   ORAL CHEMOTHERAPY   Assessment Type Left Voicemail Initial Follow up Lab Monitoring Lab Monitoring Lab Monitoring;Left Voicemail Lab Monitoring;Chart Review;Refill Lab Monitoring   Diagnosis Code Brain Cancer - Glioblastoma Brain Cancer - Glioblastoma Brain Cancer - Glioblastoma Brain Cancer - Glioblastoma Brain Cancer - Glioblastoma Brain Cancer - Glioblastoma Brain Cancer - Glioblastoma   Providers Dr. Kobe Bullock   Clinic Name/Location [facility]   Drug Name Temodar (temozolomide) Temodar (temozolomide) Temodar (temozolomide) Temodar (temozolomide) Temodar (temozolomide) Temodar (temozolomide) Temodar (temozolomide)   Dose 280 mg 280 mg 280 mg 280 mg 280 mg 280 mg 280 mg   Current Schedule QHS QHS QHS QHS QHS QHS QHS   Cycle Details 5 days on, 23 days off 5 days on, 23 days off 5 days on, 23 days off 5 days on, 23 days off 5 days on, 23 days off 5 days on, 23 days off 5 days on, 23 days off   Start Date of Last Cycle  10/2/2023 10/2/2023 10/2/2023 10/30/2023 11/28/2023 11/28/2023   Planned next cycle start date  10/30/2023 10/30/2023 10/30/2023 11/27/2023 12/26/2023 12/26/2023   Doses missed in last 2 weeks  0        Adherence Assessment  Adherent    Adherent    Adverse Effects  Nausea No AE identified during assessment  No AE identified during assessment No AE identified during assessment No AE identified during assessment   Nausea  Grade 1        Pharmacist Intervention(nausea)  Yes        Intervention(s)  Rx medication recommendation        Any new drug interactions?  No    No    Is the dose as ordered appropriate for the patient?  Yes    Yes         Labs:  _  Result Component Current Result Ref Range   Sodium 138 (11/24/2023) 135 - 145 mmol/L     _  Result Component Current Result Ref Range   Potassium 3.9 (11/24/2023) 3.4 - 5.3 mmol/L     _  Result Component Current Result Ref Range   Calcium 9.4 (11/24/2023) 8.6 - 10.0 mg/dL     No results found for Mag within last 30 days.     No results found for Phos within last 30 days.     _  Result Component Current Result Ref Range   Albumin 5.2 (11/24/2023) 3.5 - 5.2 g/dL     _  Result Component Current Result Ref Range   Urea Nitrogen 12.4 (11/24/2023) 6.0 - 20.0 mg/dL     _  Result Component Current Result Ref Range   Creatinine 0.82 (11/24/2023) 0.67 - 1.17 mg/dL     _  Result Component Current Result Ref Range   AST 24 (11/24/2023) 0 - 45 U/L     _  Result Component Current Result Ref Range   ALT 26 (11/24/2023) 0 - 70 U/L     _  Result Component Current Result Ref Range   Bilirubin Total 0.3 (11/24/2023) <=1.2 mg/dL     _  Result Component Current Result Ref Range   WBC Count 6.7 (12/12/2023) 4.0 - 11.0 10e3/uL     _  Result Component Current Result Ref Range   Hemoglobin 15.0 (12/12/2023) 13.3 - 17.7 g/dL     _  Result Component Current Result Ref Range   Platelet Count 248 (12/12/2023) 150 - 450 10e3/uL     No results found for ANC within last 30 days.     _  Result Component Current Result Ref Range   Absolute Neutrophils 3.7 (12/12/2023) 1.6 - 8.3 10e3/uL        Assessment & Plan:  No concerning abnormalities.    Follow-Up:  12/26: labs    Evaristo Watson, PeteD, MS  Hematology/Oncology Clinical Pharmacist  [facility]  [phone]

## 2023-12-13 LAB — VIT D+METAB SERPL-MCNC: 28 NG/ML (ref 20–50)

## 2023-12-15 RX ORDER — TEMOZOLOMIDE 140 MG/1
CAPSULE ORAL
OUTPATIENT
Start: 2023-12-15

## 2023-12-21 ENCOUNTER — HOSPITAL ENCOUNTER (OUTPATIENT)
Dept: MRI IMAGING | Facility: CLINIC | Age: 38
Discharge: HOME | End: 2023-12-21
Attending: NURSE PRACTITIONER | Admitting: NURSE PRACTITIONER
Payer: COMMERCIAL

## 2023-12-21 ENCOUNTER — TELEPHONE (OUTPATIENT)
Dept: ONCOLOGY | Facility: CLINIC | Age: 38
End: 2023-12-21
Payer: COMMERCIAL

## 2023-12-21 DIAGNOSIS — S35.299A: ICD-10-CM

## 2023-12-21 DIAGNOSIS — C71.9 OLIGODENDROGLIOMA (H): Primary | ICD-10-CM

## 2023-12-21 DIAGNOSIS — C71.9 OLIGODENDROGLIOMA (H): ICD-10-CM

## 2023-12-21 LAB — RADIOLOGIST FLAGS: ABNORMAL

## 2023-12-21 PROCEDURE — 70553 MRI BRAIN STEM W/O & W/DYE: CPT | Mod: 26 | Performed by: STUDENT IN AN ORGANIZED HEALTH CARE EDUCATION/TRAINING PROGRAM

## 2023-12-21 PROCEDURE — 255N000002 HC RX 255 OP 636: Mod: JZ | Performed by: NURSE PRACTITIONER

## 2023-12-21 PROCEDURE — 70553 MRI BRAIN STEM W/O & W/DYE: CPT

## 2023-12-21 PROCEDURE — A9585 GADOBUTROL INJECTION: HCPCS | Mod: JZ | Performed by: NURSE PRACTITIONER

## 2023-12-21 RX ORDER — TEMOZOLOMIDE 140 MG/1
280 CAPSULE ORAL DAILY
Qty: 10 CAPSULE | Refills: 0 | Status: SHIPPED | OUTPATIENT
Start: 2023-12-25 | End: 2024-01-07

## 2023-12-21 RX ORDER — GADOBUTROL 604.72 MG/ML
7.5 INJECTION INTRAVENOUS ONCE
Status: COMPLETED | OUTPATIENT
Start: 2023-12-21 | End: 2023-12-21

## 2023-12-21 RX ADMIN — GADOBUTROL 7.5 ML: 604.72 INJECTION INTRAVENOUS at 16:37

## 2023-12-21 NOTE — TELEPHONE ENCOUNTER
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    Call placed to Dr. Bullock and we discuss this; message to team via Epic.  Call also placed to the patient.  He reports no symptoms such as dyslexia, visual proprioception issues, headaches, dizziness, infection, elevated blood pressure history.    Plan:   - Report to ER with any concerning neuro symptoms  -Echo with bubble study  -MRA head and neck  -Fasting lipids  -A1C    Message to scheduling.

## 2023-12-27 ENCOUNTER — LAB (OUTPATIENT)
Dept: LAB | Facility: CLINIC | Age: 38
End: 2023-12-27
Payer: COMMERCIAL

## 2023-12-27 DIAGNOSIS — T45.1X5A ANTINEOPLASTIC CHEMOTHERAPY INDUCED PANCYTOPENIA (H): ICD-10-CM

## 2023-12-27 DIAGNOSIS — S35.299A: ICD-10-CM

## 2023-12-27 DIAGNOSIS — R11.2 CHEMOTHERAPY-INDUCED NAUSEA AND VOMITING: ICD-10-CM

## 2023-12-27 DIAGNOSIS — T45.1X5A CHEMOTHERAPY-INDUCED NAUSEA AND VOMITING: ICD-10-CM

## 2023-12-27 DIAGNOSIS — D61.810 ANTINEOPLASTIC CHEMOTHERAPY INDUCED PANCYTOPENIA (H): ICD-10-CM

## 2023-12-27 LAB
ALBUMIN SERPL BCG-MCNC: 4.9 G/DL (ref 3.5–5.2)
ALP SERPL-CCNC: 103 U/L (ref 40–150)
ALT SERPL W P-5'-P-CCNC: 18 U/L (ref 0–70)
ANION GAP SERPL CALCULATED.3IONS-SCNC: 9 MMOL/L (ref 7–15)
AST SERPL W P-5'-P-CCNC: 18 U/L (ref 0–45)
BASOPHILS # BLD AUTO: 0 10E3/UL (ref 0–0.2)
BASOPHILS NFR BLD AUTO: 1 %
BILIRUB SERPL-MCNC: 0.3 MG/DL
BUN SERPL-MCNC: 12.8 MG/DL (ref 6–20)
CALCIUM SERPL-MCNC: 9.2 MG/DL (ref 8.6–10)
CHLORIDE SERPL-SCNC: 98 MMOL/L (ref 98–107)
CREAT SERPL-MCNC: 0.91 MG/DL (ref 0.67–1.17)
DEPRECATED HCO3 PLAS-SCNC: 30 MMOL/L (ref 22–29)
EGFRCR SERPLBLD CKD-EPI 2021: >90 ML/MIN/1.73M2
EOSINOPHIL # BLD AUTO: 0.1 10E3/UL (ref 0–0.7)
EOSINOPHIL NFR BLD AUTO: 1 %
ERYTHROCYTE [DISTWIDTH] IN BLOOD BY AUTOMATED COUNT: 11.6 % (ref 10–15)
GLUCOSE SERPL-MCNC: 89 MG/DL (ref 70–99)
HCT VFR BLD AUTO: 44.5 % (ref 40–53)
HGB BLD-MCNC: 14.9 G/DL (ref 13.3–17.7)
IMM GRANULOCYTES # BLD: 0 10E3/UL
IMM GRANULOCYTES NFR BLD: 0 %
LYMPHOCYTES # BLD AUTO: 2.7 10E3/UL (ref 0.8–5.3)
LYMPHOCYTES NFR BLD AUTO: 38 %
MCH RBC QN AUTO: 28.9 PG (ref 26.5–33)
MCHC RBC AUTO-ENTMCNC: 33.5 G/DL (ref 31.5–36.5)
MCV RBC AUTO: 86 FL (ref 78–100)
MONOCYTES # BLD AUTO: 0.4 10E3/UL (ref 0–1.3)
MONOCYTES NFR BLD AUTO: 6 %
NEUTROPHILS # BLD AUTO: 3.8 10E3/UL (ref 1.6–8.3)
NEUTROPHILS NFR BLD AUTO: 54 %
NRBC # BLD AUTO: 0 10E3/UL
NRBC BLD AUTO-RTO: 0 /100
PLATELET # BLD AUTO: 236 10E3/UL (ref 150–450)
POTASSIUM SERPL-SCNC: 4.2 MMOL/L (ref 3.4–5.3)
PROT SERPL-MCNC: 7.8 G/DL (ref 6.4–8.3)
RBC # BLD AUTO: 5.16 10E6/UL (ref 4.4–5.9)
SODIUM SERPL-SCNC: 137 MMOL/L (ref 135–145)
WBC # BLD AUTO: 7.1 10E3/UL (ref 4–11)

## 2023-12-27 PROCEDURE — 85025 COMPLETE CBC W/AUTO DIFF WBC: CPT

## 2023-12-27 PROCEDURE — 80053 COMPREHEN METABOLIC PANEL: CPT

## 2023-12-27 PROCEDURE — 36415 COLL VENOUS BLD VENIPUNCTURE: CPT

## 2023-12-27 PROCEDURE — 83036 HEMOGLOBIN GLYCOSYLATED A1C: CPT

## 2023-12-28 ENCOUNTER — TELEPHONE (OUTPATIENT)
Dept: ONCOLOGY | Facility: CLINIC | Age: 38
End: 2023-12-28
Payer: COMMERCIAL

## 2023-12-28 DIAGNOSIS — S35.299A: ICD-10-CM

## 2023-12-28 DIAGNOSIS — C71.9 OLIGODENDROGLIOMA (H): ICD-10-CM

## 2023-12-28 LAB — HBA1C MFR BLD: 5.6 %

## 2023-12-28 NOTE — TELEPHONE ENCOUNTER
Oral Chemotherapy Monitoring Program  Lab Follow Up    Patient currently on Temodar therapy for glioblastoma.    Reviewed lab results from 12/27/23.    Labs:  _  Result Component Current Result Ref Range   Sodium 137 (12/27/2023) 135 - 145 mmol/L     _  Result Component Current Result Ref Range   Potassium 4.2 (12/27/2023) 3.4 - 5.3 mmol/L     _  Result Component Current Result Ref Range   Calcium 9.2 (12/27/2023) 8.6 - 10.0 mg/dL     No results found for Mag within last 30 days.     No results found for Phos within last 30 days.     _  Result Component Current Result Ref Range   Albumin 4.9 (12/27/2023) 3.5 - 5.2 g/dL     _  Result Component Current Result Ref Range   Urea Nitrogen 12.8 (12/27/2023) 6.0 - 20.0 mg/dL     _  Result Component Current Result Ref Range   Creatinine 0.91 (12/27/2023) 0.67 - 1.17 mg/dL       _  Result Component Current Result Ref Range   AST 18 (12/27/2023) 0 - 45 U/L     _  Result Component Current Result Ref Range   ALT 18 (12/27/2023) 0 - 70 U/L     _  Result Component Current Result Ref Range   Bilirubin Total 0.3 (12/27/2023) <=1.2 mg/dL       _  Result Component Current Result Ref Range   WBC Count 7.1 (12/27/2023) 4.0 - 11.0 10e3/uL     _  Result Component Current Result Ref Range   Hemoglobin 14.9 (12/27/2023) 13.3 - 17.7 g/dL     _  Result Component Current Result Ref Range   Platelet Count 236 (12/27/2023) 150 - 450 10e3/uL     _  Result Component Current Result Ref Range   Absolute Neutrophils 3.8 (12/27/2023) 1.6 - 8.3 10e3/uL       Assessment & Plan:  No concerning abnormalities.    Follow-Up:  Repeat labs in 2 weeks @ 1/10/24    Pb Taveras PharmD, BCOP  12/28/2023

## 2024-01-04 ENCOUNTER — HOSPITAL ENCOUNTER (OUTPATIENT)
Dept: CARDIOLOGY | Facility: CLINIC | Age: 39
Discharge: HOME | End: 2024-01-04
Attending: NURSE PRACTITIONER
Payer: COMMERCIAL

## 2024-01-04 ENCOUNTER — LAB (OUTPATIENT)
Dept: LAB | Facility: CLINIC | Age: 39
End: 2024-01-04
Attending: NURSE PRACTITIONER
Payer: COMMERCIAL

## 2024-01-04 DIAGNOSIS — S35.299A: ICD-10-CM

## 2024-01-04 DIAGNOSIS — C71.9 OLIGODENDROGLIOMA (H): ICD-10-CM

## 2024-01-04 LAB
CHOLEST SERPL-MCNC: 223 MG/DL
FASTING STATUS PATIENT QL REPORTED: YES
HDLC SERPL-MCNC: 45 MG/DL
LDLC SERPL CALC-MCNC: 139 MG/DL
LVEF ECHO: NORMAL
NONHDLC SERPL-MCNC: 178 MG/DL
TRIGL SERPL-MCNC: 195 MG/DL

## 2024-01-04 PROCEDURE — 80061 LIPID PANEL: CPT

## 2024-01-04 PROCEDURE — 999N000208 ECHOCARDIOGRAM COMPLETE

## 2024-01-04 PROCEDURE — 93306 TTE W/DOPPLER COMPLETE: CPT | Mod: 26 | Performed by: INTERNAL MEDICINE

## 2024-01-04 PROCEDURE — 36415 COLL VENOUS BLD VENIPUNCTURE: CPT

## 2024-01-04 RX ORDER — ACYCLOVIR 200 MG/1
30 CAPSULE ORAL ONCE
Status: DISCONTINUED | OUTPATIENT
Start: 2024-01-04 | End: 2024-01-05 | Stop reason: HOSPADM

## 2024-01-05 ENCOUNTER — HOSPITAL ENCOUNTER (OUTPATIENT)
Dept: MRI IMAGING | Facility: CLINIC | Age: 39
Discharge: HOME | End: 2024-01-05
Attending: NURSE PRACTITIONER | Admitting: NURSE PRACTITIONER
Payer: COMMERCIAL

## 2024-01-05 DIAGNOSIS — C71.9 OLIGODENDROGLIOMA (H): ICD-10-CM

## 2024-01-05 DIAGNOSIS — S35.299A: ICD-10-CM

## 2024-01-05 PROCEDURE — 255N000002 HC RX 255 OP 636: Performed by: NURSE PRACTITIONER

## 2024-01-05 PROCEDURE — 70544 MR ANGIOGRAPHY HEAD W/O DYE: CPT

## 2024-01-05 PROCEDURE — 70549 MR ANGIOGRAPH NECK W/O&W/DYE: CPT

## 2024-01-05 PROCEDURE — A9585 GADOBUTROL INJECTION: HCPCS | Performed by: NURSE PRACTITIONER

## 2024-01-05 RX ORDER — GADOBUTROL 604.72 MG/ML
7.5 INJECTION INTRAVENOUS ONCE
Status: COMPLETED | OUTPATIENT
Start: 2024-01-05 | End: 2024-01-05

## 2024-01-05 RX ADMIN — GADOBUTROL 10 ML: 604.72 INJECTION INTRAVENOUS at 11:00

## 2024-01-08 NOTE — PROGRESS NOTES
NEURO-ONCOLOGY VISIT  Jan 9, 2024    CHIEF COMPLAINT: Mr. 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 He completed chemoradiotherapy with adjuvant temozolomide on 7/20/2023. Post-radiation imaging demonstrated anticipated post-radiation induced changes and no new concerns. Repeat imaging in 12/2023 showed evidence a possible new stroke. Work-up was negative for a possible cause.     Brandon is receiving treatment with adjuvant-dosed temozolomide.     Brandon is presenting in follow-up and he is unaccompanied today.    HISTORY OF PRESENT ILLNESS  -He reports that he tolerated the last cycle of adjuvant temozolomide with expected side effects.   Less fatigued.    The steroid taper did help with his nausea.   Continued constipation, but well controlled with supportive medications.    -Working full-time.  -He denies any headaches, dizziness, vision changes such as double vision, or any activities concerning for seizure.  -Completed 8 weeks of high-dose vitamin D. Open to using daily supplementation.       MEDICATIONS   Current Outpatient Medications   Medication Sig Dispense Refill    dexAMETHasone (DECADRON) 2 MG tablet Take 3 tablets (6 mg) by mouth daily (with breakfast) for 6 days, THEN 2 tablets (4 mg) daily (with breakfast) for 3 days, THEN 1 tablet (2 mg) daily (with breakfast) for 3 days. 27 tablet 0    polyethylene glycol (MIRALAX) 17 GM/Dose powder Take 17 g (1 Capful) by mouth daily 578 g 1    senna-docusate (SENOKOT-S/PERICOLACE) 8.6-50 MG tablet Take 1 tablet by mouth 2 times daily as needed for constipation 20 tablet 0    vitamin D3 (CHOLECALCIFEROL) 10 MCG (400 UNIT) capsule Take 1 capsule (400 Units) by mouth daily 30 capsule 3    ondansetron (ZOFRAN) 4 MG tablet Take 1 tablet (4  mg) by mouth at bedtime Take 30-60 mins before each dose of temozolomide. (Patient not taking: Reported on 1/9/2024) 30 tablet 1     DRUG ALLERGIES No Known Allergies       IMMUNIZATIONS   Immunization History   Administered Date(s) Administered    COVID-19 MONOVALENT 12+ (Pfizer) 03/29/2021, 04/19/2021    TDAP Vaccine (Adacel) 07/30/2018       ONCOLOGIC HISTORY  -PRESENTATION: Progressive somnolence, nausea and vomiting, mild headache.  -5/4/2023 CT Head imaging with a large mass with associated calcifications and probable associated hemorrhage in the anterior right frontal region with surrounding vasogenic edema and significant local mass effect resulting in narrowing of the right more than left lateral ventricles and third ventricle, and leftward subfalcine shift as well as downward/ central transtentorial herniation.   -5/4/2023 SURGERY: Right frontal craniotomy for mass resection by Dr. Dasilva.   Post-operative imaging on 5/5 without a definite residual enhancing lesion. Nonspecific surrounding T2/FLAIR hyperintensity along the posterior and superior margins of the resection cavity may reflect vasogenic edema or nonenhancing tumor. Small volume hemorrhage and cytotoxic edema along the margins of the resection cavity and extending along the right caudate nucleus and body of the corpus callosum.  PATHOLOGY: WHO grade 2 oligodendroglioma;   FISH with deletion of both the 1p36.32 and 19q13.33 regions   No homozygous deletion of CDKN2A/B gene    IDH1 R132S mutated.  TERT c.-146C>T mutated.   TMB Score: 2.438 mut/Mb   Fusion Event: NEGATIVE   Histology is borderline due to presence of microvascular proliferation and necrosis without palisading, however, the histological criteria for distinction between grade 2 and 3 are not well defined.  -5/16/2023 NEURO-ONC: Regardless of grade, recommending chemoradiotherapy with concurrent temozolomide.  -6/30/2023 NEURO-ONC/ CHEMORADS: Here for visit at the midpoint of  concurrent chemoradiotherapy.   -7/20/2023 CHEMORADS: Completed chemoradiotherapy with concurrent temozolomide 75mg/m2 (140mg)   -8/22/2023 NEURO-ONC/ MRB/ CHEMO: Clinically well. Imaging with no concerns. Starting adjuvant temozolomide 150mg/m2 (280mg), cycle 1 (start date tonight).   -9/19/2023 NEURO-ONC/ CHEMO: No new neurological concerns. Modifications to supportive medications to better tolerate adjuvant temozolomide. Will repeat dosing of cycle 1 at 150mg/m2 (280mg).   -10/26/2023 NEURO-ONC/ CHEMO: No new neurological concerns.  Modifications to supportive medications to better tolerate adjuvant temozolomide were successful and he tolerated his repeat dosing of cycle 1 at 150mg/m2 (280mg).  Will plan on repeating at this dosing level one more time and then consider increase to 200mg/m2 with cycle 3.   -11/27/2023 NEURO-ONC/ CHEMO:  No new neurological concerns.  Modifications to supportive medications to better tolerate adjuvant temozolomide were successful and he tolerated his repeat dosing of cycle 1 at 150mg/m2, and after discussion today, he would like to continue at this dose rather than increase.  -12/21/2023 MRB with a new acute /early subacute small punctate infarct in the right aspect of splenium.  -1/9/2023 NEURO-ONC/ CHEMO: Clinically well. MRA clean. ECHO with no obvious cardiac source of emboli was seen within the limits of a transthoracic echocardiogram. ; recommended lifestyle and dietary changes plus primary care follow-up. HbA1c 5.6. Starting vitamin D daily supplementation. Adjuvant temozolomide 150mg/m2 (280mg), cycle 4 completed.     SOCIAL HISTORY   , 2 children (2 yo and 3 yo).       PHYSICAL EXAMINATION  BP (!) 130/92 (BP Location: Right arm, Patient Position: Sitting, Cuff Size: Adult Large)   Pulse 93   Temp 98.3  F (36.8  C) (Oral)   Resp 18   Wt 74.3 kg (163 lb 12.8 oz)   SpO2 99%   BMI 26.84 kg/m     Wt Readings from Last 2 Encounters:   01/09/24 74.3 kg (163 lb  "12.8 oz)   11/27/23 72.1 kg (159 lb)      Ht Readings from Last 2 Encounters:   11/27/23 1.664 m (5' 5.5\")   10/26/23 1.664 m (5' 5.5\")     KPS: 100    -Generally well appearing.  -Respiratory: Normal breath sounds, no audible wheezing.  -Psychiatric: Normal mood and affect. Pleasant, talkative.  -Neurologic:   MENTAL STATUS:     Alert, oriented.    Recall: Intact.    Speech fluent.    Comprehension intact.     CRANIAL NERVES:     Pupils are equal, round.     Symmetric facial movements.   Hearing intact.   No dysarthria.  GAIT:  Walks without assistance.       MEDICAL RECORDS  Personally reviewed pharmacy notes, oncology notes and [employer] messages.    LABS  Personally reviewed all available lab results; CBC and CMP.   .  HbA1c 5.6.   Vitamin D 28 on 12/12.    IMAGING  Personally reviewed MR brain imaging;    MRA head IMPRESSION;  The bilateral distal internal carotid, basilar, bilateral anterior cerebral, bilateral middle cerebral and bilateral posterior cerebral arteries are patent and unremarkable with no evidence for cerebral artery stenosis or aneurysm.      MRA neck IMPRESSION: Normal MR angiogram of the neck.    MR brain from 12/2023 IMPRESSION: 1. New acute /early subacute small punctate infarct in the right aspect of splenium. 2. Stable right frontal lobe resection cavity with stable to decreased T2 hyperintense non mass-like signal posterior and lateral to resection cavity without any contrast enhancement, likely represents gliotic changes.      ECHO 1/4/2024 Interpretation Summary: There is no thrombus seen in the left ventricle. There is no color Doppler evidence of an atrial shunt. A contrast injection (Bubble Study) was performed that was negative for flow across the interatrial septum.  Sinus of Valsalva 37mm (upper normal is 37mm). Normal transthoracic echocardiogram. No obvious cardiac source of emboli was seen within the limits of a transthoracic echocardiogram.      IMPRESSION  Clinic time " of 40 minutes was spent reviewing data, in evaluation, and coordinating care for this high complexity visit. This was in addition to answering questions pertaining to my recommendations and devising the plan as outlined below.     With supportive medications, Brandon reports good tolerance of adjuvant chemotherapy. He does have ongoing fatigue.  Brandon has no new neurologic concerns. Headaches are mild and can be trigger by exertion.     On his MR brain imaging from last month, there was an typical appearing stroke seen. I personally reviewed his imaging with neuro-radiology. With regard to a formal stroke work-up; MRA head and neck was clean. ECHO was without an obvious cardiac source of emboli. LDL was 139 and goal is <70, therefore, will recommend lifestyle and dietary changes plus primary care follow-up. HbA1c was 5.6.    Vitamin D increased to 28 and will start daily supplementation.     In the meantime, he completed adjuvant cycle 4 of temozolomide and will look to repeat MR brain imaging in ~2 months (March) to monitor the evolution of the stroke.     PROBLEM LIST  WHO grade 2 oligodendroglioma  Headaches  Stroke  Elevated cholesterol    PLAN  -CANCER DIRECTED THERAPY-  -Completed adjuvant temozolomide at 150mg/m2 (280mg), cycle 4.     Supportive medications; Zofran and bowel regimen plus dexamethasone as detailed below.    Miralax daily to bowel regimen today.     -Repeat 28 day cycle if WBC >= 3, ANC >= 1.5, HgB >= 10, and platelets >= 100.  -Surveillance labs reviewed, at goal for chemotherapy.   -Will continue every 2 weeks CBC and CMP every 4 weeks.    -Repeat imaging in 2 month (3/2024).     -STEROIDS-  -Dexamethasone use while on chemotherapy; 6mg daily for 6 days, 4mg daily for 3 days, 2mg daily for 3 days.    Refilled today.     -SEIZURE MANAGEMENT-  -While this patient is at increased risk of having seizures, given the lack of seizure history, there is no indication to prescribe an  antiepileptic at this time.     -STROKE/ VASCULAR RISK FACTORS-  -MRA head and neck was clean. ECHO no concerns. HbA1c was 5.6.  -LDL was 139 and goal is <70, therefore, will recommend lifestyle and dietary changes.    Deferring start to Crestor and recheck fasting lipid panel in 4 months (May) with primary care provider; Dr. Ryan Rodriguez.     -Quality of life/ MOOD/ FATIGUE-  -Denies any mood issues.  -Fatigue; chemotherapy-associated.    Labs in 9/2023 Vitamin B12/ D deficiency, low iron, and thyroid studies without concern.    Vitamin D level of 28 in 12/2023; starting daily supplementation.   Prescription sent today.   -Continue to monitor mood as untreated/ undertreated depression can worsen fatigue, dysorexia, and quality of life.    Labs every 2 weeks. Return to clinic with GWEN Pickard, CNP prior to next cycle of chemotherapy.     In the meantime, Brandon knows to call the clinic with any concerns and he can be seen sooner if needed.      Abril Bullock MD  Neuro-oncology

## 2024-01-09 ENCOUNTER — DOCUMENTATION ONLY (OUTPATIENT)
Dept: ONCOLOGY | Facility: CLINIC | Age: 39
End: 2024-01-09

## 2024-01-09 ENCOUNTER — ONCOLOGY VISIT (OUTPATIENT)
Dept: ONCOLOGY | Facility: CLINIC | Age: 39
End: 2024-01-09
Attending: PSYCHIATRY & NEUROLOGY
Payer: COMMERCIAL

## 2024-01-09 VITALS
TEMPERATURE: 98.3 F | HEART RATE: 93 BPM | RESPIRATION RATE: 18 BRPM | OXYGEN SATURATION: 99 % | SYSTOLIC BLOOD PRESSURE: 130 MMHG | DIASTOLIC BLOOD PRESSURE: 92 MMHG | WEIGHT: 163.8 LBS | BODY MASS INDEX: 26.84 KG/M2

## 2024-01-09 DIAGNOSIS — C71.9 OLIGODENDROGLIOMA (H): Primary | ICD-10-CM

## 2024-01-09 DIAGNOSIS — T45.1X5A CHEMOTHERAPY-INDUCED NAUSEA AND VOMITING: ICD-10-CM

## 2024-01-09 DIAGNOSIS — T45.1X5A ANTINEOPLASTIC CHEMOTHERAPY INDUCED PANCYTOPENIA (H): ICD-10-CM

## 2024-01-09 DIAGNOSIS — D61.810 ANTINEOPLASTIC CHEMOTHERAPY INDUCED PANCYTOPENIA (H): ICD-10-CM

## 2024-01-09 DIAGNOSIS — R11.2 CHEMOTHERAPY-INDUCED NAUSEA AND VOMITING: ICD-10-CM

## 2024-01-09 DIAGNOSIS — E55.9 VITAMIN D DEFICIENCY: ICD-10-CM

## 2024-01-09 LAB
ALBUMIN SERPL BCG-MCNC: 5 G/DL (ref 3.5–5.2)
ALP SERPL-CCNC: 116 U/L (ref 40–150)
ALT SERPL W P-5'-P-CCNC: 25 U/L (ref 0–70)
ANION GAP SERPL CALCULATED.3IONS-SCNC: 6 MMOL/L (ref 7–15)
AST SERPL W P-5'-P-CCNC: 19 U/L (ref 0–45)
BASOPHILS # BLD AUTO: 0 10E3/UL (ref 0–0.2)
BASOPHILS NFR BLD AUTO: 1 %
BILIRUB SERPL-MCNC: 0.6 MG/DL
BUN SERPL-MCNC: 9.1 MG/DL (ref 6–20)
CALCIUM SERPL-MCNC: 10.4 MG/DL (ref 8.6–10)
CHLORIDE SERPL-SCNC: 99 MMOL/L (ref 98–107)
CREAT SERPL-MCNC: 0.84 MG/DL (ref 0.67–1.17)
DEPRECATED HCO3 PLAS-SCNC: 35 MMOL/L (ref 22–29)
EGFRCR SERPLBLD CKD-EPI 2021: >90 ML/MIN/1.73M2
EOSINOPHIL # BLD AUTO: 0.1 10E3/UL (ref 0–0.7)
EOSINOPHIL NFR BLD AUTO: 1 %
ERYTHROCYTE [DISTWIDTH] IN BLOOD BY AUTOMATED COUNT: 11.6 % (ref 10–15)
GLUCOSE SERPL-MCNC: 102 MG/DL (ref 70–99)
HCT VFR BLD AUTO: 47.4 % (ref 40–53)
HGB BLD-MCNC: 16.2 G/DL (ref 13.3–17.7)
IMM GRANULOCYTES # BLD: 0 10E3/UL
IMM GRANULOCYTES NFR BLD: 0 %
LYMPHOCYTES # BLD AUTO: 2.2 10E3/UL (ref 0.8–5.3)
LYMPHOCYTES NFR BLD AUTO: 36 %
MCH RBC QN AUTO: 28.9 PG (ref 26.5–33)
MCHC RBC AUTO-ENTMCNC: 34.2 G/DL (ref 31.5–36.5)
MCV RBC AUTO: 85 FL (ref 78–100)
MONOCYTES # BLD AUTO: 0.4 10E3/UL (ref 0–1.3)
MONOCYTES NFR BLD AUTO: 7 %
NEUTROPHILS # BLD AUTO: 3.5 10E3/UL (ref 1.6–8.3)
NEUTROPHILS NFR BLD AUTO: 55 %
NRBC # BLD AUTO: 0 10E3/UL
NRBC BLD AUTO-RTO: 0 /100
PLATELET # BLD AUTO: 256 10E3/UL (ref 150–450)
POTASSIUM SERPL-SCNC: 4.2 MMOL/L (ref 3.4–5.3)
PROT SERPL-MCNC: 8.7 G/DL (ref 6.4–8.3)
RBC # BLD AUTO: 5.61 10E6/UL (ref 4.4–5.9)
SODIUM SERPL-SCNC: 140 MMOL/L (ref 135–145)
WBC # BLD AUTO: 6.3 10E3/UL (ref 4–11)

## 2024-01-09 PROCEDURE — 36415 COLL VENOUS BLD VENIPUNCTURE: CPT | Performed by: PSYCHIATRY & NEUROLOGY

## 2024-01-09 PROCEDURE — 85025 COMPLETE CBC W/AUTO DIFF WBC: CPT | Performed by: PSYCHIATRY & NEUROLOGY

## 2024-01-09 PROCEDURE — 99213 OFFICE O/P EST LOW 20 MIN: CPT | Performed by: PSYCHIATRY & NEUROLOGY

## 2024-01-09 PROCEDURE — 82247 BILIRUBIN TOTAL: CPT | Performed by: PSYCHIATRY & NEUROLOGY

## 2024-01-09 PROCEDURE — 99215 OFFICE O/P EST HI 40 MIN: CPT | Performed by: PSYCHIATRY & NEUROLOGY

## 2024-01-09 RX ORDER — ROSUVASTATIN CALCIUM 10 MG/1
10 TABLET, COATED ORAL DAILY
Qty: 30 TABLET | Refills: 11 | Status: CANCELLED | OUTPATIENT
Start: 2024-01-09

## 2024-01-09 RX ORDER — DEXAMETHASONE 2 MG/1
TABLET ORAL
Qty: 27 TABLET | Refills: 0 | Status: SHIPPED | OUTPATIENT
Start: 2024-01-09 | End: 2024-03-26

## 2024-01-09 RX ORDER — SWAB
1 SWAB, NON-MEDICATED MISCELLANEOUS DAILY
Qty: 30 CAPSULE | Refills: 3 | Status: SHIPPED | OUTPATIENT
Start: 2024-01-09

## 2024-01-09 ASSESSMENT — PAIN SCALES - GENERAL: PAINLEVEL: NO PAIN (0)

## 2024-01-09 NOTE — LETTER
1/9/2024         RE: Brandon Ordoñez  [address] [place] [facility] [address]        Dear Colleague,    Thank you for referring your patient, Brandon Ordoñez, to the [facility]. Please see a copy of my visit note below.    NEURO-ONCOLOGY VISIT  Jan 9, 2024    CHIEF COMPLAINT: Mr. 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 He completed chemoradiotherapy with adjuvant temozolomide on 7/20/2023. Post-radiation imaging demonstrated anticipated post-radiation induced changes and no new concerns. Repeat imaging in 12/2023 showed evidence a possible new stroke. Work-up was negative for a possible cause.     Brandon is receiving treatment with adjuvant-dosed temozolomide.     Brandon is presenting in follow-up and he is unaccompanied today.    HISTORY OF PRESENT ILLNESS  -He reports that he tolerated the last cycle of adjuvant temozolomide with expected side effects.   Less fatigued.    The steroid taper did help with his nausea.   Continued constipation, but well controlled with supportive medications.    -Working full-time.  -He denies any headaches, dizziness, vision changes such as double vision, or any activities concerning for seizure.  -Completed 8 weeks of high-dose vitamin D. Open to using daily supplementation.       MEDICATIONS   Current Outpatient Medications   Medication Sig Dispense Refill     dexAMETHasone (DECADRON) 2 MG tablet Take 3 tablets (6 mg) by mouth daily (with breakfast) for 6 days, THEN 2 tablets (4 mg) daily (with breakfast) for 3 days, THEN 1 tablet (2 mg) daily (with breakfast) for 3 days. 27 tablet 0     polyethylene glycol (MIRALAX) 17 GM/Dose powder Take 17 g (1 Capful) by mouth daily 578 g 1     senna-docusate  (SENOKOT-S/PERICOLACE) 8.6-50 MG tablet Take 1 tablet by mouth 2 times daily as needed for constipation 20 tablet 0     vitamin D3 (CHOLECALCIFEROL) 10 MCG (400 UNIT) capsule Take 1 capsule (400 Units) by mouth daily 30 capsule 3     ondansetron (ZOFRAN) 4 MG tablet Take 1 tablet (4 mg) by mouth at bedtime Take 30-60 mins before each dose of temozolomide. (Patient not taking: Reported on 1/9/2024) 30 tablet 1     DRUG ALLERGIES No Known Allergies       IMMUNIZATIONS   Immunization History   Administered Date(s) Administered     COVID-19 MONOVALENT 12+ (Pfizer) 03/29/2021, 04/19/2021     TDAP Vaccine (Adacel) 07/30/2018       ONCOLOGIC HISTORY  -PRESENTATION: Progressive somnolence, nausea and vomiting, mild headache.  -5/4/2023 CT Head imaging with a large mass with associated calcifications and probable associated hemorrhage in the anterior right frontal region with surrounding vasogenic edema and significant local mass effect resulting in narrowing of the right more than left lateral ventricles and third ventricle, and leftward subfalcine shift as well as downward/ central transtentorial herniation.   -5/4/2023 SURGERY: Right frontal craniotomy for mass resection by Dr. Dasilva.   Post-operative imaging on 5/5 without a definite residual enhancing lesion. Nonspecific surrounding T2/FLAIR hyperintensity along the posterior and superior margins of the resection cavity may reflect vasogenic edema or nonenhancing tumor. Small volume hemorrhage and cytotoxic edema along the margins of the resection cavity and extending along the right caudate nucleus and body of the corpus callosum.  PATHOLOGY: WHO grade 2 oligodendroglioma;   FISH with deletion of both the 1p36.32 and 19q13.33 regions   No homozygous deletion of CDKN2A/B gene    IDH1 R132S mutated.  TERT c.-146C>T mutated.   TMB Score: 2.438 mut/Mb   Fusion Event: NEGATIVE   Histology is borderline due to presence of microvascular proliferation and necrosis without  palisading, however, the histological criteria for distinction between grade 2 and 3 are not well defined.  -5/16/2023 NEURO-ONC: Regardless of grade, recommending chemoradiotherapy with concurrent temozolomide.  -6/30/2023 NEURO-ONC/ CHEMORADS: Here for visit at the midpoint of concurrent chemoradiotherapy.   -7/20/2023 CHEMORADS: Completed chemoradiotherapy with concurrent temozolomide 75mg/m2 (140mg)   -8/22/2023 NEURO-ONC/ MRB/ CHEMO: Clinically well. Imaging with no concerns. Starting adjuvant temozolomide 150mg/m2 (280mg), cycle 1 (start date tonight).   -9/19/2023 NEURO-ONC/ CHEMO: No new neurological concerns. Modifications to supportive medications to better tolerate adjuvant temozolomide. Will repeat dosing of cycle 1 at 150mg/m2 (280mg).   -10/26/2023 NEURO-ONC/ CHEMO: No new neurological concerns.  Modifications to supportive medications to better tolerate adjuvant temozolomide were successful and he tolerated his repeat dosing of cycle 1 at 150mg/m2 (280mg).  Will plan on repeating at this dosing level one more time and then consider increase to 200mg/m2 with cycle 3.   -11/27/2023 NEURO-ONC/ CHEMO:  No new neurological concerns.  Modifications to supportive medications to better tolerate adjuvant temozolomide were successful and he tolerated his repeat dosing of cycle 1 at 150mg/m2, and after discussion today, he would like to continue at this dose rather than increase.  -12/21/2023 MRB with a new acute /early subacute small punctate infarct in the right aspect of splenium.  -1/9/2023 NEURO-ONC/ CHEMO: Clinically well. MRA clean. ECHO with no obvious cardiac source of emboli was seen within the limits of a transthoracic echocardiogram. ; recommended lifestyle and dietary changes plus primary care follow-up. HbA1c 5.6. Starting vitamin D daily supplementation. Adjuvant temozolomide 150mg/m2 (280mg), cycle 4 completed.     SOCIAL HISTORY   , 2 children (2 yo and 5 yo).       PHYSICAL  "EXAMINATION  BP (!) 130/92 (BP Location: Right arm, Patient Position: Sitting, Cuff Size: Adult Large)   Pulse 93   Temp 98.3  F (36.8  C) (Oral)   Resp 18   Wt 74.3 kg (163 lb 12.8 oz)   SpO2 99%   BMI 26.84 kg/m     Wt Readings from Last 2 Encounters:   01/09/24 74.3 kg (163 lb 12.8 oz)   11/27/23 72.1 kg (159 lb)      Ht Readings from Last 2 Encounters:   11/27/23 1.664 m (5' 5.5\")   10/26/23 1.664 m (5' 5.5\")     KPS: 100    -Generally well appearing.  -Respiratory: Normal breath sounds, no audible wheezing.  -Psychiatric: Normal mood and affect. Pleasant, talkative.  -Neurologic:   MENTAL STATUS:     Alert, oriented.    Recall: Intact.    Speech fluent.    Comprehension intact.     CRANIAL NERVES:     Pupils are equal, round.     Symmetric facial movements.   Hearing intact.   No dysarthria.  GAIT:  Walks without assistance.       MEDICAL RECORDS  Personally reviewed pharmacy notes, oncology notes and [employer] messages.    LABS  Personally reviewed all available lab results; CBC and CMP.   .  HbA1c 5.6.   Vitamin D 28 on 12/12.    IMAGING  Personally reviewed MR brain imaging;    MRA head IMPRESSION;  The bilateral distal internal carotid, basilar, bilateral anterior cerebral, bilateral middle cerebral and bilateral posterior cerebral arteries are patent and unremarkable with no evidence for cerebral artery stenosis or aneurysm.      MRA neck IMPRESSION: Normal MR angiogram of the neck.    MR brain from 12/2023 IMPRESSION: 1. New acute /early subacute small punctate infarct in the right aspect of splenium. 2. Stable right frontal lobe resection cavity with stable to decreased T2 hyperintense non mass-like signal posterior and lateral to resection cavity without any contrast enhancement, likely represents gliotic changes.      ECHO 1/4/2024 Interpretation Summary: There is no thrombus seen in the left ventricle. There is no color Doppler evidence of an atrial shunt. A contrast injection (Bubble " Study) was performed that was negative for flow across the interatrial septum.  Sinus of Valsalva 37mm (upper normal is 37mm). Normal transthoracic echocardiogram. No obvious cardiac source of emboli was seen within the limits of a transthoracic echocardiogram.      IMPRESSION  Clinic time of 40 minutes was spent reviewing data, in evaluation, and coordinating care for this high complexity visit. This was in addition to answering questions pertaining to my recommendations and devising the plan as outlined below.     With supportive medications, Brandon reports good tolerance of adjuvant chemotherapy. He does have ongoing fatigue.  Brandon has no new neurologic concerns. Headaches are mild and can be trigger by exertion.     On his MR brain imaging from last month, there was an typical appearing stroke seen. I personally reviewed his imaging with neuro-radiology. With regard to a formal stroke work-up; MRA head and neck was clean. ECHO was without an obvious cardiac source of emboli. LDL was 139 and goal is <70, therefore, will recommend lifestyle and dietary changes plus primary care follow-up. HbA1c was 5.6.    Vitamin D increased to 28 and will start daily supplementation.     In the meantime, he completed adjuvant cycle 4 of temozolomide and will look to repeat MR brain imaging in ~2 months (March) to monitor the evolution of the stroke.     PROBLEM LIST  WHO grade 2 oligodendroglioma  Headaches  Stroke  Elevated cholesterol    PLAN  -CANCER DIRECTED THERAPY-  -Completed adjuvant temozolomide at 150mg/m2 (280mg), cycle 4.     Supportive medications; Zofran and bowel regimen plus dexamethasone as detailed below.    Miralax daily to bowel regimen today.     -Repeat 28 day cycle if WBC >= 3, ANC >= 1.5, HgB >= 10, and platelets >= 100.  -Surveillance labs reviewed, at goal for chemotherapy.   -Will continue every 2 weeks CBC and CMP every 4 weeks.    -Repeat imaging in 2 month (3/2024).  "    -STEROIDS-  -Dexamethasone use while on chemotherapy; 6mg daily for 6 days, 4mg daily for 3 days, 2mg daily for 3 days.    Refilled today.     -SEIZURE MANAGEMENT-  -While this patient is at increased risk of having seizures, given the lack of seizure history, there is no indication to prescribe an antiepileptic at this time.     -STROKE/ VASCULAR RISK FACTORS-  -MRA head and neck was clean. ECHO no concerns. HbA1c was 5.6.  -LDL was 139 and goal is <70, therefore, will recommend lifestyle and dietary changes.    Deferring start to Crestor and recheck fasting lipid panel in 4 months (May) with primary care provider; Dr. Ryan Rodriguez.     -Quality of life/ MOOD/ FATIGUE-  -Denies any mood issues.  -Fatigue; chemotherapy-associated.    Labs in 9/2023 Vitamin B12/ D deficiency, low iron, and thyroid studies without concern.    Vitamin D level of 28 in 12/2023; starting daily supplementation.   Prescription sent today.   -Continue to monitor mood as untreated/ undertreated depression can worsen fatigue, dysorexia, and quality of life.    Labs every 2 weeks. Return to clinic with GEWN Pickard, CNP prior to next cycle of chemotherapy.     In the meantime, Brandon knows to call the clinic with any concerns and he can be seen sooner if needed.      Abril Bullock MD  Neuro-oncology      Oncology Rooming Note    January 9, 2024 10:31 AM   Brandon Ordoñez is a 38 year old male who presents for:    Chief Complaint   Patient presents with     Oncology Clinic Visit     Oligodendroglioma (H)       Initial Vitals: BP (!) 152/10 (BP Location: Right arm, Patient Position: Sitting, Cuff Size: Adult Large)   Pulse 93   Temp 98.3  F (36.8  C) (Oral)   Resp 18   Wt 74.3 kg (163 lb 12.8 oz)   SpO2 99%   BMI 26.84 kg/m   Estimated body mass index is 26.84 kg/m  as calculated from the following:    Height as of 11/27/23: 1.664 m (5' 5.5\").    Weight as of this encounter: 74.3 kg (163 lb 12.8 oz). Body " surface area is 1.85 meters squared.  No Pain (0) Comment: Data Unavailable   No LMP for male patient.  Allergies reviewed: Yes  Medications reviewed: Yes    Medications: Medication refills not needed today.  Pharmacy name entered into INCIDE:    [id number] [place] [facility] [place] [address] [address] - [address]  [id number] - [place], [place] - [address]    Frailty Screening:   Is the patient here for a new oncology consult visit in cancer care? 2. No      Clinical concerns: no     Carolina Brown CMA                Again, thank you for allowing me to participate in the care of your patient.        Sincerely,        Abril Bullock MD

## 2024-01-09 NOTE — PROGRESS NOTES
Oral Chemotherapy Monitoring Program  Lab Follow Up     Patient currently on adjuvant Temozolomide therapy. Mid cycle 4 labs.   Reviewed lab results from today.     Lab Results   Component Value Date    WBC 6.3 01/09/2024    WBC 7.4 07/30/2018     Lab Results   Component Value Date    RBC 5.61 01/09/2024    RBC 5.27 07/30/2018     Lab Results   Component Value Date    HGB 16.2 01/09/2024    HGB 15.6 07/30/2018     Lab Results   Component Value Date    HCT 47.4 01/09/2024    HCT 45.2 07/30/2018     Lab Results   Component Value Date    MCV 85 01/09/2024    MCV 86 07/30/2018     Lab Results   Component Value Date    MCH 28.9 01/09/2024    MCH 29.6 07/30/2018     Lab Results   Component Value Date    MCHC 34.2 01/09/2024    MCHC 34.5 07/30/2018     Lab Results   Component Value Date    RDW 11.6 01/09/2024    RDW 11.8 07/30/2018     Lab Results   Component Value Date     01/09/2024     07/30/2018       Last Comprehensive Metabolic Panel:  Sodium   Date Value Ref Range Status   01/09/2024 140 135 - 145 mmol/L Final     Comment:     Reference intervals for this test were updated on 09/26/2023 to more accurately reflect our healthy population. There may be differences in the flagging of prior results with similar values performed with this method. Interpretation of those prior results can be made in the context of the updated reference intervals.    12/09/2019 137 133 - 144 mmol/L Final     Potassium   Date Value Ref Range Status   01/09/2024 4.2 3.4 - 5.3 mmol/L Final   12/09/2019 4.0 3.4 - 5.3 mmol/L Final     Chloride   Date Value Ref Range Status   01/09/2024 99 98 - 107 mmol/L Final   12/09/2019 103 94 - 109 mmol/L Final     Carbon Dioxide   Date Value Ref Range Status   12/09/2019 30 20 - 32 mmol/L Final     Carbon Dioxide (CO2)   Date Value Ref Range Status   01/09/2024 35 (H) 22 - 29 mmol/L Final     Anion Gap   Date Value Ref Range Status   01/09/2024 6 (L) 7 - 15 mmol/L Final   12/09/2019 4 3 - 14  mmol/L Final     Glucose   Date Value Ref Range Status   01/09/2024 102 (H) 70 - 99 mg/dL Final   12/09/2019 99 70 - 99 mg/dL Final     GLUCOSE BY METER POCT   Date Value Ref Range Status   05/08/2023 139 (H) 70 - 99 mg/dL Final     Urea Nitrogen   Date Value Ref Range Status   01/09/2024 9.1 6.0 - 20.0 mg/dL Final   12/09/2019 13 7 - 30 mg/dL Final     Creatinine   Date Value Ref Range Status   01/09/2024 0.84 0.67 - 1.17 mg/dL Final   12/09/2019 0.79 0.66 - 1.25 mg/dL Final     GFR Estimate   Date Value Ref Range Status   01/09/2024 >90 >60 mL/min/1.73m2 Final   12/09/2019 >90 >60 mL/min/[1.73_m2] Final     Comment:     Non  GFR Calc  Starting 12/18/2018, serum creatinine based estimated GFR (eGFR) will be   calculated using the Chronic Kidney Disease Epidemiology Collaboration   (CKD-EPI) equation.       Calcium   Date Value Ref Range Status   01/09/2024 10.4 (H) 8.6 - 10.0 mg/dL Final   12/09/2019 9.2 8.5 - 10.1 mg/dL Final     Bilirubin Total   Date Value Ref Range Status   01/09/2024 0.6 <=1.2 mg/dL Final     Alkaline Phosphatase   Date Value Ref Range Status   01/09/2024 116 40 - 150 U/L Final     Comment:     Reference intervals for this test were updated on 11/14/2023 to more accurately reflect our healthy population. There may be differences in the flagging of prior results with similar values performed with this method. Interpretation of those prior results can be made in the context of the updated reference intervals.     ALT   Date Value Ref Range Status   01/09/2024 25 0 - 70 U/L Final     Comment:     Reference intervals for this test were updated on 6/12/2023 to more accurately reflect our healthy population. There may be differences in the flagging of prior results with similar values performed with this method. Interpretation of those prior results can be made in the context of the updated reference intervals.       AST   Date Value Ref Range Status   01/09/2024 19 0 - 45 U/L Final      Comment:     Reference intervals for this test were updated on 6/12/2023 to more accurately reflect our healthy population. There may be differences in the flagging of prior results with similar values performed with this method. Interpretation of those prior results can be made in the context of the updated reference intervals.     Assessment & Plan:  CMP and CBC reviewed, no new concerns. Ok to continue adjuvant TMZ therapy. Next cycle due 1/30 - Cycle 5 - (delay appt d/t pt schedule and provider schedule). Patient will need labs prior to provider and cycle 5 start on 1/30/24. Continue CMP every 4 weeks and CBC every 2 weeks lab monitoring.      Follow-Up:  1/22 (lab appt reminder for pt - send myc msg and or call patient)  1/30 provider appt and anticipated start of adj TMZ Cycle 5    Kobe Najera PharmD  [facility]  [phone]

## 2024-01-09 NOTE — PROGRESS NOTES
"Oncology Rooming Note    January 9, 2024 10:31 AM   Brandon Ordoñez is a 38 year old male who presents for:    Chief Complaint   Patient presents with    Oncology Clinic Visit     Oligodendroglioma (H)       Initial Vitals: BP (!) 152/10 (BP Location: Right arm, Patient Position: Sitting, Cuff Size: Adult Large)   Pulse 93   Temp 98.3  F (36.8  C) (Oral)   Resp 18   Wt 74.3 kg (163 lb 12.8 oz)   SpO2 99%   BMI 26.84 kg/m   Estimated body mass index is 26.84 kg/m  as calculated from the following:    Height as of 11/27/23: 1.664 m (5' 5.5\").    Weight as of this encounter: 74.3 kg (163 lb 12.8 oz). Body surface area is 1.85 meters squared.  No Pain (0) Comment: Data Unavailable   No LMP for male patient.  Allergies reviewed: Yes  Medications reviewed: Yes    Medications: Medication refills not needed today.  Pharmacy name entered into [facility]:    [id number] [place] [facility] [address] [address] - [address] [place] [address] [id number] [place], [place] - [address] [facility] [address] [facility]    Frailty Screening:   Is the patient here for a new oncology consult visit in cancer care? 2. No      Clinical concerns: no     Carolina Brown CMA              "

## 2024-01-29 NOTE — PROGRESS NOTES
Virtual Visit Details    Type of service:  Video Visit   Time start: 7:53 am  Time stop: 8:04 am  Originating Location (pt. Location): Home  Distant Location (provider location):  On-site  Platform used for Video Visit: [facility]      NEURO-ONCOLOGY VISIT  Jan 30, 2024    CHIEF COMPLAINT: Mr. 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 He completed chemoradiotherapy with adjuvant temozolomide on 7/20/2023. Post-radiation imaging demonstrated anticipated post-radiation induced changes and no new concerns. Repeat imaging in 12/2023 showed evidence a possible new stroke. Work-up was negative for a possible cause.     Brandon is receiving treatment with adjuvant-dosed temozolomide.     Brandon is presenting in follow-up and he is unaccompanied today.    HISTORY OF PRESENT ILLNESS  -He reports that he tolerated the last cycle of adjuvant temozolomide with expected side effects.  -He does feel like things have been going well.  -He continues to work.  He does have ongoing fatigue, but feels this is improved overall.  -He finds the dexamethasone helpful for his symptoms of nausea and fatigue.  -He denies any dizziness.  He does have the occasional headache when he is working.  He feels this is related to dehydration and not drinking enough while working as his headaches improve with rehydration.  -He denies any concerns for seizure activity.  -Sometimes feels nerve pain/muscle spasm in his neck on the left side with certain movements.  Again, he feels this may be related to work stress.  -He has no other concerns today.  He will plan to get labs tomorrow.    MEDICATIONS   Current Outpatient Medications   Medication Sig Dispense Refill    dexAMETHasone (DECADRON) 2 MG tablet Take 3 tablets (6 mg) by  mouth daily (with breakfast) for 6 days, THEN 2 tablets (4 mg) daily (with breakfast) for 3 days, THEN 1 tablet (2 mg) daily (with breakfast) for 3 days. 27 tablet 0    ondansetron (ZOFRAN) 4 MG tablet Take 1 tablet (4 mg) by mouth at bedtime Take 30-60 mins before each dose of temozolomide. (Patient not taking: Reported on 1/9/2024) 30 tablet 1    polyethylene glycol (MIRALAX) 17 GM/Dose powder Take 17 g (1 Capful) by mouth daily 578 g 1    senna-docusate (SENOKOT-S/PERICOLACE) 8.6-50 MG tablet Take 1 tablet by mouth 2 times daily as needed for constipation 20 tablet 0    vitamin D3 (CHOLECALCIFEROL) 10 MCG (400 UNIT) capsule Take 1 capsule (400 Units) by mouth daily 30 capsule 3     DRUG ALLERGIES No Known Allergies       IMMUNIZATIONS   Immunization History   Administered Date(s) Administered    COVID-19 MONOVALENT 12+ (Pfizer) 03/29/2021, 04/19/2021    TDAP Vaccine (Adacel) 07/30/2018       ONCOLOGIC HISTORY  -PRESENTATION: Progressive somnolence, nausea and vomiting, mild headache.  -5/4/2023 CT Head imaging with a large mass with associated calcifications and probable associated hemorrhage in the anterior right frontal region with surrounding vasogenic edema and significant local mass effect resulting in narrowing of the right more than left lateral ventricles and third ventricle, and leftward subfalcine shift as well as downward/ central transtentorial herniation.   -5/4/2023 SURGERY: Right frontal craniotomy for mass resection by Dr. Dasilva.   Post-operative imaging on 5/5 without a definite residual enhancing lesion. Nonspecific surrounding T2/FLAIR hyperintensity along the posterior and superior margins of the resection cavity may reflect vasogenic edema or nonenhancing tumor. Small volume hemorrhage and cytotoxic edema along the margins of the resection cavity and extending along the right caudate nucleus and body of the corpus callosum.  PATHOLOGY: WHO grade 2 oligodendroglioma;   FISH with deletion of  both the 1p36.32 and 19q13.33 regions   No homozygous deletion of CDKN2A/B gene    IDH1 R132S mutated.  TERT c.-146C>T mutated.   TMB Score: 2.438 mut/Mb   Fusion Event: NEGATIVE   Histology is borderline due to presence of microvascular proliferation and necrosis without palisading, however, the histological criteria for distinction between grade 2 and 3 are not well defined.  -5/16/2023 NEURO-ONC: Regardless of grade, recommending chemoradiotherapy with concurrent temozolomide.  -6/30/2023 NEURO-ONC/ CHEMORADS: Here for visit at the midpoint of concurrent chemoradiotherapy.   -7/20/2023 CHEMORADS: Completed chemoradiotherapy with concurrent temozolomide 75mg/m2 (140mg)   -8/22/2023 NEURO-ONC/ MRB/ CHEMO: Clinically well. Imaging with no concerns. Starting adjuvant temozolomide 150mg/m2 (280mg), cycle 1 (start date tonight).   -9/19/2023 NEURO-ONC/ CHEMO: No new neurological concerns. Modifications to supportive medications to better tolerate adjuvant temozolomide. Will repeat dosing of cycle 1 at 150mg/m2 (280mg).   -10/26/2023 NEURO-ONC/ CHEMO: No new neurological concerns.  Modifications to supportive medications to better tolerate adjuvant temozolomide were successful and he tolerated his repeat dosing of cycle 1 at 150mg/m2 (280mg).  Will plan on repeating at this dosing level one more time and then consider increase to 200mg/m2 with cycle 3.   -11/27/2023 NEURO-ONC/ CHEMO:  No new neurological concerns.  Modifications to supportive medications to better tolerate adjuvant temozolomide were successful and he tolerated his repeat dosing of cycle 1 at 150mg/m2, and after discussion today, he would like to continue at this dose rather than increase.  -12/21/2023 MRB with a new acute /early subacute small punctate infarct in the right aspect of splenium.  -1/9/2023 NEURO-ONC/ CHEMO: Clinically well. MRA clean. ECHO with no obvious cardiac source of emboli was seen within the limits of a transthoracic  "echocardiogram. ; recommended lifestyle and dietary changes plus primary care follow-up. HbA1c 5.6. Starting vitamin D daily supplementation. Adjuvant temozolomide 150mg/m2 (280mg), cycle 4 completed.   -1/30/2023 NEURO-ONC/ CHEMO: Clinically well. Labs to be done tomorrow. Adjuvant temozolomide 150mg/m2 (280mg), cycle 5 due to start today or as soon as he receives it.     SOCIAL HISTORY   , 2 children (2 yo and 3 yo).       PHYSICAL EXAMINATION  There were no vitals taken for this visit.   Wt Readings from Last 2 Encounters:   01/09/24 74.3 kg (163 lb 12.8 oz)   11/27/23 72.1 kg (159 lb)      Ht Readings from Last 2 Encounters:   11/27/23 1.664 m (5' 5.5\")   10/26/23 1.664 m (5' 5.5\")     KPS: 100    -Generally well appearing.  -Respiratory: Normal breath sounds, no audible wheezing.  -Psychiatric: Normal mood and affect. Pleasant, talkative.  -Neurologic:   MENTAL STATUS:     Alert, oriented.    Recall: Intact.    Speech fluent.    Comprehension intact.     CRANIAL NERVES:     Pupils are equal, round.     Symmetric facial movements.   Hearing intact.   No dysarthria.  The remainder of the exam is deferred due to video health medium.    MEDICAL RECORDS  Personally reviewed pharmacy notes, oncology notes and [employer] messages.    LABS  Personally reviewed all available lab results; CBC and CMP from 1/9.  He will repeat labs tomorrow, and understands that he can start cycle 5 if labs are adequate.    IMAGING  No new imaging to review today.    IMPRESSION  As noted above, he continues to tolerate adjuvant temozolomide well with minimal expected side effects.  With supportive medication, he denies any nausea or constipation.  He does have ongoing fatigue.  He has no new neurologic concerns.  He does have ongoing mild headaches that he thinks are related to work (exertion, dehydration).    He will have labs tomorrow, and if adequate, will proceed with cycle 5.  We will plan to repeat MR brain imaging in ~2 " months (March).    PROBLEM LIST  WHO grade 2 oligodendroglioma  Headaches  Stroke  Elevated cholesterol    PLAN  -CANCER DIRECTED THERAPY-  -Completed adjuvant temozolomide at 150mg/m2 (280mg), cycle 5.     Supportive medications; Zofran and bowel regimen plus dexamethasone as detailed below.    Miralax daily.    -Repeat 28 day cycle if WBC >= 3, ANC >= 1.5, HgB >= 10, and platelets >= 100.  -Surveillance labs reviewed, at goal for chemotherapy.   -Will continue every 2 weeks CBC and CMP every 4 weeks.    -Repeat imaging in 2 month (3/2024).     -STEROIDS-  -Dexamethasone use while on chemotherapy; 6mg daily for 6 days, 4mg daily for 3 days, 2mg daily for 3 days.    Refilled today.     -SEIZURE MANAGEMENT-  -While this patient is at increased risk of having seizures, given the lack of seizure history, there is no indication to prescribe an antiepileptic at this time.     -STROKE/ VASCULAR RISK FACTORS-  -MRA head and neck was clean. ECHO no concerns. HbA1c was 5.6.  -LDL was 139 and goal is <70, therefore, will recommend lifestyle and dietary changes.    Deferring start to Crestor and recheck fasting lipid panel in 4 months (May) with primary care provider; Dr. Ryan Rodriguez.     -Quality of life/ MOOD/ FATIGUE-  -Denies any mood issues.  -Fatigue; chemotherapy-associated.    Labs in 9/2023 Vitamin B12/ D deficiency, low iron, and thyroid studies without concern.    Vitamin D level of 28 in 12/2023; starting daily supplementation.   -Continue to monitor mood as untreated/ undertreated depression can worsen fatigue, dysorexia, and quality of life.    Labs every 2 weeks. Return to clinic with me in 1 month prior to next cycle of chemotherapy.  He will see Dr. Bullock with repeat imaging in March.    In the meantime, Brandon knows to call the clinic with any concerns and he can be seen sooner if needed.      GWEN Sprague  [facility]   [phone]

## 2024-01-30 ENCOUNTER — OFFICE VISIT (OUTPATIENT)
Dept: FAMILY MEDICINE | Facility: CLINIC | Age: 39
End: 2024-01-30
Payer: COMMERCIAL

## 2024-01-30 ENCOUNTER — VIRTUAL VISIT (OUTPATIENT)
Dept: ONCOLOGY | Facility: CLINIC | Age: 39
End: 2024-01-30
Attending: NURSE PRACTITIONER
Payer: COMMERCIAL

## 2024-01-30 VITALS
HEIGHT: 65 IN | WEIGHT: 164.8 LBS | OXYGEN SATURATION: 99 % | DIASTOLIC BLOOD PRESSURE: 82 MMHG | BODY MASS INDEX: 27.46 KG/M2 | RESPIRATION RATE: 16 BRPM | SYSTOLIC BLOOD PRESSURE: 118 MMHG | HEART RATE: 98 BPM | TEMPERATURE: 97.2 F

## 2024-01-30 VITALS — BODY MASS INDEX: 25.71 KG/M2 | HEIGHT: 66 IN | WEIGHT: 160 LBS

## 2024-01-30 DIAGNOSIS — Z00.00 ROUTINE GENERAL MEDICAL EXAMINATION AT A HEALTH CARE FACILITY: Primary | ICD-10-CM

## 2024-01-30 DIAGNOSIS — C71.9 OLIGODENDROGLIOMA (H): Primary | ICD-10-CM

## 2024-01-30 PROCEDURE — 99214 OFFICE O/P EST MOD 30 MIN: CPT | Mod: 95 | Performed by: NURSE PRACTITIONER

## 2024-01-30 PROCEDURE — 99395 PREV VISIT EST AGE 18-39: CPT | Performed by: FAMILY MEDICINE

## 2024-01-30 ASSESSMENT — ENCOUNTER SYMPTOMS
HEMATURIA: 0
ARTHRALGIAS: 0
NERVOUS/ANXIOUS: 0
PARESTHESIAS: 0
FREQUENCY: 0
HEADACHES: 1
NAUSEA: 1
CONSTIPATION: 1
DIARRHEA: 0
HEMATOCHEZIA: 0
CHILLS: 1
JOINT SWELLING: 0
MYALGIAS: 0
ABDOMINAL PAIN: 0
DYSURIA: 0
SHORTNESS OF BREATH: 0
COUGH: 0
HEARTBURN: 0
FEVER: 0
DIZZINESS: 0
SORE THROAT: 0
EYE PAIN: 0
PALPITATIONS: 0
WEAKNESS: 0

## 2024-01-30 ASSESSMENT — PAIN SCALES - GENERAL: PAINLEVEL: NO PAIN (0)

## 2024-01-30 NOTE — NURSING NOTE
Is the patient currently in the state of [place]? YES    Visit mode:VIDEO    If the visit is dropped, the patient can be reconnected by: VIDEO VISIT: Send to e-mail at: [email or web address]    Will anyone else be joining the visit? NO  (If patient encounters technical issues they should call [phone])    How would you like to obtain your AVS? MyChart    Are changes needed to the allergy or medication list? Pt stated no changes to allergies and Pt stated no med changes    Reason for visit: HOLDEN Shanks LPN

## 2024-01-30 NOTE — PROGRESS NOTES
"Preventive Care Visit  [facility] [place] [place]  Ryan Rodriguez DO, Family Medicine  Jan 30, 2024      SUBJECTIVE:   Brandon is a 38 year old, presenting for the following:  Physical        1/30/2024     2:34 PM   Additional Questions   Roomed by Eugenia Abrams MA     Healthy Habits:     Getting at least 3 servings of Calcium per day:  Yes    Bi-annual eye exam:  NO    Dental care twice a year:  Yes    Sleep apnea or symptoms of sleep apnea:  Daytime drowsiness    Diet:  Regular (no restrictions)    Frequency of exercise:  1 day/week    Duration of exercise:  Less than 15 minutes    Taking medications regularly:  Yes    Medication side effects:  Not applicable    Additional concerns today:  No      Today's PHQ-2 Score:       1/30/2024     2:35 PM   PHQ-2 ( 1999 Pfizer)   Q1: Little interest or pleasure in doing things 0   Q2: Feeling down, depressed or hopeless 0   PHQ-2 Score 0   Q1: Little interest or pleasure in doing things Not at all   Q2: Feeling down, depressed or hopeless Not at all   PHQ-2 Score 0       Have you ever done Advance Care Planning? (For example, a Health Directive, POLST, or a discussion with a medical provider or your loved ones about your wishes): No, advance care planning information given to patient to review.  Patient declined advance care planning discussion at this time.    Social History     Tobacco Use    Smoking status: Never    Smokeless tobacco: Never   Substance Use Topics    Alcohol use: Yes     Comment: Occasional if there is a party              1/30/2024     2:35 PM   Alcohol Use   Prescreen: >3 drinks/day or >7 drinks/week? No       Last PSA: No results found for: \"PSA\"    Reviewed orders with patient. Reviewed health maintenance and updated orders accordingly - Yes  Lab work is in process    Reviewed and updated as needed this visit by clinical staff   Tobacco  Allergies  Meds  Problems  Med Hx  Surg Hx  Fam Hx          Reviewed and updated as needed " "this visit by Provider                    Review of Systems   Constitutional:  Positive for chills. Negative for fever.   HENT:  Positive for congestion. Negative for ear pain, hearing loss and sore throat.    Eyes:  Negative for pain and visual disturbance.   Respiratory:  Negative for cough and shortness of breath.    Cardiovascular:  Negative for chest pain and palpitations.   Gastrointestinal:  Positive for constipation and nausea. Negative for abdominal pain and diarrhea.   Genitourinary:  Negative for dysuria, frequency, genital sores, hematuria, penile discharge and urgency.   Musculoskeletal:  Negative for arthralgias, joint swelling and myalgias.   Skin:  Negative for rash.   Neurological:  Positive for headaches. Negative for dizziness and weakness.   Psychiatric/Behavioral:  The patient is not nervous/anxious.        OBJECTIVE:   /82   Pulse 98   Temp 97.2  F (36.2  C) (Tympanic)   Resp 16   Ht 1.651 m (5' 5\")   Wt 74.8 kg (164 lb 12.8 oz)   SpO2 99%   BMI 27.42 kg/m     Estimated body mass index is 27.42 kg/m  as calculated from the following:    Height as of this encounter: 1.651 m (5' 5\").    Weight as of this encounter: 74.8 kg (164 lb 12.8 oz).  Physical Exam  GENERAL: alert and no distress  EYES: Eyes grossly normal to inspection  HENT: ear canals and TM's normal, nose and mouth without ulcers or lesions  NECK: no adenopathy and no asymmetry, masses, or scars  RESP: lungs clear to auscultation - no rales, rhonchi or wheezes  CV: regular rates and rhythm, normal S1 S2, no S3 or S4, no murmur, click or rub, and peripheral pulses strong  MS: no gross musculoskeletal defects noted, no edema  PSYCH: mentation appears normal, affect normal/bright        ASSESSMENT/PLAN:   1. Routine general medical examination at a health care facility  Health maintenance reviewed and updated. Emphasized importance of balanced diet and regular exercise.      Patient has been advised of split billing " "requirements and indicates understanding: Yes      Counseling  Reviewed preventive health counseling, as reflected in patient instructions      BMI  Estimated body mass index is 27.42 kg/m  as calculated from the following:    Height as of this encounter: 1.651 m (5' 5\").    Weight as of this encounter: 74.8 kg (164 lb 12.8 oz).         He reports that he has never smoked. He has never used smokeless tobacco.            Signed Electronically by: Ryan Rodriguez DO  Answers submitted by the patient for this visit:  Annual Preventive Visit (Submitted on 1/30/2024)  Chief Complaint: Annual Exam:  Blood in stool: No  heartburn: No  peripheral edema: No  mood changes: No  Skin sensation changes: No  impotence: No    "

## 2024-01-30 NOTE — LETTER
1/30/2024         RE: Brandon Ordoñez  [address] [place] [address]        Dear Colleague,    Thank you for referring your patient, Brandon Ordoñez, to the [facility] CANCER [facility]. Please see a copy of my visit note below.    Virtual Visit Details    Type of service:  Video Visit   Time start: 7:53 am  Time stop: 8:04 am  Originating Location (pt. Location): Home  Distant Location (provider location):  On-site  Platform used for Video Visit: [facility]      NEURO-ONCOLOGY VISIT  Jan 30, 2024    CHIEF COMPLAINT: Mr. 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 He completed chemoradiotherapy with adjuvant temozolomide on 7/20/2023. Post-radiation imaging demonstrated anticipated post-radiation induced changes and no new concerns. Repeat imaging in 12/2023 showed evidence a possible new stroke. Work-up was negative for a possible cause.     Brandon is receiving treatment with adjuvant-dosed temozolomide.     Brandon is presenting in follow-up and he is unaccompanied today.    HISTORY OF PRESENT ILLNESS  -He reports that he tolerated the last cycle of adjuvant temozolomide with expected side effects.  -He does feel like things have been going well.  -He continues to work.  He does have ongoing fatigue, but feels this is improved overall.  -He finds the dexamethasone helpful for his symptoms of nausea and fatigue.  -He denies any dizziness.  He does have the occasional headache when he is working.  He feels this is related to dehydration and not drinking enough while working as his headaches improve with rehydration.  -He denies any concerns for seizure activity.  -Sometimes feels nerve pain/muscle spasm in his neck on the left side with certain movements.  Again, he  feels this may be related to work stress.  -He has no other concerns today.  He will plan to get labs tomorrow.    MEDICATIONS   Current Outpatient Medications   Medication Sig Dispense Refill     dexAMETHasone (DECADRON) 2 MG tablet Take 3 tablets (6 mg) by mouth daily (with breakfast) for 6 days, THEN 2 tablets (4 mg) daily (with breakfast) for 3 days, THEN 1 tablet (2 mg) daily (with breakfast) for 3 days. 27 tablet 0     ondansetron (ZOFRAN) 4 MG tablet Take 1 tablet (4 mg) by mouth at bedtime Take 30-60 mins before each dose of temozolomide. (Patient not taking: Reported on 1/9/2024) 30 tablet 1     polyethylene glycol (MIRALAX) 17 GM/Dose powder Take 17 g (1 Capful) by mouth daily 578 g 1     senna-docusate (SENOKOT-S/PERICOLACE) 8.6-50 MG tablet Take 1 tablet by mouth 2 times daily as needed for constipation 20 tablet 0     vitamin D3 (CHOLECALCIFEROL) 10 MCG (400 UNIT) capsule Take 1 capsule (400 Units) by mouth daily 30 capsule 3     DRUG ALLERGIES No Known Allergies       IMMUNIZATIONS   Immunization History   Administered Date(s) Administered     COVID-19 MONOVALENT 12+ (Pfizer) 03/29/2021, 04/19/2021     TDAP Vaccine (Adacel) 07/30/2018       ONCOLOGIC HISTORY  -PRESENTATION: Progressive somnolence, nausea and vomiting, mild headache.  -5/4/2023 CT Head imaging with a large mass with associated calcifications and probable associated hemorrhage in the anterior right frontal region with surrounding vasogenic edema and significant local mass effect resulting in narrowing of the right more than left lateral ventricles and third ventricle, and leftward subfalcine shift as well as downward/ central transtentorial herniation.   -5/4/2023 SURGERY: Right frontal craniotomy for mass resection by Dr. Dasilva.   Post-operative imaging on 5/5 without a definite residual enhancing lesion. Nonspecific surrounding T2/FLAIR hyperintensity along the posterior and superior margins of the resection cavity may reflect  vasogenic edema or nonenhancing tumor. Small volume hemorrhage and cytotoxic edema along the margins of the resection cavity and extending along the right caudate nucleus and body of the corpus callosum.  PATHOLOGY: WHO grade 2 oligodendroglioma;   FISH with deletion of both the 1p36.32 and 19q13.33 regions   No homozygous deletion of CDKN2A/B gene    IDH1 R132S mutated.  TERT c.-146C>T mutated.   TMB Score: 2.438 mut/Mb   Fusion Event: NEGATIVE   Histology is borderline due to presence of microvascular proliferation and necrosis without palisading, however, the histological criteria for distinction between grade 2 and 3 are not well defined.  -5/16/2023 NEURO-ONC: Regardless of grade, recommending chemoradiotherapy with concurrent temozolomide.  -6/30/2023 NEURO-ONC/ CHEMORADS: Here for visit at the midpoint of concurrent chemoradiotherapy.   -7/20/2023 CHEMORADS: Completed chemoradiotherapy with concurrent temozolomide 75mg/m2 (140mg)   -8/22/2023 NEURO-ONC/ MRB/ CHEMO: Clinically well. Imaging with no concerns. Starting adjuvant temozolomide 150mg/m2 (280mg), cycle 1 (start date tonight).   -9/19/2023 NEURO-ONC/ CHEMO: No new neurological concerns. Modifications to supportive medications to better tolerate adjuvant temozolomide. Will repeat dosing of cycle 1 at 150mg/m2 (280mg).   -10/26/2023 NEURO-ONC/ CHEMO: No new neurological concerns.  Modifications to supportive medications to better tolerate adjuvant temozolomide were successful and he tolerated his repeat dosing of cycle 1 at 150mg/m2 (280mg).  Will plan on repeating at this dosing level one more time and then consider increase to 200mg/m2 with cycle 3.   -11/27/2023 NEURO-ONC/ CHEMO:  No new neurological concerns.  Modifications to supportive medications to better tolerate adjuvant temozolomide were successful and he tolerated his repeat dosing of cycle 1 at 150mg/m2, and after discussion today, he would like to continue at this dose rather than  "increase.  -12/21/2023 MRB with a new acute /early subacute small punctate infarct in the right aspect of splenium.  -1/9/2023 NEURO-ONC/ CHEMO: Clinically well. MRA clean. ECHO with no obvious cardiac source of emboli was seen within the limits of a transthoracic echocardiogram. ; recommended lifestyle and dietary changes plus primary care follow-up. HbA1c 5.6. Starting vitamin D daily supplementation. Adjuvant temozolomide 150mg/m2 (280mg), cycle 4 completed.   -1/30/2023 NEURO-ONC/ CHEMO: Clinically well. Labs to be done tomorrow. Adjuvant temozolomide 150mg/m2 (280mg), cycle 5 due to start today or as soon as he receives it.     SOCIAL HISTORY   , 2 children (2 yo and 3 yo).       PHYSICAL EXAMINATION  There were no vitals taken for this visit.   Wt Readings from Last 2 Encounters:   01/09/24 74.3 kg (163 lb 12.8 oz)   11/27/23 72.1 kg (159 lb)      Ht Readings from Last 2 Encounters:   11/27/23 1.664 m (5' 5.5\")   10/26/23 1.664 m (5' 5.5\")     KPS: 100    -Generally well appearing.  -Respiratory: Normal breath sounds, no audible wheezing.  -Psychiatric: Normal mood and affect. Pleasant, talkative.  -Neurologic:   MENTAL STATUS:     Alert, oriented.    Recall: Intact.    Speech fluent.    Comprehension intact.     CRANIAL NERVES:     Pupils are equal, round.     Symmetric facial movements.   Hearing intact.   No dysarthria.  The remainder of the exam is deferred due to video health medium.    MEDICAL RECORDS  Personally reviewed pharmacy notes, oncology notes and [employer] messages.    LABS  Personally reviewed all available lab results; CBC and CMP from 1/9.  He will repeat labs tomorrow, and understands that he can start cycle 5 if labs are adequate.    IMAGING  No new imaging to review today.    IMPRESSION  As noted above, he continues to tolerate adjuvant temozolomide well with minimal expected side effects.  With supportive medication, he denies any nausea or constipation.  He does have ongoing " fatigue.  He has no new neurologic concerns.  He does have ongoing mild headaches that he thinks are related to work (exertion, dehydration).    He will have labs tomorrow, and if adequate, will proceed with cycle 5.  We will plan to repeat MR brain imaging in ~2 months (March).    PROBLEM LIST  WHO grade 2 oligodendroglioma  Headaches  Stroke  Elevated cholesterol    PLAN  -CANCER DIRECTED THERAPY-  -Completed adjuvant temozolomide at 150mg/m2 (280mg), cycle 5.     Supportive medications; Zofran and bowel regimen plus dexamethasone as detailed below.    Miralax daily.    -Repeat 28 day cycle if WBC >= 3, ANC >= 1.5, HgB >= 10, and platelets >= 100.  -Surveillance labs reviewed, at goal for chemotherapy.   -Will continue every 2 weeks CBC and CMP every 4 weeks.    -Repeat imaging in 2 month (3/2024).     -STEROIDS-  -Dexamethasone use while on chemotherapy; 6mg daily for 6 days, 4mg daily for 3 days, 2mg daily for 3 days.    Refilled today.     -SEIZURE MANAGEMENT-  -While this patient is at increased risk of having seizures, given the lack of seizure history, there is no indication to prescribe an antiepileptic at this time.     -STROKE/ VASCULAR RISK FACTORS-  -MRA head and neck was clean. ECHO no concerns. HbA1c was 5.6.  -LDL was 139 and goal is <70, therefore, will recommend lifestyle and dietary changes.    Deferring start to Crestor and recheck fasting lipid panel in 4 months (May) with primary care provider; Dr. Ryan Rodriguez.     -Quality of life/ MOOD/ FATIGUE-  -Denies any mood issues.  -Fatigue; chemotherapy-associated.    Labs in 9/2023 Vitamin B12/ D deficiency, low iron, and thyroid studies without concern.    Vitamin D level of 28 in 12/2023; starting daily supplementation.   -Continue to monitor mood as untreated/ undertreated depression can worsen fatigue, dysorexia, and quality of life.    Labs every 2 weeks. Return to clinic with me in 1 month prior to next cycle of chemotherapy.  He will see  Dr. Bullock with repeat imaging in March.    In the meantime, Brandon knows to call the clinic with any concerns and he can be seen sooner if needed.      GWEN Sprague  [facility]   [phone]        Again, thank you for allowing me to participate in the care of your patient.        Sincerely,        GWEN Sprague CNP

## 2024-01-30 NOTE — LETTER
1/30/2024         RE: Brandon Ordoñez  [address] [place] [address]        Dear Colleague,    Thank you for referring your patient, Brandon Ordoñez, to the [facility] CANCER [facility]. Please see a copy of my visit note below.    Virtual Visit Details    Type of service:  Video Visit   Time start: 7:53 am  Time stop: 8:04 am  Originating Location (pt. Location): Home  Distant Location (provider location):  On-site  Platform used for Video Visit: [facility]      NEURO-ONCOLOGY VISIT  Jan 30, 2024    CHIEF COMPLAINT: Mr. 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 He completed chemoradiotherapy with adjuvant temozolomide on 7/20/2023. Post-radiation imaging demonstrated anticipated post-radiation induced changes and no new concerns. Repeat imaging in 12/2023 showed evidence a possible new stroke. Work-up was negative for a possible cause.     Brandon is receiving treatment with adjuvant-dosed temozolomide.     Branodn is presenting in follow-up and he is unaccompanied today.    HISTORY OF PRESENT ILLNESS  -He reports that he tolerated the last cycle of adjuvant temozolomide with expected side effects.  -He does feel like things have been going well.  -He continues to work.  He does have ongoing fatigue, but feels this is improved overall.  -He finds the dexamethasone helpful for his symptoms of nausea and fatigue.  -He denies any dizziness.  He does have the occasional headache when he is working.  He feels this is related to dehydration and not drinking enough while working as his headaches improve with rehydration.  -He denies any concerns for seizure activity.  -Sometimes feels nerve pain/muscle spasm in his neck on the left side with certain movements.  Again, he  feels this may be related to work stress.  -He has no other concerns today.  He will plan to get labs tomorrow.    MEDICATIONS   Current Outpatient Medications   Medication Sig Dispense Refill     dexAMETHasone (DECADRON) 2 MG tablet Take 3 tablets (6 mg) by mouth daily (with breakfast) for 6 days, THEN 2 tablets (4 mg) daily (with breakfast) for 3 days, THEN 1 tablet (2 mg) daily (with breakfast) for 3 days. 27 tablet 0     ondansetron (ZOFRAN) 4 MG tablet Take 1 tablet (4 mg) by mouth at bedtime Take 30-60 mins before each dose of temozolomide. (Patient not taking: Reported on 1/9/2024) 30 tablet 1     polyethylene glycol (MIRALAX) 17 GM/Dose powder Take 17 g (1 Capful) by mouth daily 578 g 1     senna-docusate (SENOKOT-S/PERICOLACE) 8.6-50 MG tablet Take 1 tablet by mouth 2 times daily as needed for constipation 20 tablet 0     vitamin D3 (CHOLECALCIFEROL) 10 MCG (400 UNIT) capsule Take 1 capsule (400 Units) by mouth daily 30 capsule 3     DRUG ALLERGIES No Known Allergies       IMMUNIZATIONS   Immunization History   Administered Date(s) Administered     COVID-19 MONOVALENT 12+ (Pfizer) 03/29/2021, 04/19/2021     TDAP Vaccine (Adacel) 07/30/2018       ONCOLOGIC HISTORY  -PRESENTATION: Progressive somnolence, nausea and vomiting, mild headache.  -5/4/2023 CT Head imaging with a large mass with associated calcifications and probable associated hemorrhage in the anterior right frontal region with surrounding vasogenic edema and significant local mass effect resulting in narrowing of the right more than left lateral ventricles and third ventricle, and leftward subfalcine shift as well as downward/ central transtentorial herniation.   -5/4/2023 SURGERY: Right frontal craniotomy for mass resection by Dr. Dasilva.   Post-operative imaging on 5/5 without a definite residual enhancing lesion. Nonspecific surrounding T2/FLAIR hyperintensity along the posterior and superior margins of the resection cavity may reflect  vasogenic edema or nonenhancing tumor. Small volume hemorrhage and cytotoxic edema along the margins of the resection cavity and extending along the right caudate nucleus and body of the corpus callosum.  PATHOLOGY: WHO grade 2 oligodendroglioma;   FISH with deletion of both the 1p36.32 and 19q13.33 regions   No homozygous deletion of CDKN2A/B gene    IDH1 R132S mutated.  TERT c.-146C>T mutated.   TMB Score: 2.438 mut/Mb   Fusion Event: NEGATIVE   Histology is borderline due to presence of microvascular proliferation and necrosis without palisading, however, the histological criteria for distinction between grade 2 and 3 are not well defined.  -5/16/2023 NEURO-ONC: Regardless of grade, recommending chemoradiotherapy with concurrent temozolomide.  -6/30/2023 NEURO-ONC/ CHEMORADS: Here for visit at the midpoint of concurrent chemoradiotherapy.   -7/20/2023 CHEMORADS: Completed chemoradiotherapy with concurrent temozolomide 75mg/m2 (140mg)   -8/22/2023 NEURO-ONC/ MRB/ CHEMO: Clinically well. Imaging with no concerns. Starting adjuvant temozolomide 150mg/m2 (280mg), cycle 1 (start date tonight).   -9/19/2023 NEURO-ONC/ CHEMO: No new neurological concerns. Modifications to supportive medications to better tolerate adjuvant temozolomide. Will repeat dosing of cycle 1 at 150mg/m2 (280mg).   -10/26/2023 NEURO-ONC/ CHEMO: No new neurological concerns.  Modifications to supportive medications to better tolerate adjuvant temozolomide were successful and he tolerated his repeat dosing of cycle 1 at 150mg/m2 (280mg).  Will plan on repeating at this dosing level one more time and then consider increase to 200mg/m2 with cycle 3.   -11/27/2023 NEURO-ONC/ CHEMO:  No new neurological concerns.  Modifications to supportive medications to better tolerate adjuvant temozolomide were successful and he tolerated his repeat dosing of cycle 1 at 150mg/m2, and after discussion today, he would like to continue at this dose rather than  "increase.  -12/21/2023 MRB with a new acute /early subacute small punctate infarct in the right aspect of splenium.  -1/9/2023 NEURO-ONC/ CHEMO: Clinically well. MRA clean. ECHO with no obvious cardiac source of emboli was seen within the limits of a transthoracic echocardiogram. ; recommended lifestyle and dietary changes plus primary care follow-up. HbA1c 5.6. Starting vitamin D daily supplementation. Adjuvant temozolomide 150mg/m2 (280mg), cycle 4 completed.   -1/30/2023 NEURO-ONC/ CHEMO: Clinically well. Labs to be done tomorrow. Adjuvant temozolomide 150mg/m2 (280mg), cycle 5 due to start today or as soon as he receives it.     SOCIAL HISTORY   , 2 children (2 yo and 3 yo).       PHYSICAL EXAMINATION  There were no vitals taken for this visit.   Wt Readings from Last 2 Encounters:   01/09/24 74.3 kg (163 lb 12.8 oz)   11/27/23 72.1 kg (159 lb)      Ht Readings from Last 2 Encounters:   11/27/23 1.664 m (5' 5.5\")   10/26/23 1.664 m (5' 5.5\")     KPS: 100    -Generally well appearing.  -Respiratory: Normal breath sounds, no audible wheezing.  -Psychiatric: Normal mood and affect. Pleasant, talkative.  -Neurologic:   MENTAL STATUS:     Alert, oriented.    Recall: Intact.    Speech fluent.    Comprehension intact.     CRANIAL NERVES:     Pupils are equal, round.     Symmetric facial movements.   Hearing intact.   No dysarthria.  The remainder of the exam is deferred due to video health medium.    MEDICAL RECORDS  Personally reviewed pharmacy notes, oncology notes and [employer] messages.    LABS  Personally reviewed all available lab results; CBC and CMP from 1/9.  He will repeat labs tomorrow, and understands that he can start cycle 5 if labs are adequate.    IMAGING  No new imaging to review today.    IMPRESSION  As noted above, he continues to tolerate adjuvant temozolomide well with minimal expected side effects.  With supportive medication, he denies any nausea or constipation.  He does have ongoing " fatigue.  He has no new neurologic concerns.  He does have ongoing mild headaches that he thinks are related to work (exertion, dehydration).    He will have labs tomorrow, and if adequate, will proceed with cycle 5.  We will plan to repeat MR brain imaging in ~2 months (March).    PROBLEM LIST  WHO grade 2 oligodendroglioma  Headaches  Stroke  Elevated cholesterol    PLAN  -CANCER DIRECTED THERAPY-  -Completed adjuvant temozolomide at 150mg/m2 (280mg), cycle 5.     Supportive medications; Zofran and bowel regimen plus dexamethasone as detailed below.    Miralax daily.    -Repeat 28 day cycle if WBC >= 3, ANC >= 1.5, HgB >= 10, and platelets >= 100.  -Surveillance labs reviewed, at goal for chemotherapy.   -Will continue every 2 weeks CBC and CMP every 4 weeks.    -Repeat imaging in 2 month (3/2024).     -STEROIDS-  -Dexamethasone use while on chemotherapy; 6mg daily for 6 days, 4mg daily for 3 days, 2mg daily for 3 days.    Refilled today.     -SEIZURE MANAGEMENT-  -While this patient is at increased risk of having seizures, given the lack of seizure history, there is no indication to prescribe an antiepileptic at this time.     -STROKE/ VASCULAR RISK FACTORS-  -MRA head and neck was clean. ECHO no concerns. HbA1c was 5.6.  -LDL was 139 and goal is <70, therefore, will recommend lifestyle and dietary changes.    Deferring start to Crestor and recheck fasting lipid panel in 4 months (May) with primary care provider; Dr. Ryan Rodriguez.     -Quality of life/ MOOD/ FATIGUE-  -Denies any mood issues.  -Fatigue; chemotherapy-associated.    Labs in 9/2023 Vitamin B12/ D deficiency, low iron, and thyroid studies without concern.    Vitamin D level of 28 in 12/2023; starting daily supplementation.   -Continue to monitor mood as untreated/ undertreated depression can worsen fatigue, dysorexia, and quality of life.    Labs every 2 weeks. Return to clinic with me in 1 month prior to next cycle of chemotherapy.  He will see  Dr. Bullock with repeat imaging in March.    In the meantime, Brandon knows to call the clinic with any concerns and he can be seen sooner if needed.      GWEN Sprague  [facility]   [phone]        Again, thank you for allowing me to participate in the care of your patient.        Sincerely,        GWEN Sprague CNP

## 2024-01-31 DIAGNOSIS — C71.9 OLIGODENDROGLIOMA (H): Primary | ICD-10-CM

## 2024-01-31 RX ORDER — TEMOZOLOMIDE 140 MG/1
280 CAPSULE ORAL DAILY
Qty: 10 CAPSULE | Refills: 0 | Status: SHIPPED | OUTPATIENT
Start: 2024-01-31 | End: 2024-03-26

## 2024-01-31 NOTE — ORAL ONC MGMT
Oral Chemotherapy Monitoring Program     Placed call to patient in follow up of Temozolomide therapy. Wanting to ensure patient is getting his labs checked 1/31 prior to starting Cycle 5 of adj TMZ. Will send [employer] message to patient as well. TMZ was sent to [facility] pharmacy on 1/31.     Left message to please call back otherwise we will attempt a call in the next few days. No patient or drug names were mentioned.     Follow Up:  2/1: review for lab results. Call patient if no lab results. Awaiting to start cycle 5 of adj TMZ.     Kobe Najera PharmD  [facility]  [phone]  January 31, 2024

## 2024-02-01 ENCOUNTER — LAB (OUTPATIENT)
Dept: LAB | Facility: CLINIC | Age: 39
End: 2024-02-01
Payer: COMMERCIAL

## 2024-02-01 ENCOUNTER — TELEPHONE (OUTPATIENT)
Dept: PHARMACY | Facility: CLINIC | Age: 39
End: 2024-02-01

## 2024-02-01 DIAGNOSIS — R11.2 CHEMOTHERAPY-INDUCED NAUSEA AND VOMITING: ICD-10-CM

## 2024-02-01 DIAGNOSIS — T45.1X5A ANTINEOPLASTIC CHEMOTHERAPY INDUCED PANCYTOPENIA (H): ICD-10-CM

## 2024-02-01 DIAGNOSIS — D61.810 ANTINEOPLASTIC CHEMOTHERAPY INDUCED PANCYTOPENIA (H): ICD-10-CM

## 2024-02-01 DIAGNOSIS — T45.1X5A CHEMOTHERAPY-INDUCED NAUSEA AND VOMITING: ICD-10-CM

## 2024-02-01 LAB
ALBUMIN SERPL BCG-MCNC: 4.8 G/DL (ref 3.5–5.2)
ALP SERPL-CCNC: 109 U/L (ref 40–150)
ALT SERPL W P-5'-P-CCNC: 22 U/L (ref 0–70)
ANION GAP SERPL CALCULATED.3IONS-SCNC: 10 MMOL/L (ref 7–15)
AST SERPL W P-5'-P-CCNC: 17 U/L (ref 0–45)
BASOPHILS # BLD AUTO: 0 10E3/UL (ref 0–0.2)
BASOPHILS NFR BLD AUTO: 1 %
BILIRUB SERPL-MCNC: 0.4 MG/DL
BUN SERPL-MCNC: 10.1 MG/DL (ref 6–20)
CALCIUM SERPL-MCNC: 9.6 MG/DL (ref 8.6–10)
CHLORIDE SERPL-SCNC: 101 MMOL/L (ref 98–107)
CREAT SERPL-MCNC: 0.9 MG/DL (ref 0.67–1.17)
DEPRECATED HCO3 PLAS-SCNC: 29 MMOL/L (ref 22–29)
EGFRCR SERPLBLD CKD-EPI 2021: >90 ML/MIN/1.73M2
EOSINOPHIL # BLD AUTO: 0.1 10E3/UL (ref 0–0.7)
EOSINOPHIL NFR BLD AUTO: 1 %
ERYTHROCYTE [DISTWIDTH] IN BLOOD BY AUTOMATED COUNT: 11.5 % (ref 10–15)
GLUCOSE SERPL-MCNC: 76 MG/DL (ref 70–99)
HCT VFR BLD AUTO: 45.3 % (ref 40–53)
HGB BLD-MCNC: 15.3 G/DL (ref 13.3–17.7)
IMM GRANULOCYTES # BLD: 0 10E3/UL
IMM GRANULOCYTES NFR BLD: 0 %
LYMPHOCYTES # BLD AUTO: 2.6 10E3/UL (ref 0.8–5.3)
LYMPHOCYTES NFR BLD AUTO: 46 %
MCH RBC QN AUTO: 29.1 PG (ref 26.5–33)
MCHC RBC AUTO-ENTMCNC: 33.8 G/DL (ref 31.5–36.5)
MCV RBC AUTO: 86 FL (ref 78–100)
MONOCYTES # BLD AUTO: 0.4 10E3/UL (ref 0–1.3)
MONOCYTES NFR BLD AUTO: 7 %
NEUTROPHILS # BLD AUTO: 2.6 10E3/UL (ref 1.6–8.3)
NEUTROPHILS NFR BLD AUTO: 45 %
NRBC # BLD AUTO: 0 10E3/UL
NRBC BLD AUTO-RTO: 0 /100
PLATELET # BLD AUTO: 240 10E3/UL (ref 150–450)
POTASSIUM SERPL-SCNC: 4.2 MMOL/L (ref 3.4–5.3)
PROT SERPL-MCNC: 8.1 G/DL (ref 6.4–8.3)
RBC # BLD AUTO: 5.25 10E6/UL (ref 4.4–5.9)
SODIUM SERPL-SCNC: 140 MMOL/L (ref 135–145)
WBC # BLD AUTO: 5.7 10E3/UL (ref 4–11)

## 2024-02-01 PROCEDURE — 36415 COLL VENOUS BLD VENIPUNCTURE: CPT

## 2024-02-01 PROCEDURE — 80053 COMPREHEN METABOLIC PANEL: CPT

## 2024-02-01 PROCEDURE — 85025 COMPLETE CBC W/AUTO DIFF WBC: CPT

## 2024-02-01 NOTE — ORAL ONC MGMT
Oral Chemotherapy Program Lab review     Reviewed labs from today, 2/1/24.     Labs are unremarkable and do not require any dose adjustments at this time.     Follow-up/plan  2/27/24 @ 0800 - visit with Lilibeth HIDALGO CNP     Placed call to patient in follow up of oral chemotherapy. Left message, stating that his labs look great and he can start his next cycle, assuming he has drug on-hand, or he should start as soon as he receives drug.  No drug names were mentioned.  Asked him to reach out with questions.  Will update if response received.     Dania Marks, PharmD  Oral Chemotherapy & Infusion Pharmacist  [phone]

## 2024-02-21 ENCOUNTER — LAB (OUTPATIENT)
Dept: LAB | Facility: CLINIC | Age: 39
End: 2024-02-21
Payer: COMMERCIAL

## 2024-02-21 DIAGNOSIS — D61.810 ANTINEOPLASTIC CHEMOTHERAPY INDUCED PANCYTOPENIA (H): ICD-10-CM

## 2024-02-21 DIAGNOSIS — T45.1X5A ANTINEOPLASTIC CHEMOTHERAPY INDUCED PANCYTOPENIA (H): ICD-10-CM

## 2024-02-21 DIAGNOSIS — T45.1X5A CHEMOTHERAPY-INDUCED NAUSEA AND VOMITING: ICD-10-CM

## 2024-02-21 DIAGNOSIS — R11.2 CHEMOTHERAPY-INDUCED NAUSEA AND VOMITING: ICD-10-CM

## 2024-02-21 LAB
ALBUMIN SERPL BCG-MCNC: 4.8 G/DL (ref 3.5–5.2)
ALP SERPL-CCNC: 101 U/L (ref 40–150)
ALT SERPL W P-5'-P-CCNC: 19 U/L (ref 0–70)
ANION GAP SERPL CALCULATED.3IONS-SCNC: 10 MMOL/L (ref 7–15)
AST SERPL W P-5'-P-CCNC: 17 U/L (ref 0–45)
BASOPHILS # BLD AUTO: 0 10E3/UL (ref 0–0.2)
BASOPHILS NFR BLD AUTO: 1 %
BILIRUB SERPL-MCNC: 0.4 MG/DL
BUN SERPL-MCNC: 10.3 MG/DL (ref 6–20)
CALCIUM SERPL-MCNC: 9.6 MG/DL (ref 8.6–10)
CHLORIDE SERPL-SCNC: 102 MMOL/L (ref 98–107)
CREAT SERPL-MCNC: 0.86 MG/DL (ref 0.67–1.17)
DEPRECATED HCO3 PLAS-SCNC: 28 MMOL/L (ref 22–29)
EGFRCR SERPLBLD CKD-EPI 2021: >90 ML/MIN/1.73M2
EOSINOPHIL # BLD AUTO: 0.1 10E3/UL (ref 0–0.7)
EOSINOPHIL NFR BLD AUTO: 1 %
ERYTHROCYTE [DISTWIDTH] IN BLOOD BY AUTOMATED COUNT: 11.6 % (ref 10–15)
GLUCOSE SERPL-MCNC: 104 MG/DL (ref 70–99)
HCT VFR BLD AUTO: 42.8 % (ref 40–53)
HGB BLD-MCNC: 14.5 G/DL (ref 13.3–17.7)
IMM GRANULOCYTES # BLD: 0 10E3/UL
IMM GRANULOCYTES NFR BLD: 0 %
LYMPHOCYTES # BLD AUTO: 2.3 10E3/UL (ref 0.8–5.3)
LYMPHOCYTES NFR BLD AUTO: 40 %
MCH RBC QN AUTO: 28.9 PG (ref 26.5–33)
MCHC RBC AUTO-ENTMCNC: 33.9 G/DL (ref 31.5–36.5)
MCV RBC AUTO: 85 FL (ref 78–100)
MONOCYTES # BLD AUTO: 0.4 10E3/UL (ref 0–1.3)
MONOCYTES NFR BLD AUTO: 7 %
NEUTROPHILS # BLD AUTO: 3 10E3/UL (ref 1.6–8.3)
NEUTROPHILS NFR BLD AUTO: 51 %
NRBC # BLD AUTO: 0 10E3/UL
NRBC BLD AUTO-RTO: 0 /100
PLATELET # BLD AUTO: 243 10E3/UL (ref 150–450)
POTASSIUM SERPL-SCNC: 4 MMOL/L (ref 3.4–5.3)
PROT SERPL-MCNC: 7.7 G/DL (ref 6.4–8.3)
RBC # BLD AUTO: 5.02 10E6/UL (ref 4.4–5.9)
SODIUM SERPL-SCNC: 140 MMOL/L (ref 135–145)
WBC # BLD AUTO: 5.8 10E3/UL (ref 4–11)

## 2024-02-21 PROCEDURE — 36415 COLL VENOUS BLD VENIPUNCTURE: CPT

## 2024-02-21 PROCEDURE — 80053 COMPREHEN METABOLIC PANEL: CPT

## 2024-02-21 PROCEDURE — 85004 AUTOMATED DIFF WBC COUNT: CPT

## 2024-02-27 ENCOUNTER — VIRTUAL VISIT (OUTPATIENT)
Dept: ONCOLOGY | Facility: CLINIC | Age: 39
End: 2024-02-27
Attending: NURSE PRACTITIONER
Payer: COMMERCIAL

## 2024-02-27 VITALS — BODY MASS INDEX: 26.82 KG/M2 | HEIGHT: 65 IN | WEIGHT: 161 LBS

## 2024-02-27 DIAGNOSIS — C71.9 OLIGODENDROGLIOMA (H): ICD-10-CM

## 2024-02-27 DIAGNOSIS — C71.9 OLIGODENDROGLIOMA (H): Primary | ICD-10-CM

## 2024-02-27 DIAGNOSIS — E55.9 VITAMIN D DEFICIENCY: Primary | ICD-10-CM

## 2024-02-27 DIAGNOSIS — Z98.890 S/P CRANIOTOMY: ICD-10-CM

## 2024-02-27 PROCEDURE — 99214 OFFICE O/P EST MOD 30 MIN: CPT | Mod: 95 | Performed by: NURSE PRACTITIONER

## 2024-02-27 RX ORDER — TEMOZOLOMIDE 140 MG/1
280 CAPSULE ORAL DAILY
Qty: 10 CAPSULE | Refills: 0 | Status: SHIPPED | OUTPATIENT
Start: 2024-02-28 | End: 2024-03-26

## 2024-02-27 RX ORDER — AMOXICILLIN 250 MG
1 CAPSULE ORAL 2 TIMES DAILY PRN
Qty: 60 TABLET | Refills: 1 | Status: SHIPPED | OUTPATIENT
Start: 2024-02-27 | End: 2024-07-23

## 2024-02-27 NOTE — LETTER
2/27/2024         RE: Brandon Ordoñez  [id number] [place] [address]        Dear Colleague,    Thank you for referring your patient, Brandon Ordoñez, to the [facility] [place]. Please see a copy of my visit note below.    Virtual Visit Details    Type of service:  Video Visit   Time start: 0801  Time stop: 0820  Originating Location (pt. Location): Home  Distant Location (provider location):  On-site  Platform used for Video Visit: [facility]    NEURO-ONCOLOGY VISIT  Feb 27, 2024    CHIEF COMPLAINT: Mr. 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 He completed chemoradiotherapy with adjuvant temozolomide on 7/20/2023. Post-radiation imaging demonstrated anticipated post-radiation induced changes and no new concerns. Repeat imaging in 12/2023 showed evidence a possible new stroke. Work-up was negative for a possible cause.     Brandon is receiving treatment with adjuvant-dosed temozolomide.     Brandon is presenting in follow-up and he is unaccompanied today.    HISTORY OF PRESENT ILLNESS  -He reports that he tolerated the last cycle of adjuvant temozolomide with expected side effects.  -He feels like food is staying with him and he has no appetite.  -Feels he is getting enough to eat despite his low appetite - trying to push fluids as well.   -He feels he is constipated, especially the week of chemo and the week after.  -Feels cold all the time.   -He is taking his vitamin D.  -No concern for seizure activities.  -Continues to have headaches at work.  Tylenol and rest help to resolve these.  -He denies any concerns for seizure activity.    MEDICATIONS   Current Outpatient Medications   Medication Sig Dispense Refill     ondansetron (ZOFRAN) 4 MG tablet Take 1  tablet (4 mg) by mouth at bedtime Take 30-60 mins before each dose of temozolomide. 30 tablet 1     polyethylene glycol (MIRALAX) 17 GM/Dose powder Take 17 g (1 Capful) by mouth daily 578 g 1     senna-docusate (SENOKOT-S/PERICOLACE) 8.6-50 MG tablet Take 1 tablet by mouth 2 times daily as needed for constipation 20 tablet 0     vitamin D3 (CHOLECALCIFEROL) 10 MCG (400 UNIT) capsule Take 1 capsule (400 Units) by mouth daily 30 capsule 3     dexAMETHasone (DECADRON) 2 MG tablet Take 3 tablets (6 mg) by mouth daily (with breakfast) for 6 days, THEN 2 tablets (4 mg) daily (with breakfast) for 3 days, THEN 1 tablet (2 mg) daily (with breakfast) for 3 days. 27 tablet 0     temozolomide (TEMODAR) 140 MG capsule Take 2 capsules (280 mg) by mouth daily for 5 doses Take ondansetron 30-60 min before temozolomide. Take at bedtime on an empty stomach. 10 capsule 0     DRUG ALLERGIES No Known Allergies       IMMUNIZATIONS   Immunization History   Administered Date(s) Administered     COVID-19 MONOVALENT 12+ (Pfizer) 03/29/2021, 04/19/2021     TDAP Vaccine (Adacel) 07/30/2018       ONCOLOGIC HISTORY  -PRESENTATION: Progressive somnolence, nausea and vomiting, mild headache.  -5/4/2023 CT Head imaging with a large mass with associated calcifications and probable associated hemorrhage in the anterior right frontal region with surrounding vasogenic edema and significant local mass effect resulting in narrowing of the right more than left lateral ventricles and third ventricle, and leftward subfalcine shift as well as downward/ central transtentorial herniation.   -5/4/2023 SURGERY: Right frontal craniotomy for mass resection by Dr. Dasilva.   Post-operative imaging on 5/5 without a definite residual enhancing lesion. Nonspecific surrounding T2/FLAIR hyperintensity along the posterior and superior margins of the resection cavity may reflect vasogenic edema or nonenhancing tumor. Small volume hemorrhage and cytotoxic edema along the  margins of the resection cavity and extending along the right caudate nucleus and body of the corpus callosum.  PATHOLOGY: WHO grade 2 oligodendroglioma;   FISH with deletion of both the 1p36.32 and 19q13.33 regions   No homozygous deletion of CDKN2A/B gene    IDH1 R132S mutated.  TERT c.-146C>T mutated.   TMB Score: 2.438 mut/Mb   Fusion Event: NEGATIVE   Histology is borderline due to presence of microvascular proliferation and necrosis without palisading, however, the histological criteria for distinction between grade 2 and 3 are not well defined.  -5/16/2023 NEURO-ONC: Regardless of grade, recommending chemoradiotherapy with concurrent temozolomide.  -6/30/2023 NEURO-ONC/ CHEMORADS: Here for visit at the midpoint of concurrent chemoradiotherapy.   -7/20/2023 CHEMORADS: Completed chemoradiotherapy with concurrent temozolomide 75mg/m2 (140mg)   -8/22/2023 NEURO-ONC/ MRB/ CHEMO: Clinically well. Imaging with no concerns. Starting adjuvant temozolomide 150mg/m2 (280mg), cycle 1 (start date tonight).   -9/19/2023 NEURO-ONC/ CHEMO: No new neurological concerns. Modifications to supportive medications to better tolerate adjuvant temozolomide. Will repeat dosing of cycle 1 at 150mg/m2 (280mg).   -10/26/2023 NEURO-ONC/ CHEMO: No new neurological concerns.  Modifications to supportive medications to better tolerate adjuvant temozolomide were successful and he tolerated his repeat dosing of cycle 1 at 150mg/m2 (280mg).  Will plan on repeating at this dosing level one more time and then consider increase to 200mg/m2 with cycle 3.   -11/27/2023 NEURO-ONC/ CHEMO:  No new neurological concerns.  Modifications to supportive medications to better tolerate adjuvant temozolomide were successful and he tolerated his repeat dosing of cycle 1 at 150mg/m2, and after discussion today, he would like to continue at this dose rather than increase.  -12/21/2023 MRB with a new acute /early subacute small punctate infarct in the right  "aspect of splenium.  -1/9/2023 NEURO-ONC/ CHEMO: Clinically well. MRA clean. ECHO with no obvious cardiac source of emboli was seen within the limits of a transthoracic echocardiogram. ; recommended lifestyle and dietary changes plus primary care follow-up. HbA1c 5.6. Starting vitamin D daily supplementation. Adjuvant temozolomide 150mg/m2 (280mg), cycle 4 completed.   -1/30/2023 NEURO-ONC/ CHEMO: Clinically well. Labs to be done tomorrow. Adjuvant temozolomide 150mg/m2 (280mg), cycle 5 due to start today or as soon as he receives it.   -2/272023 NEURO-ONC/ CHEMO: Clinically well. Labs to be done 3/3 or 3/4.  Adjuvant temozolomide 150mg/m2 (280mg), cycle 6 due to start 3/4.    SOCIAL HISTORY   , 2 children (2 yo and 3 yo).       PHYSICAL EXAMINATION  Ht 1.651 m (5' 5\")   Wt 73 kg (161 lb)   BMI 26.79 kg/m     Wt Readings from Last 2 Encounters:   02/27/24 73 kg (161 lb)   01/30/24 74.8 kg (164 lb 12.8 oz)      Ht Readings from Last 2 Encounters:   02/27/24 1.651 m (5' 5\")   01/30/24 1.651 m (5' 5\")     KPS: 100    -Generally well appearing.  -Respiratory: Normal breath sounds, no audible wheezing.  -Psychiatric: Normal mood and affect. Pleasant, talkative.  -Neurologic:   MENTAL STATUS:     Alert, oriented.    Recall: Intact.    Speech fluent.    Comprehension intact.     CRANIAL NERVES:     Symmetric facial movements.   Hearing intact.   No dysarthria.  The remainder of the exam is deferred due to video health medium.    MEDICAL RECORDS  Personally reviewed pharmacy notes, oncology notes and [employer] messages.    LABS  Personally reviewed all available lab results; CBC and CMP from 2/21 with no concerns.  He will repeat labs on 3/4, and understands that he can start cycle 6 if labs are adequate.    IMAGING  No new imaging to review today.    IMPRESSION  As noted above, he continues to tolerate adjuvant temozolomide generally well overall.  He does note an increase in constipation that is likely " causing him to feel more full.  He has been taking 1 tablet of the senna daily when needed.  I have encouraged him to increase this to 2 a day the week of chemo as well as the week after.  He has no new neurologic concerns.  He does have ongoing mild headaches that he thinks are related to work (exertion, dehydration).  His headaches are well-managed with use of Tylenol as needed plus rest.    He will have labs again on 3/4, and if adequate, will proceed with cycle 6.  We will plan to repeat MR brain imaging in 1 month as scheduled.    PROBLEM LIST  WHO grade 2 oligodendroglioma  Headaches  Stroke  Elevated cholesterol    PLAN  -CANCER DIRECTED THERAPY-  -Completed adjuvant temozolomide at 150mg/m2 (280mg), cycle 5.     Supportive medications; Zofran and bowel regimen plus dexamethasone as detailed below.    Miralax daily as needed and senna 2 tabs daily.    -Repeat 28 day cycle if WBC >= 3, ANC >= 1.5, HgB >= 10, and platelets >= 100.  -Surveillance labs reviewed, at goal for chemotherapy.   -Will continue every 2 weeks CBC and CMP every 4 weeks.    -Repeat imaging in 1 month (3/26/2024).     -STEROIDS-  -Dexamethasone use while on chemotherapy; 6mg daily for 6 days, 4mg daily for 3 days, 2mg daily for 3 days.    Refilled today.     -SEIZURE MANAGEMENT-  -While this patient is at increased risk of having seizures, given the lack of seizure history, there is no indication to prescribe an antiepileptic at this time.     -STROKE/ VASCULAR RISK FACTORS-  -MRA head and neck was clean. ECHO no concerns. HbA1c was 5.6.  -LDL was 139 and goal is <70, therefore, will recommend lifestyle and dietary changes.   Deferring start to Crestor and recheck fasting lipid panel in 4 months (May) with primary care provider; Dr. Ryan Rodriguez.     -Quality of life/ MOOD/ FATIGUE-  -Denies any mood issues.  -Fatigue; chemotherapy-associated.    Labs in 9/2023 Vitamin B12/ D deficiency, low iron, and thyroid studies without concern.     Vitamin D level of 28 in 12/2023; starting daily supplementation.  Will recheck vitamin D level on 3/26/2024.  This is added today.  -Continue to monitor mood as untreated/ undertreated depression can worsen fatigue, dysorexia, and quality of life.    Labs every 2 weeks. Return to clinic in 1 month to see Dr. Kobe Bullock with repeat imaging and labs as scheduled in March.    In the meantime, Brandon knows to call the clinic with any concerns and he can be seen sooner if needed.      GWEN Sprague  [facility]   [phone]    35 minutes spent on the date of the encounter doing chart review, review of test results, interpretation of tests, patient visit, documentation, and discussion with other provider(s).       Again, thank you for allowing me to participate in the care of your patient.        Sincerely,        GWEN Sprague CNP

## 2024-02-27 NOTE — NURSING NOTE
Is the patient currently in the state of [place]? YES    Visit mode:VIDEO    If the visit is dropped, the patient can be reconnected by: TELEPHONE VISIT: Phone number: [phone]    Will anyone else be joining the visit? NO  (If patient encounters technical issues they should call [phone])    How would you like to obtain your AVS? MyChart    Are changes needed to the allergy or medication list? No    Reason for visit: MIRELAECK    Louann LOPEZ

## 2024-02-27 NOTE — PROGRESS NOTES
Virtual Visit Details    Type of service:  Video Visit   Time start: 0801  Time stop: 0820  Originating Location (pt. Location): Home  Distant Location (provider location):  On-site  Platform used for Video Visit: [facility]    NEURO-ONCOLOGY VISIT  Feb 27, 2024    CHIEF COMPLAINT: Mr. 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 He completed chemoradiotherapy with adjuvant temozolomide on 7/20/2023. Post-radiation imaging demonstrated anticipated post-radiation induced changes and no new concerns. Repeat imaging in 12/2023 showed evidence a possible new stroke. Work-up was negative for a possible cause.     Brandon is receiving treatment with adjuvant-dosed temozolomide.     Brandon is presenting in follow-up and he is unaccompanied today.    HISTORY OF PRESENT ILLNESS  -He reports that he tolerated the last cycle of adjuvant temozolomide with expected side effects.  -He feels like food is staying with him and he has no appetite.  -Feels he is getting enough to eat despite his low appetite - trying to push fluids as well.   -He feels he is constipated, especially the week of chemo and the week after.  -Feels cold all the time.   -He is taking his vitamin D.  -No concern for seizure activities.  -Continues to have headaches at work.  Tylenol and rest help to resolve these.  -He denies any concerns for seizure activity.    MEDICATIONS   Current Outpatient Medications   Medication Sig Dispense Refill    ondansetron (ZOFRAN) 4 MG tablet Take 1 tablet (4 mg) by mouth at bedtime Take 30-60 mins before each dose of temozolomide. 30 tablet 1    polyethylene glycol (MIRALAX) 17 GM/Dose powder Take 17 g (1 Capful) by mouth daily 578 g 1    senna-docusate (SENOKOT-S/PERICOLACE) 8.6-50 MG tablet Take 1  tablet by mouth 2 times daily as needed for constipation 20 tablet 0    vitamin D3 (CHOLECALCIFEROL) 10 MCG (400 UNIT) capsule Take 1 capsule (400 Units) by mouth daily 30 capsule 3    dexAMETHasone (DECADRON) 2 MG tablet Take 3 tablets (6 mg) by mouth daily (with breakfast) for 6 days, THEN 2 tablets (4 mg) daily (with breakfast) for 3 days, THEN 1 tablet (2 mg) daily (with breakfast) for 3 days. 27 tablet 0    temozolomide (TEMODAR) 140 MG capsule Take 2 capsules (280 mg) by mouth daily for 5 doses Take ondansetron 30-60 min before temozolomide. Take at bedtime on an empty stomach. 10 capsule 0     DRUG ALLERGIES No Known Allergies       IMMUNIZATIONS   Immunization History   Administered Date(s) Administered    COVID-19 MONOVALENT 12+ (Pfizer) 03/29/2021, 04/19/2021    TDAP Vaccine (Adacel) 07/30/2018       ONCOLOGIC HISTORY  -PRESENTATION: Progressive somnolence, nausea and vomiting, mild headache.  -5/4/2023 CT Head imaging with a large mass with associated calcifications and probable associated hemorrhage in the anterior right frontal region with surrounding vasogenic edema and significant local mass effect resulting in narrowing of the right more than left lateral ventricles and third ventricle, and leftward subfalcine shift as well as downward/ central transtentorial herniation.   -5/4/2023 SURGERY: Right frontal craniotomy for mass resection by Dr. Dasilva.   Post-operative imaging on 5/5 without a definite residual enhancing lesion. Nonspecific surrounding T2/FLAIR hyperintensity along the posterior and superior margins of the resection cavity may reflect vasogenic edema or nonenhancing tumor. Small volume hemorrhage and cytotoxic edema along the margins of the resection cavity and extending along the right caudate nucleus and body of the corpus callosum.  PATHOLOGY: WHO grade 2 oligodendroglioma;   FISH with deletion of both the 1p36.32 and 19q13.33 regions   No homozygous deletion of CDKN2A/B gene    IDH1  R132S mutated.  TERT c.-146C>T mutated.   TMB Score: 2.438 mut/Mb   Fusion Event: NEGATIVE   Histology is borderline due to presence of microvascular proliferation and necrosis without palisading, however, the histological criteria for distinction between grade 2 and 3 are not well defined.  -5/16/2023 NEURO-ONC: Regardless of grade, recommending chemoradiotherapy with concurrent temozolomide.  -6/30/2023 NEURO-ONC/ CHEMORADS: Here for visit at the midpoint of concurrent chemoradiotherapy.   -7/20/2023 CHEMORADS: Completed chemoradiotherapy with concurrent temozolomide 75mg/m2 (140mg)   -8/22/2023 NEURO-ONC/ MRB/ CHEMO: Clinically well. Imaging with no concerns. Starting adjuvant temozolomide 150mg/m2 (280mg), cycle 1 (start date tonight).   -9/19/2023 NEURO-ONC/ CHEMO: No new neurological concerns. Modifications to supportive medications to better tolerate adjuvant temozolomide. Will repeat dosing of cycle 1 at 150mg/m2 (280mg).   -10/26/2023 NEURO-ONC/ CHEMO: No new neurological concerns.  Modifications to supportive medications to better tolerate adjuvant temozolomide were successful and he tolerated his repeat dosing of cycle 1 at 150mg/m2 (280mg).  Will plan on repeating at this dosing level one more time and then consider increase to 200mg/m2 with cycle 3.   -11/27/2023 NEURO-ONC/ CHEMO:  No new neurological concerns.  Modifications to supportive medications to better tolerate adjuvant temozolomide were successful and he tolerated his repeat dosing of cycle 1 at 150mg/m2, and after discussion today, he would like to continue at this dose rather than increase.  -12/21/2023 MRB with a new acute /early subacute small punctate infarct in the right aspect of splenium.  -1/9/2023 NEURO-ONC/ CHEMO: Clinically well. MRA clean. ECHO with no obvious cardiac source of emboli was seen within the limits of a transthoracic echocardiogram. ; recommended lifestyle and dietary changes plus primary care follow-up. HbA1c  "5.6. Starting vitamin D daily supplementation. Adjuvant temozolomide 150mg/m2 (280mg), cycle 4 completed.   -1/30/2023 NEURO-ONC/ CHEMO: Clinically well. Labs to be done tomorrow. Adjuvant temozolomide 150mg/m2 (280mg), cycle 5 due to start today or as soon as he receives it.   -2/272023 NEURO-ONC/ CHEMO: Clinically well. Labs to be done 3/3 or 3/4.  Adjuvant temozolomide 150mg/m2 (280mg), cycle 6 due to start 3/4.    SOCIAL HISTORY   , 2 children (2 yo and 5 yo).       PHYSICAL EXAMINATION  Ht 1.651 m (5' 5\")   Wt 73 kg (161 lb)   BMI 26.79 kg/m     Wt Readings from Last 2 Encounters:   02/27/24 73 kg (161 lb)   01/30/24 74.8 kg (164 lb 12.8 oz)      Ht Readings from Last 2 Encounters:   02/27/24 1.651 m (5' 5\")   01/30/24 1.651 m (5' 5\")     KPS: 100    -Generally well appearing.  -Respiratory: Normal breath sounds, no audible wheezing.  -Psychiatric: Normal mood and affect. Pleasant, talkative.  -Neurologic:   MENTAL STATUS:     Alert, oriented.    Recall: Intact.    Speech fluent.    Comprehension intact.     CRANIAL NERVES:     Symmetric facial movements.   Hearing intact.   No dysarthria.  The remainder of the exam is deferred due to video health medium.    MEDICAL RECORDS  Personally reviewed pharmacy notes, oncology notes and [employer] messages.    LABS  Personally reviewed all available lab results; CBC and CMP from 2/21 with no concerns.  He will repeat labs on 3/4, and understands that he can start cycle 6 if labs are adequate.    IMAGING  No new imaging to review today.    IMPRESSION  As noted above, he continues to tolerate adjuvant temozolomide generally well overall.  He does note an increase in constipation that is likely causing him to feel more full.  He has been taking 1 tablet of the senna daily when needed.  I have encouraged him to increase this to 2 a day the week of chemo as well as the week after.  He has no new neurologic concerns.  He does have ongoing mild headaches that he thinks " are related to work (exertion, dehydration).  His headaches are well-managed with use of Tylenol as needed plus rest.    He will have labs again on 3/4, and if adequate, will proceed with cycle 6.  We will plan to repeat MR brain imaging in 1 month as scheduled.    PROBLEM LIST  WHO grade 2 oligodendroglioma  Headaches  Stroke  Elevated cholesterol    PLAN  -CANCER DIRECTED THERAPY-  -Completed adjuvant temozolomide at 150mg/m2 (280mg), cycle 5.     Supportive medications; Zofran and bowel regimen plus dexamethasone as detailed below.    Miralax daily as needed and senna 2 tabs daily.    -Repeat 28 day cycle if WBC >= 3, ANC >= 1.5, HgB >= 10, and platelets >= 100.  -Surveillance labs reviewed, at goal for chemotherapy.   -Will continue every 2 weeks CBC and CMP every 4 weeks.    -Repeat imaging in 1 month (3/26/2024).     -STEROIDS-  -Dexamethasone use while on chemotherapy; 6mg daily for 6 days, 4mg daily for 3 days, 2mg daily for 3 days.    Refilled today.     -SEIZURE MANAGEMENT-  -While this patient is at increased risk of having seizures, given the lack of seizure history, there is no indication to prescribe an antiepileptic at this time.     -STROKE/ VASCULAR RISK FACTORS-  -MRA head and neck was clean. ECHO no concerns. HbA1c was 5.6.  -LDL was 139 and goal is <70, therefore, will recommend lifestyle and dietary changes.   Deferring start to Crestor and recheck fasting lipid panel in 4 months (May) with primary care provider; Dr. Ryan Rodriguez.     -Quality of life/ MOOD/ FATIGUE-  -Denies any mood issues.  -Fatigue; chemotherapy-associated.    Labs in 9/2023 Vitamin B12/ D deficiency, low iron, and thyroid studies without concern.    Vitamin D level of 28 in 12/2023; starting daily supplementation.  Will recheck vitamin D level on 3/26/2024.  This is added today.  -Continue to monitor mood as untreated/ undertreated depression can worsen fatigue, dysorexia, and quality of life.    Labs every 2 weeks.  Return to clinic in 1 month to see Dr. Kobe Bullock with repeat imaging and labs as scheduled in March.    In the meantime, Connorministerio knows to call the clinic with any concerns and he can be seen sooner if needed.      [facility]   [phone]    35 minutes spent on the date of the encounter doing chart review, review of test results, interpretation of tests, patient visit, documentation, and discussion with other provider(s).

## 2024-02-27 NOTE — LETTER
2/27/2024         RE: Brandon Ordoñez  [id number] [place] [address]        Dear Colleague,    Thank you for referring your patient, Brandon Ordoñez, to the [facility] [place]. Please see a copy of my visit note below.    Virtual Visit Details    Type of service:  Video Visit   Time start: 0801  Time stop: 0820  Originating Location (pt. Location): Home  Distant Location (provider location):  On-site  Platform used for Video Visit: [facility]    NEURO-ONCOLOGY VISIT  Feb 27, 2024    CHIEF COMPLAINT: Mr. 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 He completed chemoradiotherapy with adjuvant temozolomide on 7/20/2023. Post-radiation imaging demonstrated anticipated post-radiation induced changes and no new concerns. Repeat imaging in 12/2023 showed evidence a possible new stroke. Work-up was negative for a possible cause.     Brandon is receiving treatment with adjuvant-dosed temozolomide.     Brandon is presenting in follow-up and he is unaccompanied today.    HISTORY OF PRESENT ILLNESS  -He reports that he tolerated the last cycle of adjuvant temozolomide with expected side effects.  -He feels like food is staying with him and he has no appetite.  -Feels he is getting enough to eat despite his low appetite - trying to push fluids as well.   -He feels he is constipated, especially the week of chemo and the week after.  -Feels cold all the time.   -He is taking his vitamin D.  -No concern for seizure activities.  -Continues to have headaches at work.  Tylenol and rest help to resolve these.  -He denies any concerns for seizure activity.    MEDICATIONS   Current Outpatient Medications   Medication Sig Dispense Refill     ondansetron (ZOFRAN) 4 MG tablet Take 1  tablet (4 mg) by mouth at bedtime Take 30-60 mins before each dose of temozolomide. 30 tablet 1     polyethylene glycol (MIRALAX) 17 GM/Dose powder Take 17 g (1 Capful) by mouth daily 578 g 1     senna-docusate (SENOKOT-S/PERICOLACE) 8.6-50 MG tablet Take 1 tablet by mouth 2 times daily as needed for constipation 20 tablet 0     vitamin D3 (CHOLECALCIFEROL) 10 MCG (400 UNIT) capsule Take 1 capsule (400 Units) by mouth daily 30 capsule 3     dexAMETHasone (DECADRON) 2 MG tablet Take 3 tablets (6 mg) by mouth daily (with breakfast) for 6 days, THEN 2 tablets (4 mg) daily (with breakfast) for 3 days, THEN 1 tablet (2 mg) daily (with breakfast) for 3 days. 27 tablet 0     temozolomide (TEMODAR) 140 MG capsule Take 2 capsules (280 mg) by mouth daily for 5 doses Take ondansetron 30-60 min before temozolomide. Take at bedtime on an empty stomach. 10 capsule 0     DRUG ALLERGIES No Known Allergies       IMMUNIZATIONS   Immunization History   Administered Date(s) Administered     COVID-19 MONOVALENT 12+ (Pfizer) 03/29/2021, 04/19/2021     TDAP Vaccine (Adacel) 07/30/2018       ONCOLOGIC HISTORY  -PRESENTATION: Progressive somnolence, nausea and vomiting, mild headache.  -5/4/2023 CT Head imaging with a large mass with associated calcifications and probable associated hemorrhage in the anterior right frontal region with surrounding vasogenic edema and significant local mass effect resulting in narrowing of the right more than left lateral ventricles and third ventricle, and leftward subfalcine shift as well as downward/ central transtentorial herniation.   -5/4/2023 SURGERY: Right frontal craniotomy for mass resection by Dr. Dasilva.   Post-operative imaging on 5/5 without a definite residual enhancing lesion. Nonspecific surrounding T2/FLAIR hyperintensity along the posterior and superior margins of the resection cavity may reflect vasogenic edema or nonenhancing tumor. Small volume hemorrhage and cytotoxic edema along the  margins of the resection cavity and extending along the right caudate nucleus and body of the corpus callosum.  PATHOLOGY: WHO grade 2 oligodendroglioma;   FISH with deletion of both the 1p36.32 and 19q13.33 regions   No homozygous deletion of CDKN2A/B gene    IDH1 R132S mutated.  TERT c.-146C>T mutated.   TMB Score: 2.438 mut/Mb   Fusion Event: NEGATIVE   Histology is borderline due to presence of microvascular proliferation and necrosis without palisading, however, the histological criteria for distinction between grade 2 and 3 are not well defined.  -5/16/2023 NEURO-ONC: Regardless of grade, recommending chemoradiotherapy with concurrent temozolomide.  -6/30/2023 NEURO-ONC/ CHEMORADS: Here for visit at the midpoint of concurrent chemoradiotherapy.   -7/20/2023 CHEMORADS: Completed chemoradiotherapy with concurrent temozolomide 75mg/m2 (140mg)   -8/22/2023 NEURO-ONC/ MRB/ CHEMO: Clinically well. Imaging with no concerns. Starting adjuvant temozolomide 150mg/m2 (280mg), cycle 1 (start date tonight).   -9/19/2023 NEURO-ONC/ CHEMO: No new neurological concerns. Modifications to supportive medications to better tolerate adjuvant temozolomide. Will repeat dosing of cycle 1 at 150mg/m2 (280mg).   -10/26/2023 NEURO-ONC/ CHEMO: No new neurological concerns.  Modifications to supportive medications to better tolerate adjuvant temozolomide were successful and he tolerated his repeat dosing of cycle 1 at 150mg/m2 (280mg).  Will plan on repeating at this dosing level one more time and then consider increase to 200mg/m2 with cycle 3.   -11/27/2023 NEURO-ONC/ CHEMO:  No new neurological concerns.  Modifications to supportive medications to better tolerate adjuvant temozolomide were successful and he tolerated his repeat dosing of cycle 1 at 150mg/m2, and after discussion today, he would like to continue at this dose rather than increase.  -12/21/2023 MRB with a new acute /early subacute small punctate infarct in the right  "aspect of splenium.  -1/9/2023 NEURO-ONC/ CHEMO: Clinically well. MRA clean. ECHO with no obvious cardiac source of emboli was seen within the limits of a transthoracic echocardiogram. ; recommended lifestyle and dietary changes plus primary care follow-up. HbA1c 5.6. Starting vitamin D daily supplementation. Adjuvant temozolomide 150mg/m2 (280mg), cycle 4 completed.   -1/30/2023 NEURO-ONC/ CHEMO: Clinically well. Labs to be done tomorrow. Adjuvant temozolomide 150mg/m2 (280mg), cycle 5 due to start today or as soon as he receives it.   -2/272023 NEURO-ONC/ CHEMO: Clinically well. Labs to be done 3/3 or 3/4.  Adjuvant temozolomide 150mg/m2 (280mg), cycle 6 due to start 3/4.    SOCIAL HISTORY   , 2 children (2 yo and 5 yo).       PHYSICAL EXAMINATION  Ht 1.651 m (5' 5\")   Wt 73 kg (161 lb)   BMI 26.79 kg/m     Wt Readings from Last 2 Encounters:   02/27/24 73 kg (161 lb)   01/30/24 74.8 kg (164 lb 12.8 oz)      Ht Readings from Last 2 Encounters:   02/27/24 1.651 m (5' 5\")   01/30/24 1.651 m (5' 5\")     KPS: 100    -Generally well appearing.  -Respiratory: Normal breath sounds, no audible wheezing.  -Psychiatric: Normal mood and affect. Pleasant, talkative.  -Neurologic:   MENTAL STATUS:     Alert, oriented.    Recall: Intact.    Speech fluent.    Comprehension intact.     CRANIAL NERVES:     Symmetric facial movements.   Hearing intact.   No dysarthria.  The remainder of the exam is deferred due to video health medium.    MEDICAL RECORDS  Personally reviewed pharmacy notes, oncology notes and [employer] messages.    LABS  Personally reviewed all available lab results; CBC and CMP from 2/21 with no concerns.  He will repeat labs on 3/4, and understands that he can start cycle 6 if labs are adequate.    IMAGING  No new imaging to review today.    IMPRESSION  As noted above, he continues to tolerate adjuvant temozolomide generally well overall.  He does note an increase in constipation that is likely " causing him to feel more full.  He has been taking 1 tablet of the senna daily when needed.  I have encouraged him to increase this to 2 a day the week of chemo as well as the week after.  He has no new neurologic concerns.  He does have ongoing mild headaches that he thinks are related to work (exertion, dehydration).  His headaches are well-managed with use of Tylenol as needed plus rest.    He will have labs again on 3/4, and if adequate, will proceed with cycle 6.  We will plan to repeat MR brain imaging in 1 month as scheduled.    PROBLEM LIST  WHO grade 2 oligodendroglioma  Headaches  Stroke  Elevated cholesterol    PLAN  -CANCER DIRECTED THERAPY-  -Completed adjuvant temozolomide at 150mg/m2 (280mg), cycle 5.     Supportive medications; Zofran and bowel regimen plus dexamethasone as detailed below.    Miralax daily as needed and senna 2 tabs daily.    -Repeat 28 day cycle if WBC >= 3, ANC >= 1.5, HgB >= 10, and platelets >= 100.  -Surveillance labs reviewed, at goal for chemotherapy.   -Will continue every 2 weeks CBC and CMP every 4 weeks.    -Repeat imaging in 1 month (3/26/2024).     -STEROIDS-  -Dexamethasone use while on chemotherapy; 6mg daily for 6 days, 4mg daily for 3 days, 2mg daily for 3 days.    Refilled today.     -SEIZURE MANAGEMENT-  -While this patient is at increased risk of having seizures, given the lack of seizure history, there is no indication to prescribe an antiepileptic at this time.     -STROKE/ VASCULAR RISK FACTORS-  -MRA head and neck was clean. ECHO no concerns. HbA1c was 5.6.  -LDL was 139 and goal is <70, therefore, will recommend lifestyle and dietary changes.   Deferring start to Crestor and recheck fasting lipid panel in 4 months (May) with primary care provider; Dr. Ryan Rodriguez.     -Quality of life/ MOOD/ FATIGUE-  -Denies any mood issues.  -Fatigue; chemotherapy-associated.    Labs in 9/2023 Vitamin B12/ D deficiency, low iron, and thyroid studies without concern.     Vitamin D level of 28 in 12/2023; starting daily supplementation.  Will recheck vitamin D level on 3/26/2024.  This is added today.  -Continue to monitor mood as untreated/ undertreated depression can worsen fatigue, dysorexia, and quality of life.    Labs every 2 weeks. Return to clinic in 1 month to see Dr. Kobe Bullock with repeat imaging and labs as scheduled in March.    In the meantime, Brandon knows to call the clinic with any concerns and he can be seen sooner if needed.      GWEN Sprague  [facility]   [phone]    35 minutes spent on the date of the encounter doing chart review, review of test results, interpretation of tests, patient visit, documentation, and discussion with other provider(s).       Again, thank you for allowing me to participate in the care of your patient.        Sincerely,        GWEN Sprague CNP

## 2024-03-04 ENCOUNTER — LAB (OUTPATIENT)
Dept: LAB | Facility: CLINIC | Age: 39
End: 2024-03-04
Payer: COMMERCIAL

## 2024-03-04 ENCOUNTER — DOCUMENTATION ONLY (OUTPATIENT)
Dept: PHARMACY | Facility: CLINIC | Age: 39
End: 2024-03-04

## 2024-03-04 DIAGNOSIS — D61.810 ANTINEOPLASTIC CHEMOTHERAPY INDUCED PANCYTOPENIA (H): ICD-10-CM

## 2024-03-04 DIAGNOSIS — T45.1X5A ANTINEOPLASTIC CHEMOTHERAPY INDUCED PANCYTOPENIA (H): ICD-10-CM

## 2024-03-04 LAB
BASOPHILS # BLD AUTO: 0 10E3/UL (ref 0–0.2)
BASOPHILS NFR BLD AUTO: 1 %
EOSINOPHIL # BLD AUTO: 0.1 10E3/UL (ref 0–0.7)
EOSINOPHIL NFR BLD AUTO: 1 %
ERYTHROCYTE [DISTWIDTH] IN BLOOD BY AUTOMATED COUNT: 11.5 % (ref 10–15)
HCT VFR BLD AUTO: 42.8 % (ref 40–53)
HGB BLD-MCNC: 14.7 G/DL (ref 13.3–17.7)
IMM GRANULOCYTES # BLD: 0 10E3/UL
IMM GRANULOCYTES NFR BLD: 0 %
LYMPHOCYTES # BLD AUTO: 2.3 10E3/UL (ref 0.8–5.3)
LYMPHOCYTES NFR BLD AUTO: 40 %
MCH RBC QN AUTO: 29.2 PG (ref 26.5–33)
MCHC RBC AUTO-ENTMCNC: 34.3 G/DL (ref 31.5–36.5)
MCV RBC AUTO: 85 FL (ref 78–100)
MONOCYTES # BLD AUTO: 0.4 10E3/UL (ref 0–1.3)
MONOCYTES NFR BLD AUTO: 7 %
NEUTROPHILS # BLD AUTO: 2.9 10E3/UL (ref 1.6–8.3)
NEUTROPHILS NFR BLD AUTO: 51 %
NRBC # BLD AUTO: 0 10E3/UL
NRBC BLD AUTO-RTO: 0 /100
PLATELET # BLD AUTO: 234 10E3/UL (ref 150–450)
RBC # BLD AUTO: 5.04 10E6/UL (ref 4.4–5.9)
WBC # BLD AUTO: 5.8 10E3/UL (ref 4–11)

## 2024-03-04 PROCEDURE — 36415 COLL VENOUS BLD VENIPUNCTURE: CPT

## 2024-03-04 PROCEDURE — 85049 AUTOMATED PLATELET COUNT: CPT

## 2024-03-04 NOTE — PROGRESS NOTES
Oral Chemotherapy Monitoring Program  Lab Follow Up    Reviewed lab results from 3/4/24.        12/12/2023     3:00 PM 12/28/2023     9:00 AM 1/9/2024    11:00 AM 1/31/2024    11:00 AM 2/1/2024    11:00 AM 2/27/2024     8:00 AM 3/4/2024    12:00 PM   ORAL CHEMOTHERAPY   Assessment Type Lab Monitoring Lab Monitoring Lab Monitoring Lab Monitoring;Refill Lab Monitoring Refill Lab Monitoring   Diagnosis Code Brain Cancer - Glioblastoma Brain Cancer - Glioblastoma Brain Cancer - Glioblastoma Brain Cancer - Glioblastoma Brain Cancer - Glioblastoma Brain Cancer - Glioblastoma Brain Cancer - Glioblastoma   Providers Dr. Kobe Bullock   Clinic Name/Location [facility]   Drug Name Temodar (temozolomide) Temodar (temozolomide) Temodar (temozolomide) Temodar (temozolomide) Temodar (temozolomide) Temodar (temozolomide) Temodar (temozolomide)   Dose 280 mg 280 mg 280 mg 280 mg 280 mg 280 mg 280 mg   Current Schedule QHS QHS QHS QHS QHS QHS QHS   Cycle Details 5 days on, 23 days off 5 days on, 23 days off 5 days on, 23 days off 5 days on, 23 days off 5 days on, 23 days off 5 days on, 23 days off 5 days on, 23 days off   Start Date of Last Cycle 11/28/2023  12/26/2023   3/4/2024 3/4/2024   Planned next cycle start date 12/26/2023 1/30/2024 2/1/2024 2/1/2024 4/1/2024 4/1/2024   Adherence Assessment   Adherent Adherent      Adverse Effects No AE identified during assessment    No AE identified during assessment  No AE identified during assessment   Any new drug interactions?   No No      Is the dose as ordered appropriate for the patient?   Yes Yes          Labs:  _  Result Component Current Result Ref Range   Sodium 140 (2/21/2024) 135 - 145 mmol/L     _  Result Component Current Result Ref Range   Potassium 4.0 (2/21/2024) 3.4 - 5.3 mmol/L     _  Result Component Current Result Ref Range   Calcium 9.6 (2/21/2024) 8.6 - 10.0  mg/dL     No results found for Mag within last 30 days.     No results found for Phos within last 30 days.     _  Result Component Current Result Ref Range   Albumin 4.8 (2/21/2024) 3.5 - 5.2 g/dL     _  Result Component Current Result Ref Range   Urea Nitrogen 10.3 (2/21/2024) 6.0 - 20.0 mg/dL     _  Result Component Current Result Ref Range   Creatinine 0.86 (2/21/2024) 0.67 - 1.17 mg/dL     _  Result Component Current Result Ref Range   AST 17 (2/21/2024) 0 - 45 U/L     _  Result Component Current Result Ref Range   ALT 19 (2/21/2024) 0 - 70 U/L     _  Result Component Current Result Ref Range   Bilirubin Total 0.4 (2/21/2024) <=1.2 mg/dL     _  Result Component Current Result Ref Range   WBC Count 5.8 (3/4/2024) 4.0 - 11.0 10e3/uL     _  Result Component Current Result Ref Range   Hemoglobin 14.7 (3/4/2024) 13.3 - 17.7 g/dL     _  Result Component Current Result Ref Range   Platelet Count 234 (3/4/2024) 150 - 450 10e3/uL     _  Result Component Current Result Ref Range   Absolute Neutrophils 2.9 (3/4/2024) 1.6 - 8.3 10e3/uL        Assessment & Plan:  No concerning abnormalities. Ok to proceed with temozolomide.     Questions answered to patient's satisfaction.    Follow-Up:  2 weeks with labs.    Rachel Moniz, PharmD  Hematology/Oncology Pharmacist  [facility]  [phone]

## 2024-03-14 NOTE — PROGRESS NOTES
NEURO-ONCOLOGY VISIT  Mar 26, 2024    CHIEF COMPLAINT: Mr. 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 He completed chemoradiotherapy with adjuvant temozolomide on 7/20/2023. Post-radiation imaging demonstrated anticipated post-radiation induced changes and no new concerns. Repeat imaging in 12/2023 showed evidence a possible new stroke. Work-up was negative for a possible cause. Brandon completed the planned 6 cycles of adjuvant-dosed temozolomide in 3/2024 and repeat imaging in 3/2024 showed overall stability with evidence of post-treatment related changes.     The plan is to initiate monitoring on imaging surveillance.     Brandon is presenting in follow-up and he is unaccompanied today.    HISTORY OF PRESENT ILLNESS  -He reports that he tolerated the last cycle of adjuvant temozolomide with expected side effects.   Mild lingering constipation.    Energy is good.    Appetite remains lower.  -Feels cold all the time.   -Continues to have headaches at work; especially when stressed or tired.  Tylenol and rest help to resolve these.  -He denies any concerns for seizure activity.  -No weakness, no changes in vision, no new numbness.       MEDICATIONS   Current Outpatient Medications   Medication Sig Dispense Refill    ondansetron (ZOFRAN) 4 MG tablet Take 1 tablet (4 mg) by mouth at bedtime Take 30-60 mins before each dose of temozolomide. 30 tablet 1    polyethylene glycol (MIRALAX) 17 GM/Dose powder Take 17 g (1 Capful) by mouth daily 578 g 1    senna-docusate (SENOKOT-S/PERICOLACE) 8.6-50 MG tablet Take 1 tablet by mouth 2 times daily as needed for constipation 60 tablet 1    vitamin D3 (CHOLECALCIFEROL) 10 MCG (400 UNIT) capsule Take 1 capsule (400 Units) by mouth daily 30 capsule 3      DRUG ALLERGIES No Known Allergies       IMMUNIZATIONS   Immunization History   Administered Date(s) Administered    COVID-19 MONOVALENT 12+ (Pfizer) 03/29/2021, 04/19/2021    TDAP Vaccine (Adacel) 07/30/2018       ONCOLOGIC HISTORY  -PRESENTATION: Progressive somnolence, nausea and vomiting, mild headache.  -5/4/2023 CT Head imaging with a large mass with associated calcifications and probable associated hemorrhage in the anterior right frontal region with surrounding vasogenic edema and significant local mass effect resulting in narrowing of the right more than left lateral ventricles and third ventricle, and leftward subfalcine shift as well as downward/ central transtentorial herniation.   -5/4/2023 SURGERY: Right frontal craniotomy for mass resection by Dr. Dasilva.   Post-operative imaging on 5/5 without a definite residual enhancing lesion. Nonspecific surrounding T2/FLAIR hyperintensity along the posterior and superior margins of the resection cavity may reflect vasogenic edema or nonenhancing tumor. Small volume hemorrhage and cytotoxic edema along the margins of the resection cavity and extending along the right caudate nucleus and body of the corpus callosum.  PATHOLOGY: WHO grade 2 oligodendroglioma;   FISH with deletion of both the 1p36.32 and 19q13.33 regions   No homozygous deletion of CDKN2A/B gene    IDH1 R132S mutated.  TERT c.-146C>T mutated.   TMB Score: 2.438 mut/Mb   Fusion Event: NEGATIVE   Histology is borderline due to presence of microvascular proliferation and necrosis without palisading, however, the histological criteria for distinction between grade 2 and 3 are not well defined.  -5/16/2023 NEURO-ONC: Regardless of grade, recommending chemoradiotherapy with concurrent temozolomide.  -6/30/2023 NEURO-ONC/ CHEMORADS: Here for visit at the midpoint of concurrent chemoradiotherapy.   -7/20/2023 CHEMORADS: Completed chemoradiotherapy with concurrent temozolomide 75mg/m2 (140mg)    -8/22/2023 NEURO-ONC/ MRB/ CHEMO: Clinically well. Imaging with no concerns. Starting adjuvant temozolomide 150mg/m2 (280mg), cycle 1 (start date tonight).   -9/19/2023 NEURO-ONC/ CHEMO: No new neurological concerns. Modifications to supportive medications to better tolerate adjuvant temozolomide. Will repeat dosing of cycle 1 at 150mg/m2 (280mg).   -10/26/2023 NEURO-ONC/ CHEMO: No new neurological concerns.  Modifications to supportive medications to better tolerate adjuvant temozolomide were successful and he tolerated his repeat dosing of cycle 1 at 150mg/m2 (280mg).  Will plan on repeating at this dosing level one more time and then consider increase to 200mg/m2 with cycle 3.   -11/27/2023 NEURO-ONC/ CHEMO:  No new neurological concerns.  Modifications to supportive medications to better tolerate adjuvant temozolomide were successful and he tolerated his repeat dosing of cycle 1 at 150mg/m2, and after discussion today, he would like to continue at this dose rather than increase.  -12/21/2023 MRB with a new acute /early subacute small punctate infarct in the right aspect of splenium.  -1/9/2024 NEURO-ONC/ CHEMO: Clinically well. MRA clean. ECHO with no obvious cardiac source of emboli was seen within the limits of a transthoracic echocardiogram. ; recommended lifestyle and dietary changes plus primary care follow-up. HbA1c 5.6. Starting vitamin D daily supplementation. Adjuvant temozolomide 150mg/m2 (280mg), cycle 4 completed.   -1/30/2024 NEURO-ONC/ CHEMO: Clinically well. Labs to be done tomorrow. Adjuvant temozolomide 150mg/m2 (280mg), cycle 5 due to start today or as soon as he receives it.   -2/27/2024 NEURO-ONC/ CHEMO: Clinically well. Labs to be done 3/3 or 3/4.  Adjuvant temozolomide 150mg/m2 (280mg), cycle 6 due to start 3/4.  -3/26/2024 NEURO-ONC/ MRB: Low appetite. Imaging with overall stability with evidence of post-treatment related changes.    SOCIAL HISTORY   , 2  "children      PHYSICAL EXAMINATION  BP (!) 133/91 (BP Location: Right arm, Patient Position: Sitting, Cuff Size: Adult Large)   Pulse 76   Temp 97.5  F (36.4  C) (Oral)   Resp 20   Wt 73.8 kg (162 lb 12.8 oz)   SpO2 98%   BMI 27.09 kg/m     Wt Readings from Last 2 Encounters:   03/26/24 73.8 kg (162 lb 12.8 oz)   02/27/24 73 kg (161 lb)      Ht Readings from Last 2 Encounters:   02/27/24 1.651 m (5' 5\")   01/30/24 1.651 m (5' 5\")     KPS: 100    -Generally well appearing.  -Respiratory: Normal breath sounds, no audible wheezing.  -Psychiatric: Normal mood and affect. Pleasant, talkative.  -Neurologic:   MENTAL STATUS:     Alert, oriented.    Recall: Intact.    Speech fluent.    Comprehension intact.     CRANIAL NERVES:     Symmetric facial movements.   Hearing intact.   No dysarthria.  GAIT: Steady and unassisted.       MEDICAL RECORDS  Personally reviewed pharmacy notes, oncology notes and [employer] messages.    LABS  Personally reviewed all available lab results; CBC (platelets 252) and CMP (AST/ ALT 19/26) from today with no concerns.  Vitamin D showed a decrease from 28 in December to 17 today.    IMAGING  Personally reviewed MR brain imaging from today and compared to post-radiation imaging. To my eye, there is a faint increase in linear contrast enhancement about the inferior aspect of the resection cavity (below, yellow) that appears to be post-treatment related. No increase in T2 FLAIR.        Formal read; \" Resection cavity in the right frontal lobe is stable in size. There is slightly increased linear enhancement along the posterolateral margin of the resection cavity inferiorly extending to the frontal horn of the right lateral ventricle. There is no significant new FLAIR signal abnormality in this area. The enhancement is not particularly nodular. It is unclear what this finding reflects especially given the lack of FLAIR signal abnormality associated with it that is new. Continued follow-up " "recommended.\"    Imaging was shown to and results were reviewed with Brandon.        IMPRESSION  On date of service, 41 minutes was spent in clinic and 8 minutes was spent preparing for the visit through extensive chart review and coordinating care for this high complexity visit. The following is in explanation for the recommendations used to define the plan.       Brandon is doing well with no new neurological complaints and no lingering side effects to chemotherapy except for mild constipation and low appetite. His headaches are well-managed with use of Tylenol as needed plus rest.    Imaging as detailed above was stable with no new concerns aside from changes that most likely represent post-treatment related injury. In the setting of both clinical and radiographic stability plus having completed the planned 6 cycles of adjuvant chemotherapy, the plan is to initiate management on imaging surveillance per NCCN guidelines of every 3-6 months for the first 5 years (at least through 8/2028) and then, at least every 6 months or as clinically indicated. In discussing with Brandon on the risks/ benefits of his imaging interval, he is in agreement to repeat imaging in 4 months. In the meantime, Brandon knows to call the clinic with any concerns and appointments can be moved up if needed.       CBC and CMP from today reviewed and without concerns. Will repeat labs again at his next follow-up visit and if stable, there will be no need to continue to monitor while off chemotherapy. In addition, the recheck on his vitamin D showed a decrease from 28 in December to 17 today. I instructed Brandon to double his supplementation (to 800 international unit(s)) of vitamin D and I will recheck level again in 4 months.     PROBLEM LIST  WHO grade 2 oligodendroglioma  Headaches  Stroke  Elevated cholesterol    PLAN  -CANCER DIRECTED THERAPY-  -Starting imaging surveillance; repeat MRI in 4 months.     -STEROIDS-  -Off " dexamethasone.    -SEIZURE MANAGEMENT-  -While this patient is at increased risk of having seizures, given the lack of seizure history, there is no indication to prescribe an antiepileptic at this time.     -STROKE/ VASCULAR RISK FACTORS-  -MRA head and neck was clean. ECHO no concerns. HbA1c was 5.6.  -LDL was 139 and goal is <70, therefore, will recommend lifestyle and dietary changes.    Deferring start to Crestor and recheck fasting lipid panel in May with primary care provider; Dr. Ryan Rodriguez.     -Quality of life/ MOOD/ FATIGUE-  -Denies any mood issues.  -Fatigue; chemotherapy-associated.    Labs in 9/2023 Vitamin B12/ D deficiency, low iron, and thyroid studies without concern.    Vitamin D level of 28 in 12/2023; daily supplementation.     Recheck vitamin D level on 3/26/2024; remained low at 17, increasing supplementation.  -Continue to monitor mood as untreated/ undertreated depression can worsen fatigue, dysorexia, and quality of life.    Return to clinic in 7/2024 + imaging.     In the meantime, Andrewdane knows to call the clinic with any concerns and he can be seen sooner if needed.      Abril Bullock MD  Neuro-oncology

## 2024-03-15 DIAGNOSIS — C71.9 OLIGODENDROGLIOMA (H): ICD-10-CM

## 2024-03-21 DIAGNOSIS — C71.9 OLIGODENDROGLIOMA (H): ICD-10-CM

## 2024-03-22 DIAGNOSIS — C71.9 OLIGODENDROGLIOMA (H): ICD-10-CM

## 2024-03-26 ENCOUNTER — HOSPITAL ENCOUNTER (OUTPATIENT)
Dept: MRI IMAGING | Facility: CLINIC | Age: 39
Discharge: HOME | End: 2024-03-26
Attending: NURSE PRACTITIONER
Payer: COMMERCIAL

## 2024-03-26 ENCOUNTER — LAB (OUTPATIENT)
Dept: LAB | Facility: CLINIC | Age: 39
End: 2024-03-26
Attending: NURSE PRACTITIONER
Payer: COMMERCIAL

## 2024-03-26 ENCOUNTER — ONCOLOGY VISIT (OUTPATIENT)
Dept: ONCOLOGY | Facility: CLINIC | Age: 39
End: 2024-03-26
Attending: PSYCHIATRY & NEUROLOGY
Payer: COMMERCIAL

## 2024-03-26 VITALS
SYSTOLIC BLOOD PRESSURE: 133 MMHG | HEART RATE: 76 BPM | RESPIRATION RATE: 20 BRPM | OXYGEN SATURATION: 98 % | WEIGHT: 162.8 LBS | TEMPERATURE: 97.5 F | BODY MASS INDEX: 27.09 KG/M2 | DIASTOLIC BLOOD PRESSURE: 91 MMHG

## 2024-03-26 DIAGNOSIS — E55.9 VITAMIN D DEFICIENCY: ICD-10-CM

## 2024-03-26 DIAGNOSIS — C71.9 OLIGODENDROGLIOMA (H): ICD-10-CM

## 2024-03-26 DIAGNOSIS — T45.1X5A ANTINEOPLASTIC CHEMOTHERAPY INDUCED PANCYTOPENIA (H): ICD-10-CM

## 2024-03-26 DIAGNOSIS — R11.2 CHEMOTHERAPY-INDUCED NAUSEA AND VOMITING: ICD-10-CM

## 2024-03-26 DIAGNOSIS — D61.810 ANTINEOPLASTIC CHEMOTHERAPY INDUCED PANCYTOPENIA (H): ICD-10-CM

## 2024-03-26 DIAGNOSIS — C71.9 OLIGODENDROGLIOMA (H): Primary | ICD-10-CM

## 2024-03-26 DIAGNOSIS — T45.1X5A CHEMOTHERAPY-INDUCED NAUSEA AND VOMITING: ICD-10-CM

## 2024-03-26 LAB
ALBUMIN SERPL BCG-MCNC: 4.7 G/DL (ref 3.5–5.2)
ALP SERPL-CCNC: 113 U/L (ref 40–150)
ALT SERPL W P-5'-P-CCNC: 26 U/L (ref 0–70)
ANION GAP SERPL CALCULATED.3IONS-SCNC: 12 MMOL/L (ref 7–15)
AST SERPL W P-5'-P-CCNC: 19 U/L (ref 0–45)
BASOPHILS # BLD AUTO: 0 10E3/UL (ref 0–0.2)
BASOPHILS NFR BLD AUTO: 1 %
BILIRUB SERPL-MCNC: 0.6 MG/DL
BUN SERPL-MCNC: 13.1 MG/DL (ref 6–20)
CALCIUM SERPL-MCNC: 10 MG/DL (ref 8.6–10)
CHLORIDE SERPL-SCNC: 99 MMOL/L (ref 98–107)
CREAT SERPL-MCNC: 0.85 MG/DL (ref 0.67–1.17)
DEPRECATED HCO3 PLAS-SCNC: 28 MMOL/L (ref 22–29)
EGFRCR SERPLBLD CKD-EPI 2021: >90 ML/MIN/1.73M2
EOSINOPHIL # BLD AUTO: 0.1 10E3/UL (ref 0–0.7)
EOSINOPHIL NFR BLD AUTO: 1 %
ERYTHROCYTE [DISTWIDTH] IN BLOOD BY AUTOMATED COUNT: 11.4 % (ref 10–15)
GLUCOSE SERPL-MCNC: 113 MG/DL (ref 70–99)
HCT VFR BLD AUTO: 44.9 % (ref 40–53)
HGB BLD-MCNC: 15.5 G/DL (ref 13.3–17.7)
IMM GRANULOCYTES # BLD: 0 10E3/UL
IMM GRANULOCYTES NFR BLD: 0 %
LYMPHOCYTES # BLD AUTO: 2.2 10E3/UL (ref 0.8–5.3)
LYMPHOCYTES NFR BLD AUTO: 39 %
MCH RBC QN AUTO: 29 PG (ref 26.5–33)
MCHC RBC AUTO-ENTMCNC: 34.5 G/DL (ref 31.5–36.5)
MCV RBC AUTO: 84 FL (ref 78–100)
MONOCYTES # BLD AUTO: 0.4 10E3/UL (ref 0–1.3)
MONOCYTES NFR BLD AUTO: 7 %
NEUTROPHILS # BLD AUTO: 2.9 10E3/UL (ref 1.6–8.3)
NEUTROPHILS NFR BLD AUTO: 52 %
NRBC # BLD AUTO: 0 10E3/UL
NRBC BLD AUTO-RTO: 0 /100
PLATELET # BLD AUTO: 252 10E3/UL (ref 150–450)
POTASSIUM SERPL-SCNC: 3.8 MMOL/L (ref 3.4–5.3)
PROT SERPL-MCNC: 8.1 G/DL (ref 6.4–8.3)
RBC # BLD AUTO: 5.34 10E6/UL (ref 4.4–5.9)
SODIUM SERPL-SCNC: 139 MMOL/L (ref 135–145)
VIT D+METAB SERPL-MCNC: 17 NG/ML (ref 20–50)
WBC # BLD AUTO: 5.6 10E3/UL (ref 4–11)

## 2024-03-26 PROCEDURE — 80053 COMPREHEN METABOLIC PANEL: CPT

## 2024-03-26 PROCEDURE — 82306 VITAMIN D 25 HYDROXY: CPT

## 2024-03-26 PROCEDURE — 99213 OFFICE O/P EST LOW 20 MIN: CPT | Performed by: PSYCHIATRY & NEUROLOGY

## 2024-03-26 PROCEDURE — 85025 COMPLETE CBC W/AUTO DIFF WBC: CPT

## 2024-03-26 PROCEDURE — A9585 GADOBUTROL INJECTION: HCPCS | Performed by: NURSE PRACTITIONER

## 2024-03-26 PROCEDURE — 255N000002 HC RX 255 OP 636: Performed by: NURSE PRACTITIONER

## 2024-03-26 PROCEDURE — 36415 COLL VENOUS BLD VENIPUNCTURE: CPT

## 2024-03-26 PROCEDURE — 70553 MRI BRAIN STEM W/O & W/DYE: CPT

## 2024-03-26 PROCEDURE — 99215 OFFICE O/P EST HI 40 MIN: CPT | Performed by: PSYCHIATRY & NEUROLOGY

## 2024-03-26 RX ORDER — TEMOZOLOMIDE 140 MG/1
CAPSULE ORAL
Refills: 0 | OUTPATIENT
Start: 2024-03-26

## 2024-03-26 RX ORDER — GADOBUTROL 604.72 MG/ML
15 INJECTION INTRAVENOUS ONCE
Status: COMPLETED | OUTPATIENT
Start: 2024-03-26 | End: 2024-03-26

## 2024-03-26 RX ADMIN — GADOBUTROL 15 ML: 604.72 INJECTION INTRAVENOUS at 10:33

## 2024-03-26 ASSESSMENT — PAIN SCALES - GENERAL: PAINLEVEL: NO PAIN (0)

## 2024-03-26 NOTE — ORAL ONC MGMT
[facility] Oral Chemotherapy Monitoring Program    Thank you for the opportunity to be a part in the care of this patient's oral chemotherapy. The oncology pharmacy will no longer be following this patient for oral chemotherapy. If there are any questions or the plan changes, feel free to contact us.        12/28/2023     9:00 AM 1/9/2024    11:00 AM 1/31/2024    11:00 AM 2/1/2024    11:00 AM 2/27/2024     8:00 AM 3/4/2024    12:00 PM 3/26/2024    12:00 PM   ORAL CHEMOTHERAPY   Assessment Type Lab Monitoring Lab Monitoring Lab Monitoring;Refill Lab Monitoring Refill Lab Monitoring Discontinuation   Stop Date       3/26/2024   Reason for Discontinuation       Therapy complete   Diagnosis Code Brain Cancer - Glioblastoma Brain Cancer - Glioblastoma Brain Cancer - Glioblastoma Brain Cancer - Glioblastoma Brain Cancer - Glioblastoma Brain Cancer - Glioblastoma Brain Cancer - Glioblastoma   Providers Dr. Kobe Bullock   Clinic Name/Location [facility]   Drug Name Temodar (temozolomide) Temodar (temozolomide) Temodar (temozolomide) Temodar (temozolomide) Temodar (temozolomide) Temodar (temozolomide) Temodar (temozolomide)   Dose 280 mg 280 mg 280 mg 280 mg 280 mg 280 mg 280 mg   Current Schedule QHS QHS QHS QHS QHS QHS QHS   Cycle Details 5 days on, 23 days off 5 days on, 23 days off 5 days on, 23 days off 5 days on, 23 days off 5 days on, 23 days off 5 days on, 23 days off 5 days on, 23 days off   Start Date of Last Cycle  12/26/2023   3/4/2024 3/4/2024    Planned next cycle start date  1/30/2024 2/1/2024 2/1/2024 4/1/2024 4/1/2024    Adherence Assessment  Adherent Adherent       Adverse Effects    No AE identified during assessment  No AE identified during assessment    Any new drug interactions?  No No       Is the dose as ordered appropriate for the patient?  Yes Yes            Evaristo Watson, PharmD, MS  Hematology/Oncology Clinical Pharmacist  [facility]  [phone]

## 2024-03-26 NOTE — LETTER
3/26/2024         RE: Brandon Ordoñez  [address] [facility] [address]        Dear Colleague,    Thank you for referring your patient, Brandon Ordoñez, to the [facility]. Please see a copy of my visit note below.    NEURO-ONCOLOGY VISIT  Mar 26, 2024    CHIEF COMPLAINT: Mr. 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 He completed chemoradiotherapy with adjuvant temozolomide on 7/20/2023. Post-radiation imaging demonstrated anticipated post-radiation induced changes and no new concerns. Repeat imaging in 12/2023 showed evidence a possible new stroke. Work-up was negative for a possible cause. Brandon completed the planned 6 cycles of adjuvant-dosed temozolomide in 3/2024 and repeat imaging in 3/2024 showed overall stability with evidence of post-treatment related changes.     The plan is to initiate monitoring on imaging surveillance.     Brandon is presenting in follow-up and he is unaccompanied today.    HISTORY OF PRESENT ILLNESS  -He reports that he tolerated the last cycle of adjuvant temozolomide with expected side effects.   Mild lingering constipation.    Energy is good.    Appetite remains lower.  -Feels cold all the time.   -Continues to have headaches at work; especially when stressed or tired.  Tylenol and rest help to resolve these.  -He denies any concerns for seizure activity.  -No weakness, no changes in vision, no new numbness.       MEDICATIONS   Current Outpatient Medications   Medication Sig Dispense Refill     ondansetron (ZOFRAN) 4 MG tablet Take 1 tablet (4 mg) by mouth at bedtime Take 30-60 mins before each dose of temozolomide. 30 tablet 1     polyethylene glycol (MIRALAX) 17 GM/Dose powder Take 17 g (1 Capful) by mouth  daily 578 g 1     senna-docusate (SENOKOT-S/PERICOLACE) 8.6-50 MG tablet Take 1 tablet by mouth 2 times daily as needed for constipation 60 tablet 1     vitamin D3 (CHOLECALCIFEROL) 10 MCG (400 UNIT) capsule Take 1 capsule (400 Units) by mouth daily 30 capsule 3     DRUG ALLERGIES No Known Allergies       IMMUNIZATIONS   Immunization History   Administered Date(s) Administered     COVID-19 MONOVALENT 12+ (Pfizer) 03/29/2021, 04/19/2021     TDAP Vaccine (Adacel) 07/30/2018       ONCOLOGIC HISTORY  -PRESENTATION: Progressive somnolence, nausea and vomiting, mild headache.  -5/4/2023 CT Head imaging with a large mass with associated calcifications and probable associated hemorrhage in the anterior right frontal region with surrounding vasogenic edema and significant local mass effect resulting in narrowing of the right more than left lateral ventricles and third ventricle, and leftward subfalcine shift as well as downward/ central transtentorial herniation.   -5/4/2023 SURGERY: Right frontal craniotomy for mass resection by Dr. Dasilva.   Post-operative imaging on 5/5 without a definite residual enhancing lesion. Nonspecific surrounding T2/FLAIR hyperintensity along the posterior and superior margins of the resection cavity may reflect vasogenic edema or nonenhancing tumor. Small volume hemorrhage and cytotoxic edema along the margins of the resection cavity and extending along the right caudate nucleus and body of the corpus callosum.  PATHOLOGY: WHO grade 2 oligodendroglioma;   FISH with deletion of both the 1p36.32 and 19q13.33 regions   No homozygous deletion of CDKN2A/B gene    IDH1 R132S mutated.  TERT c.-146C>T mutated.   TMB Score: 2.438 mut/Mb   Fusion Event: NEGATIVE   Histology is borderline due to presence of microvascular proliferation and necrosis without palisading, however, the histological criteria for distinction between grade 2 and 3 are not well defined.  -5/16/2023 NEURO-ONC: Regardless of grade,  recommending chemoradiotherapy with concurrent temozolomide.  -6/30/2023 NEURO-ONC/ CHEMORADS: Here for visit at the midpoint of concurrent chemoradiotherapy.   -7/20/2023 CHEMORADS: Completed chemoradiotherapy with concurrent temozolomide 75mg/m2 (140mg)   -8/22/2023 NEURO-ONC/ MRB/ CHEMO: Clinically well. Imaging with no concerns. Starting adjuvant temozolomide 150mg/m2 (280mg), cycle 1 (start date tonight).   -9/19/2023 NEURO-ONC/ CHEMO: No new neurological concerns. Modifications to supportive medications to better tolerate adjuvant temozolomide. Will repeat dosing of cycle 1 at 150mg/m2 (280mg).   -10/26/2023 NEURO-ONC/ CHEMO: No new neurological concerns.  Modifications to supportive medications to better tolerate adjuvant temozolomide were successful and he tolerated his repeat dosing of cycle 1 at 150mg/m2 (280mg).  Will plan on repeating at this dosing level one more time and then consider increase to 200mg/m2 with cycle 3.   -11/27/2023 NEURO-ONC/ CHEMO:  No new neurological concerns.  Modifications to supportive medications to better tolerate adjuvant temozolomide were successful and he tolerated his repeat dosing of cycle 1 at 150mg/m2, and after discussion today, he would like to continue at this dose rather than increase.  -12/21/2023 MRB with a new acute /early subacute small punctate infarct in the right aspect of splenium.  -1/9/2024 NEURO-ONC/ CHEMO: Clinically well. MRA clean. ECHO with no obvious cardiac source of emboli was seen within the limits of a transthoracic echocardiogram. ; recommended lifestyle and dietary changes plus primary care follow-up. HbA1c 5.6. Starting vitamin D daily supplementation. Adjuvant temozolomide 150mg/m2 (280mg), cycle 4 completed.   -1/30/2024 NEURO-ONC/ CHEMO: Clinically well. Labs to be done tomorrow. Adjuvant temozolomide 150mg/m2 (280mg), cycle 5 due to start today or as soon as he receives it.   -2/27/2024 NEURO-ONC/ CHEMO: Clinically well. Labs to be  "done 3/3 or 3/4.  Adjuvant temozolomide 150mg/m2 (280mg), cycle 6 due to start 3/4.  -3/26/2024 NEURO-ONC/ MRB: Low appetite. Imaging with overall stability with evidence of post-treatment related changes.    SOCIAL HISTORY   , 2 children      PHYSICAL EXAMINATION  BP (!) 133/91 (BP Location: Right arm, Patient Position: Sitting, Cuff Size: Adult Large)   Pulse 76   Temp 97.5  F (36.4  C) (Oral)   Resp 20   Wt 73.8 kg (162 lb 12.8 oz)   SpO2 98%   BMI 27.09 kg/m     Wt Readings from Last 2 Encounters:   03/26/24 73.8 kg (162 lb 12.8 oz)   02/27/24 73 kg (161 lb)      Ht Readings from Last 2 Encounters:   02/27/24 1.651 m (5' 5\")   01/30/24 1.651 m (5' 5\")     KPS: 100    -Generally well appearing.  -Respiratory: Normal breath sounds, no audible wheezing.  -Psychiatric: Normal mood and affect. Pleasant, talkative.  -Neurologic:   MENTAL STATUS:     Alert, oriented.    Recall: Intact.    Speech fluent.    Comprehension intact.     CRANIAL NERVES:     Symmetric facial movements.   Hearing intact.   No dysarthria.  GAIT: Steady and unassisted.       MEDICAL RECORDS  Personally reviewed pharmacy notes, oncology notes and DNA13 messages.    LABS  Personally reviewed all available lab results; CBC (platelets 252) and CMP (AST/ ALT 19/26) from today with no concerns.  Vitamin D showed a decrease from 28 in December to 17 today.    IMAGING  Personally reviewed MR brain imaging from today and compared to post-radiation imaging. To my eye, there is a faint increase in linear contrast enhancement about the inferior aspect of the resection cavity (below, yellow) that appears to be post-treatment related. No increase in T2 FLAIR.        Formal read; \" Resection cavity in the right frontal lobe is stable in size. There is slightly increased linear enhancement along the posterolateral margin of the resection cavity inferiorly extending to the frontal horn of the right lateral ventricle. There is no significant new " "FLAIR signal abnormality in this area. The enhancement is not particularly nodular. It is unclear what this finding reflects especially given the lack of FLAIR signal abnormality associated with it that is new. Continued follow-up recommended.\"    Imaging was shown to and results were reviewed with Brandon.        IMPRESSION  On date of service, 41 minutes was spent in clinic and 8 minutes was spent preparing for the visit through extensive chart review and coordinating care for this high complexity visit. The following is in explanation for the recommendations used to define the plan.       Brandon is doing well with no new neurological complaints and no lingering side effects to chemotherapy except for mild constipation and low appetite. His headaches are well-managed with use of Tylenol as needed plus rest.    Imaging as detailed above was stable with no new concerns aside from changes that most likely represent post-treatment related injury. In the setting of both clinical and radiographic stability plus having completed the planned 6 cycles of adjuvant chemotherapy, the plan is to initiate management on imaging surveillance per NCCN guidelines of every 3-6 months for the first 5 years (at least through 8/2028) and then, at least every 6 months or as clinically indicated. In discussing with Brandon on the risks/ benefits of his imaging interval, he is in agreement to repeat imaging in 4 months. In the meantime, Brandon knows to call the clinic with any concerns and appointments can be moved up if needed.       CBC and CMP from today reviewed and without concerns. Will repeat labs again at his next follow-up visit and if stable, there will be no need to continue to monitor while off chemotherapy. In addition, the recheck on his vitamin D showed a decrease from 28 in December to 17 today. I instructed Brandon to double his supplementation (to 800 international unit(s)) of vitamin D and I will " recheck level again in 4 months.     PROBLEM LIST  WHO grade 2 oligodendroglioma  Headaches  Stroke  Elevated cholesterol    PLAN  -CANCER DIRECTED THERAPY-  -Starting imaging surveillance; repeat MRI in 4 months.     -STEROIDS-  -Off dexamethasone.    -SEIZURE MANAGEMENT-  -While this patient is at increased risk of having seizures, given the lack of seizure history, there is no indication to prescribe an antiepileptic at this time.     -STROKE/ VASCULAR RISK FACTORS-  -MRA head and neck was clean. ECHO no concerns. HbA1c was 5.6.  -LDL was 139 and goal is <70, therefore, will recommend lifestyle and dietary changes.    Deferring start to Crestor and recheck fasting lipid panel in May with primary care provider; Dr. Ryan Rodriguez.     -Quality of life/ MOOD/ FATIGUE-  -Denies any mood issues.  -Fatigue; chemotherapy-associated.    Labs in 9/2023 Vitamin B12/ D deficiency, low iron, and thyroid studies without concern.    Vitamin D level of 28 in 12/2023; daily supplementation.     Recheck vitamin D level on 3/26/2024; remained low at 17, increasing supplementation.  -Continue to monitor mood as untreated/ undertreated depression can worsen fatigue, dysorexia, and quality of life.    Return to clinic in 7/2024 + imaging.     In the meantime, Brandon knows to call the clinic with any concerns and he can be seen sooner if needed.      Abril Bullock MD  Neuro-oncology      Oncology Rooming Note    March 26, 2024 11:42 AM   Brandon Ordoñez is a 38 year old male who presents for:    Chief Complaint   Patient presents with     Oncology Clinic Visit     Oligodendroglioma (H)       Initial Vitals: BP (!) 143/87 (BP Location: Right arm, Patient Position: Sitting, Cuff Size: Adult Large)   Pulse 76   Temp 97.5  F (36.4  C) (Oral)   Resp 20   Wt 73.8 kg (162 lb 12.8 oz)   SpO2 98%   BMI 27.09 kg/m   Estimated body mass index is 27.09 kg/m  as calculated from the following:    Height as of 2/27/24: 1.651 m  "(5' 5\").    Weight as of this encounter: 73.8 kg (162 lb 12.8 oz). Body surface area is 1.84 meters squared.  No Pain (0) Comment: Data Unavailable   No LMP for male patient.  Allergies reviewed: Yes  Medications reviewed: Yes    Medications: Medication refills not needed today.  Pharmacy name entered into Mover:    [id number] [place] [facility] [place] [address] [address] - [address] [place] [address] DRUG STORE #[id number] - [facility] [place] [address] AT [facility] [address] & [facility]    Frailty Screening:   Is the patient here for a new oncology consult visit in cancer care? 2. No      Clinical concerns: no        Carolina Brown CMA                Again, thank you for allowing me to participate in the care of your patient.        Sincerely,        Abril Bullock MD  "

## 2024-03-26 NOTE — PROGRESS NOTES
"Oncology Rooming Note    March 26, 2024 11:42 AM   Brandon Ordoñez is a 38 year old male who presents for:    Chief Complaint   Patient presents with    Oncology Clinic Visit     Oligodendroglioma (H)       Initial Vitals: BP (!) 143/87 (BP Location: Right arm, Patient Position: Sitting, Cuff Size: Adult Large)   Pulse 76   Temp 97.5  F (36.4  C) (Oral)   Resp 20   Wt 73.8 kg (162 lb 12.8 oz)   SpO2 98%   BMI 27.09 kg/m   Estimated body mass index is 27.09 kg/m  as calculated from the following:    Height as of 2/27/24: 1.651 m (5' 5\").    Weight as of this encounter: 73.8 kg (162 lb 12.8 oz). Body surface area is 1.84 meters squared.  No Pain (0) Comment: Data Unavailable   No LMP for male patient.  Allergies reviewed: Yes  Medications reviewed: Yes    Medications: Medication refills not needed today.  Pharmacy name entered into [employer]:    [id number] [place] [facility] [place] [address] [address] - [address] [place] [address] DRUG STORE #[id number] [place], [place] - [address] AT [facility] [address] & [facility]    Frailty Screening:   Is the patient here for a new oncology consult visit in cancer care? 2. No      Clinical concerns: no        Carolina Brown CMA              "

## 2024-04-01 RX ORDER — TEMOZOLOMIDE 140 MG/1
CAPSULE ORAL
Refills: 0 | OUTPATIENT
Start: 2024-04-01

## 2024-05-06 NOTE — LETTER
11/24/2023         RE: Brandon Ordoñez  [address]  [place] [address]        Dear Colleague,    Thank you for referring your patient, Brandon Ordoñez, to the [facility]. Please see a copy of my visit note below.    Patient connected to video from work and then needed to leave.  Rescheduled for Monday 11/27/2023.       Again, thank you for allowing me to participate in the care of your patient.        Sincerely,        GWEN Sprague CNP   Referring Provider (Optional): Dr. Cruz Anesthesia Volume In Cc: 6 Did You Provide Opioid Counseling: No Repair Type: Flap Which Eyelid Repair Cpt Are You Using?: 47694 Suturegard Retention Suture: 2-0 Nylon Retention Suture Bite Size: 3 mm Length To Time In Minutes Device Was In Place: 10 Number Of Hemigard Strips Per Side: 1 Simple / Intermediate / Complex Repair - Final Wound Length In Cm: 0 Undermining Type: Entire Wound Debridement Text: The wound edges were debrided prior to proceeding with the closure to facilitate wound healing. Helical Rim Text: The closure involved the helical rim. Vermilion Border Text: The closure involved the vermilion border. Nostril Rim Text: The closure involved the nostril rim. Retention Suture Text: Retention sutures were placed to support the closure and prevent dehiscence. Flap Type: O-L Flap Primary Defect Length (In Cm): 2.7 Primary Defect Width (In Cm): 2 Secondary Defect Length (In Cm): 4.9 Secondary Defect Width (In Cm): 3.1 Skin Substitute: EpiFix Micronized Type Of Previous Surgery (Optional- Ie Mohs Surgery): Mohs Date Of Previous Surgery (Optional): 06 May 2024 Include The Following Details In The Note (If No Details Will Only Be Reflected In The Surgical Fax): Yes Pre-Op Size Of Lesion Removed (Optional): 2.1 X Size Of Lesion In Cm (Optional): 1.4 Mohs Case Number (Optional): [id number] Date Of Previous Biopsy (Optional): 28 March 2024 Previous Accession (Optional): [id number] Detail Level: Detailed Anesthesia Type: 1% lidocaine with 1:200,000 epinephrine and a 1:10 solution of 8.4% sodium bicarbonate Additional Anesthesia Volume In Cc: 9 Hemostasis: Electrocautery Estimated Blood Loss (Cc): minimal Brow Lift Text: A midfrontal incision was made medially to the defect to allow access to the tissues just superior to the left eyebrow. Following careful dissection inferiorly in a supraperiosteal plane to the level of the left eyebrow, several 3-0 monocryl sutures were used to resuspend the eyebrow orbicularis oculi muscular unit to the superior frontal bone periosteum. This resulted in an appropriate reapproximation of static eyebrow symmetry and correction of the left brow ptosis. Deep Sutures: 4-0 Monocryl Additional Deep Sutures: 5-0 Monocryl Epidermal Sutures: 5-0 Fast Absorbing Gut Additional Epidermal Sutures: 5-0 Ethilon Epidermal Closure: running Additional Epidermal Closure (Optional): retention Wound Care: Petrolatum Dressing: dry sterile dressing Unna Boot Text: An Unna boot was placed to help immobilize the limb and facilitate more rapid healing. Suturegard Intro: Intraoperative tissue expansion was performed, utilizing the SUTUREGARD device, in order to reduce wound tension. Suturegard Body: The suture ends were repeatedly re-tightened and re-clamped to achieve the desired tissue expansion. Hemigard Intro: Due to skin fragility and wound tension, it was decided to use HEMIGARD adhesive retention suture devices to permit a linear closure. The skin was cleaned and dried for a 6cm distance away from the wound. Excessive hair, if present, was removed to allow for adhesion. Hemigard Postcare Instructions: The HEMIGARD strips are to remain completely dry for at least 5-7 days. Graft Basting Suture (Optional): 4-0 Ethilon Epidermal Closure Graft Donor Site (Optional): simple interrupted Graft Donor Site Bandage (Optional-Leave Blank If You Don't Want In Note): Pressure bandage were applied to the donor site. Closure 2 Information: This tab is for additional flaps and grafts, including complex repair and grafts and complex repair and flaps. You can also specify a different location for the additional defect, if the location is the same you do not need to select a new one. We will insert the automated text for the repair you select below just as we do for solitary flaps and grafts. Please note that at this time if you select a location with a different insurance zone you will need to override the ICD10 and CPT if appropriate. Closure 3 Information: This tab is for additional flaps and grafts above and beyond our usual structured repairs.  Please note if you enter information here it will not currently bill and you will need to add the billing information manually. Complex Repair Preamble Text (Leave Blank If You Do Not Want): Extensive wide undermining was performed. Intermediate Repair Preamble Text (Leave Blank If You Do Not Want): Undermining was performed with blunt dissection. Flap Thinning Complex Repair Preamble Text (Leave Blank If You Do Not Want): An incision was made along the previous flap suture line. Undermining was performed beneath the flap and redundant tissue was removed to restore the normal contour of the skin. Non-Graft Cartilage Fenestration Text: The cartilage was fenestrated with a 2mm punch biopsy to help facilitate healing. Graft Cartilage Fenestration Text: The cartilage was fenestrated with a 2mm punch biopsy to help facilitate graft survival and healing. Preparation Of Recipient Site - Flap: The eschar was removed surgically with sharp dissection to facilitate appropriate wound healing of the following adjacent tissue rearrangement. Preparation Of Recipient Site - Graft: The eschar was removed surgically with sharp dissection to facilitate appropriate survival of the following graft. Preparation Of Recipient Site - Flap Takedown: The eschar and granulation tissue was removed surgically with sharp dissection to facilitate appropriate healing after division and inset of the proximal and distal interpolation flap. Secondary Intention Text (Leave Blank If You Do Not Want): The defect will heal with secondary intention. No Repair - Repaired With Adjacent Surgical Defect Text (Leave Blank If You Do Not Want): After obtaining clear surgical margins the defect was repaired concurrently with another surgical defect which was in close approximation. Referred To Oculoplastics For Closure Text (Leave Blank If You Do Not Want): After obtaining clear surgical margins the patient was sent to oculoplastics for surgical repair.  The patient understands they will receive post-surgical care and follow-up from the referring physician's office. Referred To Otolaryngology For Closure Text (Leave Blank If You Do Not Want): After obtaining clear surgical margins the patient was sent to otolaryngology for surgical repair.  The patient understands they will receive post-surgical care and follow-up from the referring physician's office. Referred To Plastics For Closure Text (Leave Blank If You Do Not Want): After obtaining clear surgical margins the patient was sent to plastics for surgical repair.  The patient understands they will receive post-surgical care and follow-up from the referring physician's office. Referred To Asc For Closure Text (Leave Blank If You Do Not Want): After obtaining clear surgical margins the patient was sent to an ASC for surgical repair.  The patient understands they will receive post-surgical care and follow-up from the ASC physician. Referred To Mid-Level For Closure Text (Leave Blank If You Do Not Want): After obtaining clear surgical margins the patient was sent to a mid-level provider for surgical repair.  The patient understands they will receive post-surgical care and follow-up from the mid-level provider. Repair Performed By Another Provider Text (Leave Blank If You Do Not Want): After obtaining clear surgical margins the defect was repaired by another provider. Adjacent Tissue Transfer Text: The defect edges were debeveled with a #15 scalpel blade.  Given the location of the defect and the proximity to free margins an adjacent tissue transfer was deemed most appropriate.  Using a sterile surgical marker, an appropriate flap was drawn incorporating the defect and placing the expected incisions within the relaxed skin tension lines where possible.    The area thus outlined was incised deep to adipose tissue with a #15 scalpel blade.  The skin margins were undermined to an appropriate distance in all directions utilizing iris scissors. Advancement Flap (Single) Text: The defect edges were debeveled with a #15 scalpel blade.  Given the location of the defect and the proximity to free margins a single advancement flap was deemed most appropriate.  Using a sterile surgical marker, an appropriate advancement flap was drawn incorporating the defect and placing the expected incisions within the relaxed skin tension lines where possible.    The area thus outlined was incised deep to adipose tissue with a #15 scalpel blade.  The skin margins were undermined to an appropriate distance in all directions utilizing iris scissors. Advancement Flap (Double) Text: The defect edges were debeveled with a #15 scalpel blade.  Given the location of the defect and the proximity to free margins a double advancement flap was deemed most appropriate.  Using a sterile surgical marker, the appropriate advancement flaps were drawn incorporating the defect and placing the expected incisions within the relaxed skin tension lines where possible.    The area thus outlined was incised deep to adipose tissue with a #15 scalpel blade.  The skin margins were undermined to an appropriate distance in all directions utilizing iris scissors. Burow's Advancement Flap Text: The defect edges were debeveled with a #15 scalpel blade.  Given the location of the defect and the proximity to free margins a Burow's advancement flap was deemed most appropriate.  Using a sterile surgical marker, the appropriate advancement flap was drawn incorporating the defect and placing the expected incisions within the relaxed skin tension lines where possible.    The area thus outlined was incised deep to adipose tissue with a #15 scalpel blade.  The skin margins were undermined to an appropriate distance in all directions utilizing iris scissors. Chonodrocutaneous Helical Advancement Flap Text: The defect edges were debeveled with a #15 scalpel blade.  Given the location of the defect and the proximity to free margins a chondrocutaneous helical advancement flap was deemed most appropriate.  Using a sterile surgical marker, the appropriate advancement flap was drawn incorporating the defect and placing the expected incisions within the relaxed skin tension lines where possible.    The area thus outlined was incised deep to adipose tissue with a #15 scalpel blade.  The skin margins were undermined to an appropriate distance in all directions utilizing iris scissors. Crescentic Advancement Flap Text: The defect edges were debeveled with a #15 scalpel blade.  Given the location of the defect and the proximity to free margins a crescentic advancement flap was deemed most appropriate.  Using a sterile surgical marker, the appropriate advancement flap was drawn incorporating the defect and placing the expected incisions within the relaxed skin tension lines where possible.    The area thus outlined was incised deep to adipose tissue with a #15 scalpel blade.  The skin margins were undermined to an appropriate distance in all directions utilizing iris scissors. A-T Advancement Flap Text: The defect edges were debeveled with a #15 scalpel blade.  Given the location of the defect, shape of the defect and the proximity to free margins an A-T advancement flap was deemed most appropriate.  Using a sterile surgical marker, an appropriate advancement flap was drawn incorporating the defect and placing the expected incisions within the relaxed skin tension lines where possible.    The area thus outlined was incised deep to adipose tissue with a #15 scalpel blade.  The skin margins were undermined to an appropriate distance in all directions utilizing iris scissors. O-T Advancement Flap Text: The defect edges were debeveled with a #15 scalpel blade.  Given the location of the defect, shape of the defect and the proximity to free margins an O-T advancement flap was deemed most appropriate.  Using a sterile surgical marker, an appropriate advancement flap was drawn incorporating the defect and placing the expected incisions within the relaxed skin tension lines where possible.    The area thus outlined was incised deep to adipose tissue with a #15 scalpel blade.  The skin margins were undermined to an appropriate distance in all directions utilizing iris scissors. O-L Flap Text: The defect edges were debeveled with a #15 scalpel blade.  Given the location of the defect, shape of the defect and the proximity to free margins an O-L flap was deemed most appropriate.  Using a sterile surgical marker, an appropriate advancement flap was drawn incorporating the defect and placing the expected incisions within the relaxed skin tension lines where possible.    The area thus outlined was incised deep to adipose tissue with a #15 scalpel blade.  The skin margins were undermined to an appropriate distance in all directions utilizing iris scissors. O-Z Flap Text: The defect edges were debeveled with a #15 scalpel blade.  Given the location of the defect, shape of the defect and the proximity to free margins an O-Z flap was deemed most appropriate.  Using a sterile surgical marker, an appropriate transposition flap was drawn incorporating the defect and placing the expected incisions within the relaxed skin tension lines where possible. The area thus outlined was incised deep to adipose tissue with a #15 scalpel blade.  The skin margins were undermined to an appropriate distance in all directions utilizing iris scissors. Double O-Z Flap Text: The defect edges were debeveled with a #15 scalpel blade.  Given the location of the defect, shape of the defect and the proximity to free margins a Double O-Z flap was deemed most appropriate.  Using a sterile surgical marker, an appropriate transposition flap was drawn incorporating the defect and placing the expected incisions within the relaxed skin tension lines where possible. The area thus outlined was incised deep to adipose tissue with a #15 scalpel blade.  The skin margins were undermined to an appropriate distance in all directions utilizing iris scissors. V-Y Flap Text: The defect edges were debeveled with a #15 scalpel blade.  Given the location of the defect, shape of the defect and the proximity to free margins a V-Y flap was deemed most appropriate.  Using a sterile surgical marker, an appropriate advancement flap was drawn incorporating the defect and placing the expected incisions within the relaxed skin tension lines where possible.    The area thus outlined was incised deep to adipose tissue with a #15 scalpel blade.  The skin margins were undermined to an appropriate distance in all directions utilizing iris scissors. Advancement-Rotation Flap Text: The defect edges were debeveled with a #15 scalpel blade.  Given the location of the defect, shape of the defect and the proximity to free margins an advancement-rotation flap was deemed most appropriate.  Using a sterile surgical marker, an appropriate flap was drawn incorporating the defect and placing the expected incisions within the relaxed skin tension lines where possible. The area thus outlined was incised deep to adipose tissue with a #15 scalpel blade.  The skin margins were undermined to an appropriate distance in all directions utilizing iris scissors. Mercedes Flap Text: The defect edges were debeveled with a #15 scalpel blade.  Given the location of the defect, shape of the defect and the proximity to free margins a Mercedes flap was deemed most appropriate.  Using a sterile surgical marker, an appropriate advancement flap was drawn incorporating the defect and placing the expected incisions within the relaxed skin tension lines where possible. The area thus outlined was incised deep to adipose tissue with a #15 scalpel blade.  The skin margins were undermined to an appropriate distance in all directions utilizing iris scissors. Modified Advancement Flap Text: The defect edges were debeveled with a #15 scalpel blade.  Given the location of the defect, shape of the defect and the proximity to free margins a modified advancement flap was deemed most appropriate.  Using a sterile surgical marker, an appropriate advancement flap was drawn incorporating the defect and placing the expected incisions within the relaxed skin tension lines where possible.    The area thus outlined was incised deep to adipose tissue with a #15 scalpel blade.  The skin margins were undermined to an appropriate distance in all directions utilizing iris scissors. Mucosal Advancement Flap Text: Given the location of the defect, shape of the defect and the proximity to free margins a mucosal advancement flap was deemed most appropriate. Incisions were made with a 15 blade scalpel in the appropriate fashion along the cutaneous vermilion border and the mucosal lip. The remaining actinically damaged mucosal tissue was excised.  The mucosal advancement flap was then elevated to the gingival sulcus with care taken to preserve the neurovascular structures and advanced into the primary defect. Care was taken to ensure that precise realignment of the vermilion border was achieved. Peng Advancement Flap Text: The defect edges were debeveled with a #15 scalpel blade.  Given the location of the defect, shape of the defect and the proximity to free margins a Peng advancement flap was deemed most appropriate.  Using a sterile surgical marker, an appropriate advancement flap was drawn incorporating the defect and placing the expected incisions within the relaxed skin tension lines where possible. The area thus outlined was incised deep to adipose tissue with a #15 scalpel blade.  The skin margins were undermined to an appropriate distance in all directions utilizing iris scissors. Hatchet Flap Text: The defect edges were debeveled with a #15 scalpel blade.  Given the location of the defect, shape of the defect and the proximity to free margins a hatchet flap was deemed most appropriate.  Using a sterile surgical marker, an appropriate hatchet flap was drawn incorporating the defect and placing the expected incisions within the relaxed skin tension lines where possible.    The area thus outlined was incised deep to adipose tissue with a #15 scalpel blade.  The skin margins were undermined to an appropriate distance in all directions utilizing iris scissors. Rotation Flap Text: The defect edges were debeveled with a #15 scalpel blade.  Given the location of the defect, shape of the defect and the proximity to free margins a rotation flap was deemed most appropriate.  Using a sterile surgical marker, an appropriate rotation flap was drawn incorporating the defect and placing the expected incisions within the relaxed skin tension lines where possible.    The area thus outlined was incised deep to adipose tissue with a #15 scalpel blade.  The skin margins were undermined to an appropriate distance in all directions utilizing iris scissors. Bilateral Rotation Flap Text: The defect edges were debeveled with a #15 scalpel blade. Given the location of the defect, shape of the defect and the proximity to free margins a bilateral rotation flap was deemed most appropriate. Using a sterile surgical marker, an appropriate rotation flap was drawn incorporating the defect and placing the expected incisions within the relaxed skin tension lines where possible. The area thus outlined was incised deep to adipose tissue with a #15 scalpel blade. The skin margins were undermined to an appropriate distance in all directions utilizing iris scissors. Following this, the designed flap was carried over into the primary defect and sutured into place. Spiral Flap Text: The defect edges were debeveled with a #15 scalpel blade.  Given the location of the defect, shape of the defect and the proximity to free margins a spiral flap was deemed most appropriate.  Using a sterile surgical marker, an appropriate rotation flap was drawn incorporating the defect and placing the expected incisions within the relaxed skin tension lines where possible. The area thus outlined was incised deep to adipose tissue with a #15 scalpel blade.  The skin margins were undermined to an appropriate distance in all directions utilizing iris scissors. Staged Advancement Flap Text: The defect edges were debeveled with a #15 scalpel blade.  Given the location of the defect, shape of the defect and the proximity to free margins a staged advancement flap was deemed most appropriate.  Using a sterile surgical marker, an appropriate advancement flap was drawn incorporating the defect and placing the expected incisions within the relaxed skin tension lines where possible. The area thus outlined was incised deep to adipose tissue with a #15 scalpel blade.  The skin margins were undermined to an appropriate distance in all directions utilizing iris scissors. Star Wedge Flap Text: The defect edges were debeveled with a #15 scalpel blade.  Given the location of the defect, shape of the defect and the proximity to free margins a star wedge flap was deemed most appropriate.  Using a sterile surgical marker, an appropriate rotation flap was drawn incorporating the defect and placing the expected incisions within the relaxed skin tension lines where possible. The area thus outlined was incised deep to adipose tissue with a #15 scalpel blade.  The skin margins were undermined to an appropriate distance in all directions utilizing iris scissors. Transposition Flap Text: The defect edges were debeveled with a #15 scalpel blade.  Given the location of the defect and the proximity to free margins a transposition flap was deemed most appropriate.  Using a sterile surgical marker, an appropriate transposition flap was drawn incorporating the defect.    The area thus outlined was incised deep to adipose tissue with a #15 scalpel blade.  The skin margins were undermined to an appropriate distance in all directions utilizing iris scissors. Muscle Hinge Flap Text: The defect edges were debeveled with a #15 scalpel blade.  Given the size, depth and location of the defect and the proximity to free margins a muscle hinge flap was deemed most appropriate.  Using a sterile surgical marker, an appropriate hinge flap was drawn incorporating the defect. The area thus outlined was incised with a #15 scalpel blade.  The skin margins were undermined to an appropriate distance in all directions utilizing iris scissors. Mustarde Flap Text: The defect edges were debeveled with a #15 scalpel blade.  Given the size, depth and location of the defect and the proximity to free margins a Mustarde flap was deemed most appropriate.  Using a sterile surgical marker, an appropriate flap was drawn incorporating the defect. The area thus outlined was incised with a #15 scalpel blade.  The skin margins were undermined to an appropriate distance in all directions utilizing iris scissors. Nasal Turnover Hinge Flap Text: The defect edges were debeveled with a #15 scalpel blade.  Given the size, depth, location of the defect and the defect being full thickness a nasal turnover hinge flap was deemed most appropriate.  Using a sterile surgical marker, an appropriate hinge flap was drawn incorporating the defect. The area thus outlined was incised with a #15 scalpel blade. The flap was designed to recreate the nasal mucosal lining and the alar rim. The skin margins were undermined to an appropriate distance in all directions utilizing iris scissors. Nasalis-Muscle-Based Myocutaneous Island Pedicle Flap Text: Using a #15 blade, an incision was made around the donor flap to the level of the nasalis muscle. Wide lateral undermining was then performed in both the subcutaneous plane above the nasalis muscle, and in a submuscular plane just above periosteum. This allowed the formation of a free nasalis muscle axial pedicle (based on the angular artery) which was still attached to the actual cutaneous flap, increasing its mobility and vascular viability. Hemostasis was obtained with pinpoint electrocoagulation. The flap was mobilized into position and the pivotal anchor points positioned and stabilized with buried interrupted sutures. Subcutaneous and dermal tissues were closed in a multilayered fashion with sutures. Tissue redundancies were excised, and the epidermal edges were apposed without significant tension and sutured with sutures. Nasalis Myocutaneous Flap Text: Using a #15 blade, an incision was made around the donor flap to the level of the nasalis muscle. Wide lateral undermining was then performed in both the subcutaneous plane above the nasalis muscle, and in a submuscular plane just above periosteum. This allowed the formation of a free nasalis muscle axial pedicle which was still attached to the actual cutaneous flap, increasing its mobility and vascular viability. Hemostasis was obtained with pinpoint electrocoagulation. The flap was mobilized into position and the pivotal anchor points positioned and stabilized with buried interrupted sutures. Subcutaneous and dermal tissues were closed in a multilayered fashion with sutures. Tissue redundancies were excised, and the epidermal edges were apposed without significant tension and sutured with sutures. Orbicularis Oris Muscle Flap Text: The defect edges were debeveled with a #15 scalpel blade.  Given that the defect affected the competency of the oral sphincter an obicularis oris muscle flap was deemed most appropriate to restore this competency and normal muscle function.  Using a sterile surgical marker, an appropriate flap was drawn incorporating the defect. The area thus outlined was incised with a #15 scalpel blade. Melolabial Transposition Flap Text: The defect edges were debeveled with a #15 scalpel blade.  Given the location of the defect and the proximity to free margins a melolabial flap was deemed most appropriate.  Using a sterile surgical marker, an appropriate melolabial transposition flap was drawn incorporating the defect.    The area thus outlined was incised deep to adipose tissue with a #15 scalpel blade.  The skin margins were undermined to an appropriate distance in all directions utilizing iris scissors. Rectangular Flap Text: The defect edges were debeveled with a #15 scalpel blade. Given the location of the defect and the proximity to free margins a rectangular flap was deemed most appropriate. Using a sterile surgical marker, an appropriate rectangular flap was drawn incorporating the defect. The area thus outlined was incised deep to adipose tissue with a #15 scalpel blade. The skin margins were undermined to an appropriate distance in all directions utilizing iris scissors. Following this, the designed flap was carried over into the primary defect and sutured into place. Rhombic Flap Text: The defect edges were debeveled with a #15 scalpel blade.  Given the location of the defect and the proximity to free margins a rhombic flap was deemed most appropriate.  Using a sterile surgical marker, an appropriate rhombic flap was drawn incorporating the defect.    The area thus outlined was incised deep to adipose tissue with a #15 scalpel blade.  The skin margins were undermined to an appropriate distance in all directions utilizing iris scissors. Rhomboid Transposition Flap Text: The defect edges were debeveled with a #15 scalpel blade.  Given the location of the defect and the proximity to free margins a rhomboid transposition flap was deemed most appropriate.  Using a sterile surgical marker, an appropriate rhomboid flap was drawn incorporating the defect.    The area thus outlined was incised deep to adipose tissue with a #15 scalpel blade.  The skin margins were undermined to an appropriate distance in all directions utilizing iris scissors. Bi-Rhombic Flap Text: The defect edges were debeveled with a #15 scalpel blade.  Given the location of the defect and the proximity to free margins a bi-rhombic flap was deemed most appropriate.  Using a sterile surgical marker, an appropriate rhombic flap was drawn incorporating the defect. The area thus outlined was incised deep to adipose tissue with a #15 scalpel blade.  The skin margins were undermined to an appropriate distance in all directions utilizing iris scissors. Helical Rim Advancement Flap Text: The defect edges were debeveled with a #15 blade scalpel.  Given the location of the defect and the proximity to free margins (helical rim) a double helical rim advancement flap was deemed most appropriate.  Using a sterile surgical marker, the appropriate advancement flaps were drawn incorporating the defect and placing the expected incisions between the helical rim and antihelix where possible.  The area thus outlined was incised through and through with a #15 scalpel blade.  With a skin hook and iris scissors, the flaps were gently and sharply undermined and freed up. Bilateral Helical Rim Advancement Flap Text: The defect edges were debeveled with a #15 blade scalpel.  Given the location of the defect and the proximity to free margins (helical rim) a bilateral helical rim advancement flap was deemed most appropriate.  Using a sterile surgical marker, the appropriate advancement flaps were drawn incorporating the defect and placing the expected incisions between the helical rim and antihelix where possible.  The area thus outlined was incised through and through with a #15 scalpel blade.  With a skin hook and iris scissors, the flaps were gently and sharply undermined and freed up. Ear Star Wedge Flap Text: The defect edges were debeveled with a #15 blade scalpel.  Given the location of the defect and the proximity to free margins (helical rim) an ear star wedge flap was deemed most appropriate.  Using a sterile surgical marker, the appropriate flap was drawn incorporating the defect and placing the expected incisions between the helical rim and antihelix where possible.  The area thus outlined was incised through and through with a #15 scalpel blade. Banner Transposition Flap Text: The defect edges were debeveled with a #15 scalpel blade.  Given the location of the defect and the proximity to free margins a Banner transposition flap was deemed most appropriate.  Using a sterile surgical marker, an appropriate flap drawn around the defect. The area thus outlined was incised deep to adipose tissue with a #15 scalpel blade.  The skin margins were undermined to an appropriate distance in all directions utilizing iris scissors. Bilobed Flap Text: The defect edges were debeveled with a #15 scalpel blade.  Given the location of the defect and the proximity to free margins a bilobe flap was deemed most appropriate.  Using a sterile surgical marker, an appropriate bilobe flap drawn around the defect.    The area thus outlined was incised deep to adipose tissue with a #15 scalpel blade.  The skin margins were undermined to an appropriate distance in all directions utilizing iris scissors. Bilobed Transposition Flap Text: The defect edges were debeveled with a #15 scalpel blade.  Given the location of the defect and the proximity to free margins a bilobed transposition flap was deemed most appropriate.  Using a sterile surgical marker, an appropriate bilobe flap drawn around the defect.    The area thus outlined was incised deep to adipose tissue with a #15 scalpel blade.  The skin margins were undermined to an appropriate distance in all directions utilizing iris scissors. Trilobed Flap Text: The defect edges were debeveled with a #15 scalpel blade.  Given the location of the defect and the proximity to free margins a trilobed flap was deemed most appropriate.  Using a sterile surgical marker, an appropriate trilobed flap drawn around the defect.    The area thus outlined was incised deep to adipose tissue with a #15 scalpel blade.  The skin margins were undermined to an appropriate distance in all directions utilizing iris scissors. Dorsal Nasal Flap Text: The defect edges were debeveled with a #15 scalpel blade.  Given the location of the defect and the proximity to free margins a dorsal nasal flap was deemed most appropriate.  Using a sterile surgical marker, an appropriate dorsal nasal flap was drawn around the defect.    The area thus outlined was incised deep to adipose tissue with a #15 scalpel blade.  The skin margins were undermined to an appropriate distance in all directions utilizing iris scissors. Island Pedicle Flap Text: The defect edges were debeveled with a #15 scalpel blade.  Given the location of the defect, shape of the defect and the proximity to free margins an island pedicle advancement flap was deemed most appropriate.  Using a sterile surgical marker, an appropriate advancement flap was drawn incorporating the defect, outlining the appropriate donor tissue and placing the expected incisions within the relaxed skin tension lines where possible.    The area thus outlined was incised deep to adipose tissue with a #15 scalpel blade.  The skin margins were undermined to an appropriate distance in all directions around the primary defect and laterally outward around the island pedicle utilizing iris scissors.  There was minimal undermining beneath the pedicle flap. Island Pedicle Flap With Canthal Suspension Text: The defect edges were debeveled with a #15 scalpel blade.  Given the location of the defect, shape of the defect and the proximity to free margins an island pedicle advancement flap was deemed most appropriate.  Using a sterile surgical marker, an appropriate advancement flap was drawn incorporating the defect, outlining the appropriate donor tissue and placing the expected incisions within the relaxed skin tension lines where possible. The area thus outlined was incised deep to adipose tissue with a #15 scalpel blade.  The skin margins were undermined to an appropriate distance in all directions around the primary defect and laterally outward around the island pedicle utilizing iris scissors.  There was minimal undermining beneath the pedicle flap. A suspension suture was placed in the canthal tendon to prevent tension and prevent ectropion. Alar Island Pedicle Flap Text: The defect edges were debeveled with a #15 scalpel blade.  Given the location of the defect, shape of the defect and the proximity to the alar rim an island pedicle advancement flap was deemed most appropriate.  Using a sterile surgical marker, an appropriate advancement flap was drawn incorporating the defect, outlining the appropriate donor tissue and placing the expected incisions within the nasal ala running parallel to the alar rim. The area thus outlined was incised with a #15 scalpel blade.  The skin margins were undermined minimally to an appropriate distance in all directions around the primary defect and laterally outward around the island pedicle utilizing iris scissors.  There was minimal undermining beneath the pedicle flap. Double Island Pedicle Flap Text: The defect edges were debeveled with a #15 scalpel blade.  Given the location of the defect, shape of the defect and the proximity to free margins a double island pedicle advancement flap was deemed most appropriate.  Using a sterile surgical marker, an appropriate advancement flap was drawn incorporating the defect, outlining the appropriate donor tissue and placing the expected incisions within the relaxed skin tension lines where possible.    The area thus outlined was incised deep to adipose tissue with a #15 scalpel blade.  The skin margins were undermined to an appropriate distance in all directions around the primary defect and laterally outward around the island pedicle utilizing iris scissors.  There was minimal undermining beneath the pedicle flap. Island Pedicle Flap-Requiring Vessel Identification Text: The defect edges were debeveled with a #15 scalpel blade.  Given the location of the defect, shape of the defect and the proximity to free margins an island pedicle advancement flap was deemed most appropriate.  Using a sterile surgical marker, an appropriate advancement flap was drawn, based on the axial vessel mentioned above, incorporating the defect, outlining the appropriate donor tissue and placing the expected incisions within the relaxed skin tension lines where possible.    The area thus outlined was incised deep to adipose tissue with a #15 scalpel blade.  The skin margins were undermined to an appropriate distance in all directions around the primary defect and laterally outward around the island pedicle utilizing iris scissors.  There was minimal undermining beneath the pedicle flap. Keystone Flap Text: The defect edges were debeveled with a #15 scalpel blade.  Given the location of the defect, shape of the defect a keystone flap was deemed most appropriate.  Using a sterile surgical marker, an appropriate keystone flap was drawn incorporating the defect, outlining the appropriate donor tissue and placing the expected incisions within the relaxed skin tension lines where possible. The area thus outlined was incised deep to adipose tissue with a #15 scalpel blade.  The skin margins were undermined to an appropriate distance in all directions around the primary defect and laterally outward around the flap utilizing iris scissors. O-T Plasty Text: The defect edges were debeveled with a #15 scalpel blade.  Given the location of the defect, shape of the defect and the proximity to free margins an O-T plasty was deemed most appropriate.  Using a sterile surgical marker, an appropriate O-T plasty was drawn incorporating the defect and placing the expected incisions within the relaxed skin tension lines where possible.    The area thus outlined was incised deep to adipose tissue with a #15 scalpel blade.  The skin margins were undermined to an appropriate distance in all directions utilizing iris scissors. O-Z Plasty Text: The defect edges were debeveled with a #15 scalpel blade.  Given the location of the defect, shape of the defect and the proximity to free margins an O-Z plasty (double transposition flap) was deemed most appropriate.  Using a sterile surgical marker, the appropriate transposition flaps were drawn incorporating the defect and placing the expected incisions within the relaxed skin tension lines where possible.    The area thus outlined was incised deep to adipose tissue with a #15 scalpel blade.  The skin margins were undermined to an appropriate distance in all directions utilizing iris scissors.  Hemostasis was achieved with electrocautery.  The flaps were then transposed into place, one clockwise and the other counterclockwise, and anchored with interrupted buried subcutaneous sutures. Double O-Z Plasty Text: The defect edges were debeveled with a #15 scalpel blade.  Given the location of the defect, shape of the defect and the proximity to free margins a Double O-Z plasty (double transposition flap) was deemed most appropriate.  Using a sterile surgical marker, the appropriate transposition flaps were drawn incorporating the defect and placing the expected incisions within the relaxed skin tension lines where possible. The area thus outlined was incised deep to adipose tissue with a #15 scalpel blade.  The skin margins were undermined to an appropriate distance in all directions utilizing iris scissors.  Hemostasis was achieved with electrocautery.  The flaps were then transposed into place, one clockwise and the other counterclockwise, and anchored with interrupted buried subcutaneous sutures. V-Y Plasty Text: The defect edges were debeveled with a #15 scalpel blade.  Given the location of the defect, shape of the defect and the proximity to free margins an V-Y advancement flap was deemed most appropriate.  Using a sterile surgical marker, an appropriate advancement flap was drawn incorporating the defect and placing the expected incisions within the relaxed skin tension lines where possible.    The area thus outlined was incised deep to adipose tissue with a #15 scalpel blade.  The skin margins were undermined to an appropriate distance in all directions utilizing iris scissors. H Plasty Text: Given the location of the defect, shape of the defect and the proximity to free margins a H-plasty was deemed most appropriate for repair.  Using a sterile surgical marker, the appropriate advancement arms of the H-plasty were drawn incorporating the defect and placing the expected incisions within the relaxed skin tension lines where possible. The area thus outlined was incised deep to adipose tissue with a #15 scalpel blade. The skin margins were undermined to an appropriate distance in all directions utilizing iris scissors.  The opposing advancement arms were then advanced into place in opposite direction and anchored with interrupted buried subcutaneous sutures. W Plasty Text: The lesion was extirpated to the level of the fat with a #15 scalpel blade.  Given the location of the defect, shape of the defect and the proximity to free margins a W-plasty was deemed most appropriate for repair.  Using a sterile surgical marker, the appropriate transposition arms of the W-plasty were drawn incorporating the defect and placing the expected incisions within the relaxed skin tension lines where possible.    The area thus outlined was incised deep to adipose tissue with a #15 scalpel blade.  The skin margins were undermined to an appropriate distance in all directions utilizing iris scissors.  The opposing transposition arms were then transposed into place in opposite direction and anchored with interrupted buried subcutaneous sutures. Z Plasty Text: The lesion was extirpated to the level of the fat with a #15 scalpel blade.  Given the location of the defect, shape of the defect and the proximity to free margins a Z-plasty was deemed most appropriate for repair.  Using a sterile surgical marker, the appropriate transposition arms of the Z-plasty were drawn incorporating the defect and placing the expected incisions within the relaxed skin tension lines where possible.    The area thus outlined was incised deep to adipose tissue with a #15 scalpel blade.  The skin margins were undermined to an appropriate distance in all directions utilizing iris scissors.  The opposing transposition arms were then transposed into place in opposite direction and anchored with interrupted buried subcutaneous sutures. Double Z Plasty Text: The lesion was extirpated to the level of the fat with a #15 scalpel blade. Given the location of the defect, shape of the defect and the proximity to free margins a double Z-plasty was deemed most appropriate for repair. Using a sterile surgical marker, the appropriate transposition arms of the double Z-plasty were drawn incorporating the defect and placing the expected incisions within the relaxed skin tension lines where possible. The area thus outlined was incised deep to adipose tissue with a #15 scalpel blade. The skin margins were undermined to an appropriate distance in all directions utilizing iris scissors. The opposing transposition arms were then transposed and carried over into place in opposite direction and anchored with interrupted buried subcutaneous sutures. Zygomaticofacial Flap Text: Given the location of the defect, shape of the defect and the proximity to free margins a zygomaticofacial flap was deemed most appropriate for repair.  Using a sterile surgical marker, the appropriate flap was drawn incorporating the defect and placing the expected incisions within the relaxed skin tension lines where possible. The area thus outlined was incised deep to adipose tissue with a #15 scalpel blade with preservation of a vascular pedicle.  The skin margins were undermined to an appropriate distance in all directions utilizing iris scissors.  The flap was then placed into the defect and anchored with interrupted buried subcutaneous sutures. Cheek Interpolation Flap Text: A decision was made to reconstruct the defect utilizing an interpolation axial flap and a staged reconstruction.  A telfa template was made of the defect.  This telfa template was then used to outline the Cheek Interpolation flap.  The donor area for the pedicle flap was then injected with anesthesia.  The flap was excised through the skin and subcutaneous tissue down to the layer of the underlying musculature.  The interpolation flap was carefully excised within this deep plane to maintain its blood supply.  The edges of the donor site were undermined.   The donor site was closed in a primary fashion.  The pedicle was then rotated into position and sutured.  Once the tube was sutured into place, adequate blood supply was confirmed with blanching and refill.  The pedicle was then wrapped with xeroform gauze and dressed appropriately with a telfa and gauze bandage to ensure continued blood supply and protect the attached pedicle. Cheek-To-Nose Interpolation Flap Text: A decision was made to reconstruct the defect utilizing an interpolation axial flap and a staged reconstruction.  A telfa template was made of the defect.  This telfa template was then used to outline the Cheek-To-Nose Interpolation flap.  The donor area for the pedicle flap was then injected with anesthesia.  The flap was excised through the skin and subcutaneous tissue down to the layer of the underlying musculature.  The interpolation flap was carefully excised within this deep plane to maintain its blood supply.  The edges of the donor site were undermined.   The donor site was closed in a primary fashion.  The pedicle was then rotated into position and sutured.  Once the tube was sutured into place, adequate blood supply was confirmed with blanching and refill.  The pedicle was then wrapped with xeroform gauze and dressed appropriately with a telfa and gauze bandage to ensure continued blood supply and protect the attached pedicle. Interpolation Flap Text: A decision was made to reconstruct the defect utilizing an interpolation axial flap and a staged reconstruction.  A telfa template was made of the defect.  This telfa template was then used to outline the interpolation flap.  The donor area for the pedicle flap was then injected with anesthesia.  The flap was excised through the skin and subcutaneous tissue down to the layer of the underlying musculature.  The interpolation flap was carefully excised within this deep plane to maintain its blood supply.  The edges of the donor site were undermined.   The donor site was closed in a primary fashion.  The pedicle was then rotated into position and sutured.  Once the tube was sutured into place, adequate blood supply was confirmed with blanching and refill.  The pedicle was then wrapped with xeroform gauze and dressed appropriately with a telfa and gauze bandage to ensure continued blood supply and protect the attached pedicle. Melolabial Interpolation Flap Text: A decision was made to reconstruct the defect utilizing an interpolation axial flap and a staged reconstruction.  A telfa template was made of the defect.  This telfa template was then used to outline the melolabial interpolation flap.  The donor area for the pedicle flap was then injected with anesthesia.  The flap was excised through the skin and subcutaneous tissue down to the layer of the underlying musculature.  The pedicle flap was carefully excised within this deep plane to maintain its blood supply.  The edges of the donor site were undermined.   The donor site was closed in a primary fashion.  The pedicle was then rotated into position and sutured.  Once the tube was sutured into place, adequate blood supply was confirmed with blanching and refill.  The pedicle was then wrapped with xeroform gauze and dressed appropriately with a telfa and gauze bandage to ensure continued blood supply and protect the attached pedicle. Mastoid Interpolation Flap Text: A decision was made to reconstruct the defect utilizing an interpolation axial flap and a staged reconstruction.  A telfa template was made of the defect.  This telfa template was then used to outline the mastoid interpolation flap.  The donor area for the pedicle flap was then injected with anesthesia.  The flap was excised through the skin and subcutaneous tissue down to the layer of the underlying musculature.  The pedicle flap was carefully excised within this deep plane to maintain its blood supply.  The edges of the donor site were undermined.   The donor site was closed in a primary fashion.  The pedicle was then rotated into position and sutured.  Once the tube was sutured into place, adequate blood supply was confirmed with blanching and refill.  The pedicle was then wrapped with xeroform gauze and dressed appropriately with a telfa and gauze bandage to ensure continued blood supply and protect the attached pedicle. Posterior Auricular Interpolation Flap Text: A decision was made to reconstruct the defect utilizing an interpolation axial flap and a staged reconstruction.  A telfa template was made of the defect.  This telfa template was then used to outline the posterior auricular interpolation flap.  The donor area for the pedicle flap was then injected with anesthesia.  The flap was excised through the skin and subcutaneous tissue down to the layer of the underlying musculature.  The pedicle flap was carefully excised within this deep plane to maintain its blood supply.  The edges of the donor site were undermined.   The donor site was closed in a primary fashion.  The pedicle was then rotated into position and sutured.  Once the tube was sutured into place, adequate blood supply was confirmed with blanching and refill.  The pedicle was then wrapped with xeroform gauze and dressed appropriately with a telfa and gauze bandage to ensure continued blood supply and protect the attached pedicle. Paramedian Forehead Flap Text: A decision was made to reconstruct the defect utilizing an interpolation axial flap and a staged reconstruction.  A telfa template was made of the defect.  This telfa template was then used to outline the paramedian forehead pedicle flap.  The donor area for the pedicle flap was then injected with anesthesia.  The flap was excised through the skin and subcutaneous tissue down to the layer of the underlying musculature.  The pedicle flap was carefully excised within this deep plane to maintain its blood supply.  The edges of the donor site were undermined.   The donor site was closed in a primary fashion.  The pedicle was then rotated into position and sutured.  Once the tube was sutured into place, adequate blood supply was confirmed with blanching and refill.  The pedicle was then wrapped with xeroform gauze and dressed appropriately with a telfa and gauze bandage to ensure continued blood supply and protect the attached pedicle. Abbe Flap (Upper To Lower Lip) Text: The defect of the lower lip was assessed and measured.  Given the location and size of the defect, an Abbe flap was deemed most appropriate. Using a sterile surgical marker, an appropriate Abbe flap was measured and drawn on the upper lip. Local anesthesia was then infiltrated.  A scalpel was then used to incise the upper lip through and through the skin, vermilion, muscle and mucosa, leaving the flap pedicled on the opposite side.  The flap was then rotated and transferred to the lower lip defect.  The flap was then sutured into place with a three layer technique, closing the orbicularis oris muscle layer with subcutaneous buried sutures, followed by a mucosal layer and an epidermal layer. Abbe Flap (Lower To Upper Lip) Text: The defect of the upper lip was assessed and measured.  Given the location and size of the defect, an Abbe flap was deemed most appropriate. Using a sterile surgical marker, an appropriate Abbe flap was measured and drawn on the lower lip. Local anesthesia was then infiltrated. A scalpel was then used to incise the upper lip through and through the skin, vermilion, muscle and mucosa, leaving the flap pedicled on the opposite side.  The flap was then rotated and transferred to the lower lip defect.  The flap was then sutured into place with a three layer technique, closing the orbicularis oris muscle layer with subcutaneous buried sutures, followed by a mucosal layer and an epidermal layer. Estlander Flap (Upper To Lower Lip) Text: The defect of the lower lip was assessed and measured.  Given the location and size of the defect, an Estlander flap was deemed most appropriate. Using a sterile surgical marker, an appropriate Estlander flap was measured and drawn on the upper lip. Local anesthesia was then infiltrated. A scalpel was then used to incise the lateral aspect of the flap, through skin, muscle and mucosa, leaving the flap pedicled medially.  The flap was then rotated and positioned to fill the lower lip defect.  The flap was then sutured into place with a three layer technique, closing the orbicularis oris muscle layer with subcutaneous buried sutures, followed by a mucosal layer and an epidermal layer. Cheiloplasty (Less Than 50%) Text: A decision was made to reconstruct the defect with a  cheiloplasty.  The defect was undermined extensively.  Additional obicularis oris muscle was excised with a 15 blade scalpel.  The defect was converted into a full thickness wedge, of less than 50% of the vertical height of the lip, to facilite a better cosmetic result.  Small vessels were then tied off with 5-0 monocyrl. The obicularis oris, superficial fascia, adipose and dermis were then reapproximated.  After the deeper layers were approximated the epidermis was reapproximated with particular care given to realign the vermilion border. Cheiloplasty (Complex) Text: A decision was made to reconstruct the defect with a  cheiloplasty.  The defect was undermined extensively.  Additional obicularis oris muscle was excised with a 15 blade scalpel.  The defect was converted into a full thickness wedge to facilite a better cosmetic result.  Small vessels were then tied off with 5-0 monocyrl. The obicularis oris, superficial fascia, adipose and dermis were then reapproximated.  After the deeper layers were approximated the epidermis was reapproximated with particular care given to realign the vermilion border. Ear Wedge Repair Text: A wedge excision was completed by carrying down an excision through the full thickness of the ear and cartilage with an inward facing Burow's triangle. The wound was then closed in a layered fashion. Full Thickness Lip Wedge Repair (Flap) Text: Given the location of the defect and the proximity to free margins a full thickness wedge repair was deemed most appropriate.  Using a sterile surgical marker, the appropriate repair was drawn incorporating the defect and placing the expected incisions perpendicular to the vermilion border.  The vermilion border was also meticulously outlined to ensure appropriate reapproximation during the repair.  The area thus outlined was incised through and through with a #15 scalpel blade.  The muscularis and dermis were reaproximated with deep sutures following hemostasis. Care was taken to realign the vermilion border before proceeding with the superficial closure.  Once the vermilion was realigned the superfical and mucosal closure was finished. Ftsg Text: The defect edges were debeveled with a #15 scalpel blade.  Given the location of the defect, shape of the defect and the proximity to free margins a full thickness skin graft was deemed most appropriate.  Using a sterile surgical marker, the primary defect shape was transferred to the donor site. The area thus outlined was incised deep to adipose tissue with a #15 scalpel blade.  The harvested graft was then trimmed of adipose tissue until only dermis and epidermis was left.  The skin margins of the secondary defect were undermined to an appropriate distance in all directions utilizing iris scissors.  The secondary defect was closed with interrupted buried subcutaneous sutures.  The skin edges were then re-apposed with running  sutures.  The skin graft was then placed in the primary defect and oriented appropriately. Split-Thickness Skin Graft Text: The defect edges were debeveled with a #15 scalpel blade.  Given the location of the defect, shape of the defect and the proximity to free margins a split thickness skin graft was deemed most appropriate.  Using a sterile surgical marker, the primary defect shape was transferred to the donor site. The split thickness graft was then harvested.  The skin graft was then placed in the primary defect and oriented appropriately. Pinch Graft Text: The defect edges were debeveled with a #15 scalpel blade. Given the location of the defect, shape of the defect and the proximity to free margins a pinch graft was deemed most appropriate. Using a sterile surgical marker, the primary defect shape was transferred to the donor site. The area thus outlined was incised deep to adipose tissue with a #15 scalpel blade.  The harvested graft was then trimmed of adipose tissue until only dermis and epidermis was left. The skin margins of the secondary defect were undermined to an appropriate distance in all directions utilizing iris scissors.  The secondary defect was closed with interrupted buried subcutaneous sutures.  The skin edges were then re-apposed with running  sutures.  The skin graft was then placed in the primary defect and oriented appropriately. Burow's Graft Text: The defect edges were debeveled with a #15 scalpel blade.  Given the location of the defect, shape of the defect, the proximity to free margins and the presence of a standing cone deformity a Burow's skin graft was deemed most appropriate. The standing cone was removed and this tissue was then trimmed to the shape of the primary defect. The adipose tissue was also removed until only dermis and epidermis were left.  The skin margins of the secondary defect were undermined to an appropriate distance in all directions utilizing iris scissors.  The secondary defect was closed with interrupted buried subcutaneous sutures.  The skin edges were then re-apposed with running  sutures.  The skin graft was then placed in the primary defect and oriented appropriately. Cartilage Graft Text: The defect edges were debeveled with a #15 scalpel blade.  Given the location of the defect, shape of the defect, the fact the defect involved a full thickness cartilage defect a cartilage graft was deemed most appropriate.  An appropriate donor site was identified, cleansed, and anesthetized. The cartilage graft was then harvested and transferred to the recipient site, oriented appropriately and then sutured into place.  The secondary defect was then repaired using a primary closure. Composite Graft Text: The defect edges were debeveled with a #15 scalpel blade.  Given the location of the defect, shape of the defect, the proximity to free margins and the fact the defect was full thickness a composite graft was deemed most appropriate.  The defect was outline and then transferred to the donor site.  A full thickness graft was then excised from the donor site. The graft was then placed in the primary defect, oriented appropriately and then sutured into place.  The secondary defect was then repaired using a primary closure. Epidermal Autograft Text: The defect edges were debeveled with a #15 scalpel blade.  Given the location of the defect, shape of the defect and the proximity to free margins an epidermal autograft was deemed most appropriate.  Using a sterile surgical marker, the primary defect shape was transferred to the donor site. The epidermal graft was then harvested.  The skin graft was then placed in the primary defect and oriented appropriately. Dermal Autograft Text: The defect edges were debeveled with a #15 scalpel blade.  Given the location of the defect, shape of the defect and the proximity to free margins a dermal autograft was deemed most appropriate.  Using a sterile surgical marker, the primary defect shape was transferred to the donor site. The area thus outlined was incised deep to adipose tissue with a #15 scalpel blade.  The harvested graft was then trimmed of adipose and epidermal tissue until only dermis was left.  The skin graft was then placed in the primary defect and oriented appropriately. Skin Substitute Text: The defect edges were debeveled with a #15 scalpel blade.  Given the location of the defect, shape of the defect and the proximity to free margins a skin substitute graft was deemed most appropriate.  The graft material was trimmed to fit the size of the defect. The graft was then placed in the primary defect and oriented appropriately. Skin Substitute Paste Text: The defect edges were debeveled with a #15 scalpel blade.  Given the location of the defect, shape of the defect and the proximity to free margins a skin substitute micronized graft was deemed most appropriate.  The entire vial contents were admixed with 0.5ccs of sterile saline, formed into a paste and then evenly spread over the entire wound bed. Skin Substitute Injection Text: The defect edges were debeveled with a #15 scalpel blade.  Given the location of the defect, shape of the defect and the proximity to free margins a skin substitute micronized graft was deemed most appropriate.  The entire vial contents were admixed with 3.0ccs of sterile saline and then injected subcutaneously throughout the entire wound bed. Tissue Cultured Epidermal Autograft Text: The defect edges were debeveled with a #15 scalpel blade.  Given the location of the defect, shape of the defect and the proximity to free margins a tissue cultured epidermal autograft was deemed most appropriate.  The graft was then trimmed to fit the size of the defect.  The graft was then placed in the primary defect and oriented appropriately. Xenograft Text: The defect edges were debeveled with a #15 scalpel blade.  Given the location of the defect, shape of the defect and the proximity to free margins a xenograft was deemed most appropriate.  The graft was then trimmed to fit the size of the defect.  The graft was then placed in the primary defect and oriented appropriately. Purse String (Simple) Text: Given the location of the defect and the characteristics of the surrounding skin a purse string closure was deemed most appropriate.  Undermining was performed circumfirentially around the surgical defect.  A purse string suture was then placed and tightened. Purse String (Intermediate) Text: Given the location of the defect and the characteristics of the surrounding skin a purse string intermediate closure was deemed most appropriate.  Undermining was performed circumfirentially around the surgical defect.  A purse string suture was then placed and tightened. Partial Purse String (Simple) Text: Given the location of the defect and the characteristics of the surrounding skin a simple purse string closure was deemed most appropriate.  Undermining was performed circumfirentially around the surgical defect.  A purse string suture was then placed and tightened. Wound tension only allowed a partial closure of the circular defect. Partial Purse String (Intermediate) Text: Given the location of the defect and the characteristics of the surrounding skin an intermediate purse string closure was deemed most appropriate.  Undermining was performed circumfirentially around the surgical defect.  A purse string suture was then placed and tightened. Wound tension only allowed a partial closure of the circular defect. Localized Dermabrasion With Wire Brush Text: The patient was draped in routine manner.  Localized dermabrasion using 3 x 17 mm wire brush was performed in routine manner to papillary dermis. This spot dermabrasion is being performed to complete skin cancer reconstruction. It also will eliminate the other sun damaged precancerous cells that are known to be part of the regional effect of a lifetime's worth of sun exposure. This localized dermabrasion is therapeutic and should not be considered cosmetic in any regard. Localized Dermabrasion With Sand Papertext: The patient was draped in routine manner.  Localized dermabrasion using sterile sand paper was performed in routine manner to papillary dermis. This spot dermabrasion is being performed to complete skin cancer reconstruction. It also will eliminate the other sun damaged precancerous cells that are known to be part of the regional effect of a lifetime's worth of sun exposure. This localized dermabrasion is therapeutic and should not be considered cosmetic in any regard. Localized Dermabrasion With 15 Blade Text: The patient was draped in routine manner.  Localized dermabrasion using a 15 blade was performed in routine manner to papillary dermis. This spot dermabrasion is being performed to complete skin cancer reconstruction. It also will eliminate the other sun damaged precancerous cells that are known to be part of the regional effect of a lifetime's worth of sun exposure. This localized dermabrasion is therapeutic and should not be considered cosmetic in any regard. Tarsorrhaphy Text: A tarsorrhaphy was performed using Frost sutures. Intermediate Repair And Flap Additional Text (Will Appearing After The Standard Complex Repair Text): The intermediate repair was not sufficient to completely close the primary defect. The remaining additional defect was repaired with the flap mentioned below. Intermediate Repair And Graft Additional Text (Will Appearing After The Standard Complex Repair Text): The intermediate repair was not sufficient to completely close the primary defect. The remaining additional defect was repaired with the graft mentioned below. Complex Repair And Flap Additional Text (Will Appearing After The Standard Complex Repair Text): The complex repair was not sufficient to completely close the primary defect. The remaining additional defect was repaired with the flap mentioned below. Complex Repair And Graft Additional Text (Will Appearing After The Standard Complex Repair Text): The complex repair was not sufficient to completely close the primary defect. The remaining additional defect was repaired with the graft mentioned below. Eyelid Full Thickness Repair - 26823: The eyelid defect was full thickness which required a wedge repair of the eyelid. Special care was taken to ensure that the eyelid margin was realligned when placing sutures. Eyelid Partial Thickness Repair - 38887: The eyelid defect was partial thickness which required a wedge repair of the eyelid. Special care was taken to ensure that the eyelid margin was realligned when placing sutures. Cheek Interpolation Flap Division And Inset Text: Division and inset of the cheek interpolation flap was performed to achieve optimal aesthetic result, restore normal anatomic appearance and avoid distortion of normal anatomy, expedite and facilitate wound healing, achieve optimal functional result and because linear closure either not possible or would produce suboptimal result. The patient was prepped and draped in the usual manner. The pedicle was infiltrated with local anesthesia. The pedicle was sectioned with a #15 blade. The pedicle was de-bulked and trimmed to match the shape of the defect. Hemostasis was achieved. The flap donor site and free margin of the flap were secured with deep buried sutures and the wound edges were re-approximated. Cheek To Nose Interpolation Flap Division And Inset Text: Division and inset of the cheek to nose interpolation flap was performed to achieve optimal aesthetic result, restore normal anatomic appearance and avoid distortion of normal anatomy, expedite and facilitate wound healing, achieve optimal functional result and because linear closure either not possible or would produce suboptimal result. The patient was prepped and draped in the usual manner. The pedicle was infiltrated with local anesthesia. The pedicle was sectioned with a #15 blade. The pedicle was de-bulked and trimmed to match the shape of the defect. Hemostasis was achieved. The flap donor site and free margin of the flap were secured with deep buried sutures and the wound edges were re-approximated. Melolabial Interpolation Flap Division And Inset Text: Division and inset of the melolabial interpolation flap was performed to achieve optimal aesthetic result, restore normal anatomic appearance and avoid distortion of normal anatomy, expedite and facilitate wound healing, achieve optimal functional result and because linear closure either not possible or would produce suboptimal result. The patient was prepped and draped in the usual manner. The pedicle was infiltrated with local anesthesia. The pedicle was sectioned with a #15 blade. The pedicle was de-bulked and trimmed to match the shape of the defect. Hemostasis was achieved. The flap donor site and free margin of the flap were secured with deep buried sutures and the wound edges were re-approximated. Mastoid Interpolation Flap Division And Inset Text: Division and inset of the mastoid interpolation flap was performed to achieve optimal aesthetic result, restore normal anatomic appearance and avoid distortion of normal anatomy, expedite and facilitate wound healing, achieve optimal functional result and because linear closure either not possible or would produce suboptimal result. The patient was prepped and draped in the usual manner. The pedicle was infiltrated with local anesthesia. The pedicle was sectioned with a #15 blade. The pedicle was de-bulked and trimmed to match the shape of the defect. Hemostasis was achieved. The flap donor site and free margin of the flap were secured with deep buried sutures and the wound edges were re-approximated. Paramedian Forehead Flap Division And Inset Text: Division and inset of the paramedian forehead flap was performed to achieve optimal aesthetic result, restore normal anatomic appearance and avoid distortion of normal anatomy, expedite and facilitate wound healing, achieve optimal functional result and because linear closure either not possible or would produce suboptimal result. The patient was prepped and draped in the usual manner. The pedicle was infiltrated with local anesthesia. The pedicle was sectioned with a #15 blade. The pedicle was de-bulked and trimmed to match the shape of the defect. Hemostasis was achieved. The flap donor site and free margin of the flap were secured with deep buried sutures and the wound edges were re-approximated. Posterior Auricular Interpolation Flap Division And Inset Text: Division and inset of the posterior auricular interpolation flap was performed to achieve optimal aesthetic result, restore normal anatomic appearance and avoid distortion of normal anatomy, expedite and facilitate wound healing, achieve optimal functional result and because linear closure either not possible or would produce suboptimal result. The patient was prepped and draped in the usual manner. The pedicle was infiltrated with local anesthesia. The pedicle was sectioned with a #15 blade. The pedicle was de-bulked and trimmed to match the shape of the defect. Hemostasis was achieved. The flap donor site and free margin of the flap were secured with deep buried sutures and the wound edges were re-approximated. Manual Repair Warning Statement: We plan on removing the manually selected variable below in favor of our much easier automatic structured text blocks found in the previous tab. We decided to do this to help make the flow better and give you the full power of structured data. Manual selection is never going to be ideal in our platform and I would encourage you to avoid using manual selection from this point on, especially since I will be sunsetting this feature. It is important that you do one of two things with the customized text below. First, you can save all of the text in a word file so you can have it for future reference. Second, transfer the text to the appropriate area in the Library tab. Lastly, if there is a flap or graft type which we do not have you need to let us know right away so I can add it in before the variable is hidden. No need to panic, we plan to give you roughly 6 months to make the change. Consent: The rationale for the repair was explained to the patient and consent was obtained. The risks, benefits and alternatives to therapy were discussed in detail. Specifically, the risks of infection, scarring, bleeding, prolonged wound healing, incomplete removal, allergy to anesthesia, nerve injury and recurrence were addressed. Prior to the procedure, the treatment site was clearly identified and confirmed by the patient. All components of Universal Protocol/PAUSE Rule completed. Post-Care Instructions: I reviewed with the patient in detail post-care instructions. Patient is not to engage in any heavy lifting, exercise, or swimming for the next 14 days. Should the patient develop any fevers, chills, bleeding, severe pain patient will contact the office immediately. Post surgical wound care given to patient. Pain Refusal Text: I offered to prescribe pain medication but the patient refused to take this medication. Where Do You Want The Question To Include Opioid Counseling Located?: Case Summary Tab Information: Selecting Yes will display possible errors in your note based on the variables you have selected. This validation is only offered as a suggestion for you. PLEASE NOTE THAT THE VALIDATION TEXT WILL BE REMOVED WHEN YOU FINALIZE YOUR NOTE. IF YOU WANT TO FAX A PRELIMINARY NOTE YOU WILL NEED TO TOGGLE THIS TO 'NO' IF YOU DO NOT WANT IT IN YOUR FAXED NOTE.

## 2024-07-18 NOTE — PROGRESS NOTES
NEURO-ONCOLOGY VISIT  Jul 23, 2024    CHIEF COMPLAINT: Mr. 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 He completed chemoradiotherapy with adjuvant temozolomide on 7/20/2023. Post-radiation imaging demonstrated anticipated post-radiation induced changes and no new concerns. Repeat imaging in 12/2023 showed evidence a possible new stroke. Work-up was negative for a possible cause. Brandon completed the planned 6 cycles of adjuvant-dosed temozolomide in 3/2024 and repeat imaging in 3/2024 showed overall stability with evidence of post-treatment related changes.     Repeat imaging in 7/2024 was again stable. Therefore, the plan is to initiate monitoring on imaging surveillance.     Brandon is presenting in follow-up and he is unaccompanied today.    HISTORY OF PRESENT ILLNESS  -Limited to no lingering constipation.    Energy is good.    Appetite is better.  -Less frequent headaches when remaining well hydrated.  -He denies any concerns for seizure activity.  -No weakness, no changes in vision, no new numbness.       MEDICATIONS   Current Outpatient Medications   Medication Sig Dispense Refill    vitamin D3 (CHOLECALCIFEROL) 10 MCG (400 UNIT) capsule Take 1 capsule (400 Units) by mouth daily 30 capsule 3     DRUG ALLERGIES No Known Allergies       IMMUNIZATIONS   Immunization History   Administered Date(s) Administered    COVID-19 MONOVALENT 12+ (Pfizer) 03/29/2021, 04/19/2021    TDAP Vaccine (Adacel) 07/30/2018       ONCOLOGIC HISTORY  -PRESENTATION: Progressive somnolence, nausea and vomiting, mild headache.  -5/4/2023 CT Head imaging with a large mass with associated calcifications and probable associated hemorrhage in the anterior right frontal region with surrounding vasogenic edema and  significant local mass effect resulting in narrowing of the right more than left lateral ventricles and third ventricle, and leftward subfalcine shift as well as downward/ central transtentorial herniation.   -5/4/2023 SURGERY: Right frontal craniotomy for mass resection by Dr. Dasilva.   Post-operative imaging on 5/5 without a definite residual enhancing lesion. Nonspecific surrounding T2/FLAIR hyperintensity along the posterior and superior margins of the resection cavity may reflect vasogenic edema or nonenhancing tumor. Small volume hemorrhage and cytotoxic edema along the margins of the resection cavity and extending along the right caudate nucleus and body of the corpus callosum.  PATHOLOGY: WHO grade 2 oligodendroglioma;   FISH with deletion of both the 1p36.32 and 19q13.33 regions   No homozygous deletion of CDKN2A/B gene    IDH1 R132S mutated.  TERT c.-146C>T mutated.   TMB Score: 2.438 mut/Mb   Fusion Event: NEGATIVE   Histology is borderline due to presence of microvascular proliferation and necrosis without palisading, however, the histological criteria for distinction between grade 2 and 3 are not well defined.  -5/16/2023 NEURO-ONC: Regardless of grade, recommending chemoradiotherapy with concurrent temozolomide.  -6/30/2023 NEURO-ONC/ CHEMORADS: Here for visit at the midpoint of concurrent chemoradiotherapy.   -7/20/2023 CHEMORADS: Completed chemoradiotherapy with concurrent temozolomide 75mg/m2 (140mg)   -8/22/2023 NEURO-ONC/ MRB/ CHEMO: Clinically well. Imaging with no concerns. Starting adjuvant temozolomide 150mg/m2 (280mg), cycle 1 (start date tonight).   -9/19/2023 NEURO-ONC/ CHEMO: No new neurological concerns. Modifications to supportive medications to better tolerate adjuvant temozolomide. Will repeat dosing of cycle 1 at 150mg/m2 (280mg).   -10/26/2023 NEURO-ONC/ CHEMO: No new neurological concerns.  Modifications to supportive medications to better tolerate adjuvant temozolomide were  successful and he tolerated his repeat dosing of cycle 1 at 150mg/m2 (280mg).  Will plan on repeating at this dosing level one more time and then consider increase to 200mg/m2 with cycle 3.   -11/27/2023 NEURO-ONC/ CHEMO:  No new neurological concerns.  Modifications to supportive medications to better tolerate adjuvant temozolomide were successful and he tolerated his repeat dosing of cycle 1 at 150mg/m2, and after discussion today, he would like to continue at this dose rather than increase.  -12/21/2023 MRB with a new acute /early subacute small punctate infarct in the right aspect of splenium.  -1/9/2024 NEURO-ONC/ CHEMO: Clinically well. MRA clean. ECHO with no obvious cardiac source of emboli was seen within the limits of a transthoracic echocardiogram. ; recommended lifestyle and dietary changes plus primary care follow-up. HbA1c 5.6. Starting vitamin D daily supplementation. Adjuvant temozolomide 150mg/m2 (280mg), cycle 4 completed.   -1/30/2024 NEURO-ONC/ CHEMO: Clinically well. Labs to be done tomorrow. Adjuvant temozolomide 150mg/m2 (280mg), cycle 5 due to start today or as soon as he receives it.   -2/27/2024 NEURO-ONC/ CHEMO: Clinically well. Labs to be done 3/3 or 3/4.  Adjuvant temozolomide 150mg/m2 (280mg), cycle 6 due to start 3/4.  -3/26/2024 NEURO-ONC/ MRB: Low appetite. Imaging with overall stability with evidence of post-treatment related changes.  -7/23/2024 NEURO-ONC/ MRB: Less frequent headaches when remaining well hydrated. Imaging with overall stability with evidence of positive treatment effect.    SOCIAL HISTORY   , 2 children      PHYSICAL EXAMINATION  /83 (BP Location: Left arm, Patient Position: Sitting, Cuff Size: Adult Large)   Pulse 66   Temp 99.4  F (37.4  C) (Oral)   Resp 18   Wt 74.5 kg (164 lb 3.2 oz)   SpO2 97%   BMI 27.32 kg/m     Wt Readings from Last 2 Encounters:   07/23/24 74.5 kg (164 lb 3.2 oz)   03/26/24 73.8 kg (162 lb 12.8 oz)      Ht  "Readings from Last 2 Encounters:   02/27/24 1.651 m (5' 5\")   01/30/24 1.651 m (5' 5\")     KPS: 100    -Generally well appearing.  -Respiratory: Normal breath sounds, no audible wheezing.  -Psychiatric: Normal mood and affect. Pleasant, talkative.  -Neurologic:   MENTAL STATUS:     Alert, oriented.    Recall: Intact.    Speech fluent.    Comprehension intact.     CRANIAL NERVES:     Symmetric facial movements.   Hearing intact.   No dysarthria.  GAIT: Steady and unassisted.       MEDICAL RECORDS  Personally reviewed: No new encounters.     LABS  Personally reviewed all available lab results; CBC (platelets 235) and CMP (AST/ ALT 23/23) from today with no concerns.    IMAGING  Personally reviewed MR brain imaging from today and compared to post-radiation imaging. To my eye, there is a decrease in the degree of contrast enhancement about the resection cavity. No increase in T2 FLAIR.    Formal read; \"1. Stable postsurgical changes of the right frontal lobe without evidence of disease progression. 2. No superimposed acute intracranial abnormality.\"    Imaging was shown to and results were reviewed with Brandon.        IMPRESSION  On date of service, 31 minutes was spent in clinic and 6 minutes was spent preparing for the visit through extensive chart review and coordinating care for this high complexity visit. The following is in explanation for the recommendations used to define the plan.       Brandon is doing well with no new neurological complaints. He notes less frequent headaches when remaining well hydrated.    Imaging as detailed above was stable with a positive treatment response. Therefore, the plan is to continue management on imaging surveillance per NCCN guidelines of every 3-6 months for the first 5 years (at least through 8/2028) and then, at least every 6 months or as clinically indicated. In discussing with Brandon on the risks/ benefits of his imaging interval, he is in agreement to repeat " imaging in 6 months. In the meantime, Brandon knows to call the clinic with any concerns and appointments can be moved up if needed.       CBC and CMP from today reviewed and without concerns. Therefore, there is no need for continued monitor while off chemotherapy. Vitamin D level was checked in March and had decreased from 28 in December to 17. He was instructed to double his supplementation (to 800 international unit(s)) of vitamin D. On recheck today, his level is 25.    PROBLEM LIST  WHO grade 2 oligodendroglioma  Headaches  Stroke  Elevated cholesterol  Vitamin D deficiency    PLAN  -CANCER DIRECTED THERAPY-  -Continue imaging surveillance; repeat MRI in 6 months.     -STEROIDS-  -Off dexamethasone.    -SEIZURE MANAGEMENT-  -While this patient is at increased risk of having seizures, given the lack of seizure history, there is no indication to prescribe an antiepileptic at this time.     -STROKE/ VASCULAR RISK FACTORS-  -MRA head and neck was clean. ECHO no concerns. HbA1c was 5.6.  -LDL was 139 and goal is <70, therefore, will recommend lifestyle and dietary changes.    Deferring start to Crestor and recheck fasting lipid panel in May with primary care provider; Dr. Ryan Rodriguez.     -Quality of life/ MOOD/ FATIGUE-  -Denies any mood issues.  -Fatigue; chemotherapy-associated.    Labs in 9/2023 Vitamin B12/ D deficiency, low iron, and thyroid studies without concern.    Vitamin D level of 28 in 12/2023; daily supplementation.     Recheck vitamin D level on 3/26/2024; remained low at 17, increasing supplementation.   Recheck vitamin D level on 7/23/2024; within goal at 25, continue supplementation at 800IU daily.   -Continue to monitor mood as untreated/ undertreated depression can worsen fatigue, dysorexia, and quality of life.    Return to clinic in 1/2025 + imaging.     In the meantime, Connorministerio knows to call the clinic with any concerns and he can be seen sooner if needed.      The longitudinal  plan of care for the diagnosis(es)/condition(s) as documented were addressed during this visit. Due to the added complexity in care, I will continue to support Brandon in the subsequent management and with ongoing continuity of care.     Abril Bullock MD  Neuro-oncology

## 2024-07-23 ENCOUNTER — LAB (OUTPATIENT)
Dept: INFUSION THERAPY | Facility: CLINIC | Age: 39
End: 2024-07-23
Attending: PSYCHIATRY & NEUROLOGY
Payer: COMMERCIAL

## 2024-07-23 ENCOUNTER — ONCOLOGY VISIT (OUTPATIENT)
Dept: ONCOLOGY | Facility: CLINIC | Age: 39
End: 2024-07-23
Attending: PSYCHIATRY & NEUROLOGY
Payer: COMMERCIAL

## 2024-07-23 ENCOUNTER — HOSPITAL ENCOUNTER (OUTPATIENT)
Dept: MRI IMAGING | Facility: CLINIC | Age: 39
Discharge: HOME | End: 2024-07-23
Attending: PSYCHIATRY & NEUROLOGY | Admitting: PSYCHIATRY & NEUROLOGY
Payer: COMMERCIAL

## 2024-07-23 VITALS
WEIGHT: 164.2 LBS | OXYGEN SATURATION: 97 % | TEMPERATURE: 99.4 F | RESPIRATION RATE: 18 BRPM | HEART RATE: 66 BPM | SYSTOLIC BLOOD PRESSURE: 132 MMHG | BODY MASS INDEX: 27.32 KG/M2 | DIASTOLIC BLOOD PRESSURE: 83 MMHG

## 2024-07-23 DIAGNOSIS — R11.2 CHEMOTHERAPY-INDUCED NAUSEA AND VOMITING: ICD-10-CM

## 2024-07-23 DIAGNOSIS — D61.810 ANTINEOPLASTIC CHEMOTHERAPY INDUCED PANCYTOPENIA (H): ICD-10-CM

## 2024-07-23 DIAGNOSIS — T45.1X5A ANTINEOPLASTIC CHEMOTHERAPY INDUCED PANCYTOPENIA (H): ICD-10-CM

## 2024-07-23 DIAGNOSIS — E55.9 VITAMIN D DEFICIENCY: ICD-10-CM

## 2024-07-23 DIAGNOSIS — C71.9 OLIGODENDROGLIOMA (H): ICD-10-CM

## 2024-07-23 DIAGNOSIS — C71.9 OLIGODENDROGLIOMA (H): Primary | ICD-10-CM

## 2024-07-23 DIAGNOSIS — T45.1X5A CHEMOTHERAPY-INDUCED NAUSEA AND VOMITING: ICD-10-CM

## 2024-07-23 LAB
ALBUMIN SERPL BCG-MCNC: 4.8 G/DL (ref 3.5–5.2)
ALP SERPL-CCNC: 129 U/L (ref 40–150)
ALT SERPL W P-5'-P-CCNC: 23 U/L (ref 0–70)
ANION GAP SERPL CALCULATED.3IONS-SCNC: 5 MMOL/L (ref 7–15)
AST SERPL W P-5'-P-CCNC: 23 U/L (ref 0–45)
BASOPHILS # BLD AUTO: 0 10E3/UL (ref 0–0.2)
BASOPHILS NFR BLD AUTO: 1 %
BILIRUB SERPL-MCNC: 0.4 MG/DL
BUN SERPL-MCNC: 10.5 MG/DL (ref 6–20)
CALCIUM SERPL-MCNC: 9.7 MG/DL (ref 8.8–10.4)
CHLORIDE SERPL-SCNC: 100 MMOL/L (ref 98–107)
CREAT SERPL-MCNC: 0.83 MG/DL (ref 0.67–1.17)
EGFRCR SERPLBLD CKD-EPI 2021: >90 ML/MIN/1.73M2
EOSINOPHIL # BLD AUTO: 0.1 10E3/UL (ref 0–0.7)
EOSINOPHIL NFR BLD AUTO: 1 %
ERYTHROCYTE [DISTWIDTH] IN BLOOD BY AUTOMATED COUNT: 11.4 % (ref 10–15)
GLUCOSE SERPL-MCNC: 101 MG/DL (ref 70–99)
HCO3 SERPL-SCNC: 33 MMOL/L (ref 22–29)
HCT VFR BLD AUTO: 45 % (ref 40–53)
HGB BLD-MCNC: 15.4 G/DL (ref 13.3–17.7)
IMM GRANULOCYTES # BLD: 0 10E3/UL
IMM GRANULOCYTES NFR BLD: 0 %
LYMPHOCYTES # BLD AUTO: 2.1 10E3/UL (ref 0.8–5.3)
LYMPHOCYTES NFR BLD AUTO: 34 %
MCH RBC QN AUTO: 29.4 PG (ref 26.5–33)
MCHC RBC AUTO-ENTMCNC: 34.2 G/DL (ref 31.5–36.5)
MCV RBC AUTO: 86 FL (ref 78–100)
MONOCYTES # BLD AUTO: 0.5 10E3/UL (ref 0–1.3)
MONOCYTES NFR BLD AUTO: 9 %
NEUTROPHILS # BLD AUTO: 3.5 10E3/UL (ref 1.6–8.3)
NEUTROPHILS NFR BLD AUTO: 56 %
NRBC # BLD AUTO: 0 10E3/UL
NRBC BLD AUTO-RTO: 0 /100
PLATELET # BLD AUTO: 235 10E3/UL (ref 150–450)
POTASSIUM SERPL-SCNC: 4.7 MMOL/L (ref 3.4–5.3)
PROT SERPL-MCNC: 8 G/DL (ref 6.4–8.3)
RBC # BLD AUTO: 5.24 10E6/UL (ref 4.4–5.9)
SODIUM SERPL-SCNC: 138 MMOL/L (ref 135–145)
VIT D+METAB SERPL-MCNC: 25 NG/ML (ref 20–50)
WBC # BLD AUTO: 6.2 10E3/UL (ref 4–11)

## 2024-07-23 PROCEDURE — 99214 OFFICE O/P EST MOD 30 MIN: CPT | Performed by: PSYCHIATRY & NEUROLOGY

## 2024-07-23 PROCEDURE — 255N000002 HC RX 255 OP 636: Performed by: PSYCHIATRY & NEUROLOGY

## 2024-07-23 PROCEDURE — 70553 MRI BRAIN STEM W/O & W/DYE: CPT

## 2024-07-23 PROCEDURE — 99213 OFFICE O/P EST LOW 20 MIN: CPT | Performed by: PSYCHIATRY & NEUROLOGY

## 2024-07-23 PROCEDURE — A9585 GADOBUTROL INJECTION: HCPCS | Performed by: PSYCHIATRY & NEUROLOGY

## 2024-07-23 PROCEDURE — G2211 COMPLEX E/M VISIT ADD ON: HCPCS | Performed by: PSYCHIATRY & NEUROLOGY

## 2024-07-23 PROCEDURE — 82040 ASSAY OF SERUM ALBUMIN: CPT | Performed by: PSYCHIATRY & NEUROLOGY

## 2024-07-23 PROCEDURE — 36415 COLL VENOUS BLD VENIPUNCTURE: CPT

## 2024-07-23 PROCEDURE — 85048 AUTOMATED LEUKOCYTE COUNT: CPT | Performed by: PSYCHIATRY & NEUROLOGY

## 2024-07-23 PROCEDURE — 82306 VITAMIN D 25 HYDROXY: CPT | Performed by: PSYCHIATRY & NEUROLOGY

## 2024-07-23 RX ORDER — GADOBUTROL 604.72 MG/ML
15 INJECTION INTRAVENOUS ONCE
Status: COMPLETED | OUTPATIENT
Start: 2024-07-23 | End: 2024-07-23

## 2024-07-23 RX ADMIN — GADOBUTROL 15 ML: 604.72 INJECTION INTRAVENOUS at 13:23

## 2024-07-23 ASSESSMENT — PAIN SCALES - GENERAL: PAINLEVEL: NO PAIN (0)

## 2024-07-23 NOTE — LETTER
7/23/2024      Brandon Ordoñez  [id number] [address] [id number]      Dear Colleague,    Thank you for referring your patient, Brandon Ordoñez, to the [facility]. Please see a copy of my visit note below.    NEURO-ONCOLOGY VISIT  Jul 23, 2024    CHIEF COMPLAINT: Mr. 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 He completed chemoradiotherapy with adjuvant temozolomide on 7/20/2023. Post-radiation imaging demonstrated anticipated post-radiation induced changes and no new concerns. Repeat imaging in 12/2023 showed evidence a possible new stroke. Work-up was negative for a possible cause. Brandon completed the planned 6 cycles of adjuvant-dosed temozolomide in 3/2024 and repeat imaging in 3/2024 showed overall stability with evidence of post-treatment related changes.     Repeat imaging in 7/2024 was again stable. Therefore, the plan is to initiate monitoring on imaging surveillance.     Brandon is presenting in follow-up and he is unaccompanied today.    HISTORY OF PRESENT ILLNESS  -Limited to no lingering constipation.    Energy is good.    Appetite is better.  -Less frequent headaches when remaining well hydrated.  -He denies any concerns for seizure activity.  -No weakness, no changes in vision, no new numbness.       MEDICATIONS   Current Outpatient Medications   Medication Sig Dispense Refill     vitamin D3 (CHOLECALCIFEROL) 10 MCG (400 UNIT) capsule Take 1 capsule (400 Units) by mouth daily 30 capsule 3     DRUG ALLERGIES No Known Allergies       IMMUNIZATIONS   Immunization History   Administered Date(s) Administered     COVID-19 MONOVALENT 12+ (Pfizer) 03/29/2021, 04/19/2021     TDAP Vaccine (Adacel) 07/30/2018       ONCOLOGIC  HISTORY  -PRESENTATION: Progressive somnolence, nausea and vomiting, mild headache.  -5/4/2023 CT Head imaging with a large mass with associated calcifications and probable associated hemorrhage in the anterior right frontal region with surrounding vasogenic edema and significant local mass effect resulting in narrowing of the right more than left lateral ventricles and third ventricle, and leftward subfalcine shift as well as downward/ central transtentorial herniation.   -5/4/2023 SURGERY: Right frontal craniotomy for mass resection by Dr. Dasilva.   Post-operative imaging on 5/5 without a definite residual enhancing lesion. Nonspecific surrounding T2/FLAIR hyperintensity along the posterior and superior margins of the resection cavity may reflect vasogenic edema or nonenhancing tumor. Small volume hemorrhage and cytotoxic edema along the margins of the resection cavity and extending along the right caudate nucleus and body of the corpus callosum.  PATHOLOGY: WHO grade 2 oligodendroglioma;   FISH with deletion of both the 1p36.32 and 19q13.33 regions   No homozygous deletion of CDKN2A/B gene    IDH1 R132S mutated.  TERT c.-146C>T mutated.   TMB Score: 2.438 mut/Mb   Fusion Event: NEGATIVE   Histology is borderline due to presence of microvascular proliferation and necrosis without palisading, however, the histological criteria for distinction between grade 2 and 3 are not well defined.  -5/16/2023 NEURO-ONC: Regardless of grade, recommending chemoradiotherapy with concurrent temozolomide.  -6/30/2023 NEURO-ONC/ CHEMORADS: Here for visit at the midpoint of concurrent chemoradiotherapy.   -7/20/2023 CHEMORADS: Completed chemoradiotherapy with concurrent temozolomide 75mg/m2 (140mg)   -8/22/2023 NEURO-ONC/ MRB/ CHEMO: Clinically well. Imaging with no concerns. Starting adjuvant temozolomide 150mg/m2 (280mg), cycle 1 (start date tonight).   -9/19/2023 NEURO-ONC/ CHEMO: No new neurological concerns. Modifications to  supportive medications to better tolerate adjuvant temozolomide. Will repeat dosing of cycle 1 at 150mg/m2 (280mg).   -10/26/2023 NEURO-ONC/ CHEMO: No new neurological concerns.  Modifications to supportive medications to better tolerate adjuvant temozolomide were successful and he tolerated his repeat dosing of cycle 1 at 150mg/m2 (280mg).  Will plan on repeating at this dosing level one more time and then consider increase to 200mg/m2 with cycle 3.   -11/27/2023 NEURO-ONC/ CHEMO:  No new neurological concerns.  Modifications to supportive medications to better tolerate adjuvant temozolomide were successful and he tolerated his repeat dosing of cycle 1 at 150mg/m2, and after discussion today, he would like to continue at this dose rather than increase.  -12/21/2023 MRB with a new acute /early subacute small punctate infarct in the right aspect of splenium.  -1/9/2024 NEURO-ONC/ CHEMO: Clinically well. MRA clean. ECHO with no obvious cardiac source of emboli was seen within the limits of a transthoracic echocardiogram. ; recommended lifestyle and dietary changes plus primary care follow-up. HbA1c 5.6. Starting vitamin D daily supplementation. Adjuvant temozolomide 150mg/m2 (280mg), cycle 4 completed.   -1/30/2024 NEURO-ONC/ CHEMO: Clinically well. Labs to be done tomorrow. Adjuvant temozolomide 150mg/m2 (280mg), cycle 5 due to start today or as soon as he receives it.   -2/27/2024 NEURO-ONC/ CHEMO: Clinically well. Labs to be done 3/3 or 3/4.  Adjuvant temozolomide 150mg/m2 (280mg), cycle 6 due to start 3/4.  -3/26/2024 NEURO-ONC/ MRB: Low appetite. Imaging with overall stability with evidence of post-treatment related changes.  -7/23/2024 NEURO-ONC/ MRB: Less frequent headaches when remaining well hydrated. Imaging with overall stability with evidence of positive treatment effect.    SOCIAL HISTORY   , 2 children      PHYSICAL EXAMINATION  /83 (BP Location: Left arm, Patient Position: Sitting,  "Cuff Size: Adult Large)   Pulse 66   Temp 99.4  F (37.4  C) (Oral)   Resp 18   Wt 74.5 kg (164 lb 3.2 oz)   SpO2 97%   BMI 27.32 kg/m     Wt Readings from Last 2 Encounters:   07/23/24 74.5 kg (164 lb 3.2 oz)   03/26/24 73.8 kg (162 lb 12.8 oz)      Ht Readings from Last 2 Encounters:   02/27/24 1.651 m (5' 5\")   01/30/24 1.651 m (5' 5\")     KPS: 100    -Generally well appearing.  -Respiratory: Normal breath sounds, no audible wheezing.  -Psychiatric: Normal mood and affect. Pleasant, talkative.  -Neurologic:   MENTAL STATUS:     Alert, oriented.    Recall: Intact.    Speech fluent.    Comprehension intact.     CRANIAL NERVES:     Symmetric facial movements.   Hearing intact.   No dysarthria.  GAIT: Steady and unassisted.       MEDICAL RECORDS  Personally reviewed: No new encounters.     LABS  Personally reviewed all available lab results; CBC (platelets 235) and CMP (AST/ ALT 23/23) from today with no concerns.    IMAGING  Personally reviewed MR brain imaging from today and compared to post-radiation imaging. To my eye, there is a decrease in the degree of contrast enhancement about the resection cavity. No increase in T2 FLAIR.    Formal read; \"1. Stable postsurgical changes of the right frontal lobe without evidence of disease progression. 2. No superimposed acute intracranial abnormality.\"    Imaging was shown to and results were reviewed with Brandon.        IMPRESSION  On date of service, 31 minutes was spent in clinic and 6 minutes was spent preparing for the visit through extensive chart review and coordinating care for this high complexity visit. The following is in explanation for the recommendations used to define the plan.       Brandon is doing well with no new neurological complaints. He notes less frequent headaches when remaining well hydrated.    Imaging as detailed above was stable with a positive treatment response. Therefore, the plan is to continue management on imaging surveillance " per NCCN guidelines of every 3-6 months for the first 5 years (at least through 8/2028) and then, at least every 6 months or as clinically indicated. In discussing with Brandon on the risks/ benefits of his imaging interval, he is in agreement to repeat imaging in 6 months. In the meantime, Brandon knows to call the clinic with any concerns and appointments can be moved up if needed.       CBC and CMP from today reviewed and without concerns. Therefore, there is no need for continued monitor while off chemotherapy. Vitamin D level was checked in March and had decreased from 28 in December to 17. He was instructed to double his supplementation (to 800 international unit(s)) of vitamin D. On recheck today, his level is 25.    PROBLEM LIST  WHO grade 2 oligodendroglioma  Headaches  Stroke  Elevated cholesterol  Vitamin D deficiency    PLAN  -CANCER DIRECTED THERAPY-  -Continue imaging surveillance; repeat MRI in 6 months.     -STEROIDS-  -Off dexamethasone.    -SEIZURE MANAGEMENT-  -While this patient is at increased risk of having seizures, given the lack of seizure history, there is no indication to prescribe an antiepileptic at this time.     -STROKE/ VASCULAR RISK FACTORS-  -MRA head and neck was clean. ECHO no concerns. HbA1c was 5.6.  -LDL was 139 and goal is <70, therefore, will recommend lifestyle and dietary changes.    Deferring start to Crestor and recheck fasting lipid panel in May with primary care provider; Dr. Ryan Rodriguez.     -Quality of life/ MOOD/ FATIGUE-  -Denies any mood issues.  -Fatigue; chemotherapy-associated.    Labs in 9/2023 Vitamin B12/ D deficiency, low iron, and thyroid studies without concern.    Vitamin D level of 28 in 12/2023; daily supplementation.     Recheck vitamin D level on 3/26/2024; remained low at 17, increasing supplementation.   Recheck vitamin D level on 7/23/2024; within goal at 25, continue supplementation at 800IU daily.   -Continue to monitor mood as  untreated/ undertreated depression can worsen fatigue, dysorexia, and quality of life.    Return to clinic in 1/2025 + imaging.     In the meantime, Brandon knows to call the clinic with any concerns and he can be seen sooner if needed.      The longitudinal plan of care for the diagnosis(es)/condition(s) as documented were addressed during this visit. Due to the added complexity in care, I will continue to support Brandon in the subsequent management and with ongoing continuity of care.     Abril Bullock MD  Neuro-oncology      Again, thank you for allowing me to participate in the care of your patient.        Sincerely,        Abril Bullock MD

## 2024-11-20 DIAGNOSIS — E55.9 VITAMIN D DEFICIENCY: Primary | ICD-10-CM

## 2024-12-16 NOTE — PROGRESS NOTES
Virtual Visit Details  Type of service:  Video Visit     Originating Location (pt. Location): Home    Distant Location (provider location):  On-site  Platform used for Video Visit: Lux    _______________________________________________________________      NEURO-ONCOLOGY VISIT  Dec 17, 2024    CHIEF COMPLAINT: Mr. 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 He completed chemoradiotherapy with adjuvant temozolomide on 7/20/2023. Post-radiation imaging demonstrated anticipated post-radiation induced changes and no new concerns. Repeat imaging in 12/2023 showed evidence a possible new stroke. Work-up was negative for a possible cause. Brandon completed the planned 6 cycles of adjuvant-dosed temozolomide in 3/2024 and repeat imaging in 3/2024 showed overall stability with evidence of post-treatment related changes.     Repeat imaging in 7/2024 was again stable. However, imaging in 12/2024 showed a new, small area of contrast enhancement in the corpus callosum that was of indeterminate significance. Therefore, the plan is to continue imaging surveillance, but at a shorter interval.     Brandon is presenting in follow-up and he is unaccompanied today.    HISTORY OF PRESENT ILLNESS  -Brandon has no new concerns today.   -Very infrequent headaches.  -He denies any concerns for seizure activity.  -No weakness and no new numbness.   -Mild light sensitivity with challenges in visual focus, resolves with decreasing light intensity.    -Remaining active.    Working.    Cognition is fairly good, but can be forgetful at times.   -Noting gum bleeding when brushing teeth.       MEDICATIONS   Current Outpatient Medications   Medication Sig Dispense Refill    Vitamin D, Cholecalciferol, 25 MCG (1000 UT)  CAPS Take 1,000 Units by mouth daily.       DRUG ALLERGIES No Known Allergies       IMMUNIZATIONS   Immunization History   Administered Date(s) Administered    COVID-19 MONOVALENT 12+ (Pfizer) 03/29/2021, 04/19/2021    TDAP Vaccine (Adacel) 07/30/2018       ONCOLOGIC HISTORY  -PRESENTATION: Progressive somnolence, nausea and vomiting, mild headache.  -5/4/2023 CT Head imaging with a large mass with associated calcifications and probable associated hemorrhage in the anterior right frontal region with surrounding vasogenic edema and significant local mass effect resulting in narrowing of the right more than left lateral ventricles and third ventricle, and leftward subfalcine shift as well as downward/ central transtentorial herniation.   -5/4/2023 SURGERY: Right frontal craniotomy for mass resection by Dr. Dasilva.   Post-operative imaging on 5/5 without a definite residual enhancing lesion. Nonspecific surrounding T2/FLAIR hyperintensity along the posterior and superior margins of the resection cavity may reflect vasogenic edema or nonenhancing tumor. Small volume hemorrhage and cytotoxic edema along the margins of the resection cavity and extending along the right caudate nucleus and body of the corpus callosum.  PATHOLOGY: WHO grade 2 oligodendroglioma;   FISH with deletion of both the 1p36.32 and 19q13.33 regions   No homozygous deletion of CDKN2A/B gene    IDH1 R132S mutated.  TERT c.-146C>T mutated.   TMB Score: 2.438 mut/Mb   Fusion Event: NEGATIVE   Histology is borderline due to presence of microvascular proliferation and necrosis without palisading, however, the histological criteria for distinction between grade 2 and 3 are not well defined.  -5/16/2023 NEURO-ONC: Regardless of grade, recommending chemoradiotherapy with concurrent temozolomide.  -6/30/2023 NEURO-ONC/ CHEMORADS: Here for visit at the midpoint of concurrent chemoradiotherapy.   -7/20/2023 CHEMORADS: Completed chemoradiotherapy with concurrent  temozolomide 75mg/m2 (140mg)   -8/22/2023 NEURO-ONC/ MRB/ CHEMO: Clinically well. Imaging with no concerns. Starting adjuvant temozolomide 150mg/m2 (280mg), cycle 1 (start date tonight).   -9/19/2023 NEURO-ONC/ CHEMO: No new neurological concerns. Modifications to supportive medications to better tolerate adjuvant temozolomide. Will repeat dosing of cycle 1 at 150mg/m2 (280mg).   -10/26/2023 NEURO-ONC/ CHEMO: No new neurological concerns.  Modifications to supportive medications to better tolerate adjuvant temozolomide were successful and he tolerated his repeat dosing of cycle 1 at 150mg/m2 (280mg).  Will plan on repeating at this dosing level one more time and then consider increase to 200mg/m2 with cycle 3.   -11/27/2023 NEURO-ONC/ CHEMO:  No new neurological concerns.  Modifications to supportive medications to better tolerate adjuvant temozolomide were successful and he tolerated his repeat dosing of cycle 1 at 150mg/m2, and after discussion today, he would like to continue at this dose rather than increase.  -12/21/2023 MRB with a new acute /early subacute small punctate infarct in the right aspect of splenium.  -1/9/2024 NEURO-ONC/ CHEMO: Clinically well. MRA clean. ECHO with no obvious cardiac source of emboli was seen within the limits of a transthoracic echocardiogram. ; recommended lifestyle and dietary changes plus primary care follow-up. HbA1c 5.6. Starting vitamin D daily supplementation. Adjuvant temozolomide 150mg/m2 (280mg), cycle 4 completed.   -1/30/2024 NEURO-ONC/ CHEMO: Clinically well. Labs to be done tomorrow. Adjuvant temozolomide 150mg/m2 (280mg), cycle 5 due to start today or as soon as he receives it.   -2/27/2024 NEURO-ONC/ CHEMO: Clinically well. Labs to be done 3/3 or 3/4.  Adjuvant temozolomide 150mg/m2 (280mg), cycle 6 due to start 3/4.  -3/26/2024 NEURO-ONC/ MRB: Low appetite. Imaging with overall stability with evidence of post-treatment related changes.  -7/23/2024  "NEURO-ONC/ MRB: Less frequent headaches when remaining well hydrated. Imaging with overall stability with evidence of positive treatment effect.  -12/17/2024 NEURO-ONC/ MRB: No new neurological concerns; mild forgetfulness. Imaging with a new, small area of contrast enhancement in the corpus callosum that was of indeterminate significance; repeat imaging in 2 months.      SOCIAL HISTORY   , 2 children      PHYSICAL EXAMINATION  Ht 1.651 m (5' 5\")   Wt 74.8 kg (165 lb)   BMI 27.46 kg/m     Wt Readings from Last 2 Encounters:   12/17/24 74.8 kg (165 lb)   07/23/24 74.5 kg (164 lb 3.2 oz)      Ht Readings from Last 2 Encounters:   12/17/24 1.651 m (5' 5\")   02/27/24 1.651 m (5' 5\")     KPS: 100    -Generally well appearing.  -Respiratory: Normal breath sounds, no audible wheezing.  -Psychiatric: Normal mood and affect. Pleasant, talkative.  -Neurologic:   MENTAL STATUS:     Alert, oriented.    Recall: Intact.    Speech fluent.    Comprehension intact.     CRANIAL NERVES:     Symmetric facial movements.   Hearing intact.   No dysarthria.    The rest of a comprehensive physical examination is deferred due to video visit restrictions.        MEDICAL RECORDS  Personally reviewed: No new encounters.     LABS  Personally reviewed all available lab results; N new results.     IMAGING  Personally reviewed MR brain imaging from today and compared to prior imaging.    Formal read; \"1. New enhancement involving the body of the corpus callosum, just to the left of midline. This may represent incidental subacute ischemia or treatment changes, however glioma cannot be entirely excluded. Consider close follow-up. 2. Stable appearance of the right frontal resection cavity with marginal T2 hyperintense signal and enhancement, presumed postoperative. 3. Punctate area of enhancement within the left internal auditory canal, more conspicuous compared to the prior. This could represent incidental vascular enhancement. Tiny " "vestibular schwannoma not entirely excluded. Recommend attention on follow-up. 4. Newly enlarged tonsillar tissue and upper cervical lymph nodes, presumably reactive.\"    Imaging was shown to and results were reviewed with Brandon.        IMPRESSION  On date of service, 33 minutes was spent in clinic and 18 minutes was spent preparing for the visit through extensive chart review and coordinating care for this high complexity visit. The following is in explanation for the recommendations used to define the plan.       Brandon is doing well with no new neurological complaints. He does acknowledge some mild forgetfulness, but is performing well at work.     Imaging as detailed above a new, small area of contrast enhancement in the corpus callosum that is of indeterminate significance. I personally reviewed Brandon's imaging and case at Brain Tumor Conference and all in attendance were in agreement with this impression. Therefore, the plan is to continue management on imaging surveillance per NCCN guidelines, but at a short interval of 2 month. Brandon was updated by [facility] on the revised plan. In the meantime, Brandon knows to call the clinic with any concerns and appointments can be moved up if needed.       Vitamin D level was checked in July and increased to 25 after in March, having decreased to 17 from 28 in December. He has been taking 1000 international unit(s) of vitamin D and I encouraged him to continue to do so.    Finally, I recommended two-times a year follow-up with his dentist plus enhanced mouth care (flossing, mouthwash) to address gum bleeding when brushing teeth. Platelets were 235K in July, so I do not have concerns for thrombocytopenia as a contributing factor.     PROBLEM LIST  WHO grade 2 oligodendroglioma  Headaches  Stroke  Elevated cholesterol  Vitamin D deficiency    PLAN  -CANCER DIRECTED THERAPY-  -Continue imaging surveillance; repeat MRI in 2 months.     -STEROIDS-  -Off " dexamethasone.    -SEIZURE MANAGEMENT-  -While this patient is at increased risk of having seizures, given the lack of seizure history, there is no indication to prescribe an antiepileptic at this time.     -STROKE/ VASCULAR RISK FACTORS-  -MRA head and neck was clean. ECHO no concerns. HbA1c was 5.6.  -LDL was 139 and goal is <70.    Deferring start of Crestor to primary care provider; Dr. Ryan Rodriguez.     -Quality of life/ MOOD/ FATIGUE-  -Denies any mood issues.  -Fatigue; resolved, chemotherapy-associated.    Labs in 9/2023 Vitamin B12/ D deficiency, low iron, and thyroid studies without concern.    Vitamin D level of 28 in 12/2023; daily supplementation.     Recheck vitamin D level on 3/26/2024; remained low at 17, increasing supplementation.   Recheck vitamin D level on 7/23/2024; within goal at 25, continue supplementation at 1000IU daily.   -Continue to monitor mood as untreated/ undertreated depression can worsen fatigue, dysorexia, and quality of life.    Return to clinic in 2/2025 + imaging.     In the meantime, Brandon knows to call the clinic with any concerns and he can be seen sooner if needed.      The longitudinal plan of care for the diagnosis(es)/condition(s) as documented were addressed during this visit. Due to the added complexity in care, I will continue to support Brandon in the subsequent management and with ongoing continuity of care.     Abril Bullock MD  Neuro-oncology

## 2024-12-17 ENCOUNTER — VIRTUAL VISIT (OUTPATIENT)
Dept: ONCOLOGY | Facility: CLINIC | Age: 39
End: 2024-12-17
Attending: PSYCHIATRY & NEUROLOGY
Payer: COMMERCIAL

## 2024-12-17 ENCOUNTER — HOSPITAL ENCOUNTER (OUTPATIENT)
Dept: MRI IMAGING | Facility: CLINIC | Age: 39
Discharge: HOME | End: 2024-12-17
Attending: PSYCHIATRY & NEUROLOGY
Payer: COMMERCIAL

## 2024-12-17 VITALS — WEIGHT: 165 LBS | BODY MASS INDEX: 27.49 KG/M2 | HEIGHT: 65 IN

## 2024-12-17 DIAGNOSIS — E55.9 VITAMIN D DEFICIENCY: ICD-10-CM

## 2024-12-17 DIAGNOSIS — C71.9 OLIGODENDROGLIOMA (H): Primary | ICD-10-CM

## 2024-12-17 DIAGNOSIS — C71.9 OLIGODENDROGLIOMA (H): ICD-10-CM

## 2024-12-17 PROCEDURE — A9585 GADOBUTROL INJECTION: HCPCS | Performed by: PSYCHIATRY & NEUROLOGY

## 2024-12-17 PROCEDURE — 255N000002 HC RX 255 OP 636: Performed by: PSYCHIATRY & NEUROLOGY

## 2024-12-17 PROCEDURE — 70553 MRI BRAIN STEM W/O & W/DYE: CPT

## 2024-12-17 PROCEDURE — G2211 COMPLEX E/M VISIT ADD ON: HCPCS | Mod: 95 | Performed by: PSYCHIATRY & NEUROLOGY

## 2024-12-17 PROCEDURE — 99215 OFFICE O/P EST HI 40 MIN: CPT | Mod: 95 | Performed by: PSYCHIATRY & NEUROLOGY

## 2024-12-17 RX ORDER — GADOBUTROL 604.72 MG/ML
15 INJECTION INTRAVENOUS ONCE
Status: COMPLETED | OUTPATIENT
Start: 2024-12-17 | End: 2024-12-17

## 2024-12-17 RX ORDER — FAMOTIDINE 20 MG
1000 TABLET ORAL DAILY
COMMUNITY
Start: 2024-12-17

## 2024-12-17 RX ADMIN — GADOBUTROL 15 ML: 604.72 INJECTION INTRAVENOUS at 07:23

## 2024-12-17 ASSESSMENT — PAIN SCALES - GENERAL: PAINLEVEL_OUTOF10: NO PAIN (0)

## 2024-12-17 NOTE — LETTER
12/17/2024      Brandon Ordoñez  [id number] [place] [address]      Dear Colleague,    Thank you for referring your patient, Brandon Ordoñez, to the [facility] [place]. Please see a copy of my visit note below.    Virtual Visit Details  Type of service:  Video Visit     Originating Location (pt. Location): Home    Distant Location (provider location):  On-site  Platform used for Video Visit: edPULSE    _______________________________________________________________      NEURO-ONCOLOGY VISIT  Dec 17, 2024    CHIEF COMPLAINT: Mr. 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 He completed chemoradiotherapy with adjuvant temozolomide on 7/20/2023. Post-radiation imaging demonstrated anticipated post-radiation induced changes and no new concerns. Repeat imaging in 12/2023 showed evidence a possible new stroke. Work-up was negative for a possible cause. Brandon completed the planned 6 cycles of adjuvant-dosed temozolomide in 3/2024 and repeat imaging in 3/2024 showed overall stability with evidence of post-treatment related changes.     Repeat imaging in 7/2024 was again stable. However, imaging in 12/2024 showed a new, small area of contrast enhancement in the corpus callosum that was of indeterminate significance. Therefore, the plan is to continue imaging surveillance, but at a shorter interval.     Brandon is presenting in follow-up and he is unaccompanied today.    HISTORY OF PRESENT ILLNESS  -Brandon has no new concerns today.   -Very infrequent headaches.  -He denies any concerns for seizure activity.  -No weakness and no new numbness.   -Mild light sensitivity with challenges in visual focus, resolves with decreasing light intensity.    -Remaining active.     Working.    Cognition is fairly good, but can be forgetful at times.   -Noting gum bleeding when brushing teeth.       MEDICATIONS   Current Outpatient Medications   Medication Sig Dispense Refill     Vitamin D, Cholecalciferol, 25 MCG (1000 UT) CAPS Take 1,000 Units by mouth daily.       DRUG ALLERGIES No Known Allergies       IMMUNIZATIONS   Immunization History   Administered Date(s) Administered     COVID-19 MONOVALENT 12+ (Pfizer) 03/29/2021, 04/19/2021     TDAP Vaccine (Adacel) 07/30/2018       ONCOLOGIC HISTORY  -PRESENTATION: Progressive somnolence, nausea and vomiting, mild headache.  -5/4/2023 CT Head imaging with a large mass with associated calcifications and probable associated hemorrhage in the anterior right frontal region with surrounding vasogenic edema and significant local mass effect resulting in narrowing of the right more than left lateral ventricles and third ventricle, and leftward subfalcine shift as well as downward/ central transtentorial herniation.   -5/4/2023 SURGERY: Right frontal craniotomy for mass resection by Dr. Dasilva.   Post-operative imaging on 5/5 without a definite residual enhancing lesion. Nonspecific surrounding T2/FLAIR hyperintensity along the posterior and superior margins of the resection cavity may reflect vasogenic edema or nonenhancing tumor. Small volume hemorrhage and cytotoxic edema along the margins of the resection cavity and extending along the right caudate nucleus and body of the corpus callosum.  PATHOLOGY: WHO grade 2 oligodendroglioma;   FISH with deletion of both the 1p36.32 and 19q13.33 regions   No homozygous deletion of CDKN2A/B gene    IDH1 R132S mutated.  TERT c.-146C>T mutated.   TMB Score: 2.438 mut/Mb   Fusion Event: NEGATIVE   Histology is borderline due to presence of microvascular proliferation and necrosis without palisading, however, the histological criteria for distinction between grade 2 and 3 are not well defined.  -5/16/2023  NEURO-ONC: Regardless of grade, recommending chemoradiotherapy with concurrent temozolomide.  -6/30/2023 NEURO-ONC/ CHEMORADS: Here for visit at the midpoint of concurrent chemoradiotherapy.   -7/20/2023 CHEMORADS: Completed chemoradiotherapy with concurrent temozolomide 75mg/m2 (140mg)   -8/22/2023 NEURO-ONC/ MRB/ CHEMO: Clinically well. Imaging with no concerns. Starting adjuvant temozolomide 150mg/m2 (280mg), cycle 1 (start date tonight).   -9/19/2023 NEURO-ONC/ CHEMO: No new neurological concerns. Modifications to supportive medications to better tolerate adjuvant temozolomide. Will repeat dosing of cycle 1 at 150mg/m2 (280mg).   -10/26/2023 NEURO-ONC/ CHEMO: No new neurological concerns.  Modifications to supportive medications to better tolerate adjuvant temozolomide were successful and he tolerated his repeat dosing of cycle 1 at 150mg/m2 (280mg).  Will plan on repeating at this dosing level one more time and then consider increase to 200mg/m2 with cycle 3.   -11/27/2023 NEURO-ONC/ CHEMO:  No new neurological concerns.  Modifications to supportive medications to better tolerate adjuvant temozolomide were successful and he tolerated his repeat dosing of cycle 1 at 150mg/m2, and after discussion today, he would like to continue at this dose rather than increase.  -12/21/2023 MRB with a new acute /early subacute small punctate infarct in the right aspect of splenium.  -1/9/2024 NEURO-ONC/ CHEMO: Clinically well. MRA clean. ECHO with no obvious cardiac source of emboli was seen within the limits of a transthoracic echocardiogram. ; recommended lifestyle and dietary changes plus primary care follow-up. HbA1c 5.6. Starting vitamin D daily supplementation. Adjuvant temozolomide 150mg/m2 (280mg), cycle 4 completed.   -1/30/2024 NEURO-ONC/ CHEMO: Clinically well. Labs to be done tomorrow. Adjuvant temozolomide 150mg/m2 (280mg), cycle 5 due to start today or as soon as he receives it.   -2/27/2024 NEURO-ONC/  "CHEMO: Clinically well. Labs to be done 3/3 or 3/4.  Adjuvant temozolomide 150mg/m2 (280mg), cycle 6 due to start 3/4.  -3/26/2024 NEURO-ONC/ MRB: Low appetite. Imaging with overall stability with evidence of post-treatment related changes.  -7/23/2024 NEURO-ONC/ MRB: Less frequent headaches when remaining well hydrated. Imaging with overall stability with evidence of positive treatment effect.  -12/17/2024 NEURO-ONC/ MRB: No new neurological concerns; mild forgetfulness. Imaging with a new, small area of contrast enhancement in the corpus callosum that was of indeterminate significance; repeat imaging in 2 months.      SOCIAL HISTORY   , 2 children      PHYSICAL EXAMINATION  Ht 1.651 m (5' 5\")   Wt 74.8 kg (165 lb)   BMI 27.46 kg/m     Wt Readings from Last 2 Encounters:   12/17/24 74.8 kg (165 lb)   07/23/24 74.5 kg (164 lb 3.2 oz)      Ht Readings from Last 2 Encounters:   12/17/24 1.651 m (5' 5\")   02/27/24 1.651 m (5' 5\")     KPS: 100    -Generally well appearing.  -Respiratory: Normal breath sounds, no audible wheezing.  -Psychiatric: Normal mood and affect. Pleasant, talkative.  -Neurologic:   MENTAL STATUS:     Alert, oriented.    Recall: Intact.    Speech fluent.    Comprehension intact.     CRANIAL NERVES:     Symmetric facial movements.   Hearing intact.   No dysarthria.    The rest of a comprehensive physical examination is deferred due to video visit restrictions.        MEDICAL RECORDS  Personally reviewed: No new encounters.     LABS  Personally reviewed all available lab results; N new results.     IMAGING  Personally reviewed MR brain imaging from today and compared to prior imaging.    Formal read; \"1. New enhancement involving the body of the corpus callosum, just to the left of midline. This may represent incidental subacute ischemia or treatment changes, however glioma cannot be entirely excluded. Consider close follow-up. 2. Stable appearance of the right frontal resection cavity with " "marginal T2 hyperintense signal and enhancement, presumed postoperative. 3. Punctate area of enhancement within the left internal auditory canal, more conspicuous compared to the prior. This could represent incidental vascular enhancement. Tiny vestibular schwannoma not entirely excluded. Recommend attention on follow-up. 4. Newly enlarged tonsillar tissue and upper cervical lymph nodes, presumably reactive.\"    Imaging was shown to and results were reviewed with Andrewmartamanuelministerio.        IMPRESSION  On date of service, 33 minutes was spent in clinic and 18 minutes was spent preparing for the visit through extensive chart review and coordinating care for this high complexity visit. The following is in explanation for the recommendations used to define the plan.       Brandon is doing well with no new neurological complaints. He does acknowledge some mild forgetfulness, but is performing well at work.     Imaging as detailed above a new, small area of contrast enhancement in the corpus callosum that is of indeterminate significance. I personally reviewed Brandon's imaging and case at Brain Tumor Conference and all in attendance were in agreement with this impression. Therefore, the plan is to continue management on imaging surveillance per NCCN guidelines, but at a short interval of 2 month. Brandon was updated by [facility] on the revised plan. In the meantime, Brandon knows to call the clinic with any concerns and appointments can be moved up if needed.       Vitamin D level was checked in July and increased to 25 after in March, having decreased to 17 from 28 in December. He has been taking 1000 international unit(s) of vitamin D and I encouraged him to continue to do so.    Finally, I recommended two-times a year follow-up with his dentist plus enhanced mouth care (flossing, mouthwash) to address gum bleeding when brushing teeth. Platelets were 235K in July, so I do not have concerns for thrombocytopenia as a " contributing factor.     PROBLEM LIST  WHO grade 2 oligodendroglioma  Headaches  Stroke  Elevated cholesterol  Vitamin D deficiency    PLAN  -CANCER DIRECTED THERAPY-  -Continue imaging surveillance; repeat MRI in 2 months.     -STEROIDS-  -Off dexamethasone.    -SEIZURE MANAGEMENT-  -While this patient is at increased risk of having seizures, given the lack of seizure history, there is no indication to prescribe an antiepileptic at this time.     -STROKE/ VASCULAR RISK FACTORS-  -MRA head and neck was clean. ECHO no concerns. HbA1c was 5.6.  -LDL was 139 and goal is <70.    Deferring start of Crestor to primary care provider; Dr. Ryan Rodriguez.     -Quality of life/ MOOD/ FATIGUE-  -Denies any mood issues.  -Fatigue; resolved, chemotherapy-associated.    Labs in 9/2023 Vitamin B12/ D deficiency, low iron, and thyroid studies without concern.    Vitamin D level of 28 in 12/2023; daily supplementation.     Recheck vitamin D level on 3/26/2024; remained low at 17, increasing supplementation.   Recheck vitamin D level on 7/23/2024; within goal at 25, continue supplementation at 1000IU daily.   -Continue to monitor mood as untreated/ undertreated depression can worsen fatigue, dysorexia, and quality of life.    Return to clinic in 2/2025 + imaging.     In the meantime, Brandon knows to call the clinic with any concerns and he can be seen sooner if needed.      The longitudinal plan of care for the diagnosis(es)/condition(s) as documented were addressed during this visit. Due to the added complexity in care, I will continue to support Andrewbernadineministerio in the subsequent management and with ongoing continuity of care.     Abril Bullock MD  Neuro-oncology      Again, thank you for allowing me to participate in the care of your patient.        Sincerely,        Abril Bullock MD

## 2024-12-17 NOTE — NURSING NOTE
Current patient location: [id number] [place] [address]    Is the patient currently in the state of [place]? YES    Visit mode:VIDEO    If the visit is dropped, the patient can be reconnected by:VIDEO VISIT: Send to e-mail at: [email or web address]    Will anyone else be joining the visit? NO  (If patient encounters technical issues they should call [phone])    Are changes needed to the allergy or medication list? Pt stated no changes to allergies and Pt stated no med changes    Are refills needed on medications prescribed by this physician? NO    Rooming Documentation:  Questionnaire(s) completed    Reason for visit: HOLDEN GOTTIF

## 2024-12-17 NOTE — LETTER
12/17/2024      Brandon Ordoñez  [id number] [place] [address]      Dear Colleague,    Thank you for referring your patient, Brandon Ordoñez, to the [facility] [place]. Please see a copy of my visit note below.    Virtual Visit Details  Type of service:  Video Visit     Originating Location (pt. Location): Home    Distant Location (provider location):  On-site  Platform used for Video Visit: [employer]    _______________________________________________________________      NEURO-ONCOLOGY VISIT  Dec 17, 2024    CHIEF COMPLAINT: Mr. 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 He completed chemoradiotherapy with adjuvant temozolomide on 7/20/2023. Post-radiation imaging demonstrated anticipated post-radiation induced changes and no new concerns. Repeat imaging in 12/2023 showed evidence a possible new stroke. Work-up was negative for a possible cause. Brandon completed the planned 6 cycles of adjuvant-dosed temozolomide in 3/2024 and repeat imaging in 3/2024 showed overall stability with evidence of post-treatment related changes.     Repeat imaging in 7/2024 was again stable. However, imaging in 12/2024 showed a new, small area of contrast enhancement in the corpus callosum that was of indeterminate significance. Therefore, the plan is to continue imaging surveillance, but at a shorter interval.     Brandon is presenting in follow-up and he is unaccompanied today.    HISTORY OF PRESENT ILLNESS  -Brandon has no new concerns today.   -Very infrequent headaches.  -He denies any concerns for seizure activity.  -No weakness and no new numbness.   -Mild light sensitivity with challenges in visual focus, resolves with decreasing light intensity.    -Remaining active.     Working.    Cognition is fairly good, but can be forgetful at times.   -Noting gum bleeding when brushing teeth.       MEDICATIONS   Current Outpatient Medications   Medication Sig Dispense Refill     Vitamin D, Cholecalciferol, 25 MCG (1000 UT) CAPS Take 1,000 Units by mouth daily.       DRUG ALLERGIES No Known Allergies       IMMUNIZATIONS   Immunization History   Administered Date(s) Administered     COVID-19 MONOVALENT 12+ (Pfizer) 03/29/2021, 04/19/2021     TDAP Vaccine (Adacel) 07/30/2018       ONCOLOGIC HISTORY  -PRESENTATION: Progressive somnolence, nausea and vomiting, mild headache.  -5/4/2023 CT Head imaging with a large mass with associated calcifications and probable associated hemorrhage in the anterior right frontal region with surrounding vasogenic edema and significant local mass effect resulting in narrowing of the right more than left lateral ventricles and third ventricle, and leftward subfalcine shift as well as downward/ central transtentorial herniation.   -5/4/2023 SURGERY: Right frontal craniotomy for mass resection by Dr. Dasilva.   Post-operative imaging on 5/5 without a definite residual enhancing lesion. Nonspecific surrounding T2/FLAIR hyperintensity along the posterior and superior margins of the resection cavity may reflect vasogenic edema or nonenhancing tumor. Small volume hemorrhage and cytotoxic edema along the margins of the resection cavity and extending along the right caudate nucleus and body of the corpus callosum.  PATHOLOGY: WHO grade 2 oligodendroglioma;   FISH with deletion of both the 1p36.32 and 19q13.33 regions   No homozygous deletion of CDKN2A/B gene    IDH1 R132S mutated.  TERT c.-146C>T mutated.   TMB Score: 2.438 mut/Mb   Fusion Event: NEGATIVE   Histology is borderline due to presence of microvascular proliferation and necrosis without palisading, however, the histological criteria for distinction between grade 2 and 3 are not well defined.  -5/16/2023  NEURO-ONC: Regardless of grade, recommending chemoradiotherapy with concurrent temozolomide.  -6/30/2023 NEURO-ONC/ CHEMORADS: Here for visit at the midpoint of concurrent chemoradiotherapy.   -7/20/2023 CHEMORADS: Completed chemoradiotherapy with concurrent temozolomide 75mg/m2 (140mg)   -8/22/2023 NEURO-ONC/ MRB/ CHEMO: Clinically well. Imaging with no concerns. Starting adjuvant temozolomide 150mg/m2 (280mg), cycle 1 (start date tonight).   -9/19/2023 NEURO-ONC/ CHEMO: No new neurological concerns. Modifications to supportive medications to better tolerate adjuvant temozolomide. Will repeat dosing of cycle 1 at 150mg/m2 (280mg).   -10/26/2023 NEURO-ONC/ CHEMO: No new neurological concerns.  Modifications to supportive medications to better tolerate adjuvant temozolomide were successful and he tolerated his repeat dosing of cycle 1 at 150mg/m2 (280mg).  Will plan on repeating at this dosing level one more time and then consider increase to 200mg/m2 with cycle 3.   -11/27/2023 NEURO-ONC/ CHEMO:  No new neurological concerns.  Modifications to supportive medications to better tolerate adjuvant temozolomide were successful and he tolerated his repeat dosing of cycle 1 at 150mg/m2, and after discussion today, he would like to continue at this dose rather than increase.  -12/21/2023 MRB with a new acute /early subacute small punctate infarct in the right aspect of splenium.  -1/9/2024 NEURO-ONC/ CHEMO: Clinically well. MRA clean. ECHO with no obvious cardiac source of emboli was seen within the limits of a transthoracic echocardiogram. ; recommended lifestyle and dietary changes plus primary care follow-up. HbA1c 5.6. Starting vitamin D daily supplementation. Adjuvant temozolomide 150mg/m2 (280mg), cycle 4 completed.   -1/30/2024 NEURO-ONC/ CHEMO: Clinically well. Labs to be done tomorrow. Adjuvant temozolomide 150mg/m2 (280mg), cycle 5 due to start today or as soon as he receives it.   -2/27/2024 NEURO-ONC/  "CHEMO: Clinically well. Labs to be done 3/3 or 3/4.  Adjuvant temozolomide 150mg/m2 (280mg), cycle 6 due to start 3/4.  -3/26/2024 NEURO-ONC/ MRB: Low appetite. Imaging with overall stability with evidence of post-treatment related changes.  -7/23/2024 NEURO-ONC/ MRB: Less frequent headaches when remaining well hydrated. Imaging with overall stability with evidence of positive treatment effect.  -12/17/2024 NEURO-ONC/ MRB: No new neurological concerns; mild forgetfulness. Imaging with a new, small area of contrast enhancement in the corpus callosum that was of indeterminate significance; repeat imaging in 2 months.      SOCIAL HISTORY   , 2 children      PHYSICAL EXAMINATION  Ht 1.651 m (5' 5\")   Wt 74.8 kg (165 lb)   BMI 27.46 kg/m     Wt Readings from Last 2 Encounters:   12/17/24 74.8 kg (165 lb)   07/23/24 74.5 kg (164 lb 3.2 oz)      Ht Readings from Last 2 Encounters:   12/17/24 1.651 m (5' 5\")   02/27/24 1.651 m (5' 5\")     KPS: 100    -Generally well appearing.  -Respiratory: Normal breath sounds, no audible wheezing.  -Psychiatric: Normal mood and affect. Pleasant, talkative.  -Neurologic:   MENTAL STATUS:     Alert, oriented.    Recall: Intact.    Speech fluent.    Comprehension intact.     CRANIAL NERVES:     Symmetric facial movements.   Hearing intact.   No dysarthria.    The rest of a comprehensive physical examination is deferred due to video visit restrictions.        MEDICAL RECORDS  Personally reviewed: No new encounters.     LABS  Personally reviewed all available lab results; N new results.     IMAGING  Personally reviewed MR brain imaging from today and compared to prior imaging.    Formal read; \"1. New enhancement involving the body of the corpus callosum, just to the left of midline. This may represent incidental subacute ischemia or treatment changes, however glioma cannot be entirely excluded. Consider close follow-up. 2. Stable appearance of the right frontal resection cavity with " "marginal T2 hyperintense signal and enhancement, presumed postoperative. 3. Punctate area of enhancement within the left internal auditory canal, more conspicuous compared to the prior. This could represent incidental vascular enhancement. Tiny vestibular schwannoma not entirely excluded. Recommend attention on follow-up. 4. Newly enlarged tonsillar tissue and upper cervical lymph nodes, presumably reactive.\"    Imaging was shown to and results were reviewed with Andrewmartamanuelministerio.        IMPRESSION  On date of service, 33 minutes was spent in clinic and 18 minutes was spent preparing for the visit through extensive chart review and coordinating care for this high complexity visit. The following is in explanation for the recommendations used to define the plan.       Brandon is doing well with no new neurological complaints. He does acknowledge some mild forgetfulness, but is performing well at work.     Imaging as detailed above a new, small area of contrast enhancement in the corpus callosum that is of indeterminate significance. I personally reviewed Brandon's imaging and case at Brain Tumor Conference and all in attendance were in agreement with this impression. Therefore, the plan is to continue management on imaging surveillance per NCCN guidelines, but at a short interval of 2 month. Brandon was updated by [facility] on the revised plan. In the meantime, Brandon knows to call the clinic with any concerns and appointments can be moved up if needed.       Vitamin D level was checked in July and increased to 25 after in March, having decreased to 17 from 28 in December. He has been taking 1000 international unit(s) of vitamin D and I encouraged him to continue to do so.    Finally, I recommended two-times a year follow-up with his dentist plus enhanced mouth care (flossing, mouthwash) to address gum bleeding when brushing teeth. Platelets were 235K in July, so I do not have concerns for thrombocytopenia as a " contributing factor.     PROBLEM LIST  WHO grade 2 oligodendroglioma  Headaches  Stroke  Elevated cholesterol  Vitamin D deficiency    PLAN  -CANCER DIRECTED THERAPY-  -Continue imaging surveillance; repeat MRI in 2 months.     -STEROIDS-  -Off dexamethasone.    -SEIZURE MANAGEMENT-  -While this patient is at increased risk of having seizures, given the lack of seizure history, there is no indication to prescribe an antiepileptic at this time.     -STROKE/ VASCULAR RISK FACTORS-  -MRA head and neck was clean. ECHO no concerns. HbA1c was 5.6.  -LDL was 139 and goal is <70.    Deferring start of Crestor to primary care provider; Dr. Ryan Rodriguez.     -Quality of life/ MOOD/ FATIGUE-  -Denies any mood issues.  -Fatigue; resolved, chemotherapy-associated.    Labs in 9/2023 Vitamin B12/ D deficiency, low iron, and thyroid studies without concern.    Vitamin D level of 28 in 12/2023; daily supplementation.     Recheck vitamin D level on 3/26/2024; remained low at 17, increasing supplementation.   Recheck vitamin D level on 7/23/2024; within goal at 25, continue supplementation at 1000IU daily.   -Continue to monitor mood as untreated/ undertreated depression can worsen fatigue, dysorexia, and quality of life.    Return to clinic in 2/2025 + imaging.     In the meantime, Brandon knows to call the clinic with any concerns and he can be seen sooner if needed.      The longitudinal plan of care for the diagnosis(es)/condition(s) as documented were addressed during this visit. Due to the added complexity in care, I will continue to support Andrewbernadineministerio in the subsequent management and with ongoing continuity of care.     Abril Bullock MD  Neuro-oncology      Again, thank you for allowing me to participate in the care of your patient.        Sincerely,        Abril Bullock MD

## 2024-12-23 ENCOUNTER — TUMOR CONFERENCE (OUTPATIENT)
Dept: ONCOLOGY | Facility: CLINIC | Age: 39
End: 2024-12-23
Payer: COMMERCIAL

## 2024-12-23 ENCOUNTER — PATIENT OUTREACH (OUTPATIENT)
Dept: ONCOLOGY | Facility: CLINIC | Age: 39
End: 2024-12-23

## 2025-02-19 NOTE — PROGRESS NOTES
NEURO-ONCOLOGY VISIT  Feb 25, 2025    CHIEF COMPLAINT: Mr. 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 He completed chemoradiotherapy with adjuvant temozolomide on 7/20/2023. Post-radiation imaging demonstrated anticipated post-radiation induced changes and no new concerns. Repeat imaging in 12/2023 showed evidence a possible new stroke. Work-up was negative for a possible cause. Brandon completed the planned 6 cycles of adjuvant-dosed temozolomide in 3/2024 and repeat imaging in 3/2024 showed overall stability with evidence of post-treatment related changes.     Repeat imaging in 7/2024 was again stable. However, imaging in 12/2024 showed a new, small area of contrast enhancement in the corpus callosum that was of indeterminate significance. Repeat imaging in 2/2025 showed resolution of this findings and no new concerning changes. Therefore, the plan is to continue imaging surveillance.     Brandon is presenting in follow-up and he is unaccompanied today.    HISTORY OF PRESENT ILLNESS  -Brandon has no new concerns today.   -Very infrequent headaches.  -He denies any concerns for seizure activity.  -No weakness and no new numbness.   -Mild light sensitivity with challenges in visual focus, resolves with decreasing light intensity.    -Remaining active.    Working.    Cognition is fairly good, but can be forgetful at times.       MEDICATIONS   Current Outpatient Medications   Medication Sig Dispense Refill    Vitamin D, Cholecalciferol, 25 MCG (1000 UT) CAPS Take 1,000 Units by mouth daily.       DRUG ALLERGIES No Known Allergies       IMMUNIZATIONS   Immunization History   Administered Date(s) Administered    COVID-19 MONOVALENT 12+ (Pfizer) 03/29/2021, 04/19/2021    TDAP Vaccine (Adacel)  07/30/2018       ONCOLOGIC HISTORY  -PRESENTATION: Progressive somnolence, nausea and vomiting, mild headache.  -5/4/2023 CT Head imaging with a large mass with associated calcifications and probable associated hemorrhage in the anterior right frontal region with surrounding vasogenic edema and significant local mass effect resulting in narrowing of the right more than left lateral ventricles and third ventricle, and leftward subfalcine shift as well as downward/ central transtentorial herniation.   -5/4/2023 SURGERY: Right frontal craniotomy for mass resection by Dr. Dasilva.   Post-operative imaging on 5/5 without a definite residual enhancing lesion. Nonspecific surrounding T2/FLAIR hyperintensity along the posterior and superior margins of the resection cavity may reflect vasogenic edema or nonenhancing tumor. Small volume hemorrhage and cytotoxic edema along the margins of the resection cavity and extending along the right caudate nucleus and body of the corpus callosum.  PATHOLOGY: WHO grade 2 oligodendroglioma;   FISH with deletion of both the 1p36.32 and 19q13.33 regions   No homozygous deletion of CDKN2A/B gene    IDH1 R132S mutated.  TERT c.-146C>T mutated.   TMB Score: 2.438 mut/Mb   Fusion Event: NEGATIVE   Histology is borderline due to presence of microvascular proliferation and necrosis without palisading, however, the histological criteria for distinction between grade 2 and 3 are not well defined.  -5/16/2023 NEURO-ONC: Regardless of grade, recommending chemoradiotherapy with concurrent temozolomide.  -6/30/2023 NEURO-ONC/ CHEMORADS: Here for visit at the midpoint of concurrent chemoradiotherapy.   -7/20/2023 CHEMORADS: Completed chemoradiotherapy with concurrent temozolomide 75mg/m2 (140mg)   -8/22/2023 NEURO-ONC/ MRB/ CHEMO: Clinically well. Imaging with no concerns. Starting adjuvant temozolomide 150mg/m2 (280mg), cycle 1 (start date tonight).   -9/19/2023 NEURO-ONC/ CHEMO: No new neurological  concerns. Modifications to supportive medications to better tolerate adjuvant temozolomide. Will repeat dosing of cycle 1 at 150mg/m2 (280mg).   -10/26/2023 NEURO-ONC/ CHEMO: No new neurological concerns.  Modifications to supportive medications to better tolerate adjuvant temozolomide were successful and he tolerated his repeat dosing of cycle 1 at 150mg/m2 (280mg).  Will plan on repeating at this dosing level one more time and then consider increase to 200mg/m2 with cycle 3.   -11/27/2023 NEURO-ONC/ CHEMO:  No new neurological concerns.  Modifications to supportive medications to better tolerate adjuvant temozolomide were successful and he tolerated his repeat dosing of cycle 1 at 150mg/m2, and after discussion today, he would like to continue at this dose rather than increase.  -12/21/2023 MRB with a new acute /early subacute small punctate infarct in the right aspect of splenium.  -1/9/2024 NEURO-ONC/ CHEMO: Clinically well. MRA clean. ECHO with no obvious cardiac source of emboli was seen within the limits of a transthoracic echocardiogram. ; recommended lifestyle and dietary changes plus primary care follow-up. HbA1c 5.6. Starting vitamin D daily supplementation. Adjuvant temozolomide 150mg/m2 (280mg), cycle 4 completed.   -1/30/2024 NEURO-ONC/ CHEMO: Clinically well. Labs to be done tomorrow. Adjuvant temozolomide 150mg/m2 (280mg), cycle 5 due to start today or as soon as he receives it.   -2/27/2024 NEURO-ONC/ CHEMO: Clinically well. Labs to be done 3/3 or 3/4.  Adjuvant temozolomide 150mg/m2 (280mg), cycle 6 due to start 3/4.  -3/26/2024 NEURO-ONC/ MRB: Low appetite. Imaging with overall stability with evidence of post-treatment related changes.  -7/23/2024 NEURO-ONC/ MRB: Less frequent headaches when remaining well hydrated. Imaging with overall stability with evidence of positive treatment effect.  -12/17/2024 NEURO-ONC/ MRB: No new neurological concerns; mild forgetfulness. Imaging with a new,  "small area of contrast enhancement in the corpus callosum that was of indeterminate significance; repeat imaging in 2 months.    -2/25/2025 NEURO-ONC/ MRB: No new neurological concerns. Imaging with the previously seen small area of contrast enhancement in the corpus callosum has now resolved, no new concerning findings; repeat imaging in *** months.      SOCIAL HISTORY   , 2 children      PHYSICAL EXAMINATION  /84 (BP Location: Right arm, Patient Position: Sitting, Cuff Size: Adult Large)   Pulse 78   Temp 97.4  F (36.3  C) (Oral)   Resp 18   Wt 73.9 kg (163 lb)   SpO2 100%   BMI 27.12 kg/m     Wt Readings from Last 2 Encounters:   02/25/25 73.9 kg (163 lb)   12/17/24 74.8 kg (165 lb)      Ht Readings from Last 2 Encounters:   12/17/24 1.651 m (5' 5\")   02/27/24 1.651 m (5' 5\")     KPS: 100    -Generally well appearing.  -Respiratory: Normal breath sounds, no audible wheezing.  -Psychiatric: Normal mood and affect. Pleasant, talkative.  -Neurologic:   MENTAL STATUS:     Alert, oriented.    Recall: Intact.    Speech fluent.    Comprehension intact.     CRANIAL NERVES:     Symmetric facial movements.   Hearing intact.   No dysarthria.  GAIT: Steady and unassisted.      MEDICAL RECORDS  Personally reviewed: No new encounters.     LABS  Personally reviewed all available lab results; Vitamin D *** from today.     IMAGING  Personally reviewed MR brain imaging from today and compared to prior imaging.    Formal read; \"1.  The focus of nodular enhancement along the callosal body is no longer demonstrated. 2.  New focus of nodular enhancement in the subcortical left anterior frontal lobe with associated T2/FLAIR signal abnormality. This could represent treatment-related change versus tumor. 3.  Questionable new/increased curvilinear marginal enhancement along the dorsal margin of the resection cavity. This may be exaggerated due to motion artifact on the high-resolution postcontrast sequence. The finding " "is not corroborated on the 5 mm post contrast sequence. Careful attention to this finding is recommended on follow-up. 4.  Grossly stable punctate focus of enhancement in the left internal auditory canal, possibly a tiny vestibular schwannoma. Evaluation compromised by motion artifact. 5.  No evidence of superimposed acute intracranial abnormality.\"    Imaging was shown to and results were reviewed with Andrewdane.        IMPRESSION  On date of service, 34 minutes was spent in clinic and 12 minutes was spent preparing for the visit through extensive chart review and coordinating care for this high complexity visit. The following is in explanation for the recommendations used to define the plan.       Brandon is doing well with no new neurological complaints. He is performing well at work and is remaining active.     Imaging as detailed above with the previously seen small area of contrast enhancement in the corpus callosum now has resolved. This finding is now most consist with resolved radiation induced injury. There are no new concerning findings. Therefore, the plan is to continue management on imaging surveillance per NCCN guidelines at an interval of every 3-6 months for the first 5 years (at least through 8/2028). For now, will repeat imaging in 4*** months. In the meantime, Connorministerio knows to call the clinic with any concerns and appointments can be moved up if needed.       Vitamin D level was checked in July 2025 and increased to 25 after in March 2024, having decreased to 17 from 28 in December 2023. On recheck today, his vitamin D level was ***. He has been taking 1000 international unit(s) of vitamin D and I encouraged him to continue to do so.    PROBLEM LIST  WHO grade 2 oligodendroglioma  Headaches  Stroke  Elevated cholesterol  Vitamin D deficiency    PLAN  -CANCER DIRECTED THERAPY-  -Continue imaging surveillance; repeat MRI in *** months.     -STEROIDS-  -Off dexamethasone.    -SEIZURE " MANAGEMENT-  -While this patient is at increased risk of having seizures, given the lack of seizure history, there is no indication to prescribe an antiepileptic at this time.     -STROKE/ VASCULAR RISK FACTORS-  -MRA head and neck was clean. ECHO no concerns. HbA1c was 5.6.  -LDL was 139 and goal is <70.    Deferring start of Crestor to primary care provider; Dr. Ryan Rodriguez.     -Quality of life/ MOOD/ FATIGUE-  -Denies any mood issues.  -Fatigue; resolved, chemotherapy-associated.    Labs in 9/2023 Vitamin B12/ D deficiency, low iron, and thyroid studies without concern.    Vitamin D level of 28 in 12/2023; daily supplementation.     Recheck vitamin D level on 3/26/2024; remained low at 17, increasing supplementation.   Recheck vitamin D level on 7/23/2024; within goal at 25, continue supplementation at 1000IU daily.    Recheck vitamin D level on 2/25/2025; within goal at ***, continue supplementation at 1000IU daily.   -Continue to monitor mood as untreated/ undertreated depression can worsen fatigue, dysorexia, and quality of life.    Return to clinic in ***/2025 + imaging.     In the meantime, Brandon knows to call the clinic with any concerns and he can be seen sooner if needed.      The longitudinal plan of care for the diagnosis(es)/condition(s) as documented were addressed during this visit. Due to the added complexity in care, I will continue to support Brandon in the subsequent management and with ongoing continuity of care.     Abril Bullock MD  Neuro-oncology     continue to support Brandon in the subsequent management and with ongoing continuity of care.     Abril Bullock MD  Neuro-oncology

## 2025-02-25 ENCOUNTER — ONCOLOGY VISIT (OUTPATIENT)
Dept: ONCOLOGY | Facility: CLINIC | Age: 40
End: 2025-02-25
Attending: PSYCHIATRY & NEUROLOGY
Payer: COMMERCIAL

## 2025-02-25 ENCOUNTER — HOSPITAL ENCOUNTER (OUTPATIENT)
Dept: MRI IMAGING | Facility: CLINIC | Age: 40
Discharge: HOME | End: 2025-02-25
Attending: PSYCHIATRY & NEUROLOGY
Payer: COMMERCIAL

## 2025-02-25 VITALS
BODY MASS INDEX: 27.12 KG/M2 | RESPIRATION RATE: 18 BRPM | WEIGHT: 163 LBS | DIASTOLIC BLOOD PRESSURE: 84 MMHG | SYSTOLIC BLOOD PRESSURE: 123 MMHG | OXYGEN SATURATION: 100 % | TEMPERATURE: 97.4 F | HEART RATE: 78 BPM

## 2025-02-25 DIAGNOSIS — E55.9 VITAMIN D DEFICIENCY: ICD-10-CM

## 2025-02-25 DIAGNOSIS — C71.9 OLIGODENDROGLIOMA (H): ICD-10-CM

## 2025-02-25 DIAGNOSIS — C71.9 OLIGODENDROGLIOMA (H): Primary | ICD-10-CM

## 2025-02-25 PROCEDURE — 82306 VITAMIN D 25 HYDROXY: CPT | Performed by: PSYCHIATRY & NEUROLOGY

## 2025-02-25 PROCEDURE — 99417 PROLNG OP E/M EACH 15 MIN: CPT | Performed by: PSYCHIATRY & NEUROLOGY

## 2025-02-25 PROCEDURE — 255N000002 HC RX 255 OP 636: Performed by: PSYCHIATRY & NEUROLOGY

## 2025-02-25 PROCEDURE — A9585 GADOBUTROL INJECTION: HCPCS | Performed by: PSYCHIATRY & NEUROLOGY

## 2025-02-25 PROCEDURE — 99213 OFFICE O/P EST LOW 20 MIN: CPT | Performed by: PSYCHIATRY & NEUROLOGY

## 2025-02-25 PROCEDURE — 99215 OFFICE O/P EST HI 40 MIN: CPT | Performed by: PSYCHIATRY & NEUROLOGY

## 2025-02-25 PROCEDURE — 36415 COLL VENOUS BLD VENIPUNCTURE: CPT | Performed by: PSYCHIATRY & NEUROLOGY

## 2025-02-25 PROCEDURE — 70553 MRI BRAIN STEM W/O & W/DYE: CPT

## 2025-02-25 RX ORDER — GADOBUTROL 604.72 MG/ML
15 INJECTION INTRAVENOUS ONCE
Status: COMPLETED | OUTPATIENT
Start: 2025-02-25 | End: 2025-02-25

## 2025-02-25 RX ADMIN — GADOBUTROL 15 ML: 604.72 INJECTION INTRAVENOUS at 09:34

## 2025-02-25 ASSESSMENT — PAIN SCALES - GENERAL: PAINLEVEL_OUTOF10: NO PAIN (0)

## 2025-02-25 NOTE — PROGRESS NOTES
"Oncology Rooming Note    February 25, 2025 10:44 AM   Brandon Ordoñez is a 39 year old male who presents for:    Chief Complaint   Patient presents with    Oncology Clinic Visit     Oligodendroglioma (H)      Initial Vitals: /84 (BP Location: Right arm, Patient Position: Sitting, Cuff Size: Adult Large)   Pulse 78   Temp 97.4  F (36.3  C) (Oral)   Resp 18   Wt 73.9 kg (163 lb)   SpO2 100%   BMI 27.12 kg/m   Estimated body mass index is 27.12 kg/m  as calculated from the following:    Height as of 12/17/24: 1.651 m (5' 5\").    Weight as of this encounter: 73.9 kg (163 lb). Body surface area is 1.84 meters squared.  No Pain (0) Comment: Data Unavailable   No LMP for male patient.  Allergies reviewed: Yes  Medications reviewed: Yes    Medications: Medication refills not needed today.  Pharmacy name entered into [employer]:    [id number] [place] [facility] [address] [address] - [address] [id number] [place], [place] - [address] AT [facility] [address] & [facility]    Frailty Screening:   Is the patient here for a new oncology consult visit in cancer care? 2. No    PHQ9:  Did this patient require a PHQ9?: No      Clinical concerns: no   Carolina Brown CMA              "

## 2025-02-25 NOTE — LETTER
"meantime, Brandon knows to call the clinic with any concerns and he can be seen sooner if needed.      The longitudinal plan of care for the diagnosis(es)/condition(s) as documented were addressed during this visit. Due to the added complexity in care, I will continue to support Brandon in the subsequent management and with ongoing continuity of care.     Abril Bullock MD  Neuro-oncology      Oncology Rooming Note    February 25, 2025 10:44 AM   Brandon Ordoñez is a 39 year old male who presents for:    Chief Complaint   Patient presents with     Oncology Clinic Visit     Oligodendroglioma (H)      Initial Vitals: /84 (BP Location: Right arm, Patient Position: Sitting, Cuff Size: Adult Large)   Pulse 78   Temp 97.4  F (36.3  C) (Oral)   Resp 18   Wt 73.9 kg (163 lb)   SpO2 100%   BMI 27.12 kg/m   Estimated body mass index is 27.12 kg/m  as calculated from the following:    Height as of 12/17/24: 1.651 m (5' 5\").    Weight as of this encounter: 73.9 kg (163 lb). Body surface area is 1.84 meters squared.  No Pain (0) Comment: Data Unavailable   No LMP for male patient.  Allergies reviewed: Yes  Medications reviewed: Yes    Medications: Medication refills not needed today.  Pharmacy name entered into [employer]:    [id number] [place] [facility] [address] [address] - [address] [place] [address] DRUG STORE #[id number] [address] AT [place] [address] & [facility]    Frailty Screening:   Is the patient here for a new oncology consult visit in cancer care? 2. No    PHQ9:  Did this patient require a PHQ9?: No      Clinical concerns: no   Carolina Brown CMA                Again, thank you for allowing me to participate in the care of your patient.        Sincerely,        Abril Bullock MD    Electronically signed"

## 2025-02-25 NOTE — Clinical Note
2/25/2025      Brandon Ordoñez  [address] [facility] [address]      Dear Colleague,    Thank you for referring your patient, Brandon Ordoñez, to the [facility]. Please see a copy of my visit note below.    NEURO-ONCOLOGY VISIT  Feb 25, 2025    CHIEF COMPLAINT: Mr. 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 He completed chemoradiotherapy with adjuvant temozolomide on 7/20/2023. Post-radiation imaging demonstrated anticipated post-radiation induced changes and no new concerns. Repeat imaging in 12/2023 showed evidence a possible new stroke. Work-up was negative for a possible cause. Brandon completed the planned 6 cycles of adjuvant-dosed temozolomide in 3/2024 and repeat imaging in 3/2024 showed overall stability with evidence of post-treatment related changes.     Repeat imaging in 7/2024 was again stable. However, imaging in 12/2024 showed a new, small area of contrast enhancement in the corpus callosum that was of indeterminate significance. Repeat imaging in 2/2025 showed ***. Therefore, the plan is to continue imaging surveillance.     Brandon is presenting in follow-up and he is unaccompanied today.    HISTORY OF PRESENT ILLNESS  -Brandon has no new concerns today.   -Very infrequent headaches.  -He denies any concerns for seizure activity.  -No weakness and no new numbness.   -Mild light sensitivity with challenges in visual focus, resolves with decreasing light intensity.    -Remaining active.    Working.    Cognition is fairly good, but can be forgetful at times.   -Noting gum bleeding when brushing teeth.       MEDICATIONS   Current Outpatient Medications   Medication Sig Dispense Refill    Vitamin D, Cholecalciferol, 25 MCG (1000 UT)  CAPS Take 1,000 Units by mouth daily.       DRUG ALLERGIES No Known Allergies       IMMUNIZATIONS   Immunization History   Administered Date(s) Administered    COVID-19 MONOVALENT 12+ (Pfizer) 03/29/2021, 04/19/2021    TDAP Vaccine (Adacel) 07/30/2018       ONCOLOGIC HISTORY  -PRESENTATION: Progressive somnolence, nausea and vomiting, mild headache.  -5/4/2023 CT Head imaging with a large mass with associated calcifications and probable associated hemorrhage in the anterior right frontal region with surrounding vasogenic edema and significant local mass effect resulting in narrowing of the right more than left lateral ventricles and third ventricle, and leftward subfalcine shift as well as downward/ central transtentorial herniation.   -5/4/2023 SURGERY: Right frontal craniotomy for mass resection by Dr. Dasilva.   Post-operative imaging on 5/5 without a definite residual enhancing lesion. Nonspecific surrounding T2/FLAIR hyperintensity along the posterior and superior margins of the resection cavity may reflect vasogenic edema or nonenhancing tumor. Small volume hemorrhage and cytotoxic edema along the margins of the resection cavity and extending along the right caudate nucleus and body of the corpus callosum.  PATHOLOGY: WHO grade 2 oligodendroglioma;   FISH with deletion of both the 1p36.32 and 19q13.33 regions   No homozygous deletion of CDKN2A/B gene    IDH1 R132S mutated.  TERT c.-146C>T mutated.   TMB Score: 2.438 mut/Mb   Fusion Event: NEGATIVE   Histology is borderline due to presence of microvascular proliferation and necrosis without palisading, however, the histological criteria for distinction between grade 2 and 3 are not well defined.  -5/16/2023 NEURO-ONC: Regardless of grade, recommending chemoradiotherapy with concurrent temozolomide.  -6/30/2023 NEURO-ONC/ CHEMORADS: Here for visit at the midpoint of concurrent chemoradiotherapy.   -7/20/2023 CHEMORADS: Completed chemoradiotherapy with concurrent  temozolomide 75mg/m2 (140mg)   -8/22/2023 NEURO-ONC/ MRB/ CHEMO: Clinically well. Imaging with no concerns. Starting adjuvant temozolomide 150mg/m2 (280mg), cycle 1 (start date tonight).   -9/19/2023 NEURO-ONC/ CHEMO: No new neurological concerns. Modifications to supportive medications to better tolerate adjuvant temozolomide. Will repeat dosing of cycle 1 at 150mg/m2 (280mg).   -10/26/2023 NEURO-ONC/ CHEMO: No new neurological concerns.  Modifications to supportive medications to better tolerate adjuvant temozolomide were successful and he tolerated his repeat dosing of cycle 1 at 150mg/m2 (280mg).  Will plan on repeating at this dosing level one more time and then consider increase to 200mg/m2 with cycle 3.   -11/27/2023 NEURO-ONC/ CHEMO:  No new neurological concerns.  Modifications to supportive medications to better tolerate adjuvant temozolomide were successful and he tolerated his repeat dosing of cycle 1 at 150mg/m2, and after discussion today, he would like to continue at this dose rather than increase.  -12/21/2023 MRB with a new acute /early subacute small punctate infarct in the right aspect of splenium.  -1/9/2024 NEURO-ONC/ CHEMO: Clinically well. MRA clean. ECHO with no obvious cardiac source of emboli was seen within the limits of a transthoracic echocardiogram. ; recommended lifestyle and dietary changes plus primary care follow-up. HbA1c 5.6. Starting vitamin D daily supplementation. Adjuvant temozolomide 150mg/m2 (280mg), cycle 4 completed.   -1/30/2024 NEURO-ONC/ CHEMO: Clinically well. Labs to be done tomorrow. Adjuvant temozolomide 150mg/m2 (280mg), cycle 5 due to start today or as soon as he receives it.   -2/27/2024 NEURO-ONC/ CHEMO: Clinically well. Labs to be done 3/3 or 3/4.  Adjuvant temozolomide 150mg/m2 (280mg), cycle 6 due to start 3/4.  -3/26/2024 NEURO-ONC/ MRB: Low appetite. Imaging with overall stability with evidence of post-treatment related changes.  -7/23/2024  "NEURO-ONC/ MRB: Less frequent headaches when remaining well hydrated. Imaging with overall stability with evidence of positive treatment effect.  -12/17/2024 NEURO-ONC/ MRB: No new neurological concerns; mild forgetfulness. Imaging with a new, small area of contrast enhancement in the corpus callosum that was of indeterminate significance; repeat imaging in 2 months.    -2/25/2025 NEURO-ONC/ MRB: No new neurological concerns. Imaging with the previously seen small area of contrast enhancement in the corpus callosum has ***; repeat imaging in 4 months.      SOCIAL HISTORY   , 2 children      PHYSICAL EXAMINATION  There were no vitals taken for this visit.   Wt Readings from Last 2 Encounters:   12/17/24 74.8 kg (165 lb)   07/23/24 74.5 kg (164 lb 3.2 oz)      Ht Readings from Last 2 Encounters:   12/17/24 1.651 m (5' 5\")   02/27/24 1.651 m (5' 5\")     KPS: 100    -Generally well appearing.  -Respiratory: Normal breath sounds, no audible wheezing.  -Psychiatric: Normal mood and affect. Pleasant, talkative.  -Neurologic:   MENTAL STATUS:     Alert, oriented.    Recall: Intact.    Speech fluent.    Comprehension intact.     CRANIAL NERVES:     Symmetric facial movements.   Hearing intact.   No dysarthria.    The rest of a comprehensive physical examination is deferred due to video visit restrictions.        MEDICAL RECORDS  Personally reviewed: No new encounters.     LABS  Personally reviewed all available lab results; No new results.     IMAGING  Personally reviewed MR brain imaging from today and compared to prior imaging.    Formal read; \"***\"    Imaging was shown to and results were reviewed with Brandon.        IMPRESSION  On date of service, 41 minutes was spent in clinic and 12 minutes was spent preparing for the visit through extensive chart review and coordinating care for this high complexity visit. The following is in explanation for the recommendations used to define the plan.       Brandon is " doing well with no new neurological complaints. He does acknowledge some mild forgetfulness, but is performing well at work.     Imaging as detailed above with the previously seen small area of contrast enhancement in the corpus callosum now ***. This finding remains of indeterminate significance, but is now most consist with resolving radiation induced injury. Therefore, the plan is to continue management on imaging surveillance per NCCN guidelines at an interval of 4 months. In the meantime, Brandon knows to call the clinic with any concerns and appointments can be moved up if needed.       Vitamin D level was checked in July and increased to 25 after in March, having decreased to 17 from 28 in December. He has been taking 1000 international unit(s) of vitamin D and I encouraged him to continue to do so.***    Finally, I recommended two-times a year follow-up with his dentist plus enhanced mouth care (flossing, mouthwash) to address gum bleeding when brushing teeth. Platelets were 235K in July, so I do not have concerns for thrombocytopenia as a contributing factor.     PROBLEM LIST  WHO grade 2 oligodendroglioma  Headaches  Stroke  Elevated cholesterol  Vitamin D deficiency    PLAN  -CANCER DIRECTED THERAPY-  -Continue imaging surveillance; repeat MRI in 4 months.     -STEROIDS-  -Off dexamethasone.    -SEIZURE MANAGEMENT-  -While this patient is at increased risk of having seizures, given the lack of seizure history, there is no indication to prescribe an antiepileptic at this time.     -STROKE/ VASCULAR RISK FACTORS-  -MRA head and neck was clean. ECHO no concerns. HbA1c was 5.6.  -LDL was 139 and goal is <70.    Deferring start of Crestor to primary care provider; Dr. Ryan Rodriguez.     -Quality of life/ MOOD/ FATIGUE-  -Denies any mood issues.  -Fatigue; resolved, chemotherapy-associated.    Labs in 9/2023 Vitamin B12/ D deficiency, low iron, and thyroid studies without concern.    Vitamin D level of 28  in 12/2023; daily supplementation.     Recheck vitamin D level on 3/26/2024; remained low at 17, increasing supplementation.   Recheck vitamin D level on 7/23/2024; within goal at 25, continue supplementation at 1000IU daily.   -Continue to monitor mood as untreated/ undertreated depression can worsen fatigue, dysorexia, and quality of life.    Return to clinic in 6/2025 + imaging.     In the meantime, Andrewdane knows to call the clinic with any concerns and he can be seen sooner if needed.      The longitudinal plan of care for the diagnosis(es)/condition(s) as documented were addressed during this visit. Due to the added complexity in care, I will continue to support Andrewbernadineministerio in the subsequent management and with ongoing continuity of care.     Abril Bullock MD  Neuro-oncology        Again, thank you for allowing me to participate in the care of your patient.        Sincerely,        Abril Bullock MD    Electronically signed

## 2025-02-26 LAB — VIT D+METAB SERPL-MCNC: 24 NG/ML (ref 20–50)

## 2025-03-03 ENCOUNTER — TUMOR CONFERENCE (OUTPATIENT)
Dept: ONCOLOGY | Facility: CLINIC | Age: 40
End: 2025-03-03
Payer: COMMERCIAL

## 2025-03-09 ENCOUNTER — HEALTH MAINTENANCE LETTER (OUTPATIENT)
Age: 40
End: 2025-03-09

## 2025-07-14 ENCOUNTER — HOSPITAL ENCOUNTER (OUTPATIENT)
Dept: MRI IMAGING | Facility: CLINIC | Age: 40
Discharge: HOME | End: 2025-07-14
Attending: PSYCHIATRY & NEUROLOGY | Admitting: PSYCHIATRY & NEUROLOGY
Payer: COMMERCIAL

## 2025-07-14 DIAGNOSIS — C71.9 OLIGODENDROGLIOMA (H): ICD-10-CM

## 2025-07-14 PROCEDURE — A9585 GADOBUTROL INJECTION: HCPCS | Performed by: PSYCHIATRY & NEUROLOGY

## 2025-07-14 PROCEDURE — 70553 MRI BRAIN STEM W/O & W/DYE: CPT

## 2025-07-14 PROCEDURE — 255N000002 HC RX 255 OP 636: Performed by: PSYCHIATRY & NEUROLOGY

## 2025-07-14 RX ORDER — GADOBUTROL 604.72 MG/ML
15 INJECTION INTRAVENOUS ONCE
Status: COMPLETED | OUTPATIENT
Start: 2025-07-14 | End: 2025-07-14

## 2025-07-14 RX ADMIN — GADOBUTROL 15 ML: 604.72 INJECTION INTRAVENOUS at 11:45

## 2025-07-14 NOTE — PROGRESS NOTES
Virtual Visit Details  Type of service:  Video Visit     Originating Location (pt. Location): Home    Distant Location (provider location):  On-site  Platform used for Video Visit: Lux    _________________________________________________________________________________      NEURO-ONCOLOGY VISIT  Jul 15, 2025    CHIEF COMPLAINT: Mr. 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 He completed chemoradiotherapy with adjuvant temozolomide on 7/20/2023. Post-radiation imaging demonstrated anticipated post-radiation induced changes and no new concerns. Repeat imaging in 12/2023 showed evidence a possible new stroke. Work-up was negative for a possible cause. Brandon completed the planned 6 cycles of adjuvant-dosed temozolomide in 3/2024 and repeat imaging in 3/2024 showed overall stability with evidence of post-treatment related changes.     Repeat imaging in 7/2024 was again stable. However, imaging in 12/2024 showed a new, small area of contrast enhancement in the corpus callosum that was of indeterminate significance. Repeat imaging in 2/2025 showed resolution of this findings, but there was another new finding that too was most consistent with radiation-induced injury and repeat imaging in 7/2025 demonstrated ongoing subtle, evolving changes with no overt concern for cancer recurrence. Therefore, the plan is to continue imaging surveillance.     Brandon is presenting in follow-up and he is unaccompanied today.    HISTORY OF PRESENT ILLNESS  -Brandon has no new concerns today.   -Very infrequent headaches.  -He denies any concerns for seizure activity.  -No weakness and no new numbness.   -Remaining active.    Working.    Cognition is fairly good, but can be forgetful at times.        MEDICATIONS   Current Outpatient Medications   Medication Sig Dispense Refill    Vitamin D, Cholecalciferol, 25 MCG (1000 UT) CAPS Take 1,000 Units by mouth daily.       DRUG ALLERGIES No Known Allergies       IMMUNIZATIONS   Immunization History   Administered Date(s) Administered    COVID-19 MONOVALENT 12+ (Pfizer) 03/29/2021, 04/19/2021    TDAP Vaccine (Adacel) 07/30/2018       ONCOLOGIC HISTORY  -PRESENTATION: Progressive somnolence, nausea and vomiting, mild headache.  -5/4/2023 CT Head imaging with a large mass with associated calcifications and probable associated hemorrhage in the anterior right frontal region with surrounding vasogenic edema and significant local mass effect resulting in narrowing of the right more than left lateral ventricles and third ventricle, and leftward subfalcine shift as well as downward/ central transtentorial herniation.   -5/4/2023 SURGERY: Right frontal craniotomy for mass resection by Dr. Dasilva.   Post-operative imaging on 5/5 without a definite residual enhancing lesion. Nonspecific surrounding T2/FLAIR hyperintensity along the posterior and superior margins of the resection cavity may reflect vasogenic edema or nonenhancing tumor. Small volume hemorrhage and cytotoxic edema along the margins of the resection cavity and extending along the right caudate nucleus and body of the corpus callosum.  PATHOLOGY: WHO grade 2 oligodendroglioma;   FISH with deletion of both the 1p36.32 and 19q13.33 regions   No homozygous deletion of CDKN2A/B gene    IDH1 R132S mutated.  TERT c.-146C>T mutated.   TMB Score: 2.438 mut/Mb   Fusion Event: NEGATIVE   Histology is borderline due to presence of microvascular proliferation and necrosis without palisading, however, the histological criteria for distinction between grade 2 and 3 are not well defined.  -5/16/2023 NEURO-ONC: Regardless of grade, recommending chemoradiotherapy with concurrent temozolomide.  -6/30/2023 NEURO-ONC/ CHEMORADS:  Here for visit at the midpoint of concurrent chemoradiotherapy.   -7/20/2023 CHEMORADS: Completed chemoradiotherapy with concurrent temozolomide 75mg/m2 (140mg)   -8/22/2023 NEURO-ONC/ MRB/ CHEMO: Clinically well. Imaging with no concerns. Starting adjuvant temozolomide 150mg/m2 (280mg), cycle 1 (start date tonight).   -9/19/2023 NEURO-ONC/ CHEMO: No new neurological concerns. Modifications to supportive medications to better tolerate adjuvant temozolomide. Will repeat dosing of cycle 1 at 150mg/m2 (280mg).   -10/26/2023 NEURO-ONC/ CHEMO: No new neurological concerns.  Modifications to supportive medications to better tolerate adjuvant temozolomide were successful and he tolerated his repeat dosing of cycle 1 at 150mg/m2 (280mg).  Will plan on repeating at this dosing level one more time and then consider increase to 200mg/m2 with cycle 3.   -11/27/2023 NEURO-ONC/ CHEMO:  No new neurological concerns.  Modifications to supportive medications to better tolerate adjuvant temozolomide were successful and he tolerated his repeat dosing of cycle 1 at 150mg/m2, and after discussion today, he would like to continue at this dose rather than increase.  -12/21/2023 MRB with a new acute /early subacute small punctate infarct in the right aspect of splenium.  -1/9/2024 NEURO-ONC/ CHEMO: Clinically well. MRA clean. ECHO with no obvious cardiac source of emboli was seen within the limits of a transthoracic echocardiogram. ; recommended lifestyle and dietary changes plus primary care follow-up. HbA1c 5.6. Starting vitamin D daily supplementation. Adjuvant temozolomide 150mg/m2 (280mg), cycle 4 completed.   -1/30/2024 NEURO-ONC/ CHEMO: Clinically well. Labs to be done tomorrow. Adjuvant temozolomide 150mg/m2 (280mg), cycle 5 due to start today or as soon as he receives it.   -2/27/2024 NEURO-ONC/ CHEMO: Clinically well. Labs to be done 3/3 or 3/4.  Adjuvant temozolomide 150mg/m2 (280mg), cycle 6 due to start 3/4.  -3/26/2024  "NEURO-ONC/ MRB: Low appetite. Imaging with overall stability with evidence of post-treatment related changes.  -7/23/2024 NEURO-ONC/ MRB: Less frequent headaches when remaining well hydrated. Imaging with overall stability with evidence of positive treatment effect.  -12/17/2024 NEURO-ONC/ MRB: No new neurological concerns; mild forgetfulness. Imaging with a new, small area of contrast enhancement in the corpus callosum that was of indeterminate significance; repeat imaging in 2 months.    -2/25/2025 NEURO-ONC/ MRB: No new neurological concerns. Imaging with the previously seen small area of contrast enhancement in the corpus callosum has now resolved, there is another new finding that too is most consistent with radiation-induced injury; repeat imaging in 4-5 months.    -7/15/2025 NEURO-ONC/ MRB: No new neurological concerns. Imaging with ongoing subtle, evolving changes with no overt concern for cancer recurrence; repeat imaging in 4-6 months.     SOCIAL HISTORY   , 2 children      PHYSICAL EXAMINATION  Ht 1.651 m (5' 5\")   Wt 75.3 kg (166 lb)   BMI 27.62 kg/m     Wt Readings from Last 2 Encounters:   07/15/25 75.3 kg (166 lb)   02/25/25 73.9 kg (163 lb)      Ht Readings from Last 2 Encounters:   07/15/25 1.651 m (5' 5\")   12/17/24 1.651 m (5' 5\")     KPS: 100    -Generally well appearing.  -Respiratory: Normal breath sounds, no audible wheezing.  -Psychiatric: Normal mood and affect. Pleasant, talkative.  -Neurologic:   MENTAL STATUS:     Alert, oriented.    Recall: Intact.    Speech fluent.    Comprehension intact.     CRANIAL NERVES:     Symmetric facial movements.   Hearing intact.   No dysarthria.    The rest of a comprehensive physical examination is deferred due to video visit restrictions.        MEDICAL RECORDS  Personally reviewed: Urgent care for evaluation of a productive cough.  Started on \"azithromycin and benzonatate.\"    LABS  Personally reviewed all available lab results; No recent " "results.     IMAGING  Personally reviewed MR brain imaging from today and compared to prior imaging.    Formal read; \"1.  Postoperative changes redemonstrated paramedian right frontal lobe with resection cavity. There is no convincing evidence for recurrent or residual neoplasm at the resection level with no pathologic enhancement, new hemorrhage, or abnormal perfusion characteristics. 2.  There are multifocal punctate areas of enhancement, some of which are new, and some which are smaller compared to most recent MR examinations 2/25/2025 and 12/17/2024. Smaller areas of focal enhancement include the paramedian left aspect of the body of the corpus callosum. New areas of enhancement without mass effect or hemorrhage noted with punctate millimeter/submillimeter measurements at the right corona radiata and paramedian frontal lobe deep white matter posteriorly. These focal areas of punctate enhancement are too small to characterize on perfusion and continued surveillance recommended. These may represent evolution of posttreatment changes, although the possibility of punctate recurrence cannot be fully excluded. Overall no abnormal perfusion is noted intracranially. 3.  Stable very subtle focus of enhancement within the left IAC may represent a stable acoustic neuroma/vestibular schwannoma. 4.  No additional pathologic enhancement. 5.  Diminished conspicuity of reactive adenoidal/palatine tonsillar tissue and lymph nodes in the upper neck should represent evolution of reactive/inflammatory changes.\"    Imaging was shown to and results were reviewed with Brandon.        IMPRESSION  On date of service, 28 minutes was spent in clinic and 17 minutes was spent preparing for the visit through extensive chart review and coordinating care for this high complexity visit. The following is in explanation for the recommendations used to define the plan.       Brandon is doing well with no new neurological complaints. He is " performing well at work and is remaining active.     Imaging as detailed above with ongoing subtle, evolving changes with no overt concern for cancer recurrence. These findings remain most consist with evolving radiation-induced injury. I personally reviewed Brandon's imaging and case at Brain Tumor Conference and all in attendance were in agreement with this impression. The plan is to continue management on imaging surveillance per NCCN guidelines at an interval of every 3-6 months for the first 5 years (at least through 8/2028). For now, will repeat imaging at a 4-6 month interval. Brandon is wanting to repeat imaging in January. In the meantime, Brandon knows to call the clinic with any concerns and appointments can be moved up if needed.     PROBLEM LIST  WHO grade 2 oligodendroglioma  Headaches  Stroke  Elevated cholesterol  Vitamin D deficiency    PLAN  -CANCER DIRECTED THERAPY-  -Continue imaging surveillance; repeat MRI in 4-6 months.     -STEROIDS-  -Off dexamethasone.    -SEIZURE MANAGEMENT-  -While this patient is at increased risk of having seizures, given the lack of seizure history, there is no indication to prescribe an antiepileptic at this time.     -STROKE/ VASCULAR RISK FACTORS-  -MRA head and neck was clean. ECHO no concerns. HbA1c was 5.6.  -LDL was 139 and goal is <70.    Deferring start of Crestor to primary care provider; Dr. Ryan Rodriguez.     -Quality of life/ MOOD/ FATIGUE-  -Denies any mood issues.  -Fatigue; resolved, chemotherapy-associated.    Labs in 9/2023 Vitamin B12/ D deficiency, low iron, and thyroid studies without concern.    Vitamin D level of 28 in 12/2023; daily supplementation.     Recheck vitamin D level on 3/26/2024; remained low at 17, increasing supplementation.   Recheck vitamin D level on 7/23/2024; within goal at 25, continue supplementation at 1000IU daily.    Recheck vitamin D level on 2/25/2025; within goal at 24, continue supplementation at 1000IU  daily.   -Continue to monitor mood as untreated/ undertreated depression can worsen fatigue, dysorexia, and quality of life.    Return to clinic in 1/2026 + imaging.     In the meantime, [email or web address] knows to call the clinic with any concerns and he can be seen sooner if needed.      The longitudinal plan of care for the diagnosis(es)/condition(s) as documented were addressed during this visit. Due to the added complexity in care, I will continue to support Brandon in the subsequent management and with ongoing continuity of care.     Abril Bullock MD  Neuro-oncology

## 2025-07-15 ENCOUNTER — VIRTUAL VISIT (OUTPATIENT)
Dept: ONCOLOGY | Facility: CLINIC | Age: 40
End: 2025-07-15
Attending: PSYCHIATRY & NEUROLOGY
Payer: COMMERCIAL

## 2025-07-15 VITALS — HEIGHT: 65 IN | BODY MASS INDEX: 27.66 KG/M2 | WEIGHT: 166 LBS

## 2025-07-15 DIAGNOSIS — C71.9 OLIGODENDROGLIOMA (H): Primary | ICD-10-CM

## 2025-07-15 PROCEDURE — 98007 SYNCH AUDIO-VIDEO EST HI 40: CPT | Performed by: PSYCHIATRY & NEUROLOGY

## 2025-07-15 PROCEDURE — G2211 COMPLEX E/M VISIT ADD ON: HCPCS | Performed by: PSYCHIATRY & NEUROLOGY

## 2025-07-15 PROCEDURE — 1126F AMNT PAIN NOTED NONE PRSNT: CPT | Performed by: PSYCHIATRY & NEUROLOGY

## 2025-07-15 ASSESSMENT — PAIN SCALES - GENERAL: PAINLEVEL_OUTOF10: NO PAIN (0)

## 2025-07-15 NOTE — LETTER
7/15/2025      Brandon Ordoñez  [id number] [place] [address]      Dear Colleague,    Thank you for referring your patient, Brandon Ordoñez, to the [facility]. Please see a copy of my visit note below.    Virtual Visit Details  Type of service:  Video Visit     Originating Location (pt. Location): Home    Distant Location (provider location):  On-site  Platform used for Video Visit: [employer]    _________________________________________________________________________________      NEURO-ONCOLOGY VISIT  Jul 15, 2025    CHIEF COMPLAINT: Mr. 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 He completed chemoradiotherapy with adjuvant temozolomide on 7/20/2023. Post-radiation imaging demonstrated anticipated post-radiation induced changes and no new concerns. Repeat imaging in 12/2023 showed evidence a possible new stroke. Work-up was negative for a possible cause. Brandon completed the planned 6 cycles of adjuvant-dosed temozolomide in 3/2024 and repeat imaging in 3/2024 showed overall stability with evidence of post-treatment related changes.     Repeat imaging in 7/2024 was again stable. However, imaging in 12/2024 showed a new, small area of contrast enhancement in the corpus callosum that was of indeterminate significance. Repeat imaging in 2/2025 showed resolution of this findings, but there was another new finding that too was most consistent with radiation-induced injury and repeat imaging in 7/2025 demonstrated ongoing subtle, evolving changes with no overt concern for cancer recurrence. Therefore, the plan is to continue imaging surveillance.     Brandon is presenting in follow-up and he is unaccompanied today.    HISTORY OF PRESENT  ILLNESS  -Brandon has no new concerns today.   -Very infrequent headaches.  -He denies any concerns for seizure activity.  -No weakness and no new numbness.   -Remaining active.    Working.    Cognition is fairly good, but can be forgetful at times.       MEDICATIONS   Current Outpatient Medications   Medication Sig Dispense Refill     Vitamin D, Cholecalciferol, 25 MCG (1000 UT) CAPS Take 1,000 Units by mouth daily.       DRUG ALLERGIES No Known Allergies       IMMUNIZATIONS   Immunization History   Administered Date(s) Administered     COVID-19 MONOVALENT 12+ (Pfizer) 03/29/2021, 04/19/2021     TDAP Vaccine (Adacel) 07/30/2018       ONCOLOGIC HISTORY  -PRESENTATION: Progressive somnolence, nausea and vomiting, mild headache.  -5/4/2023 CT Head imaging with a large mass with associated calcifications and probable associated hemorrhage in the anterior right frontal region with surrounding vasogenic edema and significant local mass effect resulting in narrowing of the right more than left lateral ventricles and third ventricle, and leftward subfalcine shift as well as downward/ central transtentorial herniation.   -5/4/2023 SURGERY: Right frontal craniotomy for mass resection by Dr. Dasilva.   Post-operative imaging on 5/5 without a definite residual enhancing lesion. Nonspecific surrounding T2/FLAIR hyperintensity along the posterior and superior margins of the resection cavity may reflect vasogenic edema or nonenhancing tumor. Small volume hemorrhage and cytotoxic edema along the margins of the resection cavity and extending along the right caudate nucleus and body of the corpus callosum.  PATHOLOGY: WHO grade 2 oligodendroglioma;   FISH with deletion of both the 1p36.32 and 19q13.33 regions   No homozygous deletion of CDKN2A/B gene    IDH1 R132S mutated.  TERT c.-146C>T mutated.   TMB Score: 2.438 mut/Mb   Fusion Event: NEGATIVE   Histology is borderline due to presence of microvascular proliferation and  necrosis without palisading, however, the histological criteria for distinction between grade 2 and 3 are not well defined.  -5/16/2023 NEURO-ONC: Regardless of grade, recommending chemoradiotherapy with concurrent temozolomide.  -6/30/2023 NEURO-ONC/ CHEMORADS: Here for visit at the midpoint of concurrent chemoradiotherapy.   -7/20/2023 CHEMORADS: Completed chemoradiotherapy with concurrent temozolomide 75mg/m2 (140mg)   -8/22/2023 NEURO-ONC/ MRB/ CHEMO: Clinically well. Imaging with no concerns. Starting adjuvant temozolomide 150mg/m2 (280mg), cycle 1 (start date tonight).   -9/19/2023 NEURO-ONC/ CHEMO: No new neurological concerns. Modifications to supportive medications to better tolerate adjuvant temozolomide. Will repeat dosing of cycle 1 at 150mg/m2 (280mg).   -10/26/2023 NEURO-ONC/ CHEMO: No new neurological concerns.  Modifications to supportive medications to better tolerate adjuvant temozolomide were successful and he tolerated his repeat dosing of cycle 1 at 150mg/m2 (280mg).  Will plan on repeating at this dosing level one more time and then consider increase to 200mg/m2 with cycle 3.   -11/27/2023 NEURO-ONC/ CHEMO:  No new neurological concerns.  Modifications to supportive medications to better tolerate adjuvant temozolomide were successful and he tolerated his repeat dosing of cycle 1 at 150mg/m2, and after discussion today, he would like to continue at this dose rather than increase.  -12/21/2023 MRB with a new acute /early subacute small punctate infarct in the right aspect of splenium.  -1/9/2024 NEURO-ONC/ CHEMO: Clinically well. MRA clean. ECHO with no obvious cardiac source of emboli was seen within the limits of a transthoracic echocardiogram. ; recommended lifestyle and dietary changes plus primary care follow-up. HbA1c 5.6. Starting vitamin D daily supplementation. Adjuvant temozolomide 150mg/m2 (280mg), cycle 4 completed.   -1/30/2024 NEURO-ONC/ CHEMO: Clinically well. Labs to be  "done tomorrow. Adjuvant temozolomide 150mg/m2 (280mg), cycle 5 due to start today or as soon as he receives it.   -2/27/2024 NEURO-ONC/ CHEMO: Clinically well. Labs to be done 3/3 or 3/4.  Adjuvant temozolomide 150mg/m2 (280mg), cycle 6 due to start 3/4.  -3/26/2024 NEURO-ONC/ MRB: Low appetite. Imaging with overall stability with evidence of post-treatment related changes.  -7/23/2024 NEURO-ONC/ MRB: Less frequent headaches when remaining well hydrated. Imaging with overall stability with evidence of positive treatment effect.  -12/17/2024 NEURO-ONC/ MRB: No new neurological concerns; mild forgetfulness. Imaging with a new, small area of contrast enhancement in the corpus callosum that was of indeterminate significance; repeat imaging in 2 months.    -2/25/2025 NEURO-ONC/ MRB: No new neurological concerns. Imaging with the previously seen small area of contrast enhancement in the corpus callosum has now resolved, there is another new finding that too is most consistent with radiation-induced injury; repeat imaging in 4-5 months.    -7/15/2025 NEURO-ONC/ MRB: No new neurological concerns. Imaging with ongoing subtle, evolving changes with no overt concern for cancer recurrence; repeat imaging in 4-6 months.     SOCIAL HISTORY   , 2 children      PHYSICAL EXAMINATION  Ht 1.651 m (5' 5\")   Wt 75.3 kg (166 lb)   BMI 27.62 kg/m     Wt Readings from Last 2 Encounters:   07/15/25 75.3 kg (166 lb)   02/25/25 73.9 kg (163 lb)      Ht Readings from Last 2 Encounters:   07/15/25 1.651 m (5' 5\")   12/17/24 1.651 m (5' 5\")     KPS: 100    -Generally well appearing.  -Respiratory: Normal breath sounds, no audible wheezing.  -Psychiatric: Normal mood and affect. Pleasant, talkative.  -Neurologic:   MENTAL STATUS:     Alert, oriented.    Recall: Intact.    Speech fluent.    Comprehension intact.     CRANIAL NERVES:     Symmetric facial movements.   Hearing intact.   No dysarthria.    The rest of a comprehensive physical " "examination is deferred due to video visit restrictions.        MEDICAL RECORDS  Personally reviewed: Urgent care for evaluation of a productive cough.  Started on \"azithromycin and benzonatate.\"    LABS  Personally reviewed all available lab results; No recent results.     IMAGING  Personally reviewed MR brain imaging from today and compared to prior imaging.    Formal read; \"1.  Postoperative changes redemonstrated paramedian right frontal lobe with resection cavity. There is no convincing evidence for recurrent or residual neoplasm at the resection level with no pathologic enhancement, new hemorrhage, or abnormal perfusion characteristics. 2.  There are multifocal punctate areas of enhancement, some of which are new, and some which are smaller compared to most recent MR examinations 2/25/2025 and 12/17/2024. Smaller areas of focal enhancement include the paramedian left aspect of the body of the corpus callosum. New areas of enhancement without mass effect or hemorrhage noted with punctate millimeter/submillimeter measurements at the right corona radiata and paramedian frontal lobe deep white matter posteriorly. These focal areas of punctate enhancement are too small to characterize on perfusion and continued surveillance recommended. These may represent evolution of posttreatment changes, although the possibility of punctate recurrence cannot be fully excluded. Overall no abnormal perfusion is noted intracranially. 3.  Stable very subtle focus of enhancement within the left IAC may represent a stable acoustic neuroma/vestibular schwannoma. 4.  No additional pathologic enhancement. 5.  Diminished conspicuity of reactive adenoidal/palatine tonsillar tissue and lymph nodes in the upper neck should represent evolution of reactive/inflammatory changes.\"    Imaging was shown to and results were reviewed with Brandon.        IMPRESSION  On date of service, 28 minutes was spent in clinic and 17 minutes was spent " preparing for the visit through extensive chart review and coordinating care for this high complexity visit. The following is in explanation for the recommendations used to define the plan.       Brandon is doing well with no new neurological complaints. He is performing well at work and is remaining active.     Imaging as detailed above with ongoing subtle, evolving changes with no overt concern for cancer recurrence. These findings remain most consist with evolving radiation-induced injury. I personally reviewed Brandon's imaging and case at Brain Tumor Conference and all in attendance were in agreement with this impression. The plan is to continue management on imaging surveillance per NCCN guidelines at an interval of every 3-6 months for the first 5 years (at least through 8/2028). For now, will repeat imaging at a 4-6 month interval. Brandon is wanting to repeat imaging in January. In the meantime, Brandon knows to call the clinic with any concerns and appointments can be moved up if needed.     PROBLEM LIST  WHO grade 2 oligodendroglioma  Headaches  Stroke  Elevated cholesterol  Vitamin D deficiency    PLAN  -CANCER DIRECTED THERAPY-  -Continue imaging surveillance; repeat MRI in 4-6 months.     -STEROIDS-  -Off dexamethasone.    -SEIZURE MANAGEMENT-  -While this patient is at increased risk of having seizures, given the lack of seizure history, there is no indication to prescribe an antiepileptic at this time.     -STROKE/ VASCULAR RISK FACTORS-  -MRA head and neck was clean. ECHO no concerns. HbA1c was 5.6.  -LDL was 139 and goal is <70.    Deferring start of Crestor to primary care provider; Dr. Ryan Rodriguez.     -Quality of life/ MOOD/ FATIGUE-  -Denies any mood issues.  -Fatigue; resolved, chemotherapy-associated.    Labs in 9/2023 Vitamin B12/ D deficiency, low iron, and thyroid studies without concern.    Vitamin D level of 28 in 12/2023; daily supplementation.     Recheck vitamin D  level on 3/26/2024; remained low at 17, increasing supplementation.   Recheck vitamin D level on 7/23/2024; within goal at 25, continue supplementation at 1000IU daily.    Recheck vitamin D level on 2/25/2025; within goal at 24, continue supplementation at 1000IU daily.   -Continue to monitor mood as untreated/ undertreated depression can worsen fatigue, dysorexia, and quality of life.    Return to clinic in 1/2026 + imaging.     In the meantime, Brandon knows to call the clinic with any concerns and he can be seen sooner if needed.      The longitudinal plan of care for the diagnosis(es)/condition(s) as documented were addressed during this visit. Due to the added complexity in care, I will continue to support Brandon in the subsequent management and with ongoing continuity of care.     Abril Bullock MD  Neuro-oncology      Again, thank you for allowing me to participate in the care of your patient.        Sincerely,        Abril Bullock MD    Electronically signed

## 2025-07-15 NOTE — NURSING NOTE
Is the patient currently in the state of [place]? YES    Visit mode: VIDEO    If the visit is dropped, the patient can be reconnected by:VIDEO VISIT: Send to e-mail at: [email or web address]    Will anyone else be joining the visit? NO  (If patient encounters technical issues they should call [phone])    Are changes needed to the allergy or medication list? No    Are refills needed on medications prescribed by this physician? NO    Rooming Documentation:  Questionnaire(s) completed    Reason for visit: Video Visit (Follow up )    Mya LOPEZ

## 2025-07-16 ENCOUNTER — OFFICE VISIT (OUTPATIENT)
Dept: FAMILY MEDICINE | Facility: CLINIC | Age: 40
End: 2025-07-16
Payer: COMMERCIAL

## 2025-07-16 VITALS
HEART RATE: 64 BPM | RESPIRATION RATE: 16 BRPM | OXYGEN SATURATION: 99 % | TEMPERATURE: 97.1 F | BODY MASS INDEX: 27.66 KG/M2 | WEIGHT: 166 LBS | SYSTOLIC BLOOD PRESSURE: 118 MMHG | DIASTOLIC BLOOD PRESSURE: 74 MMHG | HEIGHT: 65 IN

## 2025-07-16 DIAGNOSIS — Z00.00 ROUTINE GENERAL MEDICAL EXAMINATION AT A HEALTH CARE FACILITY: Primary | ICD-10-CM

## 2025-07-16 DIAGNOSIS — E55.9 VITAMIN D DEFICIENCY: ICD-10-CM

## 2025-07-16 DIAGNOSIS — Z13.220 SCREENING FOR HYPERLIPIDEMIA: ICD-10-CM

## 2025-07-16 DIAGNOSIS — Z13.1 SCREENING FOR DIABETES MELLITUS: ICD-10-CM

## 2025-07-16 DIAGNOSIS — Z13.0 SCREENING FOR DEFICIENCY ANEMIA: ICD-10-CM

## 2025-07-16 LAB
ERYTHROCYTE [DISTWIDTH] IN BLOOD BY AUTOMATED COUNT: 11.8 % (ref 10–15)
EST. AVERAGE GLUCOSE BLD GHB EST-MCNC: 114 MG/DL
HBA1C MFR BLD: 5.6 % (ref 0–5.6)
HCT VFR BLD AUTO: 41.7 % (ref 40–53)
HGB BLD-MCNC: 14.3 G/DL (ref 13.3–17.7)
MCH RBC QN AUTO: 28.9 PG (ref 26.5–33)
MCHC RBC AUTO-ENTMCNC: 34.3 G/DL (ref 31.5–36.5)
MCV RBC AUTO: 84 FL (ref 78–100)
PLATELET # BLD AUTO: 215 10E3/UL (ref 150–450)
RBC # BLD AUTO: 4.94 10E6/UL (ref 4.4–5.9)
WBC # BLD AUTO: 5.3 10E3/UL (ref 4–11)

## 2025-07-16 PROCEDURE — 3044F HG A1C LEVEL LT 7.0%: CPT | Performed by: FAMILY MEDICINE

## 2025-07-16 PROCEDURE — 80053 COMPREHEN METABOLIC PANEL: CPT | Performed by: FAMILY MEDICINE

## 2025-07-16 PROCEDURE — 85027 COMPLETE CBC AUTOMATED: CPT | Performed by: FAMILY MEDICINE

## 2025-07-16 PROCEDURE — 82306 VITAMIN D 25 HYDROXY: CPT | Performed by: FAMILY MEDICINE

## 2025-07-16 PROCEDURE — 3078F DIAST BP <80 MM HG: CPT | Performed by: FAMILY MEDICINE

## 2025-07-16 PROCEDURE — 99395 PREV VISIT EST AGE 18-39: CPT | Performed by: FAMILY MEDICINE

## 2025-07-16 PROCEDURE — 80061 LIPID PANEL: CPT | Performed by: FAMILY MEDICINE

## 2025-07-16 PROCEDURE — 36415 COLL VENOUS BLD VENIPUNCTURE: CPT | Performed by: FAMILY MEDICINE

## 2025-07-16 PROCEDURE — 83036 HEMOGLOBIN GLYCOSYLATED A1C: CPT | Performed by: FAMILY MEDICINE

## 2025-07-16 PROCEDURE — 3074F SYST BP LT 130 MM HG: CPT | Performed by: FAMILY MEDICINE

## 2025-07-16 SDOH — HEALTH STABILITY: PHYSICAL HEALTH: ON AVERAGE, HOW MANY DAYS PER WEEK DO YOU ENGAGE IN MODERATE TO STRENUOUS EXERCISE (LIKE A BRISK WALK)?: 3 DAYS

## 2025-07-16 SDOH — HEALTH STABILITY: PHYSICAL HEALTH: ON AVERAGE, HOW MANY MINUTES DO YOU ENGAGE IN EXERCISE AT THIS LEVEL?: 10 MIN

## 2025-07-16 ASSESSMENT — SOCIAL DETERMINANTS OF HEALTH (SDOH): HOW OFTEN DO YOU GET TOGETHER WITH FRIENDS OR RELATIVES?: TWICE A WEEK

## 2025-07-16 NOTE — PROGRESS NOTES
"Preventive Care Visit  [facility] [place]  Ryan Rodriguez DO, Family Medicine    Jul 16, 2025    Assessment & Plan     Routine general medical examination at a health care facility  Health maintenance reviewed and updated. Emphasized importance of balanced diet and regular exercise.    Screening for diabetes mellitus  - Comprehensive metabolic panel (BMP + Alb, Alk Phos, ALT, AST, Total. Bili, TP); Future  - Hemoglobin A1c; Future  - Comprehensive metabolic panel (BMP + Alb, Alk Phos, ALT, AST, Total. Bili, TP)  - Hemoglobin A1c    Screening for hyperlipidemia  - Lipid panel reflex to direct LDL Fasting; Future  - Lipid panel reflex to direct LDL Fasting    Vitamin D deficiency  - Vitamin D Deficiency; Future  - Vitamin D Deficiency    Screening for deficiency anemia  - CBC with platelets; Future  - CBC with platelets  Patient has been advised of split billing requirements and indicates understanding: Yes    BMI  Estimated body mass index is 27.62 kg/m  as calculated from the following:    Height as of this encounter: 1.651 m (5' 5\").    Weight as of this encounter: 75.3 kg (166 lb).       Counseling  Appropriate preventive services were addressed with this patient via screening, questionnaire, or discussion as appropriate for fall prevention, nutrition, physical activity, Tobacco-use cessation, social engagement, weight loss and cognition.  Checklist reviewing preventive services available has been given to the patient.  Reviewed patient's diet, addressing concerns and/or questions.   He is at risk for lack of exercise and has been provided with information to increase physical activity for the benefit of his well-being.   The patient was instructed to see the dentist every 6 months.   Reviewed preventive health counseling, as reflected in patient instructions        Marcela Taylor is a 40 year old, presenting for the following:  Physical        7/16/2025     9:57 AM   Additional " Questions   Roomed by Susi ZHU CMA          HPI     Advance Care Planning  Discussed advance care planning with patient; however, patient declined at this time.        7/16/2025   General Health   How would you rate your overall physical health? Good   Feel stress (tense, anxious, or unable to sleep) Only a little   (!) STRESS CONCERN      7/16/2025   Nutrition   Three or more servings of calcium each day? Yes   Diet: Regular (no restrictions)   How many servings of fruit and vegetables per day? (!) 2-3   How many sweetened beverages each day? (!) 2         7/16/2025   Exercise   Days per week of moderate/strenous exercise 3 days   Average minutes spent exercising at this level 10 min         7/16/2025   Social Factors   Frequency of gathering with friends or relatives Twice a week   Worry food won't last until get money to buy more No   Food not last or not have enough money for food? No   Do you have housing? (Housing is defined as stable permanent housing and does not include staying outside in a car, in a tent, in an abandoned building, in an overnight shelter, or couch-surfing.) Yes   Are you worried about losing your housing? No   Lack of transportation? No   Unable to get utilities (heat,electricity)? No         7/16/2025   Dental   Dentist two times every year? (!) NO         Today's PHQ-2 Score:       7/16/2025     9:56 AM   PHQ-2 ( 1999 Pfizer)   Q1: Little interest or pleasure in doing things 0   Q2: Feeling down, depressed or hopeless 0   PHQ-2 Score 0    Q1: Little interest or pleasure in doing things Not at all   Q2: Feeling down, depressed or hopeless Not at all   PHQ-2 Score 0       Patient-reported           7/16/2025   Substance Use   Alcohol more than 3/day or more than 7/wk No   Do you use any other substances recreationally? No     Social History     Tobacco Use    Smoking status: Never    Smokeless tobacco: Never   Vaping Use    Vaping status: Never Used   Substance Use Topics    Alcohol use: Yes  "    Comment: Occasional if there is a party     Drug use: No           7/16/2025   STI Screening   New sexual partner(s) since last STI/HIV test? No         7/16/2025   Contraception/Family Planning   Questions about contraception or family planning No       Reviewed and updated as needed this visit by Provider                    Past Medical History:   Diagnosis Date    NO ACTIVE PROBLEMS      Past Surgical History:   Procedure Laterality Date    NO HISTORY OF SURGERY      OPTICAL TRACKING SYSTEM CRANIOTOMY, EXCISE TUMOR, COMBINED Right 5/4/2023    Procedure: CRANIOTOMY, USING OPTICAL TRACKING SYSTEM, WITH NEOPLASM EXCISION RIGHT FRONTAL CRANIOTOMY WITH RESECTION OF RIGHT FRONTAL LESION USING STEALTH;  Surgeon: Perry Dasilva MD;  Location: [facility] OR         Review of Systems  Constitutional, HEENT, cardiovascular, pulmonary, gi and gu systems are negative, except as otherwise noted.     Objective    Exam  /74   Pulse 64   Temp 97.1  F (36.2  C) (Tympanic)   Resp 16   Ht 1.651 m (5' 5\")   Wt 75.3 kg (166 lb)   SpO2 99%   BMI 27.62 kg/m     Estimated body mass index is 27.62 kg/m  as calculated from the following:    Height as of this encounter: 1.651 m (5' 5\").    Weight as of this encounter: 75.3 kg (166 lb).    Physical Exam  GENERAL: alert and no distress  EYES: Eyes grossly normal to inspection  HENT: ear canals and TM's normal, nose and mouth without ulcers or lesions  NECK: no adenopathy, no asymmetry, masses, or scars  RESP: lungs clear to auscultation - no rales, rhonchi or wheezes  CV: regular rates and rhythm, normal S1 S2, no S3 or S4, and no murmur, click or rub  MS: no gross musculoskeletal defects noted, no edema  PSYCH: mentation appears normal, affect normal/bright        Signed Electronically by: Ryan Rodriguez DO    "

## 2025-07-17 LAB
ALBUMIN SERPL BCG-MCNC: 4.6 G/DL (ref 3.5–5.2)
ALP SERPL-CCNC: 103 U/L (ref 40–150)
ALT SERPL W P-5'-P-CCNC: 20 U/L (ref 0–70)
ANION GAP SERPL CALCULATED.3IONS-SCNC: 11 MMOL/L (ref 7–15)
AST SERPL W P-5'-P-CCNC: 24 U/L (ref 0–45)
BILIRUB SERPL-MCNC: 0.5 MG/DL
BUN SERPL-MCNC: 12.1 MG/DL (ref 6–20)
CALCIUM SERPL-MCNC: 9.3 MG/DL (ref 8.8–10.4)
CHLORIDE SERPL-SCNC: 101 MMOL/L (ref 98–107)
CHOLEST SERPL-MCNC: 196 MG/DL
CREAT SERPL-MCNC: 0.91 MG/DL (ref 0.67–1.17)
EGFRCR SERPLBLD CKD-EPI 2021: >90 ML/MIN/1.73M2
FASTING STATUS PATIENT QL REPORTED: YES
FASTING STATUS PATIENT QL REPORTED: YES
GLUCOSE SERPL-MCNC: 94 MG/DL (ref 70–99)
HCO3 SERPL-SCNC: 26 MMOL/L (ref 22–29)
HDLC SERPL-MCNC: 42 MG/DL
LDLC SERPL CALC-MCNC: 132 MG/DL
NONHDLC SERPL-MCNC: 154 MG/DL
POTASSIUM SERPL-SCNC: 4.5 MMOL/L (ref 3.4–5.3)
PROT SERPL-MCNC: 7.6 G/DL (ref 6.4–8.3)
SODIUM SERPL-SCNC: 138 MMOL/L (ref 135–145)
TRIGL SERPL-MCNC: 112 MG/DL
VIT D+METAB SERPL-MCNC: 38 NG/ML (ref 20–50)

## 2025-07-17 NOTE — PATIENT INSTRUCTIONS
Patient Education   Preventive Care Advice   This is general advice given by our system to help you stay healthy. However, your care team may have specific advice just for you. Please talk to your care team about your preventive care needs.  Nutrition  Eat 5 or more servings of fruits and vegetables each day.  Try wheat bread, brown rice and whole grain pasta (instead of white bread, rice, and pasta).  Get enough calcium and vitamin D. Check the label on foods and aim for 100% of the RDA (recommended daily allowance).  Lifestyle  Exercise at least 150 minutes each week  (30 minutes a day, 5 days a week).  Do muscle strengthening activities 2 days a week. These help control your weight and prevent disease.  No smoking.  Wear sunscreen to prevent skin cancer.  Have a dental exam and cleaning every 6 months.  Yearly exams  See your health care team every year to talk about:  Any changes in your health.  Any medicines your care team has prescribed.  Preventive care, family planning, and ways to prevent chronic diseases.  Shots (vaccines)   HPV shots (up to age 26), if you've never had them before.  Hepatitis B shots (up to age 59), if you've never had them before.  COVID-19 shot: Get this shot when it's due.  Flu shot: Get a flu shot every year.  Tetanus shot: Get a tetanus shot every 10 years.  Pneumococcal, hepatitis A, and RSV shots: Ask your care team if you need these based on your risk.  Shingles shot (for age 50 and up)  General health tests  Diabetes screening:  Starting at age 35, Get screened for diabetes at least every 3 years.  If you are younger than age 35, ask your care team if you should be screened for diabetes.  Cholesterol test: At age 39, start having a cholesterol test every 5 years, or more often if advised.  Bone density scan (DEXA): At age 50, ask your care team if you should have this scan for osteoporosis (brittle bones).  Hepatitis C: Get tested at least once in your life.  STIs (sexually  transmitted infections)  Before age 24: Ask your care team if you should be screened for STIs.  After age 24: Get screened for STIs if you're at risk. You are at risk for STIs (including HIV) if:  You are sexually active with more than one person.  You don't use condoms every time.  You or a partner was diagnosed with a sexually transmitted infection.  If you are at risk for HIV, ask about PrEP medicine to prevent HIV.  Get tested for HIV at least once in your life, whether you are at risk for HIV or not.  Cancer screening tests  Cervical cancer screening: If you have a cervix, begin getting regular cervical cancer screening tests starting at age 21.  Breast cancer scan (mammogram): If you've ever had breasts, begin having regular mammograms starting at age 40. This is a scan to check for breast cancer.  Colon cancer screening: It is important to start screening for colon cancer at age 45.  Have a colonoscopy test every 10 years (or more often if you're at risk) Or, ask your provider about stool tests like a FIT test every year or Cologuard test every 3 years.  To learn more about your testing options, visit:   .  For help making a decision, visit:   https://bit.ly/dj60744.  Prostate cancer screening test: If you have a prostate, ask your care team if a prostate cancer screening test (PSA) at age 55 is right for you.  Lung cancer screening: If you are a current or former smoker ages 50 to 80, ask your care team if ongoing lung cancer screenings are right for you.  For informational purposes only. Not to replace the advice of your health care provider. Copyright   2023 [employer]. All rights reserved. Clinically reviewed by the [facility]. [employer]viral 764792 - REV 01/24.

## (undated) DEVICE — PREP SKIN SCRUB TRAY 4461A

## (undated) DEVICE — DRAIN JACKSON PRATT CHANNEL 10FR RND SIL W/TROCAR JP-2227

## (undated) DEVICE — SU MONOCRYL 4-0 PS-2 18" UND Y496G

## (undated) DEVICE — DRAIN JACKSON PRATT RESERVOIR 100ML SU130-1305

## (undated) DEVICE — DRAPE MICROSCOPE LEICA 54X150" AR8033650

## (undated) DEVICE — TUBING SET IRR MALIS BIPOLAR CORD INTEGRATE 6790-100-003

## (undated) DEVICE — TUBING SUCTION 12"X1/4" N612

## (undated) DEVICE — SPONGE SURGIFOAM 100 1974

## (undated) DEVICE — SU NUROLON 4-0 TF CR 8X18" C584D

## (undated) DEVICE — SOL NACL 0.9% IRRIG 1000ML BOTTLE 2F7124

## (undated) DEVICE — Device

## (undated) DEVICE — NDL BLUNT 17GA 1.5" 8881202330

## (undated) DEVICE — SOL WATER IRRIG 1000ML BOTTLE 2F7114

## (undated) DEVICE — RETR ELASTIC STAYS LONE STAR BLUNT DUAL LEAD 3550-1G

## (undated) DEVICE — SPONGE COTTONOID 1/2X1/2" 80-1400

## (undated) DEVICE — ESU ELEC BLADE 2.75" COATED/INSULATED E1455

## (undated) DEVICE — DRAPE POUCH INSTRUMENT 1018

## (undated) DEVICE — DRSG KERLIX 4 1/2"X4YDS ROLL 6715

## (undated) DEVICE — SURGICEL HEMOSTAT 3X4" NUKNIT 1943

## (undated) DEVICE — LINEN TOWEL PACK X5 5464

## (undated) DEVICE — TOOL DISSECT MIDAS MR8 9CM BALL 6MM DIA MR8-9BA60

## (undated) DEVICE — TOOL DISSECT MIDAS MR8 F2/7CM TAPER 2.3MM DI MR8-F2/7TA23

## (undated) DEVICE — MANIFOLD NEPTUNE 4 PORT 700-20

## (undated) DEVICE — SOL NACL 0.9% INJ 250ML BAG 2B1322Q

## (undated) DEVICE — ESU GROUND PAD UNIVERSAL W/O CORD

## (undated) DEVICE — SU ETHILON 3-0 FS-1 18" 669H

## (undated) DEVICE — DRAPE ISOLATION BAG 1003

## (undated) DEVICE — DRAPE WARMER 66X44" ORS-300

## (undated) DEVICE — RX SURGIFLO HEMOSTATIC MATRIX W/THROMBIN 8ML 2994

## (undated) DEVICE — PACK NEURO SNE15SNFSA

## (undated) DEVICE — PIN SKULL MAYFIELD ADULT TITANIUM 3/PK A1120

## (undated) DEVICE — SU VICRYL 2-0 CT-2 CR 8X18" J726D

## (undated) DEVICE — MARKER SPHERES PASSIVE MEDT PACK 5 8801075

## (undated) RX ORDER — PROPOFOL 10 MG/ML
INJECTION, EMULSION INTRAVENOUS
Status: DISPENSED
Start: 2023-05-04

## (undated) RX ORDER — CEFAZOLIN SODIUM 1 G/3ML
INJECTION, POWDER, FOR SOLUTION INTRAMUSCULAR; INTRAVENOUS
Status: DISPENSED
Start: 2023-05-04

## (undated) RX ORDER — FENTANYL CITRATE 50 UG/ML
INJECTION, SOLUTION INTRAMUSCULAR; INTRAVENOUS
Status: DISPENSED
Start: 2023-05-04

## (undated) RX ORDER — BUPIVACAINE HYDROCHLORIDE AND EPINEPHRINE 5; 5 MG/ML; UG/ML
INJECTION, SOLUTION EPIDURAL; INTRACAUDAL; PERINEURAL
Status: DISPENSED
Start: 2023-05-04

## (undated) RX ORDER — DEXMEDETOMIDINE HYDROCHLORIDE 4 UG/ML
INJECTION, SOLUTION INTRAVENOUS
Status: DISPENSED
Start: 2023-05-04

## (undated) RX ORDER — GINSENG 100 MG
CAPSULE ORAL
Status: DISPENSED
Start: 2023-05-04